# Patient Record
Sex: FEMALE | Race: WHITE | Employment: OTHER | URBAN - METROPOLITAN AREA
[De-identification: names, ages, dates, MRNs, and addresses within clinical notes are randomized per-mention and may not be internally consistent; named-entity substitution may affect disease eponyms.]

---

## 2018-05-29 ENCOUNTER — APPOINTMENT (OUTPATIENT)
Dept: CT IMAGING | Age: 48
End: 2018-05-29
Payer: MEDICARE

## 2018-05-29 ENCOUNTER — HOSPITAL ENCOUNTER (EMERGENCY)
Age: 48
Discharge: HOME | End: 2018-05-29
Attending: EMERGENCY MEDICINE
Payer: MEDICARE

## 2018-05-29 VITALS
WEIGHT: 105 LBS | DIASTOLIC BLOOD PRESSURE: 57 MMHG | RESPIRATION RATE: 18 BRPM | BODY MASS INDEX: 19.32 KG/M2 | HEART RATE: 67 BPM | HEIGHT: 62 IN | OXYGEN SATURATION: 99 % | SYSTOLIC BLOOD PRESSURE: 114 MMHG | TEMPERATURE: 98.2 F

## 2018-05-29 DIAGNOSIS — R10.9 CHRONIC ABDOMINAL PAIN: Primary | ICD-10-CM

## 2018-05-29 DIAGNOSIS — K86.89 PANCREATIC MASS: ICD-10-CM

## 2018-05-29 DIAGNOSIS — G89.29 CHRONIC ABDOMINAL PAIN: Primary | ICD-10-CM

## 2018-05-29 LAB
ALBUMIN SERPL-MCNC: 4.2 G/DL (ref 3.5–5.2)
ALP BLD-CCNC: 50 U/L (ref 35–104)
ALT SERPL-CCNC: 5 U/L (ref 0–32)
AMORPHOUS: NORMAL
ANION GAP SERPL CALCULATED.3IONS-SCNC: 14 MMOL/L (ref 7–16)
AST SERPL-CCNC: 15 U/L (ref 0–31)
BACTERIA: NORMAL /HPF
BASOPHILS ABSOLUTE: 0.04 E9/L (ref 0–0.2)
BASOPHILS RELATIVE PERCENT: 0.5 % (ref 0–2)
BILIRUB SERPL-MCNC: 0.2 MG/DL (ref 0–1.2)
BILIRUBIN URINE: NEGATIVE
BLOOD, URINE: ABNORMAL
BUN BLDV-MCNC: 11 MG/DL (ref 6–20)
CALCIUM SERPL-MCNC: 9.1 MG/DL (ref 8.6–10.2)
CHLORIDE BLD-SCNC: 106 MMOL/L (ref 98–107)
CLARITY: ABNORMAL
CO2: 24 MMOL/L (ref 22–29)
COLOR: YELLOW
CREAT SERPL-MCNC: 0.6 MG/DL (ref 0.5–1)
EOSINOPHILS ABSOLUTE: 0.03 E9/L (ref 0.05–0.5)
EOSINOPHILS RELATIVE PERCENT: 0.4 % (ref 0–6)
EPITHELIAL CELLS, UA: NORMAL /HPF
GFR AFRICAN AMERICAN: >60
GFR NON-AFRICAN AMERICAN: >60 ML/MIN/1.73
GLUCOSE BLD-MCNC: 102 MG/DL (ref 74–109)
GLUCOSE URINE: NEGATIVE MG/DL
HCG(URINE) PREGNANCY TEST: NEGATIVE
HCT VFR BLD CALC: 39.4 % (ref 34–48)
HEMOGLOBIN: 13.4 G/DL (ref 11.5–15.5)
IMMATURE GRANULOCYTES #: 0.01 E9/L
IMMATURE GRANULOCYTES %: 0.1 % (ref 0–5)
KETONES, URINE: NEGATIVE MG/DL
LACTIC ACID: 1 MMOL/L (ref 0.5–2.2)
LEUKOCYTE ESTERASE, URINE: NEGATIVE
LIPASE: 20 U/L (ref 13–60)
LYMPHOCYTES ABSOLUTE: 2.36 E9/L (ref 1.5–4)
LYMPHOCYTES RELATIVE PERCENT: 30.6 % (ref 20–42)
MCH RBC QN AUTO: 30.6 PG (ref 26–35)
MCHC RBC AUTO-ENTMCNC: 34 % (ref 32–34.5)
MCV RBC AUTO: 90 FL (ref 80–99.9)
MONOCYTES ABSOLUTE: 0.4 E9/L (ref 0.1–0.95)
MONOCYTES RELATIVE PERCENT: 5.2 % (ref 2–12)
NEUTROPHILS ABSOLUTE: 4.88 E9/L (ref 1.8–7.3)
NEUTROPHILS RELATIVE PERCENT: 63.2 % (ref 43–80)
NITRITE, URINE: NEGATIVE
PDW BLD-RTO: 13.1 FL (ref 11.5–15)
PH UA: 7.5 (ref 5–9)
PLATELET # BLD: 256 E9/L (ref 130–450)
PMV BLD AUTO: 10.1 FL (ref 7–12)
POTASSIUM SERPL-SCNC: 3.5 MMOL/L (ref 3.5–5)
PROTEIN UA: NEGATIVE MG/DL
RBC # BLD: 4.38 E12/L (ref 3.5–5.5)
RBC UA: >20 /HPF (ref 0–2)
SODIUM BLD-SCNC: 144 MMOL/L (ref 132–146)
SPECIFIC GRAVITY UA: 1.02 (ref 1–1.03)
TOTAL PROTEIN: 6.5 G/DL (ref 6.4–8.3)
UROBILINOGEN, URINE: 0.2 E.U./DL
WBC # BLD: 7.7 E9/L (ref 4.5–11.5)
WBC UA: NORMAL /HPF (ref 0–5)

## 2018-05-29 PROCEDURE — 6360000004 HC RX CONTRAST MEDICATION: Performed by: RADIOLOGY

## 2018-05-29 PROCEDURE — 83690 ASSAY OF LIPASE: CPT

## 2018-05-29 PROCEDURE — 6360000002 HC RX W HCPCS: Performed by: EMERGENCY MEDICINE

## 2018-05-29 PROCEDURE — 85025 COMPLETE CBC W/AUTO DIFF WBC: CPT

## 2018-05-29 PROCEDURE — 96374 THER/PROPH/DIAG INJ IV PUSH: CPT

## 2018-05-29 PROCEDURE — 81001 URINALYSIS AUTO W/SCOPE: CPT

## 2018-05-29 PROCEDURE — 96375 TX/PRO/DX INJ NEW DRUG ADDON: CPT

## 2018-05-29 PROCEDURE — 80053 COMPREHEN METABOLIC PANEL: CPT

## 2018-05-29 PROCEDURE — 99284 EMERGENCY DEPT VISIT MOD MDM: CPT

## 2018-05-29 PROCEDURE — 74177 CT ABD & PELVIS W/CONTRAST: CPT

## 2018-05-29 PROCEDURE — 2580000003 HC RX 258: Performed by: EMERGENCY MEDICINE

## 2018-05-29 PROCEDURE — 81025 URINE PREGNANCY TEST: CPT

## 2018-05-29 PROCEDURE — 6360000002 HC RX W HCPCS

## 2018-05-29 PROCEDURE — 83605 ASSAY OF LACTIC ACID: CPT

## 2018-05-29 RX ORDER — 0.9 % SODIUM CHLORIDE 0.9 %
1000 INTRAVENOUS SOLUTION INTRAVENOUS ONCE
Status: COMPLETED | OUTPATIENT
Start: 2018-05-29 | End: 2018-05-29

## 2018-05-29 RX ORDER — ONDANSETRON 2 MG/ML
4 INJECTION INTRAMUSCULAR; INTRAVENOUS ONCE
Status: COMPLETED | OUTPATIENT
Start: 2018-05-29 | End: 2018-05-29

## 2018-05-29 RX ORDER — HYDROCODONE BITARTRATE AND ACETAMINOPHEN 5; 325 MG/1; MG/1
1 TABLET ORAL EVERY 6 HOURS PRN
Qty: 12 TABLET | Refills: 0 | Status: SHIPPED | OUTPATIENT
Start: 2018-05-29 | End: 2018-06-01

## 2018-05-29 RX ORDER — MORPHINE SULFATE 2 MG/ML
INJECTION, SOLUTION INTRAMUSCULAR; INTRAVENOUS
Status: COMPLETED
Start: 2018-05-29 | End: 2018-05-29

## 2018-05-29 RX ORDER — MORPHINE SULFATE 4 MG/ML
4 INJECTION, SOLUTION INTRAMUSCULAR; INTRAVENOUS ONCE
Status: DISCONTINUED | OUTPATIENT
Start: 2018-05-29 | End: 2018-05-29 | Stop reason: HOSPADM

## 2018-05-29 RX ORDER — ONDANSETRON 4 MG/1
4 TABLET, ORALLY DISINTEGRATING ORAL EVERY 8 HOURS PRN
Qty: 12 TABLET | Refills: 0 | Status: SHIPPED | OUTPATIENT
Start: 2018-05-29 | End: 2019-05-28

## 2018-05-29 RX ADMIN — MORPHINE SULFATE 4 MG: 2 INJECTION, SOLUTION INTRAMUSCULAR; INTRAVENOUS at 15:54

## 2018-05-29 RX ADMIN — IOPAMIDOL 110 ML: 755 INJECTION, SOLUTION INTRAVENOUS at 17:10

## 2018-05-29 RX ADMIN — SODIUM CHLORIDE 1000 ML: 9 INJECTION, SOLUTION INTRAVENOUS at 15:46

## 2018-05-29 RX ADMIN — ONDANSETRON 4 MG: 2 INJECTION INTRAMUSCULAR; INTRAVENOUS at 15:53

## 2018-05-29 ASSESSMENT — PAIN DESCRIPTION - DESCRIPTORS: DESCRIPTORS: SHARP;ACHING

## 2018-05-29 ASSESSMENT — ENCOUNTER SYMPTOMS
VOMITING: 1
DIARRHEA: 1
COUGH: 0
BACK PAIN: 0
BLOOD IN STOOL: 0
NAUSEA: 1
SHORTNESS OF BREATH: 0
ABDOMINAL PAIN: 1

## 2018-05-29 ASSESSMENT — PAIN DESCRIPTION - ONSET: ONSET: PROGRESSIVE

## 2018-05-29 ASSESSMENT — PAIN SCALES - GENERAL
PAINLEVEL_OUTOF10: 10

## 2018-05-29 ASSESSMENT — PAIN DESCRIPTION - FREQUENCY: FREQUENCY: INTERMITTENT

## 2018-05-29 ASSESSMENT — PAIN DESCRIPTION - PROGRESSION: CLINICAL_PROGRESSION: RAPIDLY WORSENING

## 2018-05-29 ASSESSMENT — PAIN DESCRIPTION - ORIENTATION: ORIENTATION: RIGHT;LEFT;MID;LOWER

## 2018-05-29 ASSESSMENT — PAIN DESCRIPTION - LOCATION: LOCATION: ABDOMEN

## 2018-05-29 ASSESSMENT — PAIN DESCRIPTION - PAIN TYPE: TYPE: ACUTE PAIN

## 2018-06-04 ENCOUNTER — HOSPITAL ENCOUNTER (OUTPATIENT)
Dept: GENERAL RADIOLOGY | Age: 48
Discharge: HOME | End: 2018-06-06
Payer: MEDICARE

## 2018-06-04 DIAGNOSIS — R10.9 STOMACH ACHE: ICD-10-CM

## 2018-06-04 PROCEDURE — 74250 X-RAY XM SM INT 1CNTRST STD: CPT

## 2018-06-04 PROCEDURE — 2500000003 HC RX 250 WO HCPCS: Performed by: RADIOLOGY

## 2018-06-04 RX ADMIN — BARIUM SULFATE 176 G: 960 POWDER, FOR SUSPENSION ORAL at 09:32

## 2018-07-07 ENCOUNTER — APPOINTMENT (OUTPATIENT)
Dept: CT IMAGING | Age: 48
End: 2018-07-07
Payer: MEDICARE

## 2018-07-07 ENCOUNTER — HOSPITAL ENCOUNTER (EMERGENCY)
Age: 48
Discharge: HOME | End: 2018-07-07
Attending: EMERGENCY MEDICINE
Payer: MEDICARE

## 2018-07-07 VITALS
SYSTOLIC BLOOD PRESSURE: 108 MMHG | DIASTOLIC BLOOD PRESSURE: 62 MMHG | BODY MASS INDEX: 18.4 KG/M2 | RESPIRATION RATE: 16 BRPM | TEMPERATURE: 97.7 F | WEIGHT: 100 LBS | OXYGEN SATURATION: 95 % | HEART RATE: 69 BPM | HEIGHT: 62 IN

## 2018-07-07 DIAGNOSIS — R19.7 DIARRHEA, UNSPECIFIED TYPE: ICD-10-CM

## 2018-07-07 DIAGNOSIS — I95.9 HYPOTENSION, UNSPECIFIED HYPOTENSION TYPE: ICD-10-CM

## 2018-07-07 DIAGNOSIS — K86.89 PANCREATIC MASS: ICD-10-CM

## 2018-07-07 DIAGNOSIS — K59.00 CONSTIPATION, UNSPECIFIED CONSTIPATION TYPE: Primary | ICD-10-CM

## 2018-07-07 LAB
ALBUMIN SERPL-MCNC: 4.4 G/DL (ref 3.5–5.2)
ALP BLD-CCNC: 53 U/L (ref 35–104)
ALT SERPL-CCNC: <5 U/L (ref 0–32)
ANION GAP SERPL CALCULATED.3IONS-SCNC: 13 MMOL/L (ref 7–16)
AST SERPL-CCNC: 14 U/L (ref 0–31)
BACTERIA: ABNORMAL /HPF
BASOPHILS ABSOLUTE: 0.04 E9/L (ref 0–0.2)
BASOPHILS RELATIVE PERCENT: 0.4 % (ref 0–2)
BILIRUB SERPL-MCNC: 0.3 MG/DL (ref 0–1.2)
BILIRUBIN URINE: NEGATIVE
BLOOD, URINE: NEGATIVE
BUN BLDV-MCNC: 10 MG/DL (ref 6–20)
CALCIUM SERPL-MCNC: 9.5 MG/DL (ref 8.6–10.2)
CHLORIDE BLD-SCNC: 99 MMOL/L (ref 98–107)
CLARITY: CLEAR
CO2: 26 MMOL/L (ref 22–29)
COLOR: YELLOW
CREAT SERPL-MCNC: 0.7 MG/DL (ref 0.5–1)
EOSINOPHILS ABSOLUTE: 0.08 E9/L (ref 0.05–0.5)
EOSINOPHILS RELATIVE PERCENT: 0.9 % (ref 0–6)
EPITHELIAL CELLS, UA: ABNORMAL /HPF
GFR AFRICAN AMERICAN: >60
GFR NON-AFRICAN AMERICAN: >60 ML/MIN/1.73
GLUCOSE BLD-MCNC: 100 MG/DL (ref 74–109)
GLUCOSE URINE: NEGATIVE MG/DL
HCG(URINE) PREGNANCY TEST: NEGATIVE
HCT VFR BLD CALC: 40.3 % (ref 34–48)
HEMOGLOBIN: 13.8 G/DL (ref 11.5–15.5)
IMMATURE GRANULOCYTES #: 0.03 E9/L
IMMATURE GRANULOCYTES %: 0.3 % (ref 0–5)
KETONES, URINE: NEGATIVE MG/DL
LACTIC ACID: 0.8 MMOL/L (ref 0.5–2.2)
LEUKOCYTE ESTERASE, URINE: ABNORMAL
LIPASE: 19 U/L (ref 13–60)
LYMPHOCYTES ABSOLUTE: 2.25 E9/L (ref 1.5–4)
LYMPHOCYTES RELATIVE PERCENT: 25.3 % (ref 20–42)
MCH RBC QN AUTO: 30.8 PG (ref 26–35)
MCHC RBC AUTO-ENTMCNC: 34.2 % (ref 32–34.5)
MCV RBC AUTO: 90 FL (ref 80–99.9)
MONOCYTES ABSOLUTE: 0.48 E9/L (ref 0.1–0.95)
MONOCYTES RELATIVE PERCENT: 5.4 % (ref 2–12)
MUCUS: PRESENT
NEUTROPHILS ABSOLUTE: 6.03 E9/L (ref 1.8–7.3)
NEUTROPHILS RELATIVE PERCENT: 67.7 % (ref 43–80)
NITRITE, URINE: NEGATIVE
PDW BLD-RTO: 13.1 FL (ref 11.5–15)
PH UA: 6.5 (ref 5–9)
PLATELET # BLD: 256 E9/L (ref 130–450)
PMV BLD AUTO: 9.7 FL (ref 7–12)
POTASSIUM SERPL-SCNC: 4.1 MMOL/L (ref 3.5–5)
PROTEIN UA: NEGATIVE MG/DL
RBC # BLD: 4.48 E12/L (ref 3.5–5.5)
RBC UA: ABNORMAL /HPF (ref 0–2)
SODIUM BLD-SCNC: 138 MMOL/L (ref 132–146)
SPECIFIC GRAVITY UA: <=1.005 (ref 1–1.03)
TOTAL PROTEIN: 6.9 G/DL (ref 6.4–8.3)
UROBILINOGEN, URINE: 0.2 E.U./DL
WBC # BLD: 8.9 E9/L (ref 4.5–11.5)
WBC UA: ABNORMAL /HPF (ref 0–5)

## 2018-07-07 PROCEDURE — 81025 URINE PREGNANCY TEST: CPT

## 2018-07-07 PROCEDURE — 74177 CT ABD & PELVIS W/CONTRAST: CPT

## 2018-07-07 PROCEDURE — 83605 ASSAY OF LACTIC ACID: CPT

## 2018-07-07 PROCEDURE — 96375 TX/PRO/DX INJ NEW DRUG ADDON: CPT

## 2018-07-07 PROCEDURE — C9113 INJ PANTOPRAZOLE SODIUM, VIA: HCPCS | Performed by: EMERGENCY MEDICINE

## 2018-07-07 PROCEDURE — 83690 ASSAY OF LIPASE: CPT

## 2018-07-07 PROCEDURE — 99284 EMERGENCY DEPT VISIT MOD MDM: CPT

## 2018-07-07 PROCEDURE — 6360000004 HC RX CONTRAST MEDICATION: Performed by: RADIOLOGY

## 2018-07-07 PROCEDURE — 6360000002 HC RX W HCPCS: Performed by: EMERGENCY MEDICINE

## 2018-07-07 PROCEDURE — 85025 COMPLETE CBC W/AUTO DIFF WBC: CPT

## 2018-07-07 PROCEDURE — 81001 URINALYSIS AUTO W/SCOPE: CPT

## 2018-07-07 PROCEDURE — 96374 THER/PROPH/DIAG INJ IV PUSH: CPT

## 2018-07-07 PROCEDURE — 80053 COMPREHEN METABOLIC PANEL: CPT

## 2018-07-07 PROCEDURE — 2580000003 HC RX 258: Performed by: EMERGENCY MEDICINE

## 2018-07-07 RX ORDER — ONDANSETRON 2 MG/ML
4 INJECTION INTRAMUSCULAR; INTRAVENOUS ONCE
Status: COMPLETED | OUTPATIENT
Start: 2018-07-07 | End: 2018-07-07

## 2018-07-07 RX ORDER — PANTOPRAZOLE SODIUM 40 MG/10ML
40 INJECTION, POWDER, LYOPHILIZED, FOR SOLUTION INTRAVENOUS ONCE
Status: COMPLETED | OUTPATIENT
Start: 2018-07-07 | End: 2018-07-07

## 2018-07-07 RX ORDER — SODIUM CHLORIDE 9 MG/ML
INJECTION, SOLUTION INTRAVENOUS ONCE
Status: DISCONTINUED | OUTPATIENT
Start: 2018-07-07 | End: 2018-07-07 | Stop reason: HOSPADM

## 2018-07-07 RX ORDER — 0.9 % SODIUM CHLORIDE 0.9 %
500 INTRAVENOUS SOLUTION INTRAVENOUS ONCE
Status: COMPLETED | OUTPATIENT
Start: 2018-07-07 | End: 2018-07-07

## 2018-07-07 RX ORDER — DICYCLOMINE HYDROCHLORIDE 10 MG/1
20 CAPSULE ORAL 3 TIMES DAILY PRN
Qty: 20 CAPSULE | Refills: 0 | Status: SHIPPED | OUTPATIENT
Start: 2018-07-07 | End: 2018-10-09

## 2018-07-07 RX ORDER — DOCUSATE SODIUM 100 MG/1
100 CAPSULE, LIQUID FILLED ORAL 2 TIMES DAILY PRN
Qty: 20 CAPSULE | Refills: 0 | Status: SHIPPED | OUTPATIENT
Start: 2018-07-07 | End: 2018-07-12

## 2018-07-07 RX ORDER — SODIUM CHLORIDE 9 MG/ML
INJECTION, SOLUTION INTRAVENOUS ONCE
Status: COMPLETED | OUTPATIENT
Start: 2018-07-07 | End: 2018-07-07

## 2018-07-07 RX ADMIN — PANTOPRAZOLE SODIUM 40 MG: 40 INJECTION, POWDER, FOR SOLUTION INTRAVENOUS at 03:37

## 2018-07-07 RX ADMIN — ONDANSETRON 4 MG: 2 INJECTION INTRAMUSCULAR; INTRAVENOUS at 03:37

## 2018-07-07 RX ADMIN — SODIUM CHLORIDE: 9 INJECTION, SOLUTION INTRAVENOUS at 03:37

## 2018-07-07 RX ADMIN — SODIUM CHLORIDE 500 ML: 9 INJECTION, SOLUTION INTRAVENOUS at 03:37

## 2018-07-07 RX ADMIN — IOPAMIDOL 110 ML: 755 INJECTION, SOLUTION INTRAVENOUS at 04:24

## 2018-07-07 ASSESSMENT — PAIN SCALES - GENERAL: PAINLEVEL_OUTOF10: 10

## 2018-07-07 NOTE — ED PROVIDER NOTES
Department of Emergency Medicine   ED  Provider Note  Admit Date/RoomTime: 7/7/2018  1:22 AM  ED Room: 22/22      History of Present Illness:  7/7/18, Time: 2:26 AM         Jerlene Hamman is a 50 y.o. female presenting to the ED for abdominal pain. The complaint has been persistent, moderate in severity, and worsened by nothing. Patient reports that she has been having abdominal pain for the past several months. Reports that she has not had a BM for 5 days and her last BM was \"runny\". Notes that she had diarrhea prior to being constipated. She states that she did see a general surgeon at [facility] today who advised her to return to the clinic for follow-up and MRCP in 1 year and see PCP and/or gastroenterologist for ongoing abdominal pain/digestive issues. Dx with IBS. Pt has no other complaints. Pt denies any loc, chest pain, sob, palpitations, fever, chills, nausea, vomiting, neck pain, extremity pain, edema, HA, cough, congestion, visual disturbances, dysuria, hematuria, weakness, numbness, tingling, dizziness or any other further symptoms at this time. Review of Systems:   Pertinent positives and negatives are stated within HPI, all other systems reviewed and are negative.    --------------------------------------------- PAST HISTORY ------------------------------------------  Past Medical History:  has a past medical history of Anxiety; Bipolar 1 disorder ([facility] [address] and PTSD (post-traumatic stress disorder). Past Surgical History:  has a past surgical history that includes tumor removal.    Social History:  reports that she has been smoking Cigarettes. She has been smoking about 0.50 packs per day. She has never used smokeless tobacco. She reports that she does not drink alcohol or use drugs. Family History: family history is not on file. The patients home medications have been reviewed. Allergies: Ativan [lorazepam];  Seroquel [quetiapine fumarate]; and Sulfa antibiotics    ----------------------------------------------PHYSICAL EXAM--------------------------------------    Constitutional/General: Alert and oriented x3, well appearing, non toxic in NAD  Head: Normocephalic and atraumatic  Eyes: PERRL, EOMI, conjunctiva normal, sclera non icteric  Mouth: Oropharynx clear  Neck: Supple, no JVD, full ROM,  Respiratory: Lungs clear to auscultation bilaterally, no wheezes, rales, or rhonchi. Not in respiratory distress  Cardiovascular:  Regular rate. Regular rhythm. No murmurs, gallops, or rubs. 2+ distal pulses  Chest: No chest wall tenderness  GI:  Abdomen Soft, Non tender, Non distended. +BS. No rebound, guarding, or rigidity. No pulsatile masses. Musculoskeletal: Moves all extremities x 4. Warm and well perfused, no clubbing, cyanosis, or edema. Integument: Skin warm and dry. No rashes. Neurologic: GCS 15, no focal deficits, symmetric strength 5/5 in the upper and lower extremities bilaterally  Psychiatric: Normal Affect    -------------------------------------------------- RESULTS -------------------------------------------------  I have personally reviewed all laboratory and imaging results for this patient. Results are listed below.      LABS:  Results for orders placed or performed during the hospital encounter of 07/07/18   Comprehensive Metabolic Panel   Result Value Ref Range    Sodium 138 132 - 146 mmol/L    Potassium 4.1 3.5 - 5.0 mmol/L    Chloride 99 98 - 107 mmol/L    CO2 26 22 - 29 mmol/L    Anion Gap 13 7 - 16 mmol/L    Glucose 100 74 - 109 mg/dL    BUN 10 6 - 20 mg/dL    CREATININE 0.7 0.5 - 1.0 mg/dL    GFR Non-African American >60 >=60 mL/min/1.73    GFR African American >60     Calcium 9.5 8.6 - 10.2 mg/dL    Total Protein 6.9 6.4 - 8.3 g/dL    Alb 4.4 3.5 - 5.2 g/dL    Total Bilirubin 0.3 0.0 - 1.2 mg/dL    Alkaline Phosphatase 53 35 - 104 U/L    ALT <5 0 - 32 U/L    AST 14 0 - 31 U/L   CBC Auto Differential   Result Value Ref Range    WBC 8.9 4.5 -

## 2018-10-08 ENCOUNTER — TELEPHONE (OUTPATIENT)
Dept: SURGERY | Age: 48
End: 2018-10-08

## 2018-10-09 ENCOUNTER — HOSPITAL ENCOUNTER (OUTPATIENT)
Age: 48
Discharge: HOME | End: 2018-10-11
Payer: MEDICARE

## 2018-10-09 ENCOUNTER — PROCEDURE VISIT (OUTPATIENT)
Dept: SURGERY | Age: 48
End: 2018-10-09
Payer: MEDICARE

## 2018-10-09 VITALS
DIASTOLIC BLOOD PRESSURE: 56 MMHG | BODY MASS INDEX: 18.4 KG/M2 | OXYGEN SATURATION: 97 % | HEART RATE: 90 BPM | WEIGHT: 100 LBS | TEMPERATURE: 97.3 F | SYSTOLIC BLOOD PRESSURE: 101 MMHG | HEIGHT: 62 IN

## 2018-10-09 DIAGNOSIS — L72.3 SEBACEOUS CYST OF LEFT AXILLA: ICD-10-CM

## 2018-10-09 DIAGNOSIS — L08.9 INFECTED EPIDERMOID CYST: Primary | ICD-10-CM

## 2018-10-09 DIAGNOSIS — L72.0 INFECTED EPIDERMOID CYST: Primary | ICD-10-CM

## 2018-10-09 PROCEDURE — G8427 DOCREV CUR MEDS BY ELIG CLIN: HCPCS | Performed by: SURGERY

## 2018-10-09 PROCEDURE — 99203 OFFICE O/P NEW LOW 30 MIN: CPT | Performed by: SURGERY

## 2018-10-09 PROCEDURE — G8484 FLU IMMUNIZE NO ADMIN: HCPCS | Performed by: SURGERY

## 2018-10-09 PROCEDURE — 87205 SMEAR GRAM STAIN: CPT

## 2018-10-09 PROCEDURE — 4004F PT TOBACCO SCREEN RCVD TLK: CPT | Performed by: SURGERY

## 2018-10-09 PROCEDURE — 87070 CULTURE OTHR SPECIMN AEROBIC: CPT

## 2018-10-09 PROCEDURE — G8419 CALC BMI OUT NRM PARAM NOF/U: HCPCS | Performed by: SURGERY

## 2018-10-09 RX ORDER — GABAPENTIN 600 MG/1
600 TABLET ORAL 3 TIMES DAILY
COMMUNITY
End: 2019-06-11 | Stop reason: SDUPTHER

## 2018-10-09 RX ORDER — LIDOCAINE HYDROCHLORIDE 10 MG/ML
10 INJECTION, SOLUTION EPIDURAL; INFILTRATION; INTRACAUDAL; PERINEURAL ONCE
Status: DISCONTINUED | OUTPATIENT
Start: 2018-10-09 | End: 2019-11-15

## 2018-10-09 RX ORDER — PRIMIDONE 50 MG/1
50 TABLET ORAL EVERY EVENING
Status: ON HOLD | COMMUNITY
Start: 2017-07-31 | End: 2019-01-03 | Stop reason: ALTCHOICE

## 2018-10-12 LAB
GRAM STAIN RESULT: NORMAL
WOUND/ABSCESS: NORMAL

## 2018-10-19 ENCOUNTER — OFFICE VISIT (OUTPATIENT)
Dept: SURGERY | Age: 48
End: 2018-10-19
Payer: MEDICARE

## 2018-10-19 VITALS
HEIGHT: 62 IN | WEIGHT: 100 LBS | BODY MASS INDEX: 18.4 KG/M2 | TEMPERATURE: 98.6 F | DIASTOLIC BLOOD PRESSURE: 70 MMHG | HEART RATE: 71 BPM | SYSTOLIC BLOOD PRESSURE: 100 MMHG | OXYGEN SATURATION: 97 %

## 2018-10-19 DIAGNOSIS — L72.0 INFECTED EPIDERMOID CYST: Primary | ICD-10-CM

## 2018-10-19 DIAGNOSIS — L08.9 INFECTED EPIDERMOID CYST: Primary | ICD-10-CM

## 2018-10-19 PROCEDURE — G8427 DOCREV CUR MEDS BY ELIG CLIN: HCPCS | Performed by: SURGERY

## 2018-10-19 PROCEDURE — 4004F PT TOBACCO SCREEN RCVD TLK: CPT | Performed by: SURGERY

## 2018-10-19 PROCEDURE — G8419 CALC BMI OUT NRM PARAM NOF/U: HCPCS | Performed by: SURGERY

## 2018-10-19 PROCEDURE — G8484 FLU IMMUNIZE NO ADMIN: HCPCS | Performed by: SURGERY

## 2018-10-19 PROCEDURE — 99213 OFFICE O/P EST LOW 20 MIN: CPT | Performed by: SURGERY

## 2018-12-11 ENCOUNTER — OFFICE VISIT (OUTPATIENT)
Dept: ORTHOPEDIC SURGERY | Age: 48
End: 2018-12-11
Payer: MEDICARE

## 2018-12-11 VITALS
SYSTOLIC BLOOD PRESSURE: 118 MMHG | TEMPERATURE: 98.2 F | WEIGHT: 105 LBS | HEART RATE: 73 BPM | HEIGHT: 62 IN | DIASTOLIC BLOOD PRESSURE: 82 MMHG | BODY MASS INDEX: 19.32 KG/M2

## 2018-12-11 DIAGNOSIS — M25.551 RIGHT HIP PAIN: Primary | ICD-10-CM

## 2018-12-11 PROCEDURE — G8484 FLU IMMUNIZE NO ADMIN: HCPCS | Performed by: ORTHOPAEDIC SURGERY

## 2018-12-11 PROCEDURE — 99203 OFFICE O/P NEW LOW 30 MIN: CPT | Performed by: ORTHOPAEDIC SURGERY

## 2018-12-11 PROCEDURE — 4004F PT TOBACCO SCREEN RCVD TLK: CPT | Performed by: ORTHOPAEDIC SURGERY

## 2018-12-11 PROCEDURE — G8427 DOCREV CUR MEDS BY ELIG CLIN: HCPCS | Performed by: ORTHOPAEDIC SURGERY

## 2018-12-11 PROCEDURE — G8420 CALC BMI NORM PARAMETERS: HCPCS | Performed by: ORTHOPAEDIC SURGERY

## 2018-12-11 RX ORDER — CYCLOBENZAPRINE HCL 10 MG
10 TABLET ORAL
COMMUNITY
Start: 2017-07-31 | End: 2019-06-11 | Stop reason: SDUPTHER

## 2018-12-17 ENCOUNTER — TELEPHONE (OUTPATIENT)
Dept: ORTHOPEDIC SURGERY | Age: 48
End: 2018-12-17

## 2018-12-21 RX ORDER — PROPRANOLOL HCL 60 MG
60 CAPSULE, EXTENDED RELEASE 24HR ORAL 2 TIMES DAILY
COMMUNITY
End: 2019-06-11 | Stop reason: SDUPTHER

## 2018-12-27 ENCOUNTER — TELEPHONE (OUTPATIENT)
Dept: ORTHOPEDIC SURGERY | Age: 48
End: 2018-12-27

## 2018-12-27 NOTE — TELEPHONE ENCOUNTER
Pre [facility] is required for R hip injection under fluro (CPT: 77380) Follow Medicare guidelines.

## 2018-12-27 NOTE — TELEPHONE ENCOUNTER
Pt notified of all appt dates, times, and locations. Pt verbalized understanding and she is agreeable to this plan.

## 2019-01-02 ENCOUNTER — ANESTHESIA EVENT (OUTPATIENT)
Dept: OPERATING ROOM | Age: 49
End: 2019-01-02
Payer: MEDICARE

## 2019-01-03 ENCOUNTER — APPOINTMENT (OUTPATIENT)
Dept: GENERAL RADIOLOGY | Age: 49
End: 2019-01-03
Attending: ORTHOPAEDIC SURGERY
Payer: MEDICARE

## 2019-01-03 ENCOUNTER — HOSPITAL ENCOUNTER (OUTPATIENT)
Age: 49
Setting detail: OUTPATIENT SURGERY
Discharge: HOME | End: 2019-01-03
Attending: ORTHOPAEDIC SURGERY | Admitting: ORTHOPAEDIC SURGERY
Payer: MEDICARE

## 2019-01-03 ENCOUNTER — ANESTHESIA (OUTPATIENT)
Dept: OPERATING ROOM | Age: 49
End: 2019-01-03
Payer: MEDICARE

## 2019-01-03 VITALS
BODY MASS INDEX: 20.24 KG/M2 | TEMPERATURE: 97.2 F | DIASTOLIC BLOOD PRESSURE: 60 MMHG | OXYGEN SATURATION: 98 % | RESPIRATION RATE: 20 BRPM | SYSTOLIC BLOOD PRESSURE: 123 MMHG | WEIGHT: 110 LBS | HEIGHT: 62 IN | HEART RATE: 78 BPM

## 2019-01-03 VITALS — DIASTOLIC BLOOD PRESSURE: 55 MMHG | OXYGEN SATURATION: 98 % | SYSTOLIC BLOOD PRESSURE: 96 MMHG

## 2019-01-03 LAB — HCG(URINE) PREGNANCY TEST: NEGATIVE

## 2019-01-03 PROCEDURE — 2580000003 HC RX 258: Performed by: NURSE ANESTHETIST, CERTIFIED REGISTERED

## 2019-01-03 PROCEDURE — 3600000002 HC SURGERY LEVEL 2 BASE: Performed by: ORTHOPAEDIC SURGERY

## 2019-01-03 PROCEDURE — 77002 NEEDLE LOCALIZATION BY XRAY: CPT | Performed by: ORTHOPAEDIC SURGERY

## 2019-01-03 PROCEDURE — 3700000001 HC ADD 15 MINUTES (ANESTHESIA): Performed by: ORTHOPAEDIC SURGERY

## 2019-01-03 PROCEDURE — 81025 URINE PREGNANCY TEST: CPT

## 2019-01-03 PROCEDURE — 7100000010 HC PHASE II RECOVERY - FIRST 15 MIN: Performed by: ORTHOPAEDIC SURGERY

## 2019-01-03 PROCEDURE — 6360000002 HC RX W HCPCS: Performed by: ORTHOPAEDIC SURGERY

## 2019-01-03 PROCEDURE — 6360000002 HC RX W HCPCS: Performed by: NURSE ANESTHETIST, CERTIFIED REGISTERED

## 2019-01-03 PROCEDURE — 7100000011 HC PHASE II RECOVERY - ADDTL 15 MIN: Performed by: ORTHOPAEDIC SURGERY

## 2019-01-03 PROCEDURE — 2500000003 HC RX 250 WO HCPCS: Performed by: ORTHOPAEDIC SURGERY

## 2019-01-03 PROCEDURE — 20610 DRAIN/INJ JOINT/BURSA W/O US: CPT | Performed by: ORTHOPAEDIC SURGERY

## 2019-01-03 PROCEDURE — 2709999900 HC NON-CHARGEABLE SUPPLY: Performed by: ORTHOPAEDIC SURGERY

## 2019-01-03 PROCEDURE — 3209999900 FLUORO FOR SURGICAL PROCEDURES

## 2019-01-03 PROCEDURE — 6360000004 HC RX CONTRAST MEDICATION: Performed by: ORTHOPAEDIC SURGERY

## 2019-01-03 PROCEDURE — 3600000012 HC SURGERY LEVEL 2 ADDTL 15MIN: Performed by: ORTHOPAEDIC SURGERY

## 2019-01-03 PROCEDURE — 3700000000 HC ANESTHESIA ATTENDED CARE: Performed by: ORTHOPAEDIC SURGERY

## 2019-01-03 RX ORDER — SODIUM CHLORIDE 9 MG/ML
INJECTION, SOLUTION INTRAVENOUS CONTINUOUS
Status: DISCONTINUED | OUTPATIENT
Start: 2019-01-03 | End: 2019-01-03 | Stop reason: HOSPADM

## 2019-01-03 RX ORDER — SODIUM CHLORIDE 9 MG/ML
INJECTION, SOLUTION INTRAVENOUS CONTINUOUS PRN
Status: DISCONTINUED | OUTPATIENT
Start: 2019-01-03 | End: 2019-01-03 | Stop reason: SDUPTHER

## 2019-01-03 RX ORDER — PROPOFOL 10 MG/ML
INJECTION, EMULSION INTRAVENOUS PRN
Status: DISCONTINUED | OUTPATIENT
Start: 2019-01-03 | End: 2019-01-03 | Stop reason: SDUPTHER

## 2019-01-03 RX ORDER — LIDOCAINE HYDROCHLORIDE 20 MG/ML
INJECTION, SOLUTION INFILTRATION; PERINEURAL PRN
Status: DISCONTINUED | OUTPATIENT
Start: 2019-01-03 | End: 2019-01-03 | Stop reason: HOSPADM

## 2019-01-03 RX ORDER — HYDROCODONE BITARTRATE AND ACETAMINOPHEN 5; 325 MG/1; MG/1
2 TABLET ORAL PRN
Status: DISCONTINUED | OUTPATIENT
Start: 2019-01-03 | End: 2019-01-03 | Stop reason: HOSPADM

## 2019-01-03 RX ORDER — FENTANYL CITRATE 50 UG/ML
INJECTION, SOLUTION INTRAMUSCULAR; INTRAVENOUS PRN
Status: DISCONTINUED | OUTPATIENT
Start: 2019-01-03 | End: 2019-01-03 | Stop reason: SDUPTHER

## 2019-01-03 RX ORDER — SODIUM CHLORIDE 0.9 % (FLUSH) 0.9 %
10 SYRINGE (ML) INJECTION PRN
Status: DISCONTINUED | OUTPATIENT
Start: 2019-01-03 | End: 2019-01-03 | Stop reason: HOSPADM

## 2019-01-03 RX ORDER — HYDROCODONE BITARTRATE AND ACETAMINOPHEN 5; 325 MG/1; MG/1
1 TABLET ORAL PRN
Status: DISCONTINUED | OUTPATIENT
Start: 2019-01-03 | End: 2019-01-03 | Stop reason: HOSPADM

## 2019-01-03 RX ORDER — PRIMIDONE 50 MG/1
50 TABLET ORAL NIGHTLY
COMMUNITY
End: 2019-05-28

## 2019-01-03 RX ORDER — MIDAZOLAM HYDROCHLORIDE 1 MG/ML
INJECTION INTRAMUSCULAR; INTRAVENOUS PRN
Status: DISCONTINUED | OUTPATIENT
Start: 2019-01-03 | End: 2019-01-03 | Stop reason: SDUPTHER

## 2019-01-03 RX ORDER — SODIUM CHLORIDE 0.9 % (FLUSH) 0.9 %
10 SYRINGE (ML) INJECTION EVERY 12 HOURS SCHEDULED
Status: DISCONTINUED | OUTPATIENT
Start: 2019-01-03 | End: 2019-01-03 | Stop reason: HOSPADM

## 2019-01-03 RX ADMIN — SODIUM CHLORIDE: 9 INJECTION, SOLUTION INTRAVENOUS at 07:02

## 2019-01-03 RX ADMIN — FENTANYL CITRATE 25 MCG: 50 INJECTION, SOLUTION INTRAMUSCULAR; INTRAVENOUS at 07:08

## 2019-01-03 RX ADMIN — MIDAZOLAM HYDROCHLORIDE 1 MG: 1 INJECTION, SOLUTION INTRAMUSCULAR; INTRAVENOUS at 07:06

## 2019-01-03 RX ADMIN — MIDAZOLAM HYDROCHLORIDE 1 MG: 1 INJECTION, SOLUTION INTRAMUSCULAR; INTRAVENOUS at 07:02

## 2019-01-03 RX ADMIN — PROPOFOL 100 MG: 10 INJECTION, EMULSION INTRAVENOUS at 07:08

## 2019-01-03 ASSESSMENT — PULMONARY FUNCTION TESTS
PIF_VALUE: 1
PIF_VALUE: 0
PIF_VALUE: 1
PIF_VALUE: 1
PIF_VALUE: 0
PIF_VALUE: 0
PIF_VALUE: 1
PIF_VALUE: 1
PIF_VALUE: 0
PIF_VALUE: 0
PIF_VALUE: 1
PIF_VALUE: 1
PIF_VALUE: 0
PIF_VALUE: 1
PIF_VALUE: 0
PIF_VALUE: 1

## 2019-01-03 ASSESSMENT — PAIN DESCRIPTION - ORIENTATION
ORIENTATION: RIGHT

## 2019-01-03 ASSESSMENT — PAIN DESCRIPTION - LOCATION
LOCATION: HIP

## 2019-01-03 ASSESSMENT — PAIN SCALES - GENERAL
PAINLEVEL_OUTOF10: 0

## 2019-01-03 ASSESSMENT — PAIN DESCRIPTION - PAIN TYPE
TYPE: ACUTE PAIN

## 2019-01-03 ASSESSMENT — LIFESTYLE VARIABLES: SMOKING_STATUS: 1

## 2019-01-03 ASSESSMENT — PAIN DESCRIPTION - PROGRESSION
CLINICAL_PROGRESSION: NOT CHANGED
CLINICAL_PROGRESSION: NOT CHANGED

## 2019-01-03 ASSESSMENT — PAIN - FUNCTIONAL ASSESSMENT: PAIN_FUNCTIONAL_ASSESSMENT: 0-10

## 2019-01-15 ENCOUNTER — TELEPHONE (OUTPATIENT)
Dept: ORTHOPEDIC SURGERY | Age: 49
End: 2019-01-15

## 2019-04-18 ENCOUNTER — TELEPHONE (OUTPATIENT)
Dept: ORTHOPEDIC SURGERY | Age: 49
End: 2019-04-18

## 2019-05-28 ENCOUNTER — OFFICE VISIT (OUTPATIENT)
Dept: ORTHOPEDIC SURGERY | Age: 49
End: 2019-05-28
Payer: MEDICARE

## 2019-05-28 VITALS
SYSTOLIC BLOOD PRESSURE: 118 MMHG | HEIGHT: 62 IN | DIASTOLIC BLOOD PRESSURE: 77 MMHG | WEIGHT: 110 LBS | HEART RATE: 71 BPM | BODY MASS INDEX: 20.24 KG/M2

## 2019-05-28 DIAGNOSIS — M25.859 FEMORAL ACETABULAR IMPINGEMENT: Primary | ICD-10-CM

## 2019-05-28 DIAGNOSIS — M25.551 RIGHT HIP PAIN: ICD-10-CM

## 2019-05-28 PROCEDURE — 4004F PT TOBACCO SCREEN RCVD TLK: CPT | Performed by: ORTHOPAEDIC SURGERY

## 2019-05-28 PROCEDURE — G8420 CALC BMI NORM PARAMETERS: HCPCS | Performed by: ORTHOPAEDIC SURGERY

## 2019-05-28 PROCEDURE — 99213 OFFICE O/P EST LOW 20 MIN: CPT | Performed by: ORTHOPAEDIC SURGERY

## 2019-05-28 PROCEDURE — G8427 DOCREV CUR MEDS BY ELIG CLIN: HCPCS | Performed by: ORTHOPAEDIC SURGERY

## 2019-05-28 RX ORDER — VORTIOXETINE 20 MG/1
TABLET, FILM COATED ORAL
Refills: 0 | COMMUNITY
Start: 2019-05-23 | End: 2020-01-23 | Stop reason: ALTCHOICE

## 2019-05-28 NOTE — PROGRESS NOTES
Post Operative Visit     Surgery: Right hip arthrogram and injection under fluoroscopy, with anesthesia   Date: 1/3/19    Subjective:    Meghann Nielsen is here for follow up visit s/p above procedure. She states she got about 1 week of relief from the injection. Her pain has returned. It is mainly in her groin. It is essentially constant. Controlled Substances Monitoring:        Physical Exam:    Height: 5' 2\" (1.575 m), Weight: 110 lb (49.9 kg), BP: 118/77    On exam of the hip, there is positive impingement testing. Hip flexion to 90° is painful. There is no tenderness laterally or posterior      Imaging: No new imaging. Previous x-rays were reviewed showing cam bump on femur with maintained joint space    Assessment and Plan:  Right hip pain with femoral acetabular impingement  We discussed her pain today. I would like to obtain an MR arthrogram to assess her hip. She may be a candidate for arthroscopic management.   We will see her back once the MRI is complete    Adrianna Truong MD  Orthopaedic Surgery   5/28/19  9:40 AM

## 2019-06-04 ENCOUNTER — TELEPHONE (OUTPATIENT)
Dept: ORTHOPEDIC SURGERY | Age: 49
End: 2019-06-04

## 2019-06-04 ENCOUNTER — TELEPHONE (OUTPATIENT)
Dept: FAMILY MEDICINE CLINIC | Age: 49
End: 2019-06-04

## 2019-06-04 DIAGNOSIS — G43.709 CHRONIC MIGRAINE WITHOUT AURA WITHOUT STATUS MIGRAINOSUS, NOT INTRACTABLE: Primary | ICD-10-CM

## 2019-06-04 NOTE — TELEPHONE ENCOUNTER
Tried to return pt's vm in ref to her MRI/arthrogram, I was unable to leave vm. I verified MRI/arthrogram order was placed.  Pt should call scheduling at [phone]

## 2019-06-07 ENCOUNTER — TELEPHONE (OUTPATIENT)
Dept: FAMILY MEDICINE CLINIC | Age: 49
End: 2019-06-07

## 2019-06-10 PROBLEM — F33.41 RECURRENT MAJOR DEPRESSION IN PARTIAL REMISSION (HCC): Status: ACTIVE | Noted: 2017-08-02

## 2019-06-10 PROBLEM — F41.9 ANXIETY: Status: ACTIVE | Noted: 2017-08-02

## 2019-06-10 PROBLEM — G44.52 NEW DAILY PERSISTENT HEADACHE: Status: ACTIVE | Noted: 2017-08-02

## 2019-06-11 ENCOUNTER — TELEPHONE (OUTPATIENT)
Dept: FAMILY MEDICINE CLINIC | Age: 49
End: 2019-06-11

## 2019-06-11 ENCOUNTER — OFFICE VISIT (OUTPATIENT)
Dept: FAMILY MEDICINE CLINIC | Age: 49
End: 2019-06-11
Payer: MEDICARE

## 2019-06-11 ENCOUNTER — HOSPITAL ENCOUNTER (OUTPATIENT)
Age: 49
Discharge: HOME | End: 2019-06-13
Payer: MEDICARE

## 2019-06-11 VITALS — HEIGHT: 62 IN | RESPIRATION RATE: 15 BRPM | BODY MASS INDEX: 21.16 KG/M2 | WEIGHT: 115 LBS

## 2019-06-11 DIAGNOSIS — K58.0 IRRITABLE BOWEL SYNDROME WITH DIARRHEA: ICD-10-CM

## 2019-06-11 DIAGNOSIS — F33.41 RECURRENT MAJOR DEPRESSION IN PARTIAL REMISSION (HCC): Primary | ICD-10-CM

## 2019-06-11 DIAGNOSIS — F33.41 RECURRENT MAJOR DEPRESSION IN PARTIAL REMISSION (HCC): ICD-10-CM

## 2019-06-11 DIAGNOSIS — G44.52 NEW DAILY PERSISTENT HEADACHE: ICD-10-CM

## 2019-06-11 LAB
ALBUMIN SERPL-MCNC: 4.6 G/DL (ref 3.5–5.2)
ALP BLD-CCNC: 78 U/L (ref 35–104)
ALT SERPL-CCNC: 5 U/L (ref 0–32)
AMORPHOUS: ABNORMAL
ANION GAP SERPL CALCULATED.3IONS-SCNC: 12 MMOL/L (ref 7–16)
AST SERPL-CCNC: 15 U/L (ref 0–31)
BACTERIA: ABNORMAL /HPF
BASOPHILS ABSOLUTE: 0.04 E9/L (ref 0–0.2)
BASOPHILS RELATIVE PERCENT: 0.6 % (ref 0–2)
BILIRUB SERPL-MCNC: 0.4 MG/DL (ref 0–1.2)
BILIRUBIN URINE: NEGATIVE
BLOOD, URINE: NEGATIVE
BUN BLDV-MCNC: 11 MG/DL (ref 6–20)
CALCIUM SERPL-MCNC: 9.9 MG/DL (ref 8.6–10.2)
CHLORIDE BLD-SCNC: 105 MMOL/L (ref 98–107)
CHOLESTEROL, TOTAL: 225 MG/DL (ref 0–199)
CLARITY: ABNORMAL
CO2: 26 MMOL/L (ref 22–29)
COLOR: YELLOW
CREAT SERPL-MCNC: 0.7 MG/DL (ref 0.5–1)
EOSINOPHILS ABSOLUTE: 0.06 E9/L (ref 0.05–0.5)
EOSINOPHILS RELATIVE PERCENT: 0.9 % (ref 0–6)
EPITHELIAL CELLS, UA: ABNORMAL /HPF
GFR AFRICAN AMERICAN: >60
GFR NON-AFRICAN AMERICAN: >60 ML/MIN/1.73
GLUCOSE FASTING: 89 MG/DL (ref 74–99)
GLUCOSE URINE: NEGATIVE MG/DL
HCT VFR BLD CALC: 43.9 % (ref 34–48)
HDLC SERPL-MCNC: 44 MG/DL
HEMOGLOBIN: 14.4 G/DL (ref 11.5–15.5)
IMMATURE GRANULOCYTES #: 0.03 E9/L
IMMATURE GRANULOCYTES %: 0.4 % (ref 0–5)
KETONES, URINE: NEGATIVE MG/DL
LDL CHOLESTEROL CALCULATED: 158 MG/DL (ref 0–99)
LEUKOCYTE ESTERASE, URINE: NEGATIVE
LYMPHOCYTES ABSOLUTE: 2.27 E9/L (ref 1.5–4)
LYMPHOCYTES RELATIVE PERCENT: 32.2 % (ref 20–42)
MCH RBC QN AUTO: 31.2 PG (ref 26–35)
MCHC RBC AUTO-ENTMCNC: 32.8 % (ref 32–34.5)
MCV RBC AUTO: 95.2 FL (ref 80–99.9)
MONOCYTES ABSOLUTE: 0.37 E9/L (ref 0.1–0.95)
MONOCYTES RELATIVE PERCENT: 5.3 % (ref 2–12)
NEUTROPHILS ABSOLUTE: 4.27 E9/L (ref 1.8–7.3)
NEUTROPHILS RELATIVE PERCENT: 60.6 % (ref 43–80)
NITRITE, URINE: NEGATIVE
PDW BLD-RTO: 13.5 FL (ref 11.5–15)
PH UA: 7 (ref 5–9)
PLATELET # BLD: 244 E9/L (ref 130–450)
PMV BLD AUTO: 10.3 FL (ref 7–12)
POTASSIUM SERPL-SCNC: 4.9 MMOL/L (ref 3.5–5)
PROTEIN UA: NEGATIVE MG/DL
RBC # BLD: 4.61 E12/L (ref 3.5–5.5)
RBC UA: ABNORMAL /HPF (ref 0–2)
SODIUM BLD-SCNC: 143 MMOL/L (ref 132–146)
SPECIFIC GRAVITY UA: 1.01 (ref 1–1.03)
TOTAL PROTEIN: 7.5 G/DL (ref 6.4–8.3)
TRIGL SERPL-MCNC: 116 MG/DL (ref 0–149)
TSH SERPL DL<=0.05 MIU/L-ACNC: 0.72 UIU/ML (ref 0.27–4.2)
UROBILINOGEN, URINE: 0.2 E.U./DL
VLDLC SERPL CALC-MCNC: 23 MG/DL
WBC # BLD: 7 E9/L (ref 4.5–11.5)
WBC UA: ABNORMAL /HPF (ref 0–5)

## 2019-06-11 PROCEDURE — 36415 COLL VENOUS BLD VENIPUNCTURE: CPT

## 2019-06-11 PROCEDURE — 80061 LIPID PANEL: CPT

## 2019-06-11 PROCEDURE — 81001 URINALYSIS AUTO W/SCOPE: CPT

## 2019-06-11 PROCEDURE — 80053 COMPREHEN METABOLIC PANEL: CPT

## 2019-06-11 PROCEDURE — 84443 ASSAY THYROID STIM HORMONE: CPT

## 2019-06-11 PROCEDURE — 85025 COMPLETE CBC W/AUTO DIFF WBC: CPT

## 2019-06-11 PROCEDURE — 99214 OFFICE O/P EST MOD 30 MIN: CPT | Performed by: FAMILY MEDICINE

## 2019-06-11 RX ORDER — PROPRANOLOL HCL 60 MG
CAPSULE, EXTENDED RELEASE 24HR ORAL
Qty: 120 CAPSULE | Refills: 2 | Status: SHIPPED | OUTPATIENT
Start: 2019-06-11 | End: 2019-08-27 | Stop reason: SDUPTHER

## 2019-06-11 RX ORDER — GABAPENTIN 600 MG/1
600 TABLET ORAL 3 TIMES DAILY
Qty: 90 TABLET | Refills: 2 | Status: SHIPPED | OUTPATIENT
Start: 2019-06-11 | End: 2019-11-15

## 2019-06-11 RX ORDER — CYCLOBENZAPRINE HCL 10 MG
10 TABLET ORAL 3 TIMES DAILY PRN
Qty: 90 TABLET | Refills: 2 | Status: SHIPPED | OUTPATIENT
Start: 2019-06-11 | End: 2019-08-26 | Stop reason: SDUPTHER

## 2019-06-11 ASSESSMENT — ENCOUNTER SYMPTOMS
CHEST TIGHTNESS: 0
PHOTOPHOBIA: 0
ALLERGIC/IMMUNOLOGIC NEGATIVE: 1
BACK PAIN: 0
SORE THROAT: 0
NAUSEA: 1
DIARRHEA: 1
TROUBLE SWALLOWING: 0
ABDOMINAL PAIN: 1
COUGH: 0
EYE REDNESS: 0
BLOOD IN STOOL: 0
SHORTNESS OF BREATH: 0
EYE DISCHARGE: 0
VOMITING: 0
CONSTIPATION: 1
EYE PAIN: 0
SINUS PAIN: 0

## 2019-06-11 NOTE — TELEPHONE ENCOUNTER
She was in to see yo in [facility] today and did not get any scripts sent to [employer]. She was expecting an antibiotic.

## 2019-06-20 ENCOUNTER — HOSPITAL ENCOUNTER (OUTPATIENT)
Dept: MRI IMAGING | Age: 49
Discharge: HOME | End: 2019-06-22
Payer: MEDICARE

## 2019-06-20 ENCOUNTER — HOSPITAL ENCOUNTER (OUTPATIENT)
Dept: GENERAL RADIOLOGY | Age: 49
Discharge: HOME | End: 2019-06-22
Payer: MEDICARE

## 2019-06-20 VITALS
OXYGEN SATURATION: 96 % | HEART RATE: 72 BPM | WEIGHT: 113 LBS | DIASTOLIC BLOOD PRESSURE: 62 MMHG | HEIGHT: 62 IN | SYSTOLIC BLOOD PRESSURE: 124 MMHG | BODY MASS INDEX: 20.8 KG/M2 | RESPIRATION RATE: 18 BRPM

## 2019-06-20 DIAGNOSIS — M25.859 FEMORAL ACETABULAR IMPINGEMENT: ICD-10-CM

## 2019-06-28 ENCOUNTER — TELEPHONE (OUTPATIENT)
Dept: ADMINISTRATIVE | Age: 49
End: 2019-06-28

## 2019-06-28 ENCOUNTER — TELEPHONE (OUTPATIENT)
Dept: ORTHOPEDIC SURGERY | Age: 49
End: 2019-06-28

## 2019-06-28 DIAGNOSIS — G44.59 OTHER COMPLICATED HEADACHE SYNDROME: Primary | ICD-10-CM

## 2019-07-24 ENCOUNTER — OFFICE VISIT (OUTPATIENT)
Dept: NEUROLOGY | Age: 49
End: 2019-07-24
Payer: MEDICARE

## 2019-07-24 VITALS
WEIGHT: 113 LBS | BODY MASS INDEX: 20.8 KG/M2 | HEIGHT: 62 IN | DIASTOLIC BLOOD PRESSURE: 80 MMHG | SYSTOLIC BLOOD PRESSURE: 130 MMHG

## 2019-07-24 DIAGNOSIS — M54.2 CERVICALGIA: ICD-10-CM

## 2019-07-24 DIAGNOSIS — G44.86 CERVICOGENIC HEADACHE: ICD-10-CM

## 2019-07-24 DIAGNOSIS — Z87.820 HISTORY OF CONCUSSION: ICD-10-CM

## 2019-07-24 DIAGNOSIS — G44.321 INTRACTABLE CHRONIC POST-TRAUMATIC HEADACHE: ICD-10-CM

## 2019-07-24 DIAGNOSIS — G43.809 MIGRAINE VARIANT WITH HEADACHE: Primary | ICD-10-CM

## 2019-07-24 DIAGNOSIS — R51.9 CHRONIC DAILY HEADACHE: ICD-10-CM

## 2019-07-24 PROCEDURE — G8427 DOCREV CUR MEDS BY ELIG CLIN: HCPCS | Performed by: PSYCHIATRY & NEUROLOGY

## 2019-07-24 PROCEDURE — 99204 OFFICE O/P NEW MOD 45 MIN: CPT | Performed by: PSYCHIATRY & NEUROLOGY

## 2019-07-24 PROCEDURE — G8420 CALC BMI NORM PARAMETERS: HCPCS | Performed by: PSYCHIATRY & NEUROLOGY

## 2019-07-24 PROCEDURE — 4004F PT TOBACCO SCREEN RCVD TLK: CPT | Performed by: PSYCHIATRY & NEUROLOGY

## 2019-07-24 RX ORDER — GABAPENTIN 600 MG/1
600 TABLET ORAL DAILY
COMMUNITY
Start: 2019-07-22 | End: 2019-11-04

## 2019-07-24 ASSESSMENT — ENCOUNTER SYMPTOMS
ALLERGIC/IMMUNOLOGIC NEGATIVE: 1
EYES NEGATIVE: 1
BACK PAIN: 1
GASTROINTESTINAL NEGATIVE: 1
RESPIRATORY NEGATIVE: 1

## 2019-07-24 NOTE — PROGRESS NOTES
Neurology Consult Note:    Patient: Meena Bush  : 1970  Date: 19  Referring provider: Rinda Litten, DO    Referral to Neurology: headache    Dear Rinda Litten, DO     Thank you for your referral of Meena Bush a 80-year-old woman referred for evaluation of chronic migraine headaches. She reports a chronic daily headache pattern with headaches waxing and waning in intensity throughout the day for the past year and migraine headache onset approximately 2 years ago following a fall injury where she suffered a minor closed head injury resulting in a concussion. She also had another fall where she struck her head consistent with a whiplash injury and concussion. She does not recall a loss of consciousness. She has chronic cervicalgia. Headaches have no hemicranial preference. She describes a sharp, stabbing, intermittent headache pain type. She does experience photophobia, sonophobia, nausea and sometimes vomiting. There are no specific headache triggers. She denies a visual aura. She does consume several cups of coffee or tea per day. She smokes cigarettes. There is no family history of migraine. Her clinical history is consistent with posttraumatic migraine and tension type headaches or cervicogenic related headaches. She denies other focal neurologic symptoms such as visual loss, diplopia, slurred speech, confusion, tremor, gait ataxia, facial droop, hemiparesis, hemisensory loss, spinning vertigo. She has experienced several fainting episodes in the past.      She reports no relief of propranolol or gabapentin in response to her migraine and tried Botox for chronic migraine under Dr. Sarah Wang, [facility], several years ago but this was not effective in helping to reduce or control her migraine headaches. She has bipolar disorder and is managed with PRN diazepam and lamotrigine.  Dr. Martinez [place] office notes were received and indicates she has been treated with Decadron,

## 2019-07-27 ENCOUNTER — HOSPITAL ENCOUNTER (OUTPATIENT)
Dept: GENERAL RADIOLOGY | Age: 49
Discharge: HOME | End: 2019-07-29
Payer: MEDICARE

## 2019-07-27 ENCOUNTER — HOSPITAL ENCOUNTER (OUTPATIENT)
Dept: MRI IMAGING | Age: 49
Discharge: HOME | End: 2019-07-29
Payer: MEDICARE

## 2019-07-27 ENCOUNTER — HOSPITAL ENCOUNTER (OUTPATIENT)
Age: 49
Discharge: HOME | End: 2019-07-27
Payer: MEDICARE

## 2019-07-27 DIAGNOSIS — M54.2 CERVICALGIA: ICD-10-CM

## 2019-07-27 DIAGNOSIS — G43.809 MIGRAINE VARIANT WITH HEADACHE: ICD-10-CM

## 2019-07-27 DIAGNOSIS — G44.321 INTRACTABLE CHRONIC POST-TRAUMATIC HEADACHE: ICD-10-CM

## 2019-07-27 DIAGNOSIS — G44.86 CERVICOGENIC HEADACHE: ICD-10-CM

## 2019-07-27 DIAGNOSIS — Z87.820 HISTORY OF CONCUSSION: ICD-10-CM

## 2019-07-27 DIAGNOSIS — G44.59 OTHER COMPLICATED HEADACHE SYNDROME: ICD-10-CM

## 2019-07-27 DIAGNOSIS — R51.9 CHRONIC DAILY HEADACHE: ICD-10-CM

## 2019-07-27 PROCEDURE — 70551 MRI BRAIN STEM W/O DYE: CPT

## 2019-07-27 PROCEDURE — 72040 X-RAY EXAM NECK SPINE 2-3 VW: CPT

## 2019-07-29 ENCOUNTER — TELEPHONE (OUTPATIENT)
Dept: NEUROLOGY | Age: 49
End: 2019-07-29

## 2019-07-29 PROBLEM — Z86.69 HISTORY OF MIGRAINE: Chronic | Status: ACTIVE | Noted: 2019-07-29

## 2019-07-29 PROBLEM — E78.49 OTHER HYPERLIPIDEMIA: Chronic | Status: ACTIVE | Noted: 2019-07-29

## 2019-07-29 PROBLEM — F17.200 TOBACCO USE DISORDER: Chronic | Status: ACTIVE | Noted: 2019-07-29

## 2019-08-15 ENCOUNTER — TELEPHONE (OUTPATIENT)
Dept: NEUROLOGY | Age: 49
End: 2019-08-15

## 2019-08-22 ENCOUNTER — HOSPITAL ENCOUNTER (OUTPATIENT)
Dept: MAMMOGRAPHY | Age: 49
Discharge: HOME | End: 2019-08-24
Payer: MEDICARE

## 2019-08-22 DIAGNOSIS — Z12.39 SCREENING BREAST EXAMINATION: ICD-10-CM

## 2019-08-22 PROCEDURE — 77067 SCR MAMMO BI INCL CAD: CPT

## 2019-08-26 DIAGNOSIS — G44.52 NEW DAILY PERSISTENT HEADACHE: ICD-10-CM

## 2019-08-26 RX ORDER — CYCLOBENZAPRINE HCL 10 MG
10 TABLET ORAL 3 TIMES DAILY PRN
Qty: 90 TABLET | Refills: 2 | Status: SHIPPED | OUTPATIENT
Start: 2019-08-26 | End: 2019-11-04 | Stop reason: SDUPTHER

## 2019-08-27 DIAGNOSIS — G44.52 NEW DAILY PERSISTENT HEADACHE: ICD-10-CM

## 2019-08-28 RX ORDER — PROPRANOLOL HCL 60 MG
CAPSULE, EXTENDED RELEASE 24HR ORAL
Qty: 120 CAPSULE | Refills: 3 | Status: SHIPPED | OUTPATIENT
Start: 2019-08-28 | End: 2019-11-15

## 2019-08-29 ENCOUNTER — TELEPHONE (OUTPATIENT)
Dept: ONCOLOGY | Age: 49
End: 2019-08-29

## 2019-08-29 NOTE — TELEPHONE ENCOUNTER
Call to patient in reference to her mammogram performed at [address] [place] [address] on August 22, 2019. Instructed patient that the radiologist has recommended some additional breast imaging, in order to make a final determination/result. Informed her that a request for Rx has been faxed to the ordering physician. Once we receive the script a  from [address] [place] [address] Dr Dykes will contact her to schedule the additional imaging study/studies. Verbalizes understanding and is agreeable to proceed.        Anup Rojas, RN, BSN, [address]

## 2019-09-10 ENCOUNTER — HOSPITAL ENCOUNTER (OUTPATIENT)
Dept: GENERAL RADIOLOGY | Age: 49
Discharge: HOME | End: 2019-09-12
Payer: MEDICARE

## 2019-09-10 DIAGNOSIS — R92.8 ABNORMAL MAMMOGRAM: ICD-10-CM

## 2019-09-10 PROCEDURE — 76642 ULTRASOUND BREAST LIMITED: CPT

## 2019-09-10 PROCEDURE — G0279 TOMOSYNTHESIS, MAMMO: HCPCS

## 2019-10-01 ENCOUNTER — HOSPITAL ENCOUNTER (OUTPATIENT)
Dept: MRI IMAGING | Age: 49
Discharge: HOME | End: 2019-10-03
Payer: MEDICARE

## 2019-10-01 DIAGNOSIS — M54.12 CERVICAL RADICULOPATHY: ICD-10-CM

## 2019-10-01 DIAGNOSIS — M50.30 DDD (DEGENERATIVE DISC DISEASE), CERVICAL: ICD-10-CM

## 2019-10-01 PROCEDURE — 72141 MRI NECK SPINE W/O DYE: CPT

## 2019-10-04 ENCOUNTER — HOSPITAL ENCOUNTER (OUTPATIENT)
Dept: CT IMAGING | Age: 49
Discharge: HOME | End: 2019-10-06
Payer: MEDICARE

## 2019-10-04 DIAGNOSIS — M50.30 DDD (DEGENERATIVE DISC DISEASE), CERVICAL: ICD-10-CM

## 2019-10-04 PROCEDURE — 6360000004 HC RX CONTRAST MEDICATION: Performed by: RADIOLOGY

## 2019-10-04 PROCEDURE — 72126 CT NECK SPINE W/DYE: CPT

## 2019-10-04 RX ORDER — SODIUM CHLORIDE 0.9 % (FLUSH) 0.9 %
10 SYRINGE (ML) INJECTION ONCE
Status: DISCONTINUED | OUTPATIENT
Start: 2019-10-04 | End: 2019-10-07 | Stop reason: HOSPADM

## 2019-10-04 RX ADMIN — IOPAMIDOL 80 ML: 755 INJECTION, SOLUTION INTRAVENOUS at 16:08

## 2019-10-21 ENCOUNTER — HOSPITAL ENCOUNTER (OUTPATIENT)
Dept: NUCLEAR MEDICINE | Age: 49
Discharge: HOME | End: 2019-10-21
Payer: MEDICARE

## 2019-10-21 DIAGNOSIS — M89.9 BONE LESION: ICD-10-CM

## 2019-10-21 PROCEDURE — A9503 TC99M MEDRONATE: HCPCS | Performed by: RADIOLOGY

## 2019-10-21 PROCEDURE — 3430000000 HC RX DIAGNOSTIC RADIOPHARMACEUTICAL: Performed by: RADIOLOGY

## 2019-10-21 PROCEDURE — 78306 BONE IMAGING WHOLE BODY: CPT

## 2019-10-21 RX ORDER — TC 99M MEDRONATE 20 MG/10ML
25 INJECTION, POWDER, LYOPHILIZED, FOR SOLUTION INTRAVENOUS
Status: COMPLETED | OUTPATIENT
Start: 2019-10-21 | End: 2019-10-21

## 2019-10-21 RX ADMIN — TC 99M MEDRONATE 25 MILLICURIE: 20 INJECTION, POWDER, LYOPHILIZED, FOR SOLUTION INTRAVENOUS at 12:53

## 2019-11-03 ENCOUNTER — HOSPITAL ENCOUNTER (OUTPATIENT)
Dept: ULTRASOUND IMAGING | Age: 49
Discharge: HOME | End: 2019-11-05
Payer: MEDICARE

## 2019-11-03 DIAGNOSIS — N95.0 POSTMENOPAUSE BLEEDING: ICD-10-CM

## 2019-11-03 PROCEDURE — 76830 TRANSVAGINAL US NON-OB: CPT

## 2019-11-04 ENCOUNTER — OFFICE VISIT (OUTPATIENT)
Dept: FAMILY MEDICINE CLINIC | Age: 49
End: 2019-11-04
Payer: MEDICARE

## 2019-11-04 ENCOUNTER — HOSPITAL ENCOUNTER (OUTPATIENT)
Age: 49
Discharge: HOME | End: 2019-11-06
Payer: MEDICARE

## 2019-11-04 VITALS
OXYGEN SATURATION: 98 % | HEIGHT: 62 IN | WEIGHT: 108.02 LBS | HEART RATE: 82 BPM | BODY MASS INDEX: 19.88 KG/M2 | DIASTOLIC BLOOD PRESSURE: 80 MMHG | TEMPERATURE: 98.4 F | SYSTOLIC BLOOD PRESSURE: 120 MMHG

## 2019-11-04 DIAGNOSIS — M19.011 PRIMARY OSTEOARTHRITIS OF RIGHT SHOULDER: ICD-10-CM

## 2019-11-04 DIAGNOSIS — R63.4 WEIGHT LOSS: ICD-10-CM

## 2019-11-04 DIAGNOSIS — R63.4 WEIGHT LOSS: Primary | ICD-10-CM

## 2019-11-04 LAB
ALBUMIN SERPL-MCNC: 4.7 G/DL (ref 3.5–5.2)
ALP BLD-CCNC: 68 U/L (ref 35–104)
ALT SERPL-CCNC: 6 U/L (ref 0–32)
ANION GAP SERPL CALCULATED.3IONS-SCNC: 19 MMOL/L (ref 7–16)
AST SERPL-CCNC: 15 U/L (ref 0–31)
BASOPHILS ABSOLUTE: 0.08 E9/L (ref 0–0.2)
BASOPHILS RELATIVE PERCENT: 0.8 % (ref 0–2)
BILIRUB SERPL-MCNC: <0.2 MG/DL (ref 0–1.2)
BUN BLDV-MCNC: 16 MG/DL (ref 6–20)
CALCIUM SERPL-MCNC: 9.9 MG/DL (ref 8.6–10.2)
CHLORIDE BLD-SCNC: 100 MMOL/L (ref 98–107)
CHOLESTEROL, TOTAL: 262 MG/DL (ref 0–199)
CO2: 21 MMOL/L (ref 22–29)
CREAT SERPL-MCNC: 0.7 MG/DL (ref 0.5–1)
EOSINOPHILS ABSOLUTE: 0.07 E9/L (ref 0.05–0.5)
EOSINOPHILS RELATIVE PERCENT: 0.7 % (ref 0–6)
GFR AFRICAN AMERICAN: >60
GFR NON-AFRICAN AMERICAN: >60 ML/MIN/1.73
GLUCOSE BLD-MCNC: 93 MG/DL (ref 74–99)
HCT VFR BLD CALC: 45.8 % (ref 34–48)
HDLC SERPL-MCNC: 56 MG/DL
HEMOGLOBIN: 14.6 G/DL (ref 11.5–15.5)
IMMATURE GRANULOCYTES #: 0.04 E9/L
IMMATURE GRANULOCYTES %: 0.4 % (ref 0–5)
LDL CHOLESTEROL CALCULATED: 173 MG/DL (ref 0–99)
LYMPHOCYTES ABSOLUTE: 3.32 E9/L (ref 1.5–4)
LYMPHOCYTES RELATIVE PERCENT: 34.5 % (ref 20–42)
MCH RBC QN AUTO: 30.6 PG (ref 26–35)
MCHC RBC AUTO-ENTMCNC: 31.9 % (ref 32–34.5)
MCV RBC AUTO: 96 FL (ref 80–99.9)
MONOCYTES ABSOLUTE: 0.56 E9/L (ref 0.1–0.95)
MONOCYTES RELATIVE PERCENT: 5.8 % (ref 2–12)
NEUTROPHILS ABSOLUTE: 5.54 E9/L (ref 1.8–7.3)
NEUTROPHILS RELATIVE PERCENT: 57.8 % (ref 43–80)
PDW BLD-RTO: 13 FL (ref 11.5–15)
PLATELET # BLD: 324 E9/L (ref 130–450)
PMV BLD AUTO: 9.7 FL (ref 7–12)
POTASSIUM SERPL-SCNC: 4.4 MMOL/L (ref 3.5–5)
RBC # BLD: 4.77 E12/L (ref 3.5–5.5)
SODIUM BLD-SCNC: 140 MMOL/L (ref 132–146)
TOTAL PROTEIN: 7.3 G/DL (ref 6.4–8.3)
TRIGL SERPL-MCNC: 163 MG/DL (ref 0–149)
VLDLC SERPL CALC-MCNC: 33 MG/DL
WBC # BLD: 9.6 E9/L (ref 4.5–11.5)

## 2019-11-04 PROCEDURE — 4004F PT TOBACCO SCREEN RCVD TLK: CPT | Performed by: FAMILY MEDICINE

## 2019-11-04 PROCEDURE — G8427 DOCREV CUR MEDS BY ELIG CLIN: HCPCS | Performed by: FAMILY MEDICINE

## 2019-11-04 PROCEDURE — 85025 COMPLETE CBC W/AUTO DIFF WBC: CPT

## 2019-11-04 PROCEDURE — 80053 COMPREHEN METABOLIC PANEL: CPT

## 2019-11-04 PROCEDURE — 80061 LIPID PANEL: CPT

## 2019-11-04 PROCEDURE — G8420 CALC BMI NORM PARAMETERS: HCPCS | Performed by: FAMILY MEDICINE

## 2019-11-04 PROCEDURE — 99214 OFFICE O/P EST MOD 30 MIN: CPT | Performed by: FAMILY MEDICINE

## 2019-11-04 PROCEDURE — G8484 FLU IMMUNIZE NO ADMIN: HCPCS | Performed by: FAMILY MEDICINE

## 2019-11-04 PROCEDURE — 36415 COLL VENOUS BLD VENIPUNCTURE: CPT

## 2019-11-04 RX ORDER — GALCANEZUMAB 120 MG/ML
INJECTION, SOLUTION SUBCUTANEOUS
COMMUNITY
Start: 2019-10-07 | End: 2020-01-23 | Stop reason: ALTCHOICE

## 2019-11-04 RX ORDER — CYCLOBENZAPRINE HCL 10 MG
10 TABLET ORAL 3 TIMES DAILY PRN
Qty: 90 TABLET | Refills: 2 | Status: SHIPPED | OUTPATIENT
Start: 2019-11-04 | End: 2019-11-15 | Stop reason: SDUPTHER

## 2019-11-04 ASSESSMENT — ENCOUNTER SYMPTOMS
EYE REDNESS: 0
ABDOMINAL PAIN: 0
COUGH: 0
EYE DISCHARGE: 0
BLOOD IN STOOL: 0
TROUBLE SWALLOWING: 0
EYE PAIN: 0
NAUSEA: 0
SORE THROAT: 0
SINUS PAIN: 0
SHORTNESS OF BREATH: 0
VOMITING: 0
ALLERGIC/IMMUNOLOGIC NEGATIVE: 1
DIARRHEA: 0
BACK PAIN: 0
CHEST TIGHTNESS: 0
PHOTOPHOBIA: 0

## 2019-11-06 ENCOUNTER — TELEPHONE (OUTPATIENT)
Dept: FAMILY MEDICINE CLINIC | Age: 49
End: 2019-11-06

## 2019-11-13 LAB
BASOPHILS ABSOLUTE: 0 /ΜL
BASOPHILS RELATIVE PERCENT: 0 %
EOSINOPHILS ABSOLUTE: 0 /ΜL
EOSINOPHILS RELATIVE PERCENT: 0 %
HCT VFR BLD CALC: 40.8 % (ref 36–46)
HEMOGLOBIN: 13.6 G/DL (ref 12–16)
LYMPHOCYTES ABSOLUTE: 2.1 /ΜL
LYMPHOCYTES RELATIVE PERCENT: 20 %
MCH RBC QN AUTO: NORMAL PG
MCHC RBC AUTO-ENTMCNC: NORMAL G/DL
MCV RBC AUTO: NORMAL FL
MONOCYTES ABSOLUTE: 0.5 /ΜL
MONOCYTES RELATIVE PERCENT: 5 %
NEUTROPHILS ABSOLUTE: 8 /ΜL
NEUTROPHILS RELATIVE PERCENT: 75 %
PLATELET # BLD: 329 K/ΜL
PMV BLD AUTO: NORMAL FL
RBC # BLD: 4.44 10^6/ΜL
WBC # BLD: 10.7 10^3/ML

## 2019-11-15 ENCOUNTER — OFFICE VISIT (OUTPATIENT)
Dept: FAMILY MEDICINE CLINIC | Age: 49
End: 2019-11-15
Payer: MEDICARE

## 2019-11-15 VITALS
TEMPERATURE: 99.5 F | HEIGHT: 62 IN | HEART RATE: 93 BPM | BODY MASS INDEX: 19.88 KG/M2 | WEIGHT: 108 LBS | RESPIRATION RATE: 15 BRPM | SYSTOLIC BLOOD PRESSURE: 110 MMHG | OXYGEN SATURATION: 98 % | DIASTOLIC BLOOD PRESSURE: 76 MMHG

## 2019-11-15 DIAGNOSIS — E78.2 MIXED HYPERLIPIDEMIA: ICD-10-CM

## 2019-11-15 DIAGNOSIS — E01.0 THYROMEGALY: Primary | ICD-10-CM

## 2019-11-15 DIAGNOSIS — M19.011 PRIMARY OSTEOARTHRITIS OF RIGHT SHOULDER: ICD-10-CM

## 2019-11-15 DIAGNOSIS — B37.0 ORAL THRUSH: ICD-10-CM

## 2019-11-15 PROCEDURE — G8427 DOCREV CUR MEDS BY ELIG CLIN: HCPCS | Performed by: FAMILY MEDICINE

## 2019-11-15 PROCEDURE — G8484 FLU IMMUNIZE NO ADMIN: HCPCS | Performed by: FAMILY MEDICINE

## 2019-11-15 PROCEDURE — 99214 OFFICE O/P EST MOD 30 MIN: CPT | Performed by: FAMILY MEDICINE

## 2019-11-15 PROCEDURE — G8420 CALC BMI NORM PARAMETERS: HCPCS | Performed by: FAMILY MEDICINE

## 2019-11-15 PROCEDURE — 4004F PT TOBACCO SCREEN RCVD TLK: CPT | Performed by: FAMILY MEDICINE

## 2019-11-15 RX ORDER — ROSUVASTATIN CALCIUM 20 MG/1
20 TABLET, COATED ORAL NIGHTLY
Qty: 30 TABLET | Refills: 3 | Status: SHIPPED
Start: 2019-11-15 | End: 2020-02-18 | Stop reason: SDUPTHER

## 2019-11-15 RX ORDER — CLOTRIMAZOLE 10 MG/1
10 LOZENGE ORAL; TOPICAL
Qty: 50 TABLET | Refills: 0 | Status: SHIPPED | OUTPATIENT
Start: 2019-11-15 | End: 2019-11-25

## 2019-11-15 RX ORDER — CYCLOBENZAPRINE HCL 10 MG
10 TABLET ORAL 3 TIMES DAILY PRN
Qty: 90 TABLET | Refills: 2 | Status: SHIPPED | OUTPATIENT
Start: 2019-11-15 | End: 2020-01-23 | Stop reason: ALTCHOICE

## 2019-11-15 RX ORDER — PROPRANOLOL HYDROCHLORIDE 120 MG/1
120 CAPSULE, EXTENDED RELEASE ORAL DAILY
COMMUNITY
End: 2020-01-23 | Stop reason: SDUPTHER

## 2019-11-15 ASSESSMENT — ENCOUNTER SYMPTOMS
EYE PAIN: 0
ABDOMINAL PAIN: 0
SINUS PAIN: 0
BLOOD IN STOOL: 0
ALLERGIC/IMMUNOLOGIC NEGATIVE: 1
NAUSEA: 0
DIARRHEA: 0
SHORTNESS OF BREATH: 0
BACK PAIN: 0
EYE REDNESS: 0
EYE DISCHARGE: 0
VOMITING: 0
PHOTOPHOBIA: 0
TROUBLE SWALLOWING: 0
SORE THROAT: 0
CHEST TIGHTNESS: 0
COUGH: 0

## 2019-11-20 ENCOUNTER — TELEPHONE (OUTPATIENT)
Dept: FAMILY MEDICINE CLINIC | Age: 49
End: 2019-11-20

## 2019-11-23 ENCOUNTER — APPOINTMENT (OUTPATIENT)
Dept: CT IMAGING | Age: 49
End: 2019-11-23
Payer: MEDICARE

## 2019-11-23 ENCOUNTER — HOSPITAL ENCOUNTER (EMERGENCY)
Age: 49
Discharge: HOME | End: 2019-11-23
Payer: MEDICARE

## 2019-11-23 VITALS
BODY MASS INDEX: 19.14 KG/M2 | TEMPERATURE: 98 F | OXYGEN SATURATION: 100 % | HEART RATE: 72 BPM | HEIGHT: 62 IN | WEIGHT: 104 LBS | DIASTOLIC BLOOD PRESSURE: 73 MMHG | SYSTOLIC BLOOD PRESSURE: 138 MMHG | RESPIRATION RATE: 14 BRPM

## 2019-11-23 DIAGNOSIS — G43.911 INTRACTABLE MIGRAINE WITH STATUS MIGRAINOSUS, UNSPECIFIED MIGRAINE TYPE: Primary | ICD-10-CM

## 2019-11-23 LAB
ALBUMIN SERPL-MCNC: 4.6 G/DL (ref 3.5–5.2)
ALP BLD-CCNC: 63 U/L (ref 35–104)
ALT SERPL-CCNC: 8 U/L (ref 0–32)
ANION GAP SERPL CALCULATED.3IONS-SCNC: 13 MMOL/L (ref 7–16)
AST SERPL-CCNC: 16 U/L (ref 0–31)
BASOPHILS ABSOLUTE: 0.04 E9/L (ref 0–0.2)
BASOPHILS RELATIVE PERCENT: 0.4 % (ref 0–2)
BILIRUB SERPL-MCNC: 0.4 MG/DL (ref 0–1.2)
BILIRUBIN URINE: NEGATIVE
BLOOD, URINE: NEGATIVE
BUN BLDV-MCNC: 15 MG/DL (ref 6–20)
CALCIUM SERPL-MCNC: 9.7 MG/DL (ref 8.6–10.2)
CHLORIDE BLD-SCNC: 101 MMOL/L (ref 98–107)
CLARITY: CLEAR
CO2: 24 MMOL/L (ref 22–29)
COLOR: YELLOW
CREAT SERPL-MCNC: 0.6 MG/DL (ref 0.5–1)
EOSINOPHILS ABSOLUTE: 0.06 E9/L (ref 0.05–0.5)
EOSINOPHILS RELATIVE PERCENT: 0.7 % (ref 0–6)
GFR AFRICAN AMERICAN: >60
GFR NON-AFRICAN AMERICAN: >60 ML/MIN/1.73
GLUCOSE BLD-MCNC: 118 MG/DL (ref 74–99)
GLUCOSE URINE: NEGATIVE MG/DL
HCG, URINE, POC: NEGATIVE
HCT VFR BLD CALC: 45 % (ref 34–48)
HEMOGLOBIN: 15.2 G/DL (ref 11.5–15.5)
IMMATURE GRANULOCYTES #: 0.04 E9/L
IMMATURE GRANULOCYTES %: 0.4 % (ref 0–5)
KETONES, URINE: 15 MG/DL
LEUKOCYTE ESTERASE, URINE: NEGATIVE
LYMPHOCYTES ABSOLUTE: 2.77 E9/L (ref 1.5–4)
LYMPHOCYTES RELATIVE PERCENT: 30.5 % (ref 20–42)
Lab: NORMAL
MCH RBC QN AUTO: 30.8 PG (ref 26–35)
MCHC RBC AUTO-ENTMCNC: 33.8 % (ref 32–34.5)
MCV RBC AUTO: 91.3 FL (ref 80–99.9)
MONOCYTES ABSOLUTE: 0.48 E9/L (ref 0.1–0.95)
MONOCYTES RELATIVE PERCENT: 5.3 % (ref 2–12)
NEGATIVE QC PASS/FAIL: NORMAL
NEUTROPHILS ABSOLUTE: 5.69 E9/L (ref 1.8–7.3)
NEUTROPHILS RELATIVE PERCENT: 62.7 % (ref 43–80)
NITRITE, URINE: NEGATIVE
PDW BLD-RTO: 12.3 FL (ref 11.5–15)
PH UA: 6.5 (ref 5–9)
PLATELET # BLD: 255 E9/L (ref 130–450)
PMV BLD AUTO: 9.6 FL (ref 7–12)
POSITIVE QC PASS/FAIL: NORMAL
POTASSIUM REFLEX MAGNESIUM: 4.4 MMOL/L (ref 3.5–5)
PROTEIN UA: NEGATIVE MG/DL
RBC # BLD: 4.93 E12/L (ref 3.5–5.5)
SODIUM BLD-SCNC: 138 MMOL/L (ref 132–146)
SPECIFIC GRAVITY UA: 1.02 (ref 1–1.03)
TOTAL PROTEIN: 7.3 G/DL (ref 6.4–8.3)
UROBILINOGEN, URINE: 0.2 E.U./DL
WBC # BLD: 9.1 E9/L (ref 4.5–11.5)

## 2019-11-23 PROCEDURE — 96361 HYDRATE IV INFUSION ADD-ON: CPT

## 2019-11-23 PROCEDURE — 85025 COMPLETE CBC W/AUTO DIFF WBC: CPT

## 2019-11-23 PROCEDURE — 6360000002 HC RX W HCPCS: Performed by: NURSE PRACTITIONER

## 2019-11-23 PROCEDURE — 80053 COMPREHEN METABOLIC PANEL: CPT

## 2019-11-23 PROCEDURE — 96374 THER/PROPH/DIAG INJ IV PUSH: CPT

## 2019-11-23 PROCEDURE — 96375 TX/PRO/DX INJ NEW DRUG ADDON: CPT

## 2019-11-23 PROCEDURE — 99284 EMERGENCY DEPT VISIT MOD MDM: CPT

## 2019-11-23 PROCEDURE — 2580000003 HC RX 258: Performed by: NURSE PRACTITIONER

## 2019-11-23 PROCEDURE — 70450 CT HEAD/BRAIN W/O DYE: CPT

## 2019-11-23 PROCEDURE — 81003 URINALYSIS AUTO W/O SCOPE: CPT

## 2019-11-23 RX ORDER — DEXAMETHASONE SODIUM PHOSPHATE 10 MG/ML
10 INJECTION INTRAMUSCULAR; INTRAVENOUS ONCE
Status: COMPLETED | OUTPATIENT
Start: 2019-11-23 | End: 2019-11-23

## 2019-11-23 RX ORDER — KETOROLAC TROMETHAMINE 30 MG/ML
15 INJECTION, SOLUTION INTRAMUSCULAR; INTRAVENOUS ONCE
Status: COMPLETED | OUTPATIENT
Start: 2019-11-23 | End: 2019-11-23

## 2019-11-23 RX ORDER — DIPHENHYDRAMINE HYDROCHLORIDE 50 MG/ML
25 INJECTION INTRAMUSCULAR; INTRAVENOUS ONCE
Status: COMPLETED | OUTPATIENT
Start: 2019-11-23 | End: 2019-11-23

## 2019-11-23 RX ORDER — 0.9 % SODIUM CHLORIDE 0.9 %
1000 INTRAVENOUS SOLUTION INTRAVENOUS ONCE
Status: COMPLETED | OUTPATIENT
Start: 2019-11-23 | End: 2019-11-23

## 2019-11-23 RX ORDER — METOCLOPRAMIDE HYDROCHLORIDE 5 MG/ML
10 INJECTION INTRAMUSCULAR; INTRAVENOUS ONCE
Status: COMPLETED | OUTPATIENT
Start: 2019-11-23 | End: 2019-11-23

## 2019-11-23 RX ADMIN — METOCLOPRAMIDE 10 MG: 5 INJECTION, SOLUTION INTRAMUSCULAR; INTRAVENOUS at 15:46

## 2019-11-23 RX ADMIN — DIPHENHYDRAMINE HYDROCHLORIDE 25 MG: 50 INJECTION, SOLUTION INTRAMUSCULAR; INTRAVENOUS at 15:46

## 2019-11-23 RX ADMIN — SODIUM CHLORIDE 1000 ML: 9 INJECTION, SOLUTION INTRAVENOUS at 17:59

## 2019-11-23 RX ADMIN — SODIUM CHLORIDE 1000 ML: 9 INJECTION, SOLUTION INTRAVENOUS at 15:41

## 2019-11-23 RX ADMIN — DEXAMETHASONE SODIUM PHOSPHATE 10 MG: 10 INJECTION INTRAMUSCULAR; INTRAVENOUS at 18:29

## 2019-11-23 RX ADMIN — KETOROLAC TROMETHAMINE 15 MG: 30 INJECTION, SOLUTION INTRAMUSCULAR at 17:58

## 2019-11-23 ASSESSMENT — PAIN DESCRIPTION - ORIENTATION: ORIENTATION: RIGHT;LEFT;MID

## 2019-11-23 ASSESSMENT — PAIN DESCRIPTION - LOCATION: LOCATION: HEAD

## 2019-11-23 ASSESSMENT — PAIN SCALES - GENERAL: PAINLEVEL_OUTOF10: 8

## 2020-01-16 RX ORDER — GABAPENTIN 600 MG/1
TABLET ORAL
Qty: 90 TABLET | Refills: 2 | Status: SHIPPED | OUTPATIENT
Start: 2020-01-16 | End: 2020-01-23 | Stop reason: ALTCHOICE

## 2020-01-16 RX ORDER — OMEPRAZOLE 40 MG/1
CAPSULE, DELAYED RELEASE ORAL
Qty: 30 CAPSULE | Refills: 2 | Status: SHIPPED
Start: 2020-01-16 | End: 2020-02-17

## 2020-01-23 RX ORDER — DESVENLAFAXINE 50 MG/1
100 TABLET, EXTENDED RELEASE ORAL DAILY
COMMUNITY
End: 2021-04-12

## 2020-01-23 RX ORDER — PROPRANOLOL HYDROCHLORIDE 60 MG/1
TABLET ORAL
Qty: 120 TABLET | Refills: 3 | Status: SHIPPED
Start: 2020-01-23 | End: 2020-02-17 | Stop reason: SDUPTHER

## 2020-01-23 RX ORDER — TIZANIDINE HYDROCHLORIDE 4 MG/1
4 CAPSULE, GELATIN COATED ORAL 3 TIMES DAILY
COMMUNITY
End: 2020-09-08

## 2020-01-23 NOTE — TELEPHONE ENCOUNTER
Last Appointment:  11/15/2019  Future Appointments   Date Time Provider Woodrow Ji   2/3/2020  6:50 AM SCHEDULE, [facility]   2/17/2020  1:30 PM [place] DO WILL Casas [facility]      Propranolol 120 mg XR, there is not hX on when this was last ordered, how many tablets, or if there was refills under medications. Please  Advise.

## 2020-01-23 NOTE — PROGRESS NOTES
[facility] PRE-ADMISSION TESTING INSTRUCTIONS    The Preadmission Testing patient is instructed accordingly using the following criteria (check applicable):    ARRIVAL INSTRUCTIONS:  [x] Parking the day of Surgery is located in the Main Entrance lot. Upon entering the door, make an immediate right to the surgery reception desk    [] [phone] is available Monday through Friday 6 am to 6 pm    [x] Bring photo ID and insurance card    [] Bring in a copy of Living will or Durable Power of  papers. [x] Please be sure to arrange for responsible adult to provide transportation to and from the hospital    [x] Please arrange for responsible adult to be with you for the 24 hour period post procedure due to having anesthesia      GENERAL INSTRUCTIONS:    [x] Nothing by mouth after midnight, including gum, candy, mints or water    [x] You may brush your teeth, but do not swallow any water    [x] Take medications as instructed with 1-2 oz of water    [] Stop herbal supplements and vitamins 5 days prior to procedure    [] Follow preop dosing of blood thinners per physician instructions    [] Take 1/2 dose of evening insulin, but no insulin after midnight    [] No oral diabetic medications after midnight    [] If diabetic and have low blood sugar or feel symptomatic, take 1-2oz apple juice only    [] Bring inhalers day of surgery    [] Bring C-PAP/ Bi-Pap day of surgery    [x] Bring urine specimen day of surgery    [x] Shower or bath with soap, lather and rinse well, AM of Surgery, no lotion, powders or creams to surgical site    [] Follow bowel prep as instructed per surgeon    [x] No tobacco products within 24 hours of surgery     [x] No alcohol or illegal drug use within 24 hours of surgery.     [x] Jewelry, body piercing's, eyeglasses, contact lenses and dentures are not permitted into surgery (bring cases)      [x] Please do not wear any nail polish, make up or hair

## 2020-01-31 NOTE — H&P
Years: 30.00       Pack years: 30.00       Types: Cigarettes    Smokeless tobacco: Never Used   Substance and Sexual Activity    Alcohol use: No    Drug use: No    Sexual activity: Not Currently   Lifestyle    Physical activity:       Days per week: Not on file       Minutes per session: Not on file    Stress: Not on file   Relationships    Social connections:       Talks on phone: Not on file       Gets together: Not on file       Attends Jew service: Not on file       Active member of club or organization: Not on file       Attends meetings of clubs or organizations: Not on file       Relationship status: Not on file    Intimate partner violence:       Fear of current or ex partner: Not on file       Emotionally abused: Not on file       Physically abused: Not on file       Forced sexual activity: Not on file   Other Topics Concern    Not on file   Social History Narrative    Not on file         Family History   History reviewed. No pertinent family history. Allergies:  Latex; Ativan [lorazepam]; Seroquel [quetiapine fumarate]; Cariprazine; Dexamethasone; Hydroxyzine; Levofloxacin; Oxycodone-acetaminophen; Quetiapine; Tramadol; Venlafaxine; and Sulfa antibiotics     Review of Systems   Constitutional: Negative for chills and fever. Gastrointestinal: Negative for abdominal distention, abdominal pain, nausea and vomiting. Genitourinary: Positive for vaginal bleeding. Negative for difficulty urinating, dysuria, pelvic pain and vaginal discharge.         Objective:      BP 86/65   Pulse 76   Ht 5' 2\" (1.575 m)   Wt 109 lb (49.4 kg)   BMI 19.94 kg/m²      Physical Exam  Vitals signs reviewed. Constitutional:       Appearance: She is well-developed. Abdominal:      General: There is no distension. Palpations: Abdomen is soft. Tenderness: There is no tenderness. Genitourinary:     Vagina: Normal.      Cervix: No cervical motion tenderness.       Adnexa:         Right: No mass or tenderness. Left: No mass or tenderness. Skin:     General: Skin is warm and dry. Neurological:      Mental Status: She is alert and oriented to person, place, and time.             Assessment:        Diagnosis Orders   1.  Other reasons for seeking consultation            Body mass index is 19.94 kg/m².        Plan:      No orders of the defined types were placed in this encounter.       Encounter Medications    No orders of the defined types were placed in this encounter.      Procedure scheduling patient for hysteroscopy D&C risk of surgeries was discussed with her infection perforation risk of anesthesia unforeseen complications also risk of failure of the procedure were reviewed patient understand the above we'll proceed with scheduling her for that surgery   No follow-ups on file.        Patient seen today condition unchanged exam is unchanged  reViewed with her  again risk of procedure and alternatives patient is in agreement

## 2020-02-03 ENCOUNTER — ANESTHESIA EVENT (OUTPATIENT)
Dept: OPERATING ROOM | Age: 50
End: 2020-02-03
Payer: MEDICARE

## 2020-02-03 ENCOUNTER — HOSPITAL ENCOUNTER (OUTPATIENT)
Age: 50
Setting detail: OUTPATIENT SURGERY
Discharge: HOME | End: 2020-02-03
Attending: OBSTETRICS & GYNECOLOGY | Admitting: OBSTETRICS & GYNECOLOGY
Payer: MEDICARE

## 2020-02-03 ENCOUNTER — ANESTHESIA (OUTPATIENT)
Dept: OPERATING ROOM | Age: 50
End: 2020-02-03
Payer: MEDICARE

## 2020-02-03 VITALS
HEART RATE: 78 BPM | RESPIRATION RATE: 18 BRPM | HEIGHT: 62 IN | WEIGHT: 108 LBS | OXYGEN SATURATION: 98 % | BODY MASS INDEX: 19.88 KG/M2 | DIASTOLIC BLOOD PRESSURE: 57 MMHG | TEMPERATURE: 97.3 F | SYSTOLIC BLOOD PRESSURE: 113 MMHG

## 2020-02-03 VITALS — OXYGEN SATURATION: 100 % | SYSTOLIC BLOOD PRESSURE: 102 MMHG | DIASTOLIC BLOOD PRESSURE: 53 MMHG

## 2020-02-03 LAB — HCG(URINE) PREGNANCY TEST: NEGATIVE

## 2020-02-03 PROCEDURE — 81025 URINE PREGNANCY TEST: CPT

## 2020-02-03 PROCEDURE — 2580000003 HC RX 258: Performed by: NURSE ANESTHETIST, CERTIFIED REGISTERED

## 2020-02-03 PROCEDURE — 7100000010 HC PHASE II RECOVERY - FIRST 15 MIN: Performed by: OBSTETRICS & GYNECOLOGY

## 2020-02-03 PROCEDURE — 3700000001 HC ADD 15 MINUTES (ANESTHESIA): Performed by: OBSTETRICS & GYNECOLOGY

## 2020-02-03 PROCEDURE — 88305 TISSUE EXAM BY PATHOLOGIST: CPT

## 2020-02-03 PROCEDURE — 3700000000 HC ANESTHESIA ATTENDED CARE: Performed by: OBSTETRICS & GYNECOLOGY

## 2020-02-03 PROCEDURE — 2500000003 HC RX 250 WO HCPCS: Performed by: NURSE ANESTHETIST, CERTIFIED REGISTERED

## 2020-02-03 PROCEDURE — 6360000002 HC RX W HCPCS: Performed by: ANESTHESIOLOGY

## 2020-02-03 PROCEDURE — 3600000003 HC SURGERY LEVEL 3 BASE: Performed by: OBSTETRICS & GYNECOLOGY

## 2020-02-03 PROCEDURE — 7100000000 HC PACU RECOVERY - FIRST 15 MIN: Performed by: OBSTETRICS & GYNECOLOGY

## 2020-02-03 PROCEDURE — 7100000011 HC PHASE II RECOVERY - ADDTL 15 MIN: Performed by: OBSTETRICS & GYNECOLOGY

## 2020-02-03 PROCEDURE — 3600000013 HC SURGERY LEVEL 3 ADDTL 15MIN: Performed by: OBSTETRICS & GYNECOLOGY

## 2020-02-03 PROCEDURE — 2709999900 HC NON-CHARGEABLE SUPPLY: Performed by: OBSTETRICS & GYNECOLOGY

## 2020-02-03 PROCEDURE — 6360000002 HC RX W HCPCS: Performed by: NURSE ANESTHETIST, CERTIFIED REGISTERED

## 2020-02-03 RX ORDER — DEXAMETHASONE SODIUM PHOSPHATE 4 MG/ML
INJECTION, SOLUTION INTRA-ARTICULAR; INTRALESIONAL; INTRAMUSCULAR; INTRAVENOUS; SOFT TISSUE PRN
Status: DISCONTINUED | OUTPATIENT
Start: 2020-02-03 | End: 2020-02-03 | Stop reason: SDUPTHER

## 2020-02-03 RX ORDER — ONDANSETRON 2 MG/ML
INJECTION INTRAMUSCULAR; INTRAVENOUS PRN
Status: DISCONTINUED | OUTPATIENT
Start: 2020-02-03 | End: 2020-02-03 | Stop reason: SDUPTHER

## 2020-02-03 RX ORDER — SODIUM CHLORIDE 9 MG/ML
INJECTION, SOLUTION INTRAVENOUS CONTINUOUS PRN
Status: DISCONTINUED | OUTPATIENT
Start: 2020-02-03 | End: 2020-02-03 | Stop reason: SDUPTHER

## 2020-02-03 RX ORDER — GLYCOPYRROLATE 1 MG/5 ML
SYRINGE (ML) INTRAVENOUS PRN
Status: DISCONTINUED | OUTPATIENT
Start: 2020-02-03 | End: 2020-02-03 | Stop reason: SDUPTHER

## 2020-02-03 RX ORDER — IBUPROFEN 600 MG/1
600 TABLET ORAL ONCE
Status: DISCONTINUED | OUTPATIENT
Start: 2020-02-03 | End: 2020-02-03 | Stop reason: HOSPADM

## 2020-02-03 RX ORDER — PROPOFOL 10 MG/ML
INJECTION, EMULSION INTRAVENOUS PRN
Status: DISCONTINUED | OUTPATIENT
Start: 2020-02-03 | End: 2020-02-03 | Stop reason: SDUPTHER

## 2020-02-03 RX ORDER — FENTANYL CITRATE 50 UG/ML
50 INJECTION, SOLUTION INTRAMUSCULAR; INTRAVENOUS EVERY 5 MIN PRN
Status: DISCONTINUED | OUTPATIENT
Start: 2020-02-03 | End: 2020-02-03 | Stop reason: HOSPADM

## 2020-02-03 RX ORDER — SODIUM CHLORIDE 0.9 % (FLUSH) 0.9 %
10 SYRINGE (ML) INJECTION EVERY 12 HOURS SCHEDULED
Status: DISCONTINUED | OUTPATIENT
Start: 2020-02-03 | End: 2020-02-03 | Stop reason: HOSPADM

## 2020-02-03 RX ORDER — MIDAZOLAM HYDROCHLORIDE 1 MG/ML
INJECTION INTRAMUSCULAR; INTRAVENOUS PRN
Status: DISCONTINUED | OUTPATIENT
Start: 2020-02-03 | End: 2020-02-03 | Stop reason: SDUPTHER

## 2020-02-03 RX ORDER — FENTANYL CITRATE 50 UG/ML
INJECTION, SOLUTION INTRAMUSCULAR; INTRAVENOUS PRN
Status: DISCONTINUED | OUTPATIENT
Start: 2020-02-03 | End: 2020-02-03 | Stop reason: SDUPTHER

## 2020-02-03 RX ORDER — SODIUM CHLORIDE 0.9 % (FLUSH) 0.9 %
10 SYRINGE (ML) INJECTION PRN
Status: DISCONTINUED | OUTPATIENT
Start: 2020-02-03 | End: 2020-02-03 | Stop reason: HOSPADM

## 2020-02-03 RX ORDER — LIDOCAINE HYDROCHLORIDE 20 MG/ML
INJECTION, SOLUTION EPIDURAL; INFILTRATION; INTRACAUDAL; PERINEURAL PRN
Status: DISCONTINUED | OUTPATIENT
Start: 2020-02-03 | End: 2020-02-03 | Stop reason: SDUPTHER

## 2020-02-03 RX ADMIN — DEXAMETHASONE SODIUM PHOSPHATE 10 MG: 4 INJECTION, SOLUTION INTRAMUSCULAR; INTRAVENOUS at 07:26

## 2020-02-03 RX ADMIN — ONDANSETRON HYDROCHLORIDE 4 MG: 2 INJECTION, SOLUTION INTRAMUSCULAR; INTRAVENOUS at 07:26

## 2020-02-03 RX ADMIN — PROPOFOL 200 MG: 10 INJECTION, EMULSION INTRAVENOUS at 07:20

## 2020-02-03 RX ADMIN — FENTANYL CITRATE 50 MCG: 50 INJECTION, SOLUTION INTRAMUSCULAR; INTRAVENOUS at 08:15

## 2020-02-03 RX ADMIN — MIDAZOLAM 2 MG: 1 INJECTION INTRAMUSCULAR; INTRAVENOUS at 07:16

## 2020-02-03 RX ADMIN — SODIUM CHLORIDE: 9 INJECTION, SOLUTION INTRAVENOUS at 07:16

## 2020-02-03 RX ADMIN — FENTANYL CITRATE 100 MCG: 50 INJECTION, SOLUTION INTRAMUSCULAR; INTRAVENOUS at 07:20

## 2020-02-03 RX ADMIN — Medication 0.2 MG: at 07:20

## 2020-02-03 RX ADMIN — LIDOCAINE HYDROCHLORIDE 100 MG: 20 INJECTION, SOLUTION EPIDURAL; INFILTRATION; INTRACAUDAL; PERINEURAL at 07:20

## 2020-02-03 ASSESSMENT — LIFESTYLE VARIABLES: SMOKING_STATUS: 1

## 2020-02-03 ASSESSMENT — PAIN SCALES - GENERAL
PAINLEVEL_OUTOF10: 0
PAINLEVEL_OUTOF10: 7
PAINLEVEL_OUTOF10: 2

## 2020-02-03 ASSESSMENT — PAIN - FUNCTIONAL ASSESSMENT: PAIN_FUNCTIONAL_ASSESSMENT: 0-10

## 2020-02-03 NOTE — ANESTHESIA POSTPROCEDURE EVALUATION
Department of Anesthesiology  Postprocedure Note    Patient: Andreea Berger  MRN: [id number]  YOB: 1970  Date of evaluation: 2/3/2020  Time:  11:14 AM     Procedure Summary     Date:  02/03/20 Room / Location:  [facility] OR 03 / [facility]    Anesthesia Start:  3721 Anesthesia Stop:  6159    Procedure:  DILATATION AND CURETTAGE HYSTEROSCOPY   +++LATEX ALLERGY+++ (N/A ) Diagnosis:  (POSTMENOPAUSAL BLEEDING CERVICAL DIAGNOSIS)    Surgeon:  Maritza Mejia MD Responsible Provider:  Amaury Reyes DO    Anesthesia Type:  general ASA Status:  3          Anesthesia Type: general    Vito Phase I: Vito Score: 10    Vito Phase II: Vito Score: 10    Last vitals: Reviewed and per EMR flowsheets.        Anesthesia Post Evaluation    Patient location during evaluation: PACU  Patient participation: complete - patient participated  Level of consciousness: awake and alert  Airway patency: patent  Nausea & Vomiting: no nausea and no vomiting  Complications: no  Cardiovascular status: hemodynamically stable  Respiratory status: acceptable  Hydration status: euvolemic

## 2020-02-03 NOTE — ANESTHESIA PRE PROCEDURE
Cigarettes    Smokeless tobacco: Never Used   Substance Use Topics    Alcohol use: No                                Ready to quit: Not Answered  Counseling given: Not Answered      Vital Signs (Current):   Vitals:    01/23/20 1541 02/03/20 0621   BP:  (!) 120/56   Pulse:  70   Resp:  15   Temp:  98.5 °F (36.9 °C)   TempSrc:  Temporal   SpO2:  99%   Weight: 108 lb (49 kg) 108 lb (49 kg)   Height: 5' 2\" (1.575 m) 5' 2\" (1.575 m)                                              BP Readings from Last 3 Encounters:   02/03/20 (!) 120/56   12/12/19 86/65   11/23/19 138/73       NPO Status: Time of last liquid consumption: 1900                        Time of last solid consumption: 1900                        Date of last liquid consumption: 02/02/20                        Date of last solid food consumption: 02/02/20    BMI:   Wt Readings from Last 3 Encounters:   02/03/20 108 lb (49 kg)   12/12/19 109 lb (49.4 kg)   11/23/19 104 lb (47.2 kg)     Body mass index is 19.75 kg/m². CBC:   Lab Results   Component Value Date    WBC 9.1 11/23/2019    RBC 4.93 11/23/2019    HGB 15.2 11/23/2019    HCT 45.0 11/23/2019    MCV 91.3 11/23/2019    RDW 12.3 11/23/2019     11/23/2019       CMP:   Lab Results   Component Value Date     11/23/2019    K 4.4 11/23/2019     11/23/2019    CO2 24 11/23/2019    BUN 15 11/23/2019    CREATININE 0.6 11/23/2019    GFRAA >60 11/23/2019    LABGLOM >60 11/23/2019    GLUCOSE 118 11/23/2019    PROT 7.3 11/23/2019    CALCIUM 9.7 11/23/2019    BILITOT 0.4 11/23/2019    ALKPHOS 63 11/23/2019    AST 16 11/23/2019    ALT 8 11/23/2019       POC Tests: No results for input(s): POCGLU, POCNA, POCK, POCCL, POCBUN, POCHEMO, POCHCT in the last 72 hours.     Coags: No results found for: PROTIME, INR, APTT    HCG (If Applicable):   Lab Results   Component Value Date    PREGTESTUR NEGATIVE 02/03/2020        ABGs: No results found for: PHART, PO2ART, FZN0QJE, THE9AFJ, BEART, S1JDFLMR     Type & Screen (If Applicable):  No results found for: LABABO, [address]    Anesthesia Evaluation  Patient summary reviewed no history of anesthetic complications:   Airway: Mallampati: I  TM distance: >3 FB   Neck ROM: full  Mouth opening: > = 3 FB Dental:          Pulmonary: breath sounds clear to auscultation  (+) current smoker                           Cardiovascular:Negative CV ROS            Rhythm: regular  Rate: normal           Beta Blocker:  Dose within 24 Hrs         Neuro/Psych:   (+) headaches: migraine headaches, psychiatric history:depression/anxiety              ROS comment: Anxiety    Bipolar 1 disorder (HCC)   PTSD (post-traumatic stress disorder)        GI/Hepatic/Renal:   (+) GERD:,           Endo/Other:    (+) : arthritis: OA., .                 Abdominal:           Vascular: negative vascular ROS. Anesthesia Plan      general     ASA 3       Induction: intravenous. MIPS: Postoperative opioids intended and Prophylactic antiemetics administered. Anesthetic plan and risks discussed with patient. Plan discussed with CRNA.             [address] Palumbo, DO   2/3/2020

## 2020-02-03 NOTE — PROCEDURES
Giovani Amador is a 52 y.o. female patient. No diagnosis found. Past Medical History:   Diagnosis Date    Anxiety     Arthritis     right hip, for injection 1-3-18     Bipolar 1 disorder (HCC)     Chronic back pain     and neck pain     Depression     Irregular menses     Migraines     Other hyperlipidemia 7/29/2019    PTSD (post-traumatic stress disorder)     Tobacco use disorder 7/29/2019     Blood pressure (!) 120/56, pulse 70, temperature 98.5 °F (36.9 °C), temperature source Temporal, resp. rate 15, height 5' 2\" (1.575 m), weight 108 lb (49 kg), SpO2 99 %. Procedures  Department of Obstetrics and Gynecology   Operative Report        Pre-operative Diagnosis: Postmenopausal bleeding/rectal stenosis                                       Post-operative Diagnosis:  Same    Procedure hysteroscopy D&C  Surgeon:  Connie Mesa    Anesthesia:  {General)  Findings: 8-week size uterus atrophic tissue pending final pathology  Estimated blood loss:5 Cc  Specimens: Uterine curetting  Complications:  none    Condition:  Good  Procedure  Patient was brought to the operating room where she was placed in a dorsal supine position given anesthesia placed in the dorsolithotomy position she was prepped and draped in a normal sterile fashion weighted speculum was placed in the posterior portion vagina full visualization of the cervix was obtained single-tooth tenaculum was placed cervix was dilated uterus was sounded up to 8 cm hysteroscope was introduced atrophic tissue was obtained difficult to assess uterine cavity due to most likely uterine adhesions sharp curetting was performed small amount of uterine tissue was obtained.   Patient was stable instrument counts were correct x2 patient will follow-up in the office to discuss further plan after pathology is obtained     Connie Mesa MD  2/3/2020

## 2020-02-17 ENCOUNTER — OFFICE VISIT (OUTPATIENT)
Dept: FAMILY MEDICINE CLINIC | Age: 50
End: 2020-02-17
Payer: MEDICARE

## 2020-02-17 VITALS
SYSTOLIC BLOOD PRESSURE: 108 MMHG | BODY MASS INDEX: 21.35 KG/M2 | DIASTOLIC BLOOD PRESSURE: 80 MMHG | WEIGHT: 116 LBS | HEIGHT: 62 IN

## 2020-02-17 PROCEDURE — G8420 CALC BMI NORM PARAMETERS: HCPCS | Performed by: FAMILY MEDICINE

## 2020-02-17 PROCEDURE — G8484 FLU IMMUNIZE NO ADMIN: HCPCS | Performed by: FAMILY MEDICINE

## 2020-02-17 PROCEDURE — G8427 DOCREV CUR MEDS BY ELIG CLIN: HCPCS | Performed by: FAMILY MEDICINE

## 2020-02-17 PROCEDURE — 4004F PT TOBACCO SCREEN RCVD TLK: CPT | Performed by: FAMILY MEDICINE

## 2020-02-17 PROCEDURE — 99214 OFFICE O/P EST MOD 30 MIN: CPT | Performed by: FAMILY MEDICINE

## 2020-02-17 RX ORDER — PROPRANOLOL HYDROCHLORIDE 60 MG/1
TABLET ORAL
Qty: 120 TABLET | Refills: 3 | Status: SHIPPED
Start: 2020-02-17 | End: 2020-09-21 | Stop reason: DRUGHIGH

## 2020-02-17 ASSESSMENT — ENCOUNTER SYMPTOMS
NAUSEA: 0
EYE DISCHARGE: 0
BLOOD IN STOOL: 0
SHORTNESS OF BREATH: 0
SINUS PAIN: 0
EYE REDNESS: 0
SORE THROAT: 0
TROUBLE SWALLOWING: 0
CHEST TIGHTNESS: 0
PHOTOPHOBIA: 0
COUGH: 0
DIARRHEA: 0
ALLERGIC/IMMUNOLOGIC NEGATIVE: 1
VOMITING: 0
EYE PAIN: 0
BACK PAIN: 0
ABDOMINAL PAIN: 0

## 2020-02-17 NOTE — PROGRESS NOTES
20  Loly Escudero : 1970 Sex: female  Age: 52 y.o. Chief Complaint   Patient presents with    Hyperlipidemia    Hip Pain     pt said they have arthritus in their hips and since the drastic weather change they have been hurting pretty bad and pt would like to discus this with Dr. Al Cord Blood Pressure Check     pt would like to also discuss their BP, they said normally it's 98/80, and recently its been 120/80, she undersstands that 120/80 is good but says its not for her. Hip pain, lateral aspect with pain to palpation. Denies deep joint pain. Has good ROM bilaterally. Denies recent illness, trauma, rash, fever, chills      Review of Systems   Constitutional: Negative for appetite change, fatigue and unexpected weight change. HENT: Negative for congestion, ear pain, hearing loss, sinus pain, sore throat and trouble swallowing. Eyes: Negative for photophobia, pain, discharge and redness. Respiratory: Negative for cough, chest tightness and shortness of breath. Cardiovascular: Negative for chest pain, palpitations and leg swelling. Gastrointestinal: Negative for abdominal pain, blood in stool, diarrhea, nausea and vomiting. Endocrine: Negative. Genitourinary: Negative for dysuria, flank pain, frequency and hematuria. Musculoskeletal: Positive for arthralgias. Negative for back pain, joint swelling and myalgias. Hips   Skin: Negative. Allergic/Immunologic: Negative. Neurological: Negative for dizziness, seizures, syncope, weakness, light-headedness, numbness and headaches. Hematological: Negative for adenopathy. Does not bruise/bleed easily. Psychiatric/Behavioral: Negative.           Current Outpatient Medications:     propranolol (INDERAL) 60 MG tablet, take 1 tablet by mouth every morning and 1 tablet IN THE AFTERNOON and 2 tablets at bedtime, Disp: 120 tablet, Rfl: 3    diclofenac sodium 1 % GEL, Apply 2 g topically 3 times daily, Disp: 1 Tube, Rfl: 0    Erenumab-aooe (AIMOVIG) 140 MG/ML SOAJ, Inject into the skin every 30 days, Disp: , Rfl:     desvenlafaxine succinate (PRISTIQ) 50 MG TB24 extended release tablet, Take 100 mg by mouth daily Instructed to take am of procedure , Disp: , Rfl:     tiZANidine (ZANAFLEX) 4 MG capsule, Take 4 mg by mouth 3 times daily Instructed to take am of procedure, Disp: , Rfl:     rosuvastatin (CRESTOR) 20 MG tablet, Take 1 tablet by mouth nightly, Disp: 30 tablet, Rfl: 3    Docusate Calcium (STOOL SOFTENER PO), Take by mouth 2 times daily, Disp: , Rfl:     diazepam (VALIUM) 10 MG tablet, Take 10 mg by mouth every 8 hours as needed for Anxiety. Instructed to take am of procedure if needed, Disp: , Rfl:     folic acid (FOLVITE) 1 MG tablet, Take 1 mg by mouth daily. , Disp: , Rfl:     lamoTRIgine (LAMICTAL) 200 MG tablet, Take 200 mg by mouth every evening , Disp: , Rfl:     EPINEPHrine (EPIPEN 2-BENITO) 0.3 MG/0.3ML SOAJ injection, Inject 0.3 mLs into the muscle once as needed (as needed in case of anaphylaxis) Use as directed for allergic reaction, Disp: 1 each, Rfl: 1  Allergies   Allergen Reactions    Latex     Ativan [Lorazepam] Other (See Comments)     Panic attack    Seroquel [Quetiapine Fumarate] Other (See Comments)     \"very bad mood\"    Cariprazine     Dexamethasone      Other reaction(s): Free Text    Hydroxyzine     Levofloxacin     Oxycodone-Acetaminophen     Quetiapine     Tramadol     Venlafaxine     Sulfa Antibiotics Rash       Past Medical History:   Diagnosis Date    Anxiety     Arthritis     right hip, for injection 1-3-18     Bipolar 1 disorder (HCC)     Chronic back pain     and neck pain     Depression     Irregular menses     Migraines     Other hyperlipidemia 7/29/2019    PTSD (post-traumatic stress disorder)     Tobacco use disorder 7/29/2019     Past Surgical History:   Procedure Laterality Date    ARTHROGRAPHY Right 1/3/2019    RIGHT HIP ARTHROGRAM AND STEROID 2 g topically 3 times daily        Return in about 1 week (around 2/24/2020) for recheck if not improved.       Seen By:  Paramjit Neville, DO

## 2020-02-18 ENCOUNTER — TELEPHONE (OUTPATIENT)
Dept: FAMILY MEDICINE CLINIC | Age: 50
End: 2020-02-18

## 2020-02-18 RX ORDER — ROSUVASTATIN CALCIUM 20 MG/1
20 TABLET, COATED ORAL NIGHTLY
Qty: 30 TABLET | Refills: 3 | Status: SHIPPED
Start: 2020-02-18 | End: 2020-04-21

## 2020-02-24 ENCOUNTER — TELEPHONE (OUTPATIENT)
Dept: FAMILY MEDICINE CLINIC | Age: 50
End: 2020-02-24

## 2020-04-21 RX ORDER — ROSUVASTATIN CALCIUM 20 MG/1
TABLET, COATED ORAL
Qty: 90 TABLET | Refills: 2 | Status: SHIPPED
Start: 2020-04-21 | End: 2020-06-29 | Stop reason: SDUPTHER

## 2020-05-22 RX ORDER — OMEPRAZOLE 40 MG/1
CAPSULE, DELAYED RELEASE ORAL
Qty: 30 CAPSULE | Refills: 2 | Status: SHIPPED
Start: 2020-05-22 | End: 2020-08-04 | Stop reason: DRUGHIGH

## 2020-06-29 ENCOUNTER — OFFICE VISIT (OUTPATIENT)
Dept: FAMILY MEDICINE CLINIC | Age: 50
End: 2020-06-29
Payer: MEDICARE

## 2020-06-29 ENCOUNTER — HOSPITAL ENCOUNTER (OUTPATIENT)
Age: 50
Discharge: HOME | End: 2020-07-01
Payer: MEDICARE

## 2020-06-29 VITALS
DIASTOLIC BLOOD PRESSURE: 70 MMHG | OXYGEN SATURATION: 99 % | RESPIRATION RATE: 16 BRPM | HEART RATE: 77 BPM | SYSTOLIC BLOOD PRESSURE: 100 MMHG | BODY MASS INDEX: 22.46 KG/M2 | TEMPERATURE: 98.6 F | WEIGHT: 111.2 LBS

## 2020-06-29 PROBLEM — E78.2 MIXED HYPERLIPIDEMIA: Chronic | Status: ACTIVE | Noted: 2019-07-29

## 2020-06-29 LAB
ALBUMIN SERPL-MCNC: 4.5 G/DL (ref 3.5–5.2)
ALP BLD-CCNC: 61 U/L (ref 35–104)
ALT SERPL-CCNC: 7 U/L (ref 0–32)
ANION GAP SERPL CALCULATED.3IONS-SCNC: 13 MMOL/L (ref 7–16)
AST SERPL-CCNC: 20 U/L (ref 0–31)
BASOPHILS ABSOLUTE: 0.04 E9/L (ref 0–0.2)
BASOPHILS RELATIVE PERCENT: 0.5 % (ref 0–2)
BILIRUB SERPL-MCNC: <0.2 MG/DL (ref 0–1.2)
BILIRUBIN URINE: NEGATIVE
BLOOD, URINE: NEGATIVE
BUN BLDV-MCNC: 9 MG/DL (ref 6–20)
CALCIUM SERPL-MCNC: 9.4 MG/DL (ref 8.6–10.2)
CHLORIDE BLD-SCNC: 103 MMOL/L (ref 98–107)
CHOLESTEROL, TOTAL: 136 MG/DL (ref 0–199)
CLARITY: CLEAR
CO2: 25 MMOL/L (ref 22–29)
COLOR: YELLOW
CREAT SERPL-MCNC: 0.6 MG/DL (ref 0.5–1)
EOSINOPHILS ABSOLUTE: 0.12 E9/L (ref 0.05–0.5)
EOSINOPHILS RELATIVE PERCENT: 1.6 % (ref 0–6)
GFR AFRICAN AMERICAN: >60
GFR NON-AFRICAN AMERICAN: >60 ML/MIN/1.73
GLUCOSE FASTING: 94 MG/DL (ref 74–99)
GLUCOSE URINE: NEGATIVE MG/DL
HCT VFR BLD CALC: 43.5 % (ref 34–48)
HDLC SERPL-MCNC: 48 MG/DL
HEMOGLOBIN: 13.8 G/DL (ref 11.5–15.5)
IMMATURE GRANULOCYTES #: 0.01 E9/L
IMMATURE GRANULOCYTES %: 0.1 % (ref 0–5)
KETONES, URINE: NEGATIVE MG/DL
LDL CHOLESTEROL CALCULATED: 68 MG/DL (ref 0–99)
LEUKOCYTE ESTERASE, URINE: NEGATIVE
LYMPHOCYTES ABSOLUTE: 2.27 E9/L (ref 1.5–4)
LYMPHOCYTES RELATIVE PERCENT: 30.5 % (ref 20–42)
MCH RBC QN AUTO: 30.4 PG (ref 26–35)
MCHC RBC AUTO-ENTMCNC: 31.7 % (ref 32–34.5)
MCV RBC AUTO: 95.8 FL (ref 80–99.9)
MONOCYTES ABSOLUTE: 0.36 E9/L (ref 0.1–0.95)
MONOCYTES RELATIVE PERCENT: 4.8 % (ref 2–12)
NEUTROPHILS ABSOLUTE: 4.65 E9/L (ref 1.8–7.3)
NEUTROPHILS RELATIVE PERCENT: 62.5 % (ref 43–80)
NITRITE, URINE: NEGATIVE
PDW BLD-RTO: 13.3 FL (ref 11.5–15)
PH UA: 7 (ref 5–9)
PLATELET # BLD: 221 E9/L (ref 130–450)
PMV BLD AUTO: 10 FL (ref 7–12)
POTASSIUM SERPL-SCNC: 4.5 MMOL/L (ref 3.5–5)
PROTEIN UA: NEGATIVE MG/DL
RBC # BLD: 4.54 E12/L (ref 3.5–5.5)
SODIUM BLD-SCNC: 141 MMOL/L (ref 132–146)
SPECIFIC GRAVITY UA: 1.02 (ref 1–1.03)
TOTAL PROTEIN: 7.1 G/DL (ref 6.4–8.3)
TRIGL SERPL-MCNC: 99 MG/DL (ref 0–149)
TSH SERPL DL<=0.05 MIU/L-ACNC: 0.86 UIU/ML (ref 0.27–4.2)
UROBILINOGEN, URINE: 0.2 E.U./DL
VLDLC SERPL CALC-MCNC: 20 MG/DL
WBC # BLD: 7.5 E9/L (ref 4.5–11.5)

## 2020-06-29 PROCEDURE — 99214 OFFICE O/P EST MOD 30 MIN: CPT | Performed by: FAMILY MEDICINE

## 2020-06-29 PROCEDURE — 4004F PT TOBACCO SCREEN RCVD TLK: CPT | Performed by: FAMILY MEDICINE

## 2020-06-29 PROCEDURE — 84443 ASSAY THYROID STIM HORMONE: CPT

## 2020-06-29 PROCEDURE — 36415 COLL VENOUS BLD VENIPUNCTURE: CPT

## 2020-06-29 PROCEDURE — 3017F COLORECTAL CA SCREEN DOC REV: CPT | Performed by: FAMILY MEDICINE

## 2020-06-29 PROCEDURE — 80053 COMPREHEN METABOLIC PANEL: CPT

## 2020-06-29 PROCEDURE — 80061 LIPID PANEL: CPT

## 2020-06-29 PROCEDURE — 85025 COMPLETE CBC W/AUTO DIFF WBC: CPT

## 2020-06-29 PROCEDURE — G8420 CALC BMI NORM PARAMETERS: HCPCS | Performed by: FAMILY MEDICINE

## 2020-06-29 PROCEDURE — 81003 URINALYSIS AUTO W/O SCOPE: CPT

## 2020-06-29 PROCEDURE — G8427 DOCREV CUR MEDS BY ELIG CLIN: HCPCS | Performed by: FAMILY MEDICINE

## 2020-06-29 RX ORDER — ROSUVASTATIN CALCIUM 20 MG/1
TABLET, COATED ORAL
Qty: 90 TABLET | Refills: 2 | Status: SHIPPED
Start: 2020-06-29 | End: 2020-09-21 | Stop reason: SDUPTHER

## 2020-06-29 ASSESSMENT — ENCOUNTER SYMPTOMS
EYE REDNESS: 0
SINUS PAIN: 0
CHEST TIGHTNESS: 0
PHOTOPHOBIA: 0
ALLERGIC/IMMUNOLOGIC NEGATIVE: 1
EYE PAIN: 0
DIARRHEA: 0
BACK PAIN: 0
ABDOMINAL PAIN: 0
BLOOD IN STOOL: 0
SORE THROAT: 0
COUGH: 0
NAUSEA: 0
EYE DISCHARGE: 0
VOMITING: 0
SHORTNESS OF BREATH: 0
TROUBLE SWALLOWING: 0

## 2020-08-03 ENCOUNTER — TELEPHONE (OUTPATIENT)
Dept: ADMINISTRATIVE | Age: 50
End: 2020-08-03

## 2020-08-04 ENCOUNTER — VIRTUAL VISIT (OUTPATIENT)
Dept: FAMILY MEDICINE CLINIC | Age: 50
End: 2020-08-04
Payer: MEDICARE

## 2020-08-04 PROCEDURE — G2012 BRIEF CHECK IN BY MD/QHP: HCPCS | Performed by: FAMILY MEDICINE

## 2020-08-04 RX ORDER — OMEPRAZOLE 20 MG/1
20 CAPSULE, DELAYED RELEASE ORAL
Qty: 30 CAPSULE | Refills: 2 | Status: SHIPPED
Start: 2020-08-04 | End: 2020-08-14 | Stop reason: SDUPTHER

## 2020-08-04 RX ORDER — PERPHENAZINE 4 MG/1
4 TABLET, FILM COATED ORAL 2 TIMES DAILY
COMMUNITY
Start: 2020-06-03 | End: 2020-09-21

## 2020-08-04 RX ORDER — BENZTROPINE MESYLATE 0.5 MG/1
0.5 TABLET ORAL 2 TIMES DAILY
COMMUNITY
Start: 2020-07-21 | End: 2020-09-21

## 2020-08-04 RX ORDER — ONDANSETRON 4 MG/1
4 TABLET, ORALLY DISINTEGRATING ORAL 3 TIMES DAILY PRN
Qty: 30 TABLET | Refills: 2 | Status: SHIPPED
Start: 2020-08-04 | End: 2020-11-02

## 2020-08-04 NOTE — PROGRESS NOTES
Dasha Rich is a 48 y.o. female evaluated via telephone on 8/4/2020. Consent:  She and/or health care decision maker is aware that that she may receive a bill for this telephone service, depending on her insurance coverage, and has provided verbal consent to proceed: Yes      Documentation:  I communicated with the patient and/or health care decision maker about Abdominal pain. Details of this discussion including any medical advice provided:   The patient states that they have had abdominal pain for the last 2 weeks. The pain is described as aching and is located in the upper abdomen. The patient denies nausea and vomiting. Bowel movements have been normal color and consistency. Only intermittent diarrhea. No black or bloody stools. The patient denies dysuria, urinary frequency, urgency and or gross hematuria. No flank pain. No fevers or chills. No chest pain or dyspnea. She is out of Prilosec and Zofran  Which she has used in the past  Pancho Mike was seen today for diarrhea, abdominal pain and nausea. Diagnoses and all orders for this visit:    Gastroesophageal reflux disease without esophagitis  -     omeprazole (PRILOSEC) 20 MG delayed release capsule; Take 1 capsule by mouth every morning (before breakfast)    Nausea  -     ondansetron (ZOFRAN-ODT) 4 MG disintegrating tablet; Take 1 tablet by mouth 3 times daily as needed for Nausea or Vomiting        I affirm this is a Patient Initiated Episode with a Patient who has not had a related appointment within my department in the past 7 days or scheduled within the next 24 hours.     Patient identification was verified at the start of the visit: Yes    Total Time: minutes: 5-10 minutes    Note: not billable if this call serves to triage the patient into an appointment for the relevant concern      Renaldo Sanabria

## 2020-08-11 ENCOUNTER — OFFICE VISIT (OUTPATIENT)
Dept: ORTHOPEDIC SURGERY | Age: 50
End: 2020-08-11
Payer: MEDICARE

## 2020-08-11 VITALS — TEMPERATURE: 97.9 F | BODY MASS INDEX: 19.32 KG/M2 | WEIGHT: 105 LBS | HEIGHT: 62 IN

## 2020-08-11 PROCEDURE — 3017F COLORECTAL CA SCREEN DOC REV: CPT | Performed by: ORTHOPAEDIC SURGERY

## 2020-08-11 PROCEDURE — G8420 CALC BMI NORM PARAMETERS: HCPCS | Performed by: ORTHOPAEDIC SURGERY

## 2020-08-11 PROCEDURE — 99213 OFFICE O/P EST LOW 20 MIN: CPT | Performed by: ORTHOPAEDIC SURGERY

## 2020-08-11 PROCEDURE — 4004F PT TOBACCO SCREEN RCVD TLK: CPT | Performed by: ORTHOPAEDIC SURGERY

## 2020-08-11 PROCEDURE — G8427 DOCREV CUR MEDS BY ELIG CLIN: HCPCS | Performed by: ORTHOPAEDIC SURGERY

## 2020-08-11 NOTE — PROGRESS NOTES
 Smoking status: Current Every Day Smoker     Packs/day: 1.50     Years: 30.00     Pack years: 45.00     Types: Cigarettes    Smokeless tobacco: Never Used   Substance and Sexual Activity    Alcohol use: No    Drug use: No    Sexual activity: Not Currently       CURRENT MEDICATIONS     Current Outpatient Medications:     Fremanezumab-vfrm 225 MG/1.5ML SOAJ, Inject into the skin Inject every 30 days, Disp: , Rfl:     benztropine (COGENTIN) 0.5 MG tablet, Take 0.5 mg by mouth 2 times daily, Disp: , Rfl:     perphenazine 4 MG tablet, , Disp: , Rfl:     omeprazole (PRILOSEC) 20 MG delayed release capsule, Take 1 capsule by mouth every morning (before breakfast), Disp: 30 capsule, Rfl: 2    ondansetron (ZOFRAN-ODT) 4 MG disintegrating tablet, Take 1 tablet by mouth 3 times daily as needed for Nausea or Vomiting, Disp: 30 tablet, Rfl: 2    rosuvastatin (CRESTOR) 20 MG tablet, take 1 tablet by mouth nightly ---90DAYS REQUEST, Disp: 90 tablet, Rfl: 2    propranolol (INDERAL) 60 MG tablet, take 1 tablet by mouth every morning and 1 tablet IN THE AFTERNOON and 2 tablets at bedtime, Disp: 120 tablet, Rfl: 3    desvenlafaxine succinate (PRISTIQ) 50 MG TB24 extended release tablet, Take 100 mg by mouth daily Instructed to take am of procedure , Disp: , Rfl:     tiZANidine (ZANAFLEX) 4 MG capsule, Take 4 mg by mouth 3 times daily Instructed to take am of procedure, Disp: , Rfl:     Docusate Calcium (STOOL SOFTENER PO), Take by mouth 2 times daily, Disp: , Rfl:     diazepam (VALIUM) 10 MG tablet, Take 10 mg by mouth every 8 hours as needed for Anxiety. Instructed to take am of procedure if needed, Disp: , Rfl:     folic acid (FOLVITE) 1 MG tablet, Take 1 mg by mouth daily. , Disp: , Rfl:     lamoTRIgine (LAMICTAL) 200 MG tablet, Take 200 mg by mouth every evening , Disp: , Rfl:     diclofenac sodium 1 % GEL, Apply 2 g topically 3 times daily (Patient not taking: Reported on 8/4/2020), Disp: 1 Tube, Rfl: 0   EPINEPHrine (EPIPEN 2-BENITO) 0.3 MG/0.3ML SOAJ injection, Inject 0.3 mLs into the muscle once as needed (as needed in case of anaphylaxis) Use as directed for allergic reaction (Patient not taking: Reported on 8/4/2020), Disp: 1 each, Rfl: 1    ALLERGIES  Allergies   Allergen Reactions    Latex     Ativan [Lorazepam] Other (See Comments)     Panic attack    Seroquel [Quetiapine Fumarate] Other (See Comments)     \"very bad mood\"    Cariprazine     Dexamethasone      Other reaction(s): Free Text    Hydroxyzine     Levofloxacin     Oxycodone-Acetaminophen     Quetiapine     Venlafaxine     Sulfa Antibiotics Rash       Controlled Substances Monitoring:      REVIEW OF SYSTEMS:     General: Negative Fever, chills, fatigue   Cardiovascular: Negative chest pain, palpitations  Respiratory: Equal chest expansion, negative shortness of breath   Skin: Negative rash, open wounds  Psych: Normal affect, mood stable  Neurologic: sensation grossly intact in extremities   Musculoskeletal: See HPI      PHYSICAL EXAM     Vitals:    08/11/20 1028   Temp: 97.9 °F (36.6 °C)   TempSrc: Infrared   Weight: 105 lb (47.6 kg)   Height: 5' 2\" (1.575 m)       Height: 5' 2\" (1.575 m)  Weight: [unfilled]  BMI:  Body mass index is 19.2 kg/m². General: The patient is alert and oriented x 3, appears to be stated age and in no distress. HEENT: head is normocephalic, atraumatic. EOMI. Neck: supple, trachea midline, no thyromegaly   Cardiovascular: peripheral pulses palpable. Normal Capillary refill   Respiratory: breathing unlabored, chest expansion symmetric   Skin: no rash, no open wounds, no erythema  Psych: normal affect; mood stable  Neurologic: gait normal, sensation grossly intact in extremities  MSK:     Hip:  Bilateral hip exam decreased internal and external rotation on exam.  Mild greater trochanter tenderness present. Straight leg raise exam negative for lower back or hip pain, resisted abduction negative for pain.   Georgia Olivier

## 2020-08-13 ENCOUNTER — TELEPHONE (OUTPATIENT)
Dept: FAMILY MEDICINE CLINIC | Age: 50
End: 2020-08-13

## 2020-08-13 NOTE — TELEPHONE ENCOUNTER
Tito Lover calling to tell you that the Omeprazole has helped some, but she is still a little nauseated at times.

## 2020-08-14 ENCOUNTER — TELEPHONE (OUTPATIENT)
Dept: FAMILY MEDICINE CLINIC | Age: 50
End: 2020-08-14

## 2020-08-14 RX ORDER — OMEPRAZOLE 40 MG/1
40 CAPSULE, DELAYED RELEASE ORAL
Qty: 30 CAPSULE | Refills: 2 | Status: SHIPPED
Start: 2020-08-14 | End: 2020-09-21 | Stop reason: SDUPTHER

## 2020-08-14 NOTE — TELEPHONE ENCOUNTER
Patient given instructions and another Rx was sent  For 40mg to get after this 30, 20mg Rx . Patient demonstrates verbal understanding.

## 2020-08-26 NOTE — PROGRESS NOTES
mouth 3 times daily Instructed to take am of procedure, Disp: , Rfl:     Docusate Calcium (STOOL SOFTENER PO), Take by mouth 2 times daily, Disp: , Rfl:     diazepam (VALIUM) 10 MG tablet, Take 10 mg by mouth every 8 hours as needed for Anxiety. Instructed to take am of procedure if needed, Disp: , Rfl:     folic acid (FOLVITE) 1 MG tablet, Take 1 mg by mouth daily. , Disp: , Rfl:     lamoTRIgine (LAMICTAL) 200 MG tablet, Take 200 mg by mouth every evening , Disp: , Rfl:     omeprazole (PRILOSEC) 40 MG delayed release capsule, take 1 capsule by mouth once daily (Patient not taking: Reported on 6/29/2020), Disp: 30 capsule, Rfl: 2    diclofenac sodium 1 % GEL, Apply 2 g topically 3 times daily, Disp: 1 Tube, Rfl: 0    EPINEPHrine (EPIPEN 2-BENITO) 0.3 MG/0.3ML SOAJ injection, Inject 0.3 mLs into the muscle once as needed (as needed in case of anaphylaxis) Use as directed for allergic reaction, Disp: 1 each, Rfl: 1  Allergies   Allergen Reactions    Latex     Ativan [Lorazepam] Other (See Comments)     Panic attack    Seroquel [Quetiapine Fumarate] Other (See Comments)     \"very bad mood\"    Cariprazine     Dexamethasone      Other reaction(s): Free Text    Hydroxyzine     Levofloxacin     Oxycodone-Acetaminophen     Quetiapine     Tramadol     Venlafaxine     Sulfa Antibiotics Rash       Past Medical History:   Diagnosis Date    Anxiety     Arthritis     right hip, for injection 1-3-18     Bipolar 1 disorder (HCC)     Chronic back pain     and neck pain     Depression     Irregular menses     Migraines     Other hyperlipidemia 7/29/2019    PTSD (post-traumatic stress disorder)     Tobacco use disorder 7/29/2019     Past Surgical History:   Procedure Laterality Date    ARTHROGRAPHY Right 1/3/2019    RIGHT HIP ARTHROGRAM AND STEROID INJECTION performed by Deepak Winston MD at [address] [place] [address] N/A 2/3/2020    DILATATION AND CURETTAGE HYSTEROSCOPY performed by Sonu Liz MD at [facility]  02/20/2019    implant     TONSILLECTOMY      TOOTH EXTRACTION      TUBAL LIGATION      TUMOR REMOVAL      in pancrease      No family history on file. Social History     Socioeconomic History    Marital status:      Spouse name: Not on file    Number of children: Not on file    Years of education: Not on file    Highest education level: Not on file   Occupational History    Not on file   Social Needs    Financial resource strain: Not on file    Food insecurity     Worry: Not on file     Inability: Not on file    Transportation needs     Medical: Not on file     Non-medical: Not on file   Tobacco Use    Smoking status: Current Every Day Smoker     Packs/day: 1.50     Years: 30.00     Pack years: 45.00     Types: Cigarettes    Smokeless tobacco: Never Used   Substance and Sexual Activity    Alcohol use: No    Drug use: No    Sexual activity: Not Currently   Lifestyle    Physical activity     Days per week: Not on file     Minutes per session: Not on file    Stress: Not on file   Relationships    Social connections     Talks on phone: Not on file     Gets together: Not on file     Attends Synagogue service: Not on file     Active member of club or organization: Not on file     Attends meetings of clubs or organizations: Not on file     Relationship status: Not on file    Intimate partner violence     Fear of current or ex partner: Not on file     Emotionally abused: Not on file     Physically abused: Not on file     Forced sexual activity: Not on file   Other Topics Concern    Not on file   Social History Narrative    Not on file       Vitals:    06/29/20 1036   BP: 100/70   Pulse: 77   Resp: 16   Temp: 98.6 °F (37 °C)   SpO2: 99%   Weight: 111 lb 3.2 oz (50.4 kg)       Physical Exam  Vitals signs and nursing note reviewed. Constitutional:       Appearance: She is well-developed.    HENT:      Head: Please resume regular diet upon discharge to acute rehab facility.

## 2020-09-08 NOTE — PROGRESS NOTES

## 2020-09-10 ENCOUNTER — HOSPITAL ENCOUNTER (OUTPATIENT)
Age: 50
Setting detail: OUTPATIENT SURGERY
Discharge: HOME | End: 2020-09-10
Attending: ORTHOPAEDIC SURGERY | Admitting: ORTHOPAEDIC SURGERY
Payer: MEDICARE

## 2020-09-10 ENCOUNTER — APPOINTMENT (OUTPATIENT)
Dept: GENERAL RADIOLOGY | Age: 50
End: 2020-09-10
Attending: ORTHOPAEDIC SURGERY
Payer: MEDICARE

## 2020-09-10 ENCOUNTER — ANESTHESIA (OUTPATIENT)
Dept: OPERATING ROOM | Age: 50
End: 2020-09-10
Payer: MEDICARE

## 2020-09-10 ENCOUNTER — ANESTHESIA EVENT (OUTPATIENT)
Dept: OPERATING ROOM | Age: 50
End: 2020-09-10
Payer: MEDICARE

## 2020-09-10 VITALS — DIASTOLIC BLOOD PRESSURE: 52 MMHG | SYSTOLIC BLOOD PRESSURE: 92 MMHG | OXYGEN SATURATION: 98 %

## 2020-09-10 VITALS
DIASTOLIC BLOOD PRESSURE: 59 MMHG | TEMPERATURE: 97.2 F | BODY MASS INDEX: 20.24 KG/M2 | WEIGHT: 110 LBS | HEIGHT: 62 IN | RESPIRATION RATE: 16 BRPM | OXYGEN SATURATION: 99 % | SYSTOLIC BLOOD PRESSURE: 109 MMHG | HEART RATE: 63 BPM

## 2020-09-10 PROCEDURE — 2709999900 HC NON-CHARGEABLE SUPPLY: Performed by: ORTHOPAEDIC SURGERY

## 2020-09-10 PROCEDURE — 6360000002 HC RX W HCPCS: Performed by: ORTHOPAEDIC SURGERY

## 2020-09-10 PROCEDURE — 3600000012 HC SURGERY LEVEL 2 ADDTL 15MIN: Performed by: ORTHOPAEDIC SURGERY

## 2020-09-10 PROCEDURE — 6360000002 HC RX W HCPCS: Performed by: NURSE ANESTHETIST, CERTIFIED REGISTERED

## 2020-09-10 PROCEDURE — 2500000003 HC RX 250 WO HCPCS: Performed by: ORTHOPAEDIC SURGERY

## 2020-09-10 PROCEDURE — 3600000002 HC SURGERY LEVEL 2 BASE: Performed by: ORTHOPAEDIC SURGERY

## 2020-09-10 PROCEDURE — 7100000011 HC PHASE II RECOVERY - ADDTL 15 MIN: Performed by: ORTHOPAEDIC SURGERY

## 2020-09-10 PROCEDURE — 3209999900 FLUORO FOR SURGICAL PROCEDURES

## 2020-09-10 PROCEDURE — 6360000004 HC RX CONTRAST MEDICATION: Performed by: ORTHOPAEDIC SURGERY

## 2020-09-10 PROCEDURE — 7100000010 HC PHASE II RECOVERY - FIRST 15 MIN: Performed by: ORTHOPAEDIC SURGERY

## 2020-09-10 PROCEDURE — 2580000003 HC RX 258: Performed by: NURSE ANESTHETIST, CERTIFIED REGISTERED

## 2020-09-10 PROCEDURE — 77002 NEEDLE LOCALIZATION BY XRAY: CPT | Performed by: ORTHOPAEDIC SURGERY

## 2020-09-10 PROCEDURE — 20610 DRAIN/INJ JOINT/BURSA W/O US: CPT | Performed by: ORTHOPAEDIC SURGERY

## 2020-09-10 PROCEDURE — 3700000001 HC ADD 15 MINUTES (ANESTHESIA): Performed by: ORTHOPAEDIC SURGERY

## 2020-09-10 PROCEDURE — 3700000000 HC ANESTHESIA ATTENDED CARE: Performed by: ORTHOPAEDIC SURGERY

## 2020-09-10 PROCEDURE — 6360000002 HC RX W HCPCS: Performed by: NURSE PRACTITIONER

## 2020-09-10 RX ORDER — LIDOCAINE HYDROCHLORIDE 20 MG/ML
INJECTION, SOLUTION INFILTRATION; PERINEURAL PRN
Status: DISCONTINUED | OUTPATIENT
Start: 2020-09-10 | End: 2020-09-10 | Stop reason: ALTCHOICE

## 2020-09-10 RX ORDER — SODIUM CHLORIDE 0.9 % (FLUSH) 0.9 %
10 SYRINGE (ML) INJECTION EVERY 12 HOURS SCHEDULED
Status: DISCONTINUED | OUTPATIENT
Start: 2020-09-10 | End: 2020-09-10 | Stop reason: HOSPADM

## 2020-09-10 RX ORDER — SODIUM CHLORIDE 0.9 % (FLUSH) 0.9 %
10 SYRINGE (ML) INJECTION PRN
Status: DISCONTINUED | OUTPATIENT
Start: 2020-09-10 | End: 2020-09-10 | Stop reason: HOSPADM

## 2020-09-10 RX ORDER — MIDAZOLAM HYDROCHLORIDE 1 MG/ML
INJECTION INTRAMUSCULAR; INTRAVENOUS PRN
Status: DISCONTINUED | OUTPATIENT
Start: 2020-09-10 | End: 2020-09-10 | Stop reason: SDUPTHER

## 2020-09-10 RX ORDER — SODIUM CHLORIDE, SODIUM LACTATE, POTASSIUM CHLORIDE, CALCIUM CHLORIDE 600; 310; 30; 20 MG/100ML; MG/100ML; MG/100ML; MG/100ML
INJECTION, SOLUTION INTRAVENOUS CONTINUOUS PRN
Status: DISCONTINUED | OUTPATIENT
Start: 2020-09-10 | End: 2020-09-10 | Stop reason: SDUPTHER

## 2020-09-10 RX ORDER — PROPOFOL 10 MG/ML
INJECTION, EMULSION INTRAVENOUS PRN
Status: DISCONTINUED | OUTPATIENT
Start: 2020-09-10 | End: 2020-09-10 | Stop reason: SDUPTHER

## 2020-09-10 RX ORDER — FENTANYL CITRATE 50 UG/ML
INJECTION, SOLUTION INTRAMUSCULAR; INTRAVENOUS PRN
Status: DISCONTINUED | OUTPATIENT
Start: 2020-09-10 | End: 2020-09-10 | Stop reason: SDUPTHER

## 2020-09-10 RX ADMIN — FENTANYL CITRATE 100 MCG: 50 INJECTION, SOLUTION INTRAMUSCULAR; INTRAVENOUS at 07:05

## 2020-09-10 RX ADMIN — SODIUM CHLORIDE, POTASSIUM CHLORIDE, SODIUM LACTATE AND CALCIUM CHLORIDE: 600; 310; 30; 20 INJECTION, SOLUTION INTRAVENOUS at 06:50

## 2020-09-10 RX ADMIN — MIDAZOLAM 2 MG: 1 INJECTION INTRAMUSCULAR; INTRAVENOUS at 06:59

## 2020-09-10 RX ADMIN — PROPOFOL 80 MG: 10 INJECTION, EMULSION INTRAVENOUS at 07:05

## 2020-09-10 RX ADMIN — Medication 2 G: at 06:59

## 2020-09-10 ASSESSMENT — PAIN SCALES - GENERAL
PAINLEVEL_OUTOF10: 0

## 2020-09-10 ASSESSMENT — PULMONARY FUNCTION TESTS
PIF_VALUE: 1
PIF_VALUE: 0
PIF_VALUE: 1
PIF_VALUE: 0
PIF_VALUE: 0
PIF_VALUE: 1
PIF_VALUE: 0
PIF_VALUE: 1

## 2020-09-10 ASSESSMENT — PAIN - FUNCTIONAL ASSESSMENT: PAIN_FUNCTIONAL_ASSESSMENT: 0-10

## 2020-09-10 ASSESSMENT — PAIN DESCRIPTION - DESCRIPTORS: DESCRIPTORS: DISCOMFORT;ACHING

## 2020-09-10 ASSESSMENT — LIFESTYLE VARIABLES: SMOKING_STATUS: 1

## 2020-09-10 NOTE — ANESTHESIA POSTPROCEDURE EVALUATION
Department of Anesthesiology  Postprocedure Note    Patient: Bhupendra Payne  MRN: [id number]  YOB: 1970  Date of evaluation: 9/10/2020  Time:  9:22 AM     Procedure Summary     Date:  09/10/20 Room / Location:  [facility] OR 05 / [facility]    Anesthesia Start:  7912 Anesthesia Stop:  5015    Procedure:  RIGHT HIP INJECTION UNDER FLUOROSCOPY (Right Hip) Diagnosis:  (RIGHT HIP OSTEOARTHRITIS)    Surgeon:  Sarah Ward MD Responsible Provider:  Philomena Davila DO    Anesthesia Type:  MAC ASA Status:  3          Anesthesia Type: MAC    Ivto Phase I: Vito Score: 10    Vito Phase II: Vito Score: 10    Last vitals: Reviewed and per EMR flowsheets.        Anesthesia Post Evaluation    Patient location during evaluation: PACU  Patient participation: complete - patient participated  Level of consciousness: awake and alert  Airway patency: patent  Nausea & Vomiting: no nausea and no vomiting  Complications: no  Cardiovascular status: hemodynamically stable  Respiratory status: acceptable  Hydration status: euvolemic

## 2020-09-10 NOTE — PROGRESS NOTES
Discharge instructions provided with good understanding. Remains a little groggy. Taking nourishment with good toleration.

## 2020-09-10 NOTE — ANESTHESIA PRE PROCEDURE
Department of Anesthesiology  Preprocedure Note       Name:  Maile Garza   Age:  48 y.o.  :  1970                                          MRN:  [id number]         Date:  9/10/2020      Surgeon: Xin Hsieh):  Terrence Olivier MD    Procedure: RIGHT HIP INJECTION UNDER FLUOROSCOPY    +++LATEX ALLERGY+++ (Right )    Medications prior to admission:   Prior to Admission medications    Medication Sig Start Date End Date Taking? Authorizing Provider   omeprazole (PRILOSEC) 40 MG delayed release capsule Take 1 capsule by mouth every morning (before breakfast) 20  Yes [place] SHANE Persaud DO   Fremanezumab-vfrm 225 MG/1.5ML SOAJ Inject into the skin Inject every 30 days   Yes Historical Provider, MD   benztropine (COGENTIN) 0.5 MG tablet Take 0.5 mg by mouth 2 times daily 20  Yes Historical Provider, MD   perphenazine 4 MG tablet Take 4 mg by mouth 2 times daily  6/3/20  Yes Historical Provider, MD   rosuvastatin (CRESTOR) 20 MG tablet take 1 tablet by mouth nightly ---90DAYS REQUEST 20  Yes [place] SHANE Persaud DO   propranolol (INDERAL) 60 MG tablet take 1 tablet by mouth every morning and 1 tablet IN THE AFTERNOON and 2 tablets at bedtime 20  Yes Gladstone SHANE Persaud DO   desvenlafaxine succinate (PRISTIQ) 50 MG TB24 extended release tablet Take 100 mg by mouth daily Instructed to take am of procedure    Yes Historical Provider, MD   Docusate Calcium (STOOL SOFTENER PO) Take by mouth 2 times daily   Yes Historical Provider, MD   diazepam (VALIUM) 10 MG tablet Take 10 mg by mouth every 8 hours as needed for Anxiety. Instructed to take am of procedure if needed   Yes Historical Provider, MD   folic acid (FOLVITE) 1 MG tablet Take 1 mg by mouth daily.    Yes Historical Provider, MD   lamoTRIgine (LAMICTAL) 200 MG tablet Take 200 mg by mouth every evening    Yes Historical Provider, MD   ondansetron (ZOFRAN-ODT) 4 MG disintegrating tablet Take 1 tablet by mouth 3 times daily as needed for Nausea or Vomiting 8/4/20   Lala Mendiola DO       Current medications:    Current Facility-Administered Medications   Medication Dose Route Frequency Provider Last Rate Last Dose    sodium chloride flush 0.9 % injection 10 mL  10 mL Intravenous 2 times per day FRENCH Romero CNP        sodium chloride flush 0.9 % injection 10 mL  10 mL Intravenous PRN FRENCH Romero CNP        ceFAZolin (ANCEF) 2 g in sterile water 20 mL IV syringe  2 g Intravenous On Call to FRENCH Villalta CNP        cefazolin 2 g in 20 mL SWFI IV Syringe IV syringe                Allergies:     Allergies   Allergen Reactions    Latex     Ativan [Lorazepam] Other (See Comments)     Panic attack    Seroquel [Quetiapine Fumarate] Other (See Comments)     \"very bad mood\"    Cariprazine     Dexamethasone      Other reaction(s): Free Text    Hydroxyzine     Levofloxacin     Oxycodone-Acetaminophen     Quetiapine     Venlafaxine     Sulfa Antibiotics Rash       Problem List:    Patient Active Problem List   Diagnosis Code    Right hip pain M25.551    Anxiety F41.9    Recurrent major depression in partial remission ([facility] [address] F33.41    New daily persistent headache G44.52    Tobacco use disorder F17.200    Mixed hyperlipidemia E78.2    History of migraine Z86.69       Past Medical History:        Diagnosis Date    Anxiety     Arthritis     right hip, for injection 1-3-18     Bipolar 1 disorder ([facility] [address]     Chronic back pain     and neck pain     Depression     Migraines     Other hyperlipidemia 7/29/2019    PTSD (post-traumatic stress disorder)     Tobacco use disorder 7/29/2019       Past Surgical History:        Procedure Laterality Date    ARTHROGRAPHY Right 1/3/2019    RIGHT HIP ARTHROGRAM AND STEROID INJECTION performed by Paige Lizama MD at [address] [place] [address] N/A 2/3/2020    DILATATION AND CURETTAGE HYSTEROSCOPY performed by Antonio Mcclelland MD at [facility] OR    SMALL INTESTINE SURGERY  02/20/2019    implant     TONSILLECTOMY      TOOTH EXTRACTION      TUBAL LIGATION      TUMOR REMOVAL      in pancrease        Social History:    Social History     Tobacco Use    Smoking status: Current Every Day Smoker     Packs/day: 1.50     Years: 30.00     Pack years: 45.00     Types: Cigarettes    Smokeless tobacco: Never Used   Substance Use Topics    Alcohol use: No                                Ready to quit: Not Answered  Counseling given: Not Answered      Vital Signs (Current):   Vitals:    09/08/20 1603 09/10/20 0608   BP:  (!) 101/50   Pulse:  72   Resp:  20   Temp:  96.8 °F (36 °C)   TempSrc:  Temporal   SpO2:  98%   Weight: 110 lb (49.9 kg) 110 lb (49.9 kg)   Height: 5' 2\" (1.575 m) 5' 2\" (1.575 m)                                              BP Readings from Last 3 Encounters:   09/10/20 (!) 101/50   06/29/20 100/70   02/20/20 (!) 161/79       NPO Status: Time of last liquid consumption: 2130                        Time of last solid consumption: 1800                        Date of last liquid consumption: 09/09/20                        Date of last solid food consumption: 09/09/20    BMI:   Wt Readings from Last 3 Encounters:   09/10/20 110 lb (49.9 kg)   08/11/20 105 lb (47.6 kg)   06/29/20 111 lb 3.2 oz (50.4 kg)     Body mass index is 20.12 kg/m².     CBC:   Lab Results   Component Value Date    WBC 7.5 06/29/2020    RBC 4.54 06/29/2020    HGB 13.8 06/29/2020    HCT 43.5 06/29/2020    MCV 95.8 06/29/2020    RDW 13.3 06/29/2020     06/29/2020       CMP:   Lab Results   Component Value Date     06/29/2020    K 4.5 06/29/2020    K 4.4 11/23/2019     06/29/2020    CO2 25 06/29/2020    BUN 9 06/29/2020    CREATININE 0.6 06/29/2020    GFRAA >60 06/29/2020    LABGLOM >60 06/29/2020    GLUCOSE 118 11/23/2019    PROT 7.1 06/29/2020    CALCIUM 9.4 06/29/2020    BILITOT <0.2 06/29/2020    ALKPHOS 61 06/29/2020    AST 20 06/29/2020    ALT 7 06/29/2020       POC Tests: No results for input(s): POCGLU, POCNA, POCK, POCCL, POCBUN, POCHEMO, POCHCT in the last 72 hours. Coags: No results found for: PROTIME, INR, APTT    HCG (If Applicable):   Lab Results   Component Value Date    PREGTESTUR NEGATIVE 02/03/2020        ABGs: No results found for: PHART, PO2ART, VLI7YTY, MDM6YWH, BEART, Z1AOYHOI     Type & Screen (If Applicable):  No results found for: LABABO, [address]    Anesthesia Evaluation  Patient summary reviewed no history of anesthetic complications:   Airway: Mallampati: I  TM distance: >3 FB   Neck ROM: full  Mouth opening: > = 3 FB Dental:          Pulmonary: breath sounds clear to auscultation  (+) current smoker                           Cardiovascular:Negative CV ROS            Rhythm: regular  Rate: normal           Beta Blocker:  Dose within 24 Hrs         Neuro/Psych:   (+) headaches: migraine headaches, psychiatric history:depression/anxiety              ROS comment: Anxiety    Bipolar 1 disorder (HCC)   PTSD (post-traumatic stress disorder)        GI/Hepatic/Renal:   (+) GERD:,           Endo/Other:    (+) : arthritis: OA., .                 Abdominal:           Vascular: negative vascular ROS. Anesthesia Plan      MAC     ASA 3     (General back up)  Induction: intravenous. MIPS: Postoperative opioids intended and Prophylactic antiemetics administered. Anesthetic plan and risks discussed with patient. Plan discussed with CRNA.             [address] Jew[address] Palumbo DO   9/10/2020

## 2020-09-10 NOTE — H&P
New Hip Patient     Referring Provider:   DO Sha Alvarado 84, [address]:   No chief complaint on file. HPI:    Naseem Gutierrez is a 48y.o. year old female who is seen today  for evaluation of bilateral hip pain. She reports the pain has been ongoing for the past 6 months. She does not recall a specific injury which started the pain. She reports the pain is worse when weightbearing, better with rest.  The patient does not have mechanical symptoms. She denies a feeling of instability. The prior treatments have been Therapy  with no improvement. The patient   has not responded to the treatment. The patient is not working. The patient is on disability. PAST MEDICAL HISTORY  Past Medical History:   Diagnosis Date    Anxiety     Arthritis     right hip, for injection 1-3-18     Bipolar 1 disorder ([facility] [address]     Chronic back pain     and neck pain     Depression     Migraines     Other hyperlipidemia 7/29/2019    PTSD (post-traumatic stress disorder)     Tobacco use disorder 7/29/2019       PAST SURGICAL HISTORY  Past Surgical History:   Procedure Laterality Date    ARTHROGRAPHY Right 1/3/2019    RIGHT HIP ARTHROGRAM AND STEROID INJECTION performed by Erlinda Haider MD at [facility] AND CURETTAGE OF UTERUS N/A 2/3/2020    DILATATION AND CURETTAGE HYSTEROSCOPY performed by Nito Blakely MD at [address] [place] [address]  02/20/2019    implant     TONSILLECTOMY      TOOTH EXTRACTION      TUBAL LIGATION      TUMOR REMOVAL      in pancrease        FAMILY HISTORY   History reviewed. No pertinent family history.     SOCIAL HISTORY  Social History     Occupational History    Not on file   Tobacco Use    Smoking status: Current Every Day Smoker     Packs/day: 1.50     Years: 30.00     Pack years: 45.00     Types: Cigarettes    Smokeless tobacco: Never Used   Substance and Sexual Activity    Alcohol use: No    Drug use: No    Sexual activity: Not on file       CURRENT MEDICATIONS   No current outpatient medications on file. ALLERGIES  Allergies   Allergen Reactions    Latex     Ativan [Lorazepam] Other (See Comments)     Panic attack    Seroquel [Quetiapine Fumarate] Other (See Comments)     \"very bad mood\"    Cariprazine     Dexamethasone      Other reaction(s): Free Text    Hydroxyzine     Levofloxacin     Oxycodone-Acetaminophen     Quetiapine     Venlafaxine     Sulfa Antibiotics Rash       Controlled Substances Monitoring:      REVIEW OF SYSTEMS:     General: Negative Fever, chills, fatigue   Cardiovascular: Negative chest pain, palpitations  Respiratory: Equal chest expansion, negative shortness of breath   Skin: Negative rash, open wounds  Psych: Normal affect, mood stable  Neurologic: sensation grossly intact in extremities   Musculoskeletal: See HPI      PHYSICAL EXAM     Vitals:    09/08/20 1603 09/10/20 0608   BP:  (!) 101/50   Pulse:  72   Resp:  20   Temp:  96.8 °F (36 °C)   TempSrc:  Temporal   SpO2:  98%   Weight: 110 lb (49.9 kg) 110 lb (49.9 kg)   Height: 5' 2\" (1.575 m) 5' 2\" (1.575 m)       Height: 5' 2\" (1.575 m)  Weight: [unfilled]  BMI:  Body mass index is 20.12 kg/m². General: The patient is alert and oriented x 3, appears to be stated age and in no distress. HEENT: head is normocephalic, atraumatic. EOMI. Neck: supple, trachea midline, no thyromegaly   Cardiovascular: peripheral pulses palpable. Normal Capillary refill   Respiratory: breathing unlabored, chest expansion symmetric   Skin: no rash, no open wounds, no erythema  Psych: normal affect; mood stable  Neurologic: gait normal, sensation grossly intact in extremities  MSK:     Hip:  Bilateral hip exam decreased internal and external rotation on exam.  Mild greater trochanter tenderness present. Straight leg raise exam negative for lower back or hip pain, resisted abduction negative for pain.   Josy Bautista FADIR exams elicit lateral buttocks pain     IMAGING:    Previous imaging was reviewed which shows osteoarthritis to her hips      ASSESSMENT  Bilateral hip osteoarthritis     PLAN  Today we discussed both her hips. Patient reports lateralization of pain in towards buttocks and lower back versus her groin. Patient reports that she has completed physical therapy with no improvements in the past.  She has tried cortisone injections to her right hip joint in the past with no improvement. Patient is not interested in trying physical therapy again. Patient states she is unable to take over-the-counter anti-inflammatory medications due to her current migraine prescription. We discussed following up with her spine physician for her back symptoms. Patient will call office to schedule hip injection in the near future. Rocio Navarrete CNP  Orthopaedic Surgery   8/11/20  6:45 AM      Staff Addendum    I have seen and evaluated the patient and agree with the assessment and plan as documented by Rocio Navarrete CNP. I have performed the key components of the history and physical examination and concur with the findings and plan, and have made changes where appropriate/necessary. Agree with above. Patient well-known to us. Seen previously for her hips and then lost to follow-up for. .  Since that time her arthritis has progressed. She also has significant back problems with spinal cord stimulator. We discussed intra-articular steroid injection for her hip. She is interested in proceeding. She will call us to schedule      Bekah Cole MD  [address]      Update History & Physical     The patient's History and Physical was reviewed with the patient and there were no significant changes.      I examined the patient and there were no significant changes from the previous History and Physical.     Plan: The risk, benefits, expected outcome, and alternative to the recommended procedure have been discussed with the patient. Patient understands and wants to proceed with the procedure.

## 2020-09-10 NOTE — OP NOTE
OPERATIVE REPORT    PATIENT:  Mikayla Diop  [id number]    DATE OF PROCEDURE:  9/10/20    SURGEON: Winston Flores MD    ASSISTANT:  None    PRE-PROCEDURE DIAGNOSIS:  Right hip pain    POST-PROCEDURE DIAGNOSIS: Right hip pain    PROCEDURE:  Right hip arthrogram and steroid injection under fluoroscopy, with anesthesia     ANESTHESIA: MAC and local      PROCEDURE INDICATIONS:  The patient is a 48year old female who suffers from right hip pain. The patient would like to try intra-articular steroid injection for improved pain relief. She understands this is likely a temporary solution and future procedures may be required. Risks, benefits, and alternative treatments were dicussed. The patient understands the risks, signed an informed consent, and elected to proceed       PROCEDURE: The patient was transferred from hospital bed to OR table and underwent sedation. The planned entry site for needle insertion was mapped out using fluoroscopy. Next, a square area around this site was squared off with drapes and the skin was cleaned with chloraprep. Next, the skin and deep tissues were anesthetized with 2% lidocaine. A long 21 gauge spinal needle was then introduced through the skin down to the femoral neck using fluoroscopic guidance. When it was felt the appropriate position had been reached, 2 cc of omnivist radio-opague dye was injected and found to pool within the capsule, indicating we were intra-articular. The medication mixture of 40 mg Kenalog (1cc), 2 cc 0.25% Marcaine, and 2 cc 2% lidocaine was injected into the joint. The needle was removed, and site was covered with a bandaid. The patient was transferred to the hospital bed and taken to the PACU in stable condition. She tolerated the procedure well      ESTIMATED BLOOD LOSS: 0 mL    COMPLICATIONS: none    POST OPERATIVE PLAN: The patient will be discharged home. She will follow up in clinic in 3 to 4 weeks.          Anushka Knowles

## 2020-09-21 ENCOUNTER — OFFICE VISIT (OUTPATIENT)
Dept: FAMILY MEDICINE CLINIC | Age: 50
End: 2020-09-21
Payer: MEDICARE

## 2020-09-21 VITALS
BODY MASS INDEX: 20.06 KG/M2 | SYSTOLIC BLOOD PRESSURE: 102 MMHG | DIASTOLIC BLOOD PRESSURE: 64 MMHG | WEIGHT: 109 LBS | TEMPERATURE: 98 F | HEIGHT: 62 IN | HEART RATE: 71 BPM | OXYGEN SATURATION: 98 %

## 2020-09-21 PROCEDURE — 99213 OFFICE O/P EST LOW 20 MIN: CPT | Performed by: FAMILY MEDICINE

## 2020-09-21 PROCEDURE — 3017F COLORECTAL CA SCREEN DOC REV: CPT | Performed by: FAMILY MEDICINE

## 2020-09-21 PROCEDURE — 4004F PT TOBACCO SCREEN RCVD TLK: CPT | Performed by: FAMILY MEDICINE

## 2020-09-21 PROCEDURE — G8420 CALC BMI NORM PARAMETERS: HCPCS | Performed by: FAMILY MEDICINE

## 2020-09-21 PROCEDURE — G8427 DOCREV CUR MEDS BY ELIG CLIN: HCPCS | Performed by: FAMILY MEDICINE

## 2020-09-21 RX ORDER — SUCRALFATE 1 G/1
1 TABLET ORAL 2 TIMES DAILY
Qty: 60 TABLET | Refills: 3 | Status: SHIPPED
Start: 2020-09-21 | End: 2020-11-02

## 2020-09-21 RX ORDER — ROSUVASTATIN CALCIUM 20 MG/1
TABLET, COATED ORAL
Qty: 90 TABLET | Refills: 1 | Status: SHIPPED
Start: 2020-09-21 | End: 2020-12-02 | Stop reason: SDUPTHER

## 2020-09-21 RX ORDER — OMEPRAZOLE 40 MG/1
40 CAPSULE, DELAYED RELEASE ORAL
Qty: 90 CAPSULE | Refills: 1 | Status: SHIPPED
Start: 2020-09-21 | End: 2020-12-02

## 2020-09-21 RX ORDER — PROPRANOLOL HYDROCHLORIDE 80 MG/1
80 CAPSULE, EXTENDED RELEASE ORAL 2 TIMES DAILY
Qty: 180 CAPSULE | Refills: 1 | Status: SHIPPED
Start: 2020-09-21 | End: 2020-11-02 | Stop reason: DRUGHIGH

## 2020-09-21 ASSESSMENT — ENCOUNTER SYMPTOMS
BACK PAIN: 0
SINUS PAIN: 0
BLOOD IN STOOL: 0
CHEST TIGHTNESS: 0
COUGH: 0
TROUBLE SWALLOWING: 0
VOMITING: 0
ABDOMINAL PAIN: 1
EYE PAIN: 0
SORE THROAT: 0
ALLERGIC/IMMUNOLOGIC NEGATIVE: 1
EYE REDNESS: 0
PHOTOPHOBIA: 0
SHORTNESS OF BREATH: 0
NAUSEA: 0
DIARRHEA: 0
EYE DISCHARGE: 0

## 2020-09-29 ENCOUNTER — TELEPHONE (OUTPATIENT)
Dept: ADMINISTRATIVE | Age: 50
End: 2020-09-29

## 2020-09-29 NOTE — TELEPHONE ENCOUNTER
Patient states the medicine that Dr Kevin Flores recently put her on is helping. Stated he wanted her to inform him. Tried to transfer call to nurse line.

## 2020-10-29 ENCOUNTER — TELEPHONE (OUTPATIENT)
Dept: FAMILY MEDICINE CLINIC | Age: 50
End: 2020-10-29

## 2020-10-29 NOTE — TELEPHONE ENCOUNTER
Iliana Benitez calling [facility] wants you to d/c propranolol ER due to rebound HA.  They suggest placing her on regular relase propanolol

## 2020-10-30 NOTE — TELEPHONE ENCOUNTER
She has an Appointment on 11/2 and asking if you want her to wait until then to change the medication?

## 2020-11-02 ENCOUNTER — OFFICE VISIT (OUTPATIENT)
Dept: FAMILY MEDICINE CLINIC | Age: 50
End: 2020-11-02
Payer: MEDICARE

## 2020-11-02 VITALS
BODY MASS INDEX: 20.24 KG/M2 | RESPIRATION RATE: 16 BRPM | WEIGHT: 110 LBS | SYSTOLIC BLOOD PRESSURE: 104 MMHG | OXYGEN SATURATION: 98 % | TEMPERATURE: 97.1 F | HEART RATE: 76 BPM | DIASTOLIC BLOOD PRESSURE: 80 MMHG | HEIGHT: 62 IN

## 2020-11-02 PROCEDURE — 4004F PT TOBACCO SCREEN RCVD TLK: CPT | Performed by: FAMILY MEDICINE

## 2020-11-02 PROCEDURE — G8427 DOCREV CUR MEDS BY ELIG CLIN: HCPCS | Performed by: FAMILY MEDICINE

## 2020-11-02 PROCEDURE — 99213 OFFICE O/P EST LOW 20 MIN: CPT | Performed by: FAMILY MEDICINE

## 2020-11-02 PROCEDURE — G8420 CALC BMI NORM PARAMETERS: HCPCS | Performed by: FAMILY MEDICINE

## 2020-11-02 PROCEDURE — 3017F COLORECTAL CA SCREEN DOC REV: CPT | Performed by: FAMILY MEDICINE

## 2020-11-02 PROCEDURE — G8484 FLU IMMUNIZE NO ADMIN: HCPCS | Performed by: FAMILY MEDICINE

## 2020-11-02 RX ORDER — PROPRANOLOL HCL 60 MG
60 CAPSULE, EXTENDED RELEASE 24HR ORAL DAILY
Qty: 30 CAPSULE | Refills: 2 | Status: SHIPPED
Start: 2020-11-02 | End: 2020-11-03 | Stop reason: CLARIF

## 2020-11-02 RX ORDER — MAGNESIUM OXIDE 400 MG/1
400 TABLET ORAL DAILY
Qty: 30 TABLET | Refills: 1 | Status: SHIPPED
Start: 2020-11-02 | End: 2020-12-18

## 2020-11-02 ASSESSMENT — ENCOUNTER SYMPTOMS
TROUBLE SWALLOWING: 0
CHEST TIGHTNESS: 0
EYE PAIN: 0
ALLERGIC/IMMUNOLOGIC NEGATIVE: 1
NAUSEA: 0
VOMITING: 0
SORE THROAT: 0
COUGH: 0
SHORTNESS OF BREATH: 0
EYE DISCHARGE: 0
PHOTOPHOBIA: 0
DIARRHEA: 0
BLOOD IN STOOL: 0
SINUS PAIN: 0
EYE REDNESS: 0
ABDOMINAL PAIN: 0
BACK PAIN: 0

## 2020-11-02 NOTE — PROGRESS NOTES
20  Anthony Lei : 1970 Sex: female  Age: 48 y.o. Chief Complaint   Patient presents with    Medication Check     propanolol       The patient complains of a headache for weeks. The headache is all over her head. The headache did come on gradually. It is constant and currently a 710 on the pain scale. The patient has taken Tylenol/Motrin at home with minimal improvement in their discomfort. Patient denies any dizziness or visual disturbances. Admits photophobia. No nausea and vomiting. No neck pain/ stiffness, fever or chills. No sore throat, nasal congestion or facial pressure. No numbness, tingling, or weakness to the extremities. No back and abdominal or flank pain. No bowel or bladder dysfunction. No recent head injury. No new exposures or recent illness. [facility] managing headaches, feels these are rebound and needs BB changed to regular release and Mg and B6 started      Review of Systems   Constitutional: Negative for appetite change, fatigue and unexpected weight change. HENT: Negative for congestion, ear pain, hearing loss, sinus pain, sore throat and trouble swallowing. Eyes: Negative for photophobia, pain, discharge and redness. Respiratory: Negative for cough, chest tightness and shortness of breath. Cardiovascular: Negative for chest pain, palpitations and leg swelling. Gastrointestinal: Negative for abdominal pain, blood in stool, diarrhea, nausea and vomiting. Endocrine: Negative. Genitourinary: Negative for dysuria, flank pain, frequency and hematuria. Musculoskeletal: Negative for arthralgias, back pain, joint swelling and myalgias. Skin: Negative. Allergic/Immunologic: Negative. Neurological: Positive for headaches. Negative for dizziness, seizures, syncope, weakness, light-headedness and numbness. Hematological: Negative for adenopathy. Does not bruise/bleed easily. Psychiatric/Behavioral: Negative.           Current Outpatient Medications:     NONFORMULARY, 225 mg Ajovi  1 q month, injectable, Disp: , Rfl:     propranolol (INDERAL LA) 60 MG extended release capsule, Take 1 capsule by mouth daily, Disp: 30 capsule, Rfl: 2    magnesium oxide (MAG-OX) 400 MG tablet, Take 1 tablet by mouth daily, Disp: 30 tablet, Rfl: 1    omeprazole (PRILOSEC) 40 MG delayed release capsule, Take 1 capsule by mouth every morning (before breakfast), Disp: 90 capsule, Rfl: 1    rosuvastatin (CRESTOR) 20 MG tablet, take 1 tablet by mouth nightly ---90DAYS REQUEST, Disp: 90 tablet, Rfl: 1    desvenlafaxine succinate (PRISTIQ) 50 MG TB24 extended release tablet, Take 100 mg by mouth daily Instructed to take am of procedure , Disp: , Rfl:     Docusate Calcium (STOOL SOFTENER PO), Take by mouth 2 times daily, Disp: , Rfl:     diazepam (VALIUM) 10 MG tablet, Take 10 mg by mouth every 8 hours as needed for Anxiety. Instructed to take am of procedure if needed, Disp: , Rfl:     folic acid (FOLVITE) 1 MG tablet, Take 1 mg by mouth daily. , Disp: , Rfl:     lamoTRIgine (LAMICTAL) 200 MG tablet, Take 200 mg by mouth every evening , Disp: , Rfl:   Allergies   Allergen Reactions    Latex     Ativan [Lorazepam] Other (See Comments)     Panic attack    Seroquel [Quetiapine Fumarate] Other (See Comments)     \"very bad mood\"    Cariprazine     Dexamethasone      Other reaction(s): Free Text    Hydroxyzine     Levofloxacin     Oxycodone-Acetaminophen     Quetiapine     Venlafaxine     Sulfa Antibiotics Rash       Past Medical History:   Diagnosis Date    Anxiety     Arthritis     right hip, for injection 1-3-18     Bipolar 1 disorder (HCC)     Chronic back pain     and neck pain     Depression     Migraines     Other hyperlipidemia 7/29/2019    PTSD (post-traumatic stress disorder)     Tobacco use disorder 7/29/2019     Past Surgical History:   Procedure Laterality Date    ARTHROGRAPHY Right 1/3/2019    RIGHT HIP ARTHROGRAM AND STEROID INJECTION performed by Aimee Beckwith MD at [facility] Right 9/10/2020    RIGHT HIP INJECTION UNDER FLUOROSCOPY performed by Aimee Beckwith MD at [facility] AND CURETTAGE OF UTERUS N/A 2/3/2020    DILATATION AND CURETTAGE HYSTEROSCOPY performed by Nikita Orozco MD at [facility]  02/20/2019    implant     TONSILLECTOMY      TOOTH EXTRACTION      TUBAL LIGATION      TUMOR REMOVAL      in pancrease      No family history on file.   Social History     Socioeconomic History    Marital status:      Spouse name: Not on file    Number of children: Not on file    Years of education: Not on file    Highest education level: Not on file   Occupational History    Not on file   Social Needs    Financial resource strain: Not on file    Food insecurity     Worry: Not on file     Inability: Not on file    Transportation needs     Medical: Not on file     Non-medical: Not on file   Tobacco Use    Smoking status: Current Every Day Smoker     Packs/day: 1.50     Years: 30.00     Pack years: 45.00     Types: Cigarettes    Smokeless tobacco: Never Used   Substance and Sexual Activity    Alcohol use: No    Drug use: No    Sexual activity: Not on file   Lifestyle    Physical activity     Days per week: Not on file     Minutes per session: Not on file    Stress: Not on file   Relationships    Social connections     Talks on phone: Not on file     Gets together: Not on file     Attends [employer] service: Not on file     Active member of club or organization: Not on file     Attends meetings of clubs or organizations: Not on file     Relationship status: Not on file    Intimate partner violence     Fear of current or ex partner: Not on file     Emotionally abused: Not on file     Physically abused: Not on file     Forced sexual activity: Not on file   Other Topics Concern    Not on file   Social History Narrative    Not on file       Vitals: 11/02/20 1320   BP: 104/80   Pulse: 76   Resp: 16   Temp: 97.1 °F (36.2 °C)   TempSrc: Temporal   SpO2: 98%   Weight: 110 lb (49.9 kg)   Height: 5' 2\" (1.575 m)       Physical Exam  Vitals signs and nursing note reviewed. Constitutional:       Appearance: She is well-developed. HENT:      Head: Atraumatic. Right Ear: External ear normal.      Left Ear: External ear normal.      Nose: Nose normal.      Mouth/Throat:      Pharynx: No oropharyngeal exudate. Eyes:      Conjunctiva/sclera: Conjunctivae normal.      Pupils: Pupils are equal, round, and reactive to light. Neck:      Musculoskeletal: Normal range of motion and neck supple. Thyroid: No thyromegaly. Trachea: No tracheal deviation. Cardiovascular:      Rate and Rhythm: Normal rate and regular rhythm. Heart sounds: No murmur. No friction rub. No gallop. Pulmonary:      Effort: Pulmonary effort is normal. No respiratory distress. Breath sounds: Normal breath sounds. Abdominal:      General: Bowel sounds are normal.      Palpations: Abdomen is soft. Musculoskeletal: Normal range of motion. General: No tenderness or deformity. Lymphadenopathy:      Cervical: No cervical adenopathy. Skin:     General: Skin is warm and dry. Capillary Refill: Capillary refill takes less than 2 seconds. Findings: No rash. Neurological:      Mental Status: She is alert and oriented to person, place, and time. Sensory: No sensory deficit. Motor: No abnormal muscle tone. Coordination: Coordination normal.      Deep Tendon Reflexes: Reflexes normal.         Assessment and Plan:  Adryan Escoto was seen today for medication check. Diagnoses and all orders for this visit:    Rebound headache  -     propranolol (INDERAL LA) 60 MG extended release capsule; Take 1 capsule by mouth daily    Intractable chronic migraine without aura and without status migrainosus  -     magnesium oxide (MAG-OX) 400 MG tablet;  Take 1 tablet by mouth daily        Return in about 4 weeks (around 11/30/2020).       Seen By:  Bhupinder Singh, DO

## 2020-11-03 ENCOUNTER — TELEPHONE (OUTPATIENT)
Dept: FAMILY MEDICINE CLINIC | Age: 50
End: 2020-11-03

## 2020-11-03 RX ORDER — PROPRANOLOL HYDROCHLORIDE 80 MG/1
80 TABLET ORAL 2 TIMES DAILY
Qty: 60 TABLET | Refills: 2 | Status: SHIPPED
Start: 2020-11-03 | End: 2021-03-02

## 2020-11-03 NOTE — TELEPHONE ENCOUNTER
Patient came in for appt yesterday. You were supposed to send in propanolol regular release due to rebound HA.  You sent in ER please advise why she is confused

## 2020-11-06 ENCOUNTER — TELEPHONE (OUTPATIENT)
Dept: FAMILY MEDICINE CLINIC | Age: 50
End: 2020-11-06

## 2020-11-06 NOTE — TELEPHONE ENCOUNTER
Spoke with [facility] 80 mg propanolol not available right now. They filled 5 days then they will 40 mg Rx  2 po bid. Called and left message , she is to call if there are any additional questions.

## 2020-11-13 ENCOUNTER — TELEPHONE (OUTPATIENT)
Dept: FAMILY MEDICINE CLINIC | Age: 50
End: 2020-11-13

## 2020-11-13 NOTE — TELEPHONE ENCOUNTER
Bibiana Andrew calling to tell you that the Propanolol is working. She is still getting the migraines, but not as severe. She had a follow up with her doctor at [facility] yesterday. They have scheduled an MRI on the 17th at [facility] in [facility].

## 2020-11-17 ENCOUNTER — HOSPITAL ENCOUNTER (OUTPATIENT)
Dept: MRI IMAGING | Age: 50
Discharge: HOME | End: 2020-11-19
Payer: MEDICARE

## 2020-11-17 PROCEDURE — A9579 GAD-BASE MR CONTRAST NOS,1ML: HCPCS | Performed by: RADIOLOGY

## 2020-11-17 PROCEDURE — 70553 MRI BRAIN STEM W/O & W/DYE: CPT

## 2020-11-17 PROCEDURE — 6360000004 HC RX CONTRAST MEDICATION: Performed by: RADIOLOGY

## 2020-11-17 RX ADMIN — GADOTERIDOL 10 ML: 279.3 INJECTION, SOLUTION INTRAVENOUS at 19:02

## 2020-12-02 ENCOUNTER — OFFICE VISIT (OUTPATIENT)
Dept: FAMILY MEDICINE CLINIC | Age: 50
End: 2020-12-02
Payer: MEDICARE

## 2020-12-02 ENCOUNTER — TELEPHONE (OUTPATIENT)
Dept: FAMILY MEDICINE CLINIC | Age: 50
End: 2020-12-02

## 2020-12-02 VITALS
BODY MASS INDEX: 21.9 KG/M2 | DIASTOLIC BLOOD PRESSURE: 62 MMHG | RESPIRATION RATE: 16 BRPM | HEIGHT: 62 IN | WEIGHT: 119 LBS | HEART RATE: 69 BPM | SYSTOLIC BLOOD PRESSURE: 100 MMHG | TEMPERATURE: 97 F | OXYGEN SATURATION: 98 %

## 2020-12-02 DIAGNOSIS — R35.0 FREQUENCY OF MICTURITION: ICD-10-CM

## 2020-12-02 LAB
APPEARANCE FLUID: NORMAL
BILIRUBIN, POC: NORMAL
BLOOD URINE, POC: NORMAL
CLARITY, POC: CLEAR
COLOR, POC: YELLOW
GLUCOSE URINE, POC: NORMAL
KETONES, POC: NORMAL
LEUKOCYTE EST, POC: NORMAL
NITRITE, POC: NORMAL
PH, POC: 7
PROTEIN, POC: NORMAL
SPECIFIC GRAVITY, POC: 1.01
UROBILINOGEN, POC: 0.2

## 2020-12-02 PROCEDURE — G8420 CALC BMI NORM PARAMETERS: HCPCS | Performed by: FAMILY MEDICINE

## 2020-12-02 PROCEDURE — G8428 CUR MEDS NOT DOCUMENT: HCPCS | Performed by: FAMILY MEDICINE

## 2020-12-02 PROCEDURE — 99213 OFFICE O/P EST LOW 20 MIN: CPT | Performed by: FAMILY MEDICINE

## 2020-12-02 PROCEDURE — 4004F PT TOBACCO SCREEN RCVD TLK: CPT | Performed by: FAMILY MEDICINE

## 2020-12-02 PROCEDURE — 81002 URINALYSIS NONAUTO W/O SCOPE: CPT | Performed by: FAMILY MEDICINE

## 2020-12-02 PROCEDURE — G8484 FLU IMMUNIZE NO ADMIN: HCPCS | Performed by: FAMILY MEDICINE

## 2020-12-02 PROCEDURE — 3017F COLORECTAL CA SCREEN DOC REV: CPT | Performed by: FAMILY MEDICINE

## 2020-12-02 RX ORDER — ROSUVASTATIN CALCIUM 20 MG/1
TABLET, COATED ORAL
Qty: 90 TABLET | Refills: 1 | Status: SHIPPED
Start: 2020-12-02 | End: 2021-03-02 | Stop reason: SDUPTHER

## 2020-12-02 RX ORDER — PROPRANOLOL HYDROCHLORIDE 40 MG/1
TABLET ORAL
Qty: 120 TABLET | Refills: 3 | Status: SHIPPED
Start: 2020-12-02 | End: 2020-12-23

## 2020-12-02 RX ORDER — PROPRANOLOL HYDROCHLORIDE 80 MG/1
80 TABLET ORAL 2 TIMES DAILY
Qty: 60 TABLET | Refills: 2 | Status: CANCELLED | OUTPATIENT
Start: 2020-12-02

## 2020-12-02 ASSESSMENT — ENCOUNTER SYMPTOMS
DIARRHEA: 0
BACK PAIN: 0
SINUS PAIN: 0
COUGH: 0
NAUSEA: 0
EYE REDNESS: 0
TROUBLE SWALLOWING: 0
ABDOMINAL PAIN: 0
SHORTNESS OF BREATH: 0
SORE THROAT: 0
EYE DISCHARGE: 0
ALLERGIC/IMMUNOLOGIC NEGATIVE: 1
BLOOD IN STOOL: 0
PHOTOPHOBIA: 0
VOMITING: 0
EYE PAIN: 0
CHEST TIGHTNESS: 0

## 2020-12-02 NOTE — PROGRESS NOTES
Calcium (STOOL SOFTENER PO), Take by mouth 2 times daily, Disp: , Rfl:     diazepam (VALIUM) 10 MG tablet, Take 10 mg by mouth every 8 hours as needed for Anxiety. Instructed to take am of procedure if needed, Disp: , Rfl:     folic acid (FOLVITE) 1 MG tablet, Take 1 mg by mouth daily. , Disp: , Rfl:     lamoTRIgine (LAMICTAL) 200 MG tablet, Take 200 mg by mouth every evening , Disp: , Rfl:   Allergies   Allergen Reactions    Latex     Ativan [Lorazepam] Other (See Comments)     Panic attack    Seroquel [Quetiapine Fumarate] Other (See Comments)     \"very bad mood\"    Cariprazine     Dexamethasone      Other reaction(s): Free Text    Hydroxyzine     Levofloxacin     Oxycodone-Acetaminophen     Quetiapine     Venlafaxine     Sulfa Antibiotics Rash       Past Medical History:   Diagnosis Date    Anxiety     Arthritis     right hip, for injection 1-3-18     [facility] 1 [facility] ([facility] [address]     Chronic back pain     and neck pain     Depression     Migraines     Other hyperlipidemia 7/29/2019    PTSD (post-traumatic stress disorder)     Tobacco use disorder 7/29/2019     Past Surgical History:   Procedure Laterality Date    ARTHROGRAPHY Right 1/3/2019    RIGHT HIP ARTHROGRAM AND STEROID INJECTION performed by Candace Leary MD at [address] [place] [address] Right 9/10/2020    RIGHT HIP INJECTION UNDER FLUOROSCOPY performed by Candace Leary MD at [address] [place] [address] N/A 2/3/2020    DILATATION AND CURETTAGE HYSTEROSCOPY performed by Laney Edouard MD at [facility]  02/20/2019    implant     TONSILLECTOMY      TOOTH EXTRACTION      TUBAL LIGATION      TUMOR REMOVAL      in pancrease      No family history on file.   Social History     Socioeconomic History    Marital status:      Spouse name: Not on file    Number of children: Not on file    Years of education: Not on file    Highest education level: Not on file Occupational History    Not on file   Social Needs    Financial resource strain: Not on file    Food insecurity     Worry: Not on file     Inability: Not on file    Transportation needs     Medical: Not on file     Non-medical: Not on file   Tobacco Use    Smoking status: Current Every Day Smoker     Packs/day: 1.50     Years: 30.00     Pack years: 45.00     Types: Cigarettes    Smokeless tobacco: Never Used   Substance and Sexual Activity    Alcohol use: No    Drug use: No    Sexual activity: Not on file   Lifestyle    Physical activity     Days per week: Not on file     Minutes per session: Not on file    Stress: Not on file   Relationships    Social connections     Talks on phone: Not on file     Gets together: Not on file     Attends Confucianist service: Not on file     Active member of club or organization: Not on file     Attends meetings of clubs or organizations: Not on file     Relationship status: Not on file    Intimate partner violence     Fear of current or ex partner: Not on file     Emotionally abused: Not on file     Physically abused: Not on file     Forced sexual activity: Not on file   Other Topics Concern    Not on file   Social History Narrative    Not on file       Vitals:    12/02/20 1329   BP: 100/62   Pulse: 69   Resp: 16   Temp: 97 °F (36.1 °C)   TempSrc: Temporal   SpO2: 98%   Weight: 119 lb (54 kg)  Comment: with coat   Height: 5' 2\" (1.575 m)       Physical Exam  Vitals signs and nursing note reviewed. Constitutional:       Appearance: She is well-developed. HENT:      Head: Atraumatic. Right Ear: External ear normal.      Left Ear: External ear normal.      Nose: Nose normal.      Mouth/Throat:      Pharynx: No oropharyngeal exudate. Eyes:      Conjunctiva/sclera: Conjunctivae normal.      Pupils: Pupils are equal, round, and reactive to light. Neck:      Musculoskeletal: Normal range of motion and neck supple. Thyroid: No thyromegaly.       Trachea: No tracheal deviation. Cardiovascular:      Rate and Rhythm: Normal rate and regular rhythm. Heart sounds: No murmur. No friction rub. No gallop. Pulmonary:      Effort: Pulmonary effort is normal. No respiratory distress. Breath sounds: Normal breath sounds. Abdominal:      General: Bowel sounds are normal.      Palpations: Abdomen is soft. Musculoskeletal: Normal range of motion. General: No tenderness or deformity. Lymphadenopathy:      Cervical: No cervical adenopathy. Skin:     General: Skin is warm and dry. Capillary Refill: Capillary refill takes less than 2 seconds. Findings: No rash. Neurological:      Mental Status: She is alert and oriented to person, place, and time. Sensory: No sensory deficit. Motor: No abnormal muscle tone. Coordination: Coordination normal.      Deep Tendon Reflexes: Reflexes normal.      Comments: Paraesthesias bilateral upper         Assessment and Plan:  [facility] was seen today for medication check and urinary frequency. Diagnoses and all orders for this visit:    Mixed hyperlipidemia  -     rosuvastatin (CRESTOR) 20 MG tablet; take 1 tablet by mouth nightly ---90DAYS REQUEST    Frequency of micturition  -     POCT Urinalysis no Micro  -     Culture, Urine; Future    Paresthesia of upper extremity  -     EMG; Future    History of migraine  -     propranolol (INDERAL) 40 MG tablet; 2 tablets BID        Return in about 3 months (around 3/2/2021).       Seen By:  Pascale Manzano DO

## 2020-12-02 NOTE — TELEPHONE ENCOUNTER
Let patient know that Dr Deysi Wise office has been sent EMG order through [facility] and they will contact patient to schedule.

## 2020-12-05 LAB — URINE CULTURE, ROUTINE: NORMAL

## 2020-12-10 ENCOUNTER — TELEPHONE (OUTPATIENT)
Dept: FAMILY MEDICINE CLINIC | Age: 50
End: 2020-12-10

## 2020-12-10 NOTE — TELEPHONE ENCOUNTER
Romilda Romberg calling for the results of urine test and EMG dome at [address] [place] [address] over a week ago.

## 2020-12-11 ENCOUNTER — TELEPHONE (OUTPATIENT)
Dept: FAMILY MEDICINE CLINIC | Age: 50
End: 2020-12-11

## 2020-12-16 ENCOUNTER — OFFICE VISIT (OUTPATIENT)
Dept: FAMILY MEDICINE CLINIC | Age: 50
End: 2020-12-16
Payer: MEDICARE

## 2020-12-16 VITALS
OXYGEN SATURATION: 98 % | WEIGHT: 115 LBS | TEMPERATURE: 96.7 F | RESPIRATION RATE: 15 BRPM | SYSTOLIC BLOOD PRESSURE: 102 MMHG | HEART RATE: 62 BPM | DIASTOLIC BLOOD PRESSURE: 74 MMHG | BODY MASS INDEX: 21.16 KG/M2 | HEIGHT: 62 IN

## 2020-12-16 PROBLEM — R35.0 FREQUENCY OF MICTURITION: Status: ACTIVE | Noted: 2020-12-16

## 2020-12-16 PROCEDURE — G8427 DOCREV CUR MEDS BY ELIG CLIN: HCPCS | Performed by: FAMILY MEDICINE

## 2020-12-16 PROCEDURE — 4004F PT TOBACCO SCREEN RCVD TLK: CPT | Performed by: FAMILY MEDICINE

## 2020-12-16 PROCEDURE — 99213 OFFICE O/P EST LOW 20 MIN: CPT | Performed by: FAMILY MEDICINE

## 2020-12-16 PROCEDURE — G8420 CALC BMI NORM PARAMETERS: HCPCS | Performed by: FAMILY MEDICINE

## 2020-12-16 PROCEDURE — G8484 FLU IMMUNIZE NO ADMIN: HCPCS | Performed by: FAMILY MEDICINE

## 2020-12-16 PROCEDURE — 3017F COLORECTAL CA SCREEN DOC REV: CPT | Performed by: FAMILY MEDICINE

## 2020-12-16 RX ORDER — OMEPRAZOLE 40 MG/1
40 CAPSULE, DELAYED RELEASE ORAL
Qty: 90 CAPSULE | Refills: 1 | Status: SHIPPED
Start: 2020-12-16 | End: 2021-03-02

## 2020-12-16 ASSESSMENT — ENCOUNTER SYMPTOMS
VOMITING: 0
EYE PAIN: 0
COUGH: 0
ABDOMINAL PAIN: 0
TROUBLE SWALLOWING: 0
SINUS PAIN: 0
SORE THROAT: 0
ALLERGIC/IMMUNOLOGIC NEGATIVE: 1
BACK PAIN: 0
DIARRHEA: 0
EYE DISCHARGE: 0
SHORTNESS OF BREATH: 0
CHEST TIGHTNESS: 0
PHOTOPHOBIA: 0
EYE REDNESS: 0
BLOOD IN STOOL: 0
NAUSEA: 0

## 2020-12-16 NOTE — PROGRESS NOTES
20  Chantale Gomez : 1970 Sex: female  Age: 48 y.o. Chief Complaint   Patient presents with    Results       EMG, urine C&S results still  Pending! Reviewed MR, and nuclear stomach emptying test      Review of Systems   Constitutional: Negative for appetite change, fatigue and unexpected weight change. HENT: Negative for congestion, ear pain, hearing loss, sinus pain, sore throat and trouble swallowing. Eyes: Negative for photophobia, pain, discharge and redness. Respiratory: Negative for cough, chest tightness and shortness of breath. Cardiovascular: Negative for chest pain, palpitations and leg swelling. Gastrointestinal: Negative for abdominal pain, blood in stool, diarrhea, nausea and vomiting. Endocrine: Negative. Genitourinary: Negative for dysuria, flank pain, frequency and hematuria. Musculoskeletal: Negative for arthralgias, back pain, joint swelling and myalgias. Skin: Negative. Allergic/Immunologic: Negative. Neurological: Negative for dizziness, seizures, syncope, weakness, light-headedness, numbness and headaches. Hematological: Negative for adenopathy. Does not bruise/bleed easily. Psychiatric/Behavioral: Negative.           Current Outpatient Medications:     omeprazole (PRILOSEC) 40 MG delayed release capsule, Take 1 capsule by mouth every morning (before breakfast), Disp: 90 capsule, Rfl: 1    rosuvastatin (CRESTOR) 20 MG tablet, take 1 tablet by mouth nightly ---90DAYS REQUEST, Disp: 90 tablet, Rfl: 1    propranolol (INDERAL) 40 MG tablet, 2 tablets BID, Disp: 120 tablet, Rfl: 3    propranolol (INDERAL) 80 MG tablet, Take 1 tablet by mouth 2 times daily, Disp: 60 tablet, Rfl: 2    NONFORMULARY, 225 mg Ajovi  1 q month, injectable, Disp: , Rfl:     magnesium oxide (MAG-OX) 400 MG tablet, Take 1 tablet by mouth daily, Disp: 30 tablet, Rfl: 1    desvenlafaxine succinate (PRISTIQ) 50 MG TB24 extended release tablet, Take 100 mg by mouth daily Instructed to take am of procedure , Disp: , Rfl:     Docusate Calcium (STOOL SOFTENER PO), Take by mouth 2 times daily, Disp: , Rfl:     diazepam (VALIUM) 10 MG tablet, Take 10 mg by mouth every 8 hours as needed for Anxiety. Instructed to take am of procedure if needed, Disp: , Rfl:     folic acid (FOLVITE) 1 MG tablet, Take 1 mg by mouth daily. , Disp: , Rfl:     lamoTRIgine (LAMICTAL) 200 MG tablet, Take 200 mg by mouth every evening , Disp: , Rfl:   Allergies   Allergen Reactions    Latex     Ativan [Lorazepam] Other (See Comments)     Panic attack    Seroquel [Quetiapine Fumarate] Other (See Comments)     \"very bad mood\"    Cariprazine     Dexamethasone      Other reaction(s): Free Text    Hydroxyzine     Levofloxacin     Oxycodone-Acetaminophen     Quetiapine     Venlafaxine     Sulfa Antibiotics Rash       Past Medical History:   Diagnosis Date    Anxiety     Arthritis     right hip, for injection 1-3-18     Bipolar 1 disorder ([facility] [address]     Chronic back pain     and neck pain     Depression     Migraines     Other hyperlipidemia 7/29/2019    PTSD (post-traumatic stress disorder)     Tobacco use disorder 7/29/2019     Past Surgical History:   Procedure Laterality Date    ARTHROGRAPHY Right 1/3/2019    RIGHT HIP ARTHROGRAM AND STEROID INJECTION performed by Jeff Santana MD at [address] [place] [address] Right 9/10/2020    RIGHT HIP INJECTION UNDER FLUOROSCOPY performed by Jeff Santana MD at [facility] N/A 2/3/2020    DILATATION AND CURETTAGE HYSTEROSCOPY performed by Mabel Bailey MD at [address] [place] [address]  02/20/2019    implant     TONSILLECTOMY      TOOTH EXTRACTION      TUBAL LIGATION      TUMOR REMOVAL      in pancrease      No family history on file.   Social History     Socioeconomic History    Marital status:      Spouse name: Not on file    Number of children: Not on file    Years of education: Not on file    Highest education level: Not on file   Occupational History    Not on file   Social Needs    Financial resource strain: Not on file    Food insecurity     Worry: Not on file     Inability: Not on file    Transportation needs     Medical: Not on file     Non-medical: Not on file   Tobacco Use    Smoking status: Current Every Day Smoker     Packs/day: 1.50     Years: 30.00     Pack years: 45.00     Types: Cigarettes    Smokeless tobacco: Never Used   Substance and Sexual Activity    Alcohol use: No    Drug use: No    Sexual activity: Not on file   Lifestyle    Physical activity     Days per week: Not on file     Minutes per session: Not on file    Stress: Not on file   Relationships    Social connections     Talks on phone: Not on file     Gets together: Not on file     Attends Yarsanism service: Not on file     Active member of club or organization: Not on file     Attends meetings of clubs or organizations: Not on file     Relationship status: Not on file    Intimate partner violence     Fear of current or ex partner: Not on file     Emotionally abused: Not on file     Physically abused: Not on file     Forced sexual activity: Not on file   Other Topics Concern    Not on file   Social History Narrative    Not on file       Vitals:    12/16/20 1455   BP: 102/74   Pulse: 62   Resp: 15   Temp: 96.7 °F (35.9 °C)   TempSrc: Temporal   SpO2: 98%   Weight: 115 lb (52.2 kg)   Height: 5' 2\" (1.575 m)       Physical Exam  Vitals signs and nursing note reviewed. Constitutional:       Appearance: She is well-developed. HENT:      Head: Atraumatic. Right Ear: External ear normal.      Left Ear: External ear normal.      Nose: Nose normal.      Mouth/Throat:      Pharynx: No oropharyngeal exudate. Eyes:      Conjunctiva/sclera: Conjunctivae normal.      Pupils: Pupils are equal, round, and reactive to light.    Neck:      Musculoskeletal: Normal range of motion and neck supple. Thyroid: No thyromegaly. Trachea: No tracheal deviation. Cardiovascular:      Rate and Rhythm: Normal rate and regular rhythm. Heart sounds: No murmur. No friction rub. No gallop. Pulmonary:      Effort: Pulmonary effort is normal. No respiratory distress. Breath sounds: Normal breath sounds. Abdominal:      General: Bowel sounds are normal.      Palpations: Abdomen is soft. Musculoskeletal: Normal range of motion. General: No tenderness or deformity. Lymphadenopathy:      Cervical: No cervical adenopathy. Skin:     General: Skin is warm and dry. Capillary Refill: Capillary refill takes less than 2 seconds. Findings: No rash. Neurological:      Mental Status: She is alert and oriented to person, place, and time. Sensory: No sensory deficit. Motor: No abnormal muscle tone. Coordination: Coordination normal.      Deep Tendon Reflexes: Reflexes normal.         Assessment and Plan:  [facility] was seen today for results. Diagnoses and all orders for this visit:    Mixed hyperlipidemia    Gastroesophageal reflux disease without esophagitis  -     omeprazole (PRILOSEC) 40 MG delayed release capsule; Take 1 capsule by mouth every morning (before breakfast)    Recurrent major depression in partial remission ([address]    Frequency of micturition        Return in about 3 months (around 3/16/2021).       Seen By:  Cassandra Meade DO

## 2020-12-17 DIAGNOSIS — R20.2 PARESTHESIA OF UPPER EXTREMITY: ICD-10-CM

## 2020-12-17 NOTE — TELEPHONE ENCOUNTER
Voicemail left for patient to return our call at their earliest convenience. [facility] will be faxing EMG. I will scan and route to doctor. [facility] did not have any urine results from a week ago. There is results from 12/2/20 in patient's chart that was normal.  Need to clarify about the urine.

## 2020-12-18 ENCOUNTER — TELEPHONE (OUTPATIENT)
Dept: FAMILY MEDICINE CLINIC | Age: 50
End: 2020-12-18

## 2020-12-18 ENCOUNTER — APPOINTMENT (OUTPATIENT)
Dept: CT IMAGING | Age: 50
End: 2020-12-18
Payer: MEDICARE

## 2020-12-18 ENCOUNTER — HOSPITAL ENCOUNTER (EMERGENCY)
Age: 50
Discharge: HOME | End: 2020-12-18
Attending: EMERGENCY MEDICINE
Payer: MEDICARE

## 2020-12-18 ENCOUNTER — APPOINTMENT (OUTPATIENT)
Dept: GENERAL RADIOLOGY | Age: 50
End: 2020-12-18
Payer: MEDICARE

## 2020-12-18 ENCOUNTER — OFFICE VISIT (OUTPATIENT)
Dept: PRIMARY CARE CLINIC | Age: 50
End: 2020-12-18
Payer: MEDICARE

## 2020-12-18 VITALS
HEIGHT: 62 IN | DIASTOLIC BLOOD PRESSURE: 66 MMHG | RESPIRATION RATE: 18 BRPM | BODY MASS INDEX: 19.88 KG/M2 | HEART RATE: 68 BPM | TEMPERATURE: 98.3 F | SYSTOLIC BLOOD PRESSURE: 102 MMHG | WEIGHT: 108 LBS | OXYGEN SATURATION: 98 %

## 2020-12-18 VITALS
HEART RATE: 68 BPM | RESPIRATION RATE: 16 BRPM | SYSTOLIC BLOOD PRESSURE: 116 MMHG | OXYGEN SATURATION: 97 % | HEIGHT: 62 IN | BODY MASS INDEX: 19.88 KG/M2 | TEMPERATURE: 97.9 F | DIASTOLIC BLOOD PRESSURE: 71 MMHG | WEIGHT: 108 LBS

## 2020-12-18 DIAGNOSIS — Z20.822 ENCOUNTER FOR LABORATORY TESTING FOR COVID-19 VIRUS: ICD-10-CM

## 2020-12-18 LAB
ALBUMIN SERPL-MCNC: 4.5 G/DL (ref 3.5–5.2)
ALP BLD-CCNC: 72 U/L (ref 35–104)
ALT SERPL-CCNC: 10 U/L (ref 0–32)
ANION GAP SERPL CALCULATED.3IONS-SCNC: 8 MMOL/L (ref 7–16)
AST SERPL-CCNC: 21 U/L (ref 0–31)
BACTERIA: ABNORMAL /HPF
BASOPHILS ABSOLUTE: 0.04 E9/L (ref 0–0.2)
BASOPHILS RELATIVE PERCENT: 0.6 % (ref 0–2)
BILIRUB SERPL-MCNC: 0.4 MG/DL (ref 0–1.2)
BILIRUBIN URINE: NEGATIVE
BLOOD, URINE: ABNORMAL
BUN BLDV-MCNC: 13 MG/DL (ref 6–20)
CALCIUM SERPL-MCNC: 9.9 MG/DL (ref 8.6–10.2)
CHLORIDE BLD-SCNC: 105 MMOL/L (ref 98–107)
CHP ED QC CHECK: NORMAL
CLARITY: CLEAR
CO2: 26 MMOL/L (ref 22–29)
COLOR: YELLOW
CREAT SERPL-MCNC: 0.6 MG/DL (ref 0.5–1)
EOSINOPHILS ABSOLUTE: 0.1 E9/L (ref 0.05–0.5)
EOSINOPHILS RELATIVE PERCENT: 1.4 % (ref 0–6)
EPITHELIAL CELLS, UA: ABNORMAL /HPF
GFR AFRICAN AMERICAN: >60
GFR NON-AFRICAN AMERICAN: >60 ML/MIN/1.73
GLUCOSE BLD-MCNC: 104 MG/DL (ref 74–99)
GLUCOSE BLD-MCNC: 107 MG/DL
GLUCOSE URINE: NEGATIVE MG/DL
HCT VFR BLD CALC: 43.1 % (ref 34–48)
HEMOGLOBIN: 13.9 G/DL (ref 11.5–15.5)
IMMATURE GRANULOCYTES #: 0.02 E9/L
IMMATURE GRANULOCYTES %: 0.3 % (ref 0–5)
KETONES, URINE: >=80 MG/DL
LACTIC ACID: 0.8 MMOL/L (ref 0.5–2.2)
LEUKOCYTE ESTERASE, URINE: NEGATIVE
LIPASE: 20 U/L (ref 13–60)
LYMPHOCYTES ABSOLUTE: 2.44 E9/L (ref 1.5–4)
LYMPHOCYTES RELATIVE PERCENT: 34.5 % (ref 20–42)
Lab: NORMAL
MCH RBC QN AUTO: 30.5 PG (ref 26–35)
MCHC RBC AUTO-ENTMCNC: 32.3 % (ref 32–34.5)
MCV RBC AUTO: 94.5 FL (ref 80–99.9)
METER GLUCOSE: 107 MG/DL (ref 74–99)
MONOCYTES ABSOLUTE: 0.42 E9/L (ref 0.1–0.95)
MONOCYTES RELATIVE PERCENT: 5.9 % (ref 2–12)
MUCUS: PRESENT /LPF
NEUTROPHILS ABSOLUTE: 4.05 E9/L (ref 1.8–7.3)
NEUTROPHILS RELATIVE PERCENT: 57.3 % (ref 43–80)
NITRITE, URINE: NEGATIVE
PDW BLD-RTO: 13.8 FL (ref 11.5–15)
PH UA: 6 (ref 5–9)
PLATELET # BLD: 256 E9/L (ref 130–450)
PMV BLD AUTO: 9.6 FL (ref 7–12)
POTASSIUM REFLEX MAGNESIUM: 3.8 MMOL/L (ref 3.5–5)
PROTEIN UA: NEGATIVE MG/DL
QC PASS/FAIL: NORMAL
RBC # BLD: 4.56 E12/L (ref 3.5–5.5)
RBC UA: ABNORMAL /HPF (ref 0–2)
SARS-COV-2, POC: NORMAL
SODIUM BLD-SCNC: 139 MMOL/L (ref 132–146)
SPECIFIC GRAVITY UA: >=1.03 (ref 1–1.03)
TOTAL PROTEIN: 7.2 G/DL (ref 6.4–8.3)
TROPONIN: <0.01 NG/ML (ref 0–0.03)
UROBILINOGEN, URINE: 0.2 E.U./DL
WBC # BLD: 7.1 E9/L (ref 4.5–11.5)
WBC UA: ABNORMAL /HPF (ref 0–5)

## 2020-12-18 PROCEDURE — 6360000002 HC RX W HCPCS: Performed by: STUDENT IN AN ORGANIZED HEALTH CARE EDUCATION/TRAINING PROGRAM

## 2020-12-18 PROCEDURE — 84484 ASSAY OF TROPONIN QUANT: CPT

## 2020-12-18 PROCEDURE — 99212 OFFICE O/P EST SF 10 MIN: CPT | Performed by: FAMILY MEDICINE

## 2020-12-18 PROCEDURE — 4004F PT TOBACCO SCREEN RCVD TLK: CPT | Performed by: FAMILY MEDICINE

## 2020-12-18 PROCEDURE — 82962 GLUCOSE BLOOD TEST: CPT

## 2020-12-18 PROCEDURE — 96361 HYDRATE IV INFUSION ADD-ON: CPT

## 2020-12-18 PROCEDURE — 70450 CT HEAD/BRAIN W/O DYE: CPT

## 2020-12-18 PROCEDURE — 87426 SARSCOV CORONAVIRUS AG IA: CPT | Performed by: FAMILY MEDICINE

## 2020-12-18 PROCEDURE — 3017F COLORECTAL CA SCREEN DOC REV: CPT | Performed by: FAMILY MEDICINE

## 2020-12-18 PROCEDURE — 85025 COMPLETE CBC W/AUTO DIFF WBC: CPT

## 2020-12-18 PROCEDURE — 99284 EMERGENCY DEPT VISIT MOD MDM: CPT

## 2020-12-18 PROCEDURE — 93005 ELECTROCARDIOGRAM TRACING: CPT | Performed by: NURSE PRACTITIONER

## 2020-12-18 PROCEDURE — 80053 COMPREHEN METABOLIC PANEL: CPT

## 2020-12-18 PROCEDURE — 83605 ASSAY OF LACTIC ACID: CPT

## 2020-12-18 PROCEDURE — G8427 DOCREV CUR MEDS BY ELIG CLIN: HCPCS | Performed by: FAMILY MEDICINE

## 2020-12-18 PROCEDURE — 96374 THER/PROPH/DIAG INJ IV PUSH: CPT

## 2020-12-18 PROCEDURE — 71046 X-RAY EXAM CHEST 2 VIEWS: CPT

## 2020-12-18 PROCEDURE — G8484 FLU IMMUNIZE NO ADMIN: HCPCS | Performed by: FAMILY MEDICINE

## 2020-12-18 PROCEDURE — 81001 URINALYSIS AUTO W/SCOPE: CPT

## 2020-12-18 PROCEDURE — G8420 CALC BMI NORM PARAMETERS: HCPCS | Performed by: FAMILY MEDICINE

## 2020-12-18 PROCEDURE — 2580000003 HC RX 258: Performed by: EMERGENCY MEDICINE

## 2020-12-18 PROCEDURE — 83690 ASSAY OF LIPASE: CPT

## 2020-12-18 PROCEDURE — 96375 TX/PRO/DX INJ NEW DRUG ADDON: CPT

## 2020-12-18 RX ORDER — KETOROLAC TROMETHAMINE 30 MG/ML
15 INJECTION, SOLUTION INTRAMUSCULAR; INTRAVENOUS ONCE
Status: DISCONTINUED | OUTPATIENT
Start: 2020-12-18 | End: 2020-12-18

## 2020-12-18 RX ORDER — DIPHENHYDRAMINE HYDROCHLORIDE 50 MG/ML
25 INJECTION INTRAMUSCULAR; INTRAVENOUS ONCE
Status: COMPLETED | OUTPATIENT
Start: 2020-12-18 | End: 2020-12-18

## 2020-12-18 RX ORDER — PROCHLORPERAZINE EDISYLATE 5 MG/ML
10 INJECTION INTRAMUSCULAR; INTRAVENOUS ONCE
Status: COMPLETED | OUTPATIENT
Start: 2020-12-18 | End: 2020-12-18

## 2020-12-18 RX ORDER — 0.9 % SODIUM CHLORIDE 0.9 %
2000 INTRAVENOUS SOLUTION INTRAVENOUS ONCE
Status: COMPLETED | OUTPATIENT
Start: 2020-12-18 | End: 2020-12-18

## 2020-12-18 RX ADMIN — PROCHLORPERAZINE EDISYLATE 10 MG: 5 INJECTION INTRAMUSCULAR; INTRAVENOUS at 20:46

## 2020-12-18 RX ADMIN — DIPHENHYDRAMINE HYDROCHLORIDE 25 MG: 50 INJECTION, SOLUTION INTRAMUSCULAR; INTRAVENOUS at 20:46

## 2020-12-18 RX ADMIN — SODIUM CHLORIDE 2000 ML: 9 INJECTION, SOLUTION INTRAVENOUS at 20:42

## 2020-12-18 ASSESSMENT — ENCOUNTER SYMPTOMS
DIARRHEA: 1
NAUSEA: 1
SHORTNESS OF BREATH: 1

## 2020-12-18 NOTE — PROGRESS NOTES
2020     Ramon Crawford (:  1970) is a 48 y.o. female, here for evaluation of the following medical concerns:    HPI  Patient brought in today due to worsening dizziness x2 weeks. Patient has longstanding history of abnormal MRI and headaches. Currently trying to be weaned off of benzodiazepines. Denies any recent exposure to Covid. Covid testing in office negative. She contacted the office. She initially presented to urgent care instead of hospital.  For some reason they shunted her over to the flu clinic. ECP recommended that she be evaluated by emergency room urgently given patient's longstanding medical history. Vitals currently stable. Review of Systems   Constitutional: Positive for fatigue. Respiratory: Positive for shortness of breath. Gastrointestinal: Positive for diarrhea and nausea. Neurological: Positive for dizziness, tremors, weakness, light-headedness and headaches. Psychiatric/Behavioral: Positive for decreased concentration. All other systems reviewed and are negative. Prior to Visit Medications    Medication Sig Taking? Authorizing Provider   omeprazole (PRILOSEC) 40 MG delayed release capsule Take 1 capsule by mouth every morning (before breakfast) Yes Alexis Persaud DO   rosuvastatin (CRESTOR) 20 MG tablet take 1 tablet by mouth nightly ---90DAYS REQUEST Yes Alexis Persaud DO   propranolol (INDERAL) 40 MG tablet 2 tablets BID Yes Alexis Persaud DO   propranolol (INDERAL) 80 MG tablet Take 1 tablet by mouth 2 times daily Yes Alexis Persaud DO   desvenlafaxine succinate (PRISTIQ) 50 MG TB24 extended release tablet Take 100 mg by mouth daily Instructed to take am of procedure  Yes Historical Provider, MD   Docusate Calcium (STOOL SOFTENER PO) Take by mouth 2 times daily Yes Historical Provider, MD   diazepam (VALIUM) 10 MG tablet Take 10 mg by mouth every 8 hours as needed for Anxiety.  Instructed to take am of procedure if needed Yes Historical Provider, MD   folic acid (FOLVITE) 1 MG tablet Take 1 mg by mouth daily. Yes Historical Provider, MD   lamoTRIgine (LAMICTAL) 200 MG tablet Take 200 mg by mouth every evening  Yes Historical Provider, MD        Social History     Tobacco Use    Smoking status: Current Every Day Smoker     Packs/day: 1.50     Years: 30.00     Pack years: 45.00     Types: Cigarettes    Smokeless tobacco: Never Used   Substance Use Topics    Alcohol use: No        Vitals:    12/18/20 1550   BP: 102/66   Pulse: 68   Resp: 18   Temp: 98.3 °F (36.8 °C)   TempSrc: Oral   SpO2: 98%   Weight: 108 lb (49 kg)   Height: 5' 2\" (1.575 m)     Estimated body mass index is 19.75 kg/m² as calculated from the following:    Height as of this encounter: 5' 2\" (1.575 m). Weight as of this encounter: 108 lb (49 kg). Physical Exam  Vitals signs reviewed. Constitutional:       Appearance: She is ill-appearing. HENT:      Head: Normocephalic and atraumatic. Eyes:      Extraocular Movements: Extraocular movements intact. Conjunctiva/sclera: Conjunctivae normal.      Pupils: Pupils are equal, round, and reactive to light. Cardiovascular:      Rate and Rhythm: Normal rate and regular rhythm. Pulmonary:      Effort: Pulmonary effort is normal.   Neurological:      Motor: Weakness present. Assessment/Plan:   Diagnosis Orders   1. Encounter for laboratory testing for COVID-19 virus  POCT COVID-19, Antigen    COVID-19 Ambulatory   2. Diarrhea, unspecified type     3. Nausea     4. Weakness     5. Dizziness       In office testing for Covid negative. Given patient's presentation longstanding history of neurologic abnormalities she is sent directly over to hospital for further evaluation and treatment including labs and imaging. Refused EMS transport. Angelito Rodrigez D.O.   4:09 PM  12/18/2020       This document may have been prepared at least partially through the use of voice recognition software.  Although effort is taken to assure the accuracy of this document, it is possible that grammatical, syntax,  or spelling errors may occur.

## 2020-12-18 NOTE — ED NOTES
FIRST PROVIDER CONTACT ASSESSMENT NOTE      Department of Emergency Medicine   12/18/20  4:27 PM EST    Chief Complaint: No chief complaint on file. History of Present Illness:   Joanne Hayes is a 48 y.o. female who presents to the ED for dizziness, nausea, diarrhea for the last several weeks. Patient states that she was seen by Dr. Pascale Alvarez this evening and sent to the ER for further evaluation. Patient states that she was tested for Covid and her test is not back yet. Medical History:  has a past medical history of Anxiety, Arthritis, Bipolar 1 disorder ([address] Chronic back pain, Depression, Migraines, Other hyperlipidemia, PTSD (post-traumatic stress disorder), and Tobacco use disorder. Surgical History:  has a past surgical history that includes tumor removal; Tonsillectomy; Tooth Extraction; Tubal ligation; Colonoscopy; arthrography (Right, 1/3/2019); Small intestine surgery (02/20/2019); Dilation and curettage of uterus (N/A, 2/3/2020); and arthrography (Right, 9/10/2020). Social History:  reports that she has been smoking cigarettes. She has a 45.00 pack-year smoking history. She has never used smokeless tobacco. She reports that she does not drink alcohol or use drugs. Family History: family history is not on file.     *ALLERGIES*     Latex, Ativan [lorazepam], Seroquel [quetiapine fumarate], Cariprazine, Dexamethasone, Hydroxyzine, Levofloxacin, Oxycodone-acetaminophen, Quetiapine, Venlafaxine, and Sulfa antibiotics     Physical Exam:      VS:  /75   Pulse 75   Temp 97.9 °F (36.6 °C) (Temporal)   Resp 16   Ht 5' 2\" (1.575 m)   Wt 108 lb (49 kg)   LMP 12/28/2018 (LMP Unknown)   SpO2 99%   BMI 19.75 kg/m²      Initial Plan of Care:  Initiate Treatment-Testing, Proceed toTreatment Area When Bed Available for ED Attending/MLP to Continue Care    -----------------END OF FIRST PROVIDER CONTACT ASSESSMENT NOTE--------------  Electronically signed by Sophia Oneal, APRN - CNP   DD: 12/18/20           Sophia Current, APRN - CNP  12/18/20 9280

## 2020-12-19 ENCOUNTER — CARE COORDINATION (OUTPATIENT)
Dept: CARE COORDINATION | Age: 50
End: 2020-12-19

## 2020-12-19 LAB
EKG ATRIAL RATE: 68 BPM
EKG P AXIS: 62 DEGREES
EKG P-R INTERVAL: 134 MS
EKG Q-T INTERVAL: 392 MS
EKG QRS DURATION: 80 MS
EKG QTC CALCULATION (BAZETT): 416 MS
EKG R AXIS: 84 DEGREES
EKG T AXIS: 48 DEGREES
EKG VENTRICULAR RATE: 68 BPM
URINE CULTURE, ROUTINE: NORMAL

## 2020-12-19 PROCEDURE — 93010 ELECTROCARDIOGRAM REPORT: CPT | Performed by: INTERNAL MEDICINE

## 2020-12-19 NOTE — CARE COORDINATION
Patient contacted regarding Akil Lopze. Discussed COVID-19 related testing which was pending at this time. Test results were pending. Patient informed of results, if available? Patient is aware her COVID antigen test was negative. She states she needed 2 negative tests, therefore she was tested again in the ED and she is aware that this test result is pending. Care Transition Nurse/ Ambulatory Care Manager contacted the patient by telephone to perform post discharge assessment. Call within 2 business days of discharge: Yes. Verified name and  with patient as identifiers. Provided introduction to self, and explanation of the CTN/ACM role, and reason for call due to risk factors for infection and/or exposure to COVID-19. Symptoms reviewed with patient who verbalized the following symptoms: nausea and dizziness/lightheadedness. Patient states symptoms have slightly improved. Due to no new or worsening symptoms encounter was not routed to provider for escalation. Discussed follow-up appointments. If no appointment was previously scheduled, appointment scheduling offered: Yes  [facility] follow up appointment(s):   Future Appointments   Date Time Provider Woodrow Ji   3/2/2021 11:45 AM M[facility] [address]     Patient states she will call PCP to schedule an ED F/U when she receives results from her COVID test.   Patient does not need assistance in making this call. Non-[facility] follow up appointment(s):     Non-face-to-face services provided:  Reviewed AVS     Advance Care Planning:   Does patient have an Advance Directive:  decision maker updated. Patient has following risk factors of: Patient does smoke. CTN/ACM reviewed discharge instructions, medical action plan and red flags such as increased shortness of breath, increasing fever and signs of decompensation with patient who verbalized understanding.    Discussed exposure protocols and quarantine with CDC Guidelines What to do if you are sick with coronavirus disease 2019.  Patient was given an opportunity for questions and concerns. The patient agrees to contact the [facility] exposure line [phone], local health department [place] Department of Health: ([phone]) and PCP office for questions related to their healthcare. CTN/ACM provided contact information for future needs. Reviewed and educated patient on any new and changed medications related to discharge diagnosis     No medications were ordered by the ED provider. Patient is active on My Chart and is aware how to utilize this tool. Patient/family/caregiver given information for [employer] and agrees to enroll yes  Patient's preferred e-mail: [email or web address]   Patient's preferred phone number: [phone]  Based on Loop alert triggers, patient will be contacted by nurse care manager for worsening symptoms. Pt will be further monitored by COVID Loop Team based on severity of symptoms and risk factors. ACM reviewed self quarantine recommendations d/t COVID test results are pending. Steps to help prevent the spread of COVID-19 if you are sick  SOURCE - https://fidelia-perez.info/. html     Stay home except to get medical care   ; Stay home: People who are mildly ill with COVID-19 are able to isolate at home during their illness. You should restrict activities outside your home, except for getting medical care.   ; Avoid public areas: Do not go to work, school, or public areas.   ; Avoid public transportation: Avoid using public transportation, ride-sharing, or taxis.  ; Separate yourself from other people and animals in your home   ; Stay away from others: As much as possible, you should stay in a specific room and away from other people in your home. Also, you should use a separate bathroom, if available.   ; Limit contact with pets & animals:  You should restrict contact with pets and other animals while you are sick with COVID-19, just like you would around other people. Although there have not been reports of pets or other animals becoming sick with COVID-19, it is still recommended that people sick with COVID-19 limit contact with animals until more information is known about the virus. ; When possible, have another member of your household care for your animals while you are sick. If you are sick with COVID-19, avoid contact with your pet, including petting, snuggling, being kissed or licked, and sharing food. If you must care for your pet or be around animals while you are sick, wash your hands before and after you interact with pets and wear a facemask. See COVID-19 and Animals for more information. Other considerations   The ill person should eat/be fed in their room if possible. Non-disposable  items used should be handled with gloves and washed with hot water or in a . Clean hands after handling used  items.  If possible, dedicate a lined trash can for the ill person. Use gloves when removing garbage bags, handling, and disposing of trash. Wash hands after handling or disposing of trash.  Consider consulting with your local health department about trash disposal guidance if available. Information for Household Members and Caregivers of Someone who is Sick   Call ahead before visiting your doctor   Call ahead: If you have a medical appointment, call the healthcare provider and tell them that you have or may have COVID-19. This will help the healthcare provider's office take steps to keep other people from getting infected or exposed. Wear a facemask if you are sick   ; If you are sick: You should wear a facemask when you are around other people (e.g., sharing a room or vehicle) or pets and before you enter a healthcare provider's office.    ; If you are caring for others: If the person who is sick is not able to wear a facemask (for example, because it causes trouble breathing), then people who live with the person who is sick should not stay in the same room with them, or they should wear a facemask if they enter a room with the person who is sick. Cover your coughs and sneezes   ; Cover: Cover your mouth and nose with a tissue when you cough or sneeze.   ; Dispose: Throw used tissues in a lined trash can.   ; Wash hands: Immediately wash your hands with soap and water for at least 20 seconds or, if soap and water are not available, clean your hands with an alcohol-based hand  that contains at least 60% alcohol. Clean your hands often   ; Wash hands: Wash your hands often with soap and water for at least 20 seconds, especially after blowing your nose, coughing, or sneezing; going to the bathroom; and before eating or preparing food.   ; Hand : If soap and water are not readily available, use an alcohol-based hand  with at least 60% alcohol, covering all surfaces of your hands and rubbing them together until they feel dry.   ; Soap and water: Soap and water are the best option if hands are visibly dirty.   ; Avoid touching: Avoid touching your eyes, nose, and mouth with unwashed hands. Handwashing Tips   ; Wet your hands with clean, running water (warm or cold), turn off the tap, and apply soap.  ; Lather your hands by rubbing them together with the soap. Lather the backs of your hands, between your fingers, and under your nails. ; Scrub your hands for at least 20 seconds. Need a timer? Hum the [place] from beginning to end twice.  ; Rinse your hands well under clean, running water.  ; Dry your hands using a clean towel or air dry them. Avoid sharing personal household items   ; Do not share: You should not share dishes, drinking glasses, cups, eating utensils, towels, or bedding with other people or pets in your home.   ; Wash thoroughly after use:  After using these items, they should be washed thoroughly with soap and

## 2020-12-19 NOTE — ED PROVIDER NOTES
HPI:     Rainelle Boast is a 48 y.o. female presenting to the ED for lightheadedness, beginning 1 week ago. The complaint has been persistent, moderate in severity, and worsened by nothing. Patient states that she has been intermittent lightheaded for the last week, states that she has not passed out or lost consciousness but she has come close to passing out a few times. States that she has been mildly nauseated intermittently as well. Patient also is complaining of headache. States that she gets migraine headaches and she feels one coming on at this time. .  She denies any, dizziness, fever, chest pain, cough, vomiting, abdominal pain, diarrhea, constipation, urinary symptoms. Review of Systems:   Please see HPI above. All bolded are positive. All un-bolded are negative.     Constitutional Symptoms: fever, chills, fatigue, generalized weakness, diaphoresis, increase in thirst, loss of appetite  Eyes: vision change   Ears, Nose, Mouth, Throat: hearing loss, nasal congestion, sores in the mouth  Cardiovascular: chest pain, chest heaviness, palpitations  Respiratory: shortness of breath, wheezing, coughing  Gastrointestinal: abdominal pain, nausea, vomiting, diarrhea, constipation, melena, hematochezia, hematemesis  Genitourinary: dysuria, hematuria, increased frequency  Musculoskeletal: lower extremity edema, myalgias, arthralgias, back pain  Integumentary: rashes, itching   Neurological: headache, lightheadedness, dizziness, confusion, syncope, numbness, tingling, focal weakness  Psychiatric: depression, suicidal ideation, anxiety  Endocrine: unintentional weight change  Hematologic/Lymphatic: lymphadenopathy, easy bruising, easy bleeding   Allergic/Immunologic: recurrent infections            --------------------------------------------- PAST HISTORY ---------------------------------------------  Past Medical History:  has a past medical history of Anxiety, Arthritis, [facility] Chronic back pain, Depression, Migraines, Other hyperlipidemia, PTSD (post-traumatic stress disorder), and Tobacco use disorder. Past Surgical History:  has a past surgical history that includes tumor removal; Tonsillectomy; Tooth Extraction; Tubal ligation; Colonoscopy; arthrography (Right, 1/3/2019); Small intestine surgery (02/20/2019); Dilation and curettage of uterus (N/A, 2/3/2020); and arthrography (Right, 9/10/2020). Social History:  reports that she has been smoking cigarettes. She has a 45.00 pack-year smoking history. She has never used smokeless tobacco. She reports that she does not drink alcohol or use drugs. Family History: family history is not on file. The patients home medications have been reviewed.     Allergies: Latex, Ativan [lorazepam], Seroquel [quetiapine fumarate], Cariprazine, Dexamethasone, Hydroxyzine, Levofloxacin, Oxycodone-acetaminophen, Quetiapine, Venlafaxine, and Sulfa antibiotics    -------------------------------------------------- RESULTS -------------------------------------------------  All laboratory and radiology results have been personally reviewed by myself   LABS:  Results for orders placed or performed during the hospital encounter of 12/18/20   CBC Auto Differential   Result Value Ref Range    WBC 7.1 4.5 - 11.5 E9/L    RBC 4.56 3.50 - 5.50 E12/L    Hemoglobin 13.9 11.5 - 15.5 g/dL    Hematocrit 43.1 34.0 - 48.0 %    MCV 94.5 80.0 - 99.9 fL    MCH 30.5 26.0 - 35.0 pg    MCHC 32.3 32.0 - 34.5 %    RDW 13.8 11.5 - 15.0 fL    Platelets 680 678 - 317 E9/L    MPV 9.6 7.0 - 12.0 fL    Neutrophils % 57.3 43.0 - 80.0 %    Immature Granulocytes % 0.3 0.0 - 5.0 %    Lymphocytes % 34.5 20.0 - 42.0 %    Monocytes % 5.9 2.0 - 12.0 %    Eosinophils % 1.4 0.0 - 6.0 %    Basophils % 0.6 0.0 - 2.0 %    Neutrophils Absolute 4.05 1.80 - 7.30 E9/L    Immature Granulocytes # 0.02 E9/L    Lymphocytes Absolute 2.44 1.50 - 4.00 E9/L    Monocytes Absolute 0.42 0.10 - 0.95 E9/L Interval 392 ms    QTc Calculation (Bazett) 416 ms    P Axis 62 degrees    R Axis 84 degrees    T Axis 48 degrees       RADIOLOGY:  Interpreted by Radiologist.  CT HEAD WO CONTRAST   Final Result   No acute intracranial abnormality. XR CHEST (2 VW)   Final Result   New subtle patchy ground-glass opacity in right lower lung may be a small   focus of edema and/or pneumonitis          ------------------------- NURSING NOTES AND VITALS REVIEWED ---------------------------   The nursing notes within the ED encounter and vital signs as below have been reviewed. /71   Pulse 68   Temp 97.9 °F (36.6 °C) (Temporal)   Resp 16   Ht 5' 2\" (1.575 m)   Wt 108 lb (49 kg)   LMP 12/28/2018 (LMP Unknown)   SpO2 97%   BMI 19.75 kg/m²   Oxygen Saturation Interpretation: Normal      ---------------------------------------------------PHYSICAL EXAM--------------------------------------      Constitutional/General: Alert and oriented x3, well appearing, non toxic in NAD  Head: Normocephalic and atraumatic  Eyes: PERRL, EOMI, conjunctival pallor noted  Mouth: Oropharynx clear, handling secretions, no trismus, dry mucous membranes  Neck: Supple, full ROM,   Pulmonary: Lungs clear to auscultation bilaterally, no wheezes, rales, or rhonchi. Not in respiratory distress  Cardiovascular:  Regular rate and rhythm, no murmurs, gallops, or rubs. 2+ distal pulses  Abdomen: Soft, non tender, non distended,   Extremities: Moves all extremities x 4.  Warm and well perfused  Skin: warm and dry without rash  Neurologic: GCS 15,  Psych: Normal Affect      ------------------------------ ED COURSE/MEDICAL DECISION MAKING----------------------  Medications   0.9 % sodium chloride bolus (0 mLs Intravenous Stopped 12/18/20 2154)   diphenhydrAMINE (BENADRYL) injection 25 mg (25 mg Intravenous Given 12/18/20 2046)   prochlorperazine (COMPAZINE) injection 10 mg (10 mg Intravenous Given 12/18/20 2046)         ED COURSE:  ED Course as of Dec 19 0022   Fri Dec 18, 2020   1917 EKG: This EKG is signed and interpreted by me. Rate: 68  Rhythm: Sinus  Interpretation: Normal sinus rhythm, normal [place] interval, normal QRS, normal QT interval, no acute ST or T wave changes  Comparison: no previous EKG available        [JA]   2047 PHYSICAL EXAM:  Vitals Reviewed  Constitutional/General: Alert and oriented x3, well appearing, non toxic in NAD  Head: Normocephalic and atraumatic  Eyes: PERRL, EOMI  Mouth: Oropharynx clear, handling secretions, no trismus  Neck: Supple, full ROM, no meningeal signs, no nuchal rigidity  Pulmonary: Lungs clear to auscultation bilaterally, no wheezes, rales, or rhonchi. Not in respiratory distress  Cardiovascular:  Regular rate and rhythm, no murmurs, gallops, or rubs. 2+ distal pulses  Abdomen: Soft, non distended, mild epigastric tenderness, no rebound, no guarding  Extremities: Moves all extremities x 4. Warm and well perfused  Skin: warm and dry without rash  Neurologic: GCS 15, Facial symmetry. Speech clear. Cranial nerves intact. No focal motor or sensory deficits. No ataxia with finger-to-nose or heel-to-shin. No drift in upper or lower extremities. No nystagmus. Psych: Normal Affect        [JA]      ED Course User Index  [JA] Guerline Murry MD       Medical Decision Making:    Patient presented the ER today with chief complaint of lightheadedness. Patient appears clinically dehydrated. Patient was given Compazine and Benadryl for her headache in addition to a liter of fluids. States he is feeling significantly better at this time, states that her symptoms are fully resolved. Patient be discharged home with instructions to orally rehydrate and return to ER symptoms return or worsen. Patient informed to follow with her PCP. She verbalized understanding of plan and is agreeable discharge this time. Questions answered. Counseling:    The emergency provider has spoken with the patient and discussed todays results, in addition to providing specific details for the plan of care and counseling regarding the diagnosis and prognosis. Questions are answered at this time and they are agreeable with the plan.      --------------------------------- IMPRESSION AND DISPOSITION ---------------------------------    IMPRESSION  1. Dehydration    2. Lightheadedness        Discharge Medication List as of 12/18/2020 10:52 PM          DISPOSITION  Disposition: Discharge to home  Patient condition is stable      NOTE: This report was transcribed using voice recognition software.  Every effort was made to ensure accuracy; however, inadvertent computerized transcription errors may be present       Cherelle Mercado DO  Resident  12/19/20 0030

## 2020-12-20 LAB
SARS-COV-2: NOT DETECTED
SOURCE: NORMAL

## 2020-12-23 ENCOUNTER — OFFICE VISIT (OUTPATIENT)
Dept: FAMILY MEDICINE CLINIC | Age: 50
End: 2020-12-23
Payer: MEDICARE

## 2020-12-23 VITALS
DIASTOLIC BLOOD PRESSURE: 78 MMHG | HEART RATE: 56 BPM | WEIGHT: 112.8 LBS | SYSTOLIC BLOOD PRESSURE: 124 MMHG | OXYGEN SATURATION: 98 % | HEIGHT: 62 IN | BODY MASS INDEX: 20.76 KG/M2 | TEMPERATURE: 97.1 F

## 2020-12-23 PROCEDURE — 3017F COLORECTAL CA SCREEN DOC REV: CPT | Performed by: FAMILY MEDICINE

## 2020-12-23 PROCEDURE — 4004F PT TOBACCO SCREEN RCVD TLK: CPT | Performed by: FAMILY MEDICINE

## 2020-12-23 PROCEDURE — G8484 FLU IMMUNIZE NO ADMIN: HCPCS | Performed by: FAMILY MEDICINE

## 2020-12-23 PROCEDURE — G8427 DOCREV CUR MEDS BY ELIG CLIN: HCPCS | Performed by: FAMILY MEDICINE

## 2020-12-23 PROCEDURE — 99213 OFFICE O/P EST LOW 20 MIN: CPT | Performed by: FAMILY MEDICINE

## 2020-12-23 PROCEDURE — G8420 CALC BMI NORM PARAMETERS: HCPCS | Performed by: FAMILY MEDICINE

## 2020-12-23 RX ORDER — BACLOFEN 10 MG/1
10 TABLET ORAL 2 TIMES DAILY
Qty: 60 TABLET | Refills: 1 | Status: SHIPPED
Start: 2020-12-23 | End: 2021-03-02 | Stop reason: SDUPTHER

## 2020-12-23 RX ORDER — ELETRIPTAN HYDROBROMIDE 40 MG/1
40 TABLET, FILM COATED ORAL
Qty: 6 TABLET | Refills: 1 | Status: SHIPPED
Start: 2020-12-23 | End: 2021-03-02 | Stop reason: SDUPTHER

## 2020-12-23 RX ORDER — ELETRIPTAN HYDROBROMIDE 40 MG/1
40 TABLET, FILM COATED ORAL
COMMUNITY
End: 2020-12-23 | Stop reason: SDUPTHER

## 2020-12-23 ASSESSMENT — ENCOUNTER SYMPTOMS
ALLERGIC/IMMUNOLOGIC NEGATIVE: 1
SORE THROAT: 0
DIARRHEA: 0
CHEST TIGHTNESS: 0
SHORTNESS OF BREATH: 0
NAUSEA: 0
EYE DISCHARGE: 0
TROUBLE SWALLOWING: 0
SINUS PAIN: 0
COUGH: 0
BACK PAIN: 0
ABDOMINAL PAIN: 0
PHOTOPHOBIA: 0
VOMITING: 0
EYE REDNESS: 0
BLOOD IN STOOL: 0
EYE PAIN: 0

## 2020-12-23 NOTE — PROGRESS NOTES
20  Trnia Valle : 1970 Sex: female  Age: 48 y.o. Chief Complaint   Patient presents with    Follow-up     ED follow up from 2020    Neck Pain     onset of a few weeks    Migraine       Post ER recheck. THE [facility] ER where she was scanned and treated with IV fluids for mild dehydration. She is much improved. ER notes were reviewed by me      Review of Systems   Constitutional: Negative for appetite change, fatigue and unexpected weight change. HENT: Negative for congestion, ear pain, hearing loss, sinus pain, sore throat and trouble swallowing. Eyes: Negative for photophobia, pain, discharge and redness. Respiratory: Negative for cough, chest tightness and shortness of breath. Cardiovascular: Negative for chest pain, palpitations and leg swelling. Gastrointestinal: Negative for abdominal pain, blood in stool, diarrhea, nausea and vomiting. Endocrine: Negative. Genitourinary: Negative for dysuria, flank pain, frequency and hematuria. Musculoskeletal: Positive for arthralgias, myalgias and neck pain. Negative for back pain and joint swelling. Cervical spine   Skin: Negative. Allergic/Immunologic: Negative. Neurological: Positive for dizziness and headaches. Negative for seizures, syncope, weakness, light-headedness and numbness. Hematological: Negative for adenopathy. Does not bruise/bleed easily. Psychiatric/Behavioral: Negative.           Current Outpatient Medications:     eletriptan (RELPAX) 40 MG tablet, Take 1 tablet by mouth once as needed (Headache) may repeat in 2 hours if necessary, Disp: 6 tablet, Rfl: 1    diclofenac sodium (VOLTAREN) 1 % GEL, Apply 2 g topically 2 times daily, Disp: 100 g, Rfl: 1    baclofen (LIORESAL) 10 MG tablet, Take 1 tablet by mouth 2 times daily, Disp: 60 tablet, Rfl: 1    omeprazole (PRILOSEC) 40 MG delayed release capsule, Take 1 capsule by mouth every morning (before breakfast), Disp: 90 capsule, Rfl: 1   rosuvastatin (CRESTOR) 20 MG tablet, take 1 tablet by mouth nightly ---90DAYS REQUEST, Disp: 90 tablet, Rfl: 1    propranolol (INDERAL) 80 MG tablet, Take 1 tablet by mouth 2 times daily, Disp: 60 tablet, Rfl: 2    desvenlafaxine succinate (PRISTIQ) 50 MG TB24 extended release tablet, Take 100 mg by mouth daily Instructed to take am of procedure , Disp: , Rfl:     Docusate Calcium (STOOL SOFTENER PO), Take by mouth 2 times daily, Disp: , Rfl:     diazepam (VALIUM) 10 MG tablet, Take 10 mg by mouth every 8 hours as needed for Anxiety. Instructed to take am of procedure if needed, Disp: , Rfl:     folic acid (FOLVITE) 1 MG tablet, Take 1 mg by mouth daily. , Disp: , Rfl:     lamoTRIgine (LAMICTAL) 200 MG tablet, Take 200 mg by mouth every evening , Disp: , Rfl:   Allergies   Allergen Reactions    Latex     Ativan [Lorazepam] Other (See Comments)     Panic attack    Seroquel [Quetiapine Fumarate] Other (See Comments)     \"very bad mood\"    Cariprazine     Dexamethasone      Other reaction(s): Free Text    Hydroxyzine     Levofloxacin     Oxycodone-Acetaminophen     Quetiapine     Venlafaxine     Sulfa Antibiotics Rash       Past Medical History:   Diagnosis Date    Anxiety     Arthritis     right hip, for injection 1-3-18     B[facility] 1 disorder ([facility]     Chronic back pain     and neck pain     Depression     Migraines     Other hyperlipidemia 7/29/2019    PTSD (post-traumatic stress disorder)     Tobacco use disorder 7/29/2019     Past Surgical History:   Procedure Laterality Date    ARTHROGRAPHY Right 1/3/2019    RIGHT HIP ARTHROGRAM AND STEROID INJECTION performed by Elizabeth Chacon MD at [facility] Right 9/10/2020    RIGHT HIP INJECTION UNDER FLUOROSCOPY performed by Elizabeth Chacon MD at [facility] N/A 2/3/2020    DILATATION AND CURETTAGE HYSTEROSCOPY performed by Nasreen Clark MD at [address] [place] [address] SURGERY  02/20/2019    implant     TONSILLECTOMY      TOOTH EXTRACTION      TUBAL LIGATION      TUMOR REMOVAL      in pancrease      No family history on file. Social History     Socioeconomic History    Marital status:      Spouse name: Not on file    Number of children: Not on file    Years of education: Not on file    Highest education level: Not on file   Occupational History    Not on file   Social Needs    Financial resource strain: Not on file    Food insecurity     Worry: Not on file     Inability: Not on file    Transportation needs     Medical: Not on file     Non-medical: Not on file   Tobacco Use    Smoking status: Current Every Day Smoker     Packs/day: 1.50     Years: 30.00     Pack years: 45.00     Types: Cigarettes    Smokeless tobacco: Never Used   Substance and Sexual Activity    Alcohol use: No    Drug use: No    Sexual activity: Not on file   Lifestyle    Physical activity     Days per week: Not on file     Minutes per session: Not on file    Stress: Not on file   Relationships    Social connections     Talks on phone: Not on file     Gets together: Not on file     Attends Mosque service: Not on file     Active member of club or organization: Not on file     Attends meetings of clubs or organizations: Not on file     Relationship status: Not on file    Intimate partner violence     Fear of current or ex partner: Not on file     Emotionally abused: Not on file     Physically abused: Not on file     Forced sexual activity: Not on file   Other Topics Concern    Not on file   Social History Narrative    Not on file       Vitals:    12/23/20 1121   BP: 124/78   Pulse: 56   Temp: 97.1 °F (36.2 °C)   SpO2: 98%   Weight: 112 lb 12.8 oz (51.2 kg)   Height: 5' 2\" (1.575 m)       Physical Exam  Vitals signs and nursing note reviewed. Constitutional:       Appearance: She is well-developed. HENT:      Head: Atraumatic.       Right Ear: External ear normal.      Left Ear: External ear normal.      Nose: Nose normal.      Mouth/Throat:      Pharynx: No oropharyngeal exudate. Eyes:      Conjunctiva/sclera: Conjunctivae normal.      Pupils: Pupils are equal, round, and reactive to light. Neck:      Musculoskeletal: Normal range of motion and neck supple. Thyroid: No thyromegaly. Trachea: No tracheal deviation. Cardiovascular:      Rate and Rhythm: Normal rate and regular rhythm. Heart sounds: No murmur. No friction rub. No gallop. Pulmonary:      Effort: Pulmonary effort is normal. No respiratory distress. Breath sounds: Normal breath sounds. Abdominal:      General: Bowel sounds are normal.      Palpations: Abdomen is soft. Musculoskeletal: Normal range of motion. General: Tenderness present. No deformity. Comments: Pain, stiffness, decreased ROM Cspine   Lymphadenopathy:      Cervical: No cervical adenopathy. Skin:     General: Skin is warm and dry. Capillary Refill: Capillary refill takes less than 2 seconds. Findings: No rash. Neurological:      Mental Status: She is alert and oriented to person, place, and time. Sensory: No sensory deficit. Motor: No abnormal muscle tone. Coordination: Coordination normal.      Deep Tendon Reflexes: Reflexes normal.         Assessment and Plan:  Patience Screen was seen today for follow-up, neck pain and migraine. Diagnoses and all orders for this visit:    History of migraine  -     eletriptan (RELPAX) 40 MG tablet; Take 1 tablet by mouth once as needed (Headache) may repeat in 2 hours if necessary    Spondylosis of cervical region without myelopathy or radiculopathy  -     diclofenac sodium (VOLTAREN) 1 % GEL; Apply 2 g topically 2 times daily  -     baclofen (LIORESAL) 10 MG tablet; Take 1 tablet by mouth 2 times daily        Return in about 3 months (around 3/23/2021).       Seen By:  Jad Zamora DO

## 2021-01-07 ENCOUNTER — TELEPHONE (OUTPATIENT)
Dept: FAMILY MEDICINE CLINIC | Age: 51
End: 2021-01-07

## 2021-01-07 DIAGNOSIS — R20.2 PARESTHESIA OF UPPER EXTREMITY: ICD-10-CM

## 2021-01-07 DIAGNOSIS — M47.812 SPONDYLOSIS OF CERVICAL REGION WITHOUT MYELOPATHY OR RADICULOPATHY: Primary | ICD-10-CM

## 2021-01-07 NOTE — TELEPHONE ENCOUNTER
Fleta Rubinstein calling about the pain she is still having in the arms and into the shoulder blades. This is still very painful to the point that she can hardly carry her purse. Also, the omeprazole is helping, but she still has some nausea at times.

## 2021-01-12 ENCOUNTER — TELEPHONE (OUTPATIENT)
Dept: ADMINISTRATIVE | Age: 51
End: 2021-01-12

## 2021-01-12 DIAGNOSIS — Z12.31 OTHER SCREENING MAMMOGRAM: Primary | ICD-10-CM

## 2021-01-15 NOTE — TELEPHONE ENCOUNTER
Patient calling in stated she was suppose to talk about her cholesterol but forgot an also needed a refill on pended medication. I look forward to serving you and your patients again in the future. Please don't hesitate to contact me at my mobile number .         Aisha Chaney MD  Orthopedic Surgery

## 2021-03-02 ENCOUNTER — OFFICE VISIT (OUTPATIENT)
Dept: FAMILY MEDICINE CLINIC | Age: 51
End: 2021-03-02
Payer: MEDICARE

## 2021-03-02 VITALS
RESPIRATION RATE: 18 BRPM | TEMPERATURE: 98 F | OXYGEN SATURATION: 97 % | DIASTOLIC BLOOD PRESSURE: 64 MMHG | HEART RATE: 81 BPM | SYSTOLIC BLOOD PRESSURE: 102 MMHG | WEIGHT: 110 LBS | HEIGHT: 62 IN | BODY MASS INDEX: 20.24 KG/M2

## 2021-03-02 VITALS
HEIGHT: 62 IN | BODY MASS INDEX: 20.24 KG/M2 | RESPIRATION RATE: 18 BRPM | TEMPERATURE: 98 F | DIASTOLIC BLOOD PRESSURE: 64 MMHG | HEART RATE: 81 BPM | WEIGHT: 110 LBS | OXYGEN SATURATION: 97 % | SYSTOLIC BLOOD PRESSURE: 102 MMHG

## 2021-03-02 DIAGNOSIS — Z00.00 ROUTINE GENERAL MEDICAL EXAMINATION AT A HEALTH CARE FACILITY: Primary | ICD-10-CM

## 2021-03-02 DIAGNOSIS — E78.2 MIXED HYPERLIPIDEMIA: ICD-10-CM

## 2021-03-02 DIAGNOSIS — F33.41 RECURRENT MAJOR DEPRESSION IN PARTIAL REMISSION (HCC): ICD-10-CM

## 2021-03-02 DIAGNOSIS — Z86.69 HISTORY OF MIGRAINE: ICD-10-CM

## 2021-03-02 DIAGNOSIS — M47.812 SPONDYLOSIS OF CERVICAL REGION WITHOUT MYELOPATHY OR RADICULOPATHY: ICD-10-CM

## 2021-03-02 LAB
ALBUMIN SERPL-MCNC: 4.4 G/DL (ref 3.5–5.2)
ALP BLD-CCNC: 56 U/L (ref 35–104)
ALT SERPL-CCNC: 9 U/L (ref 0–32)
ANION GAP SERPL CALCULATED.3IONS-SCNC: 9 MMOL/L (ref 7–16)
AST SERPL-CCNC: 23 U/L (ref 0–31)
BASOPHILS ABSOLUTE: 0.05 E9/L (ref 0–0.2)
BASOPHILS RELATIVE PERCENT: 0.5 % (ref 0–2)
BILIRUB SERPL-MCNC: 0.2 MG/DL (ref 0–1.2)
BUN BLDV-MCNC: 12 MG/DL (ref 6–20)
CALCIUM SERPL-MCNC: 9.7 MG/DL (ref 8.6–10.2)
CHLORIDE BLD-SCNC: 104 MMOL/L (ref 98–107)
CHOLESTEROL, TOTAL: 148 MG/DL (ref 0–199)
CO2: 27 MMOL/L (ref 22–29)
CREAT SERPL-MCNC: 0.6 MG/DL (ref 0.5–1)
EOSINOPHILS ABSOLUTE: 0.12 E9/L (ref 0.05–0.5)
EOSINOPHILS RELATIVE PERCENT: 1.2 % (ref 0–6)
GFR AFRICAN AMERICAN: >60
GFR NON-AFRICAN AMERICAN: >60 ML/MIN/1.73
GLUCOSE FASTING: 78 MG/DL (ref 74–99)
HCT VFR BLD CALC: 43 % (ref 34–48)
HDLC SERPL-MCNC: 48 MG/DL
HEMOGLOBIN: 13.9 G/DL (ref 11.5–15.5)
IMMATURE GRANULOCYTES #: 0.04 E9/L
IMMATURE GRANULOCYTES %: 0.4 % (ref 0–5)
LDL CHOLESTEROL CALCULATED: 76 MG/DL (ref 0–99)
LYMPHOCYTES ABSOLUTE: 2.88 E9/L (ref 1.5–4)
LYMPHOCYTES RELATIVE PERCENT: 29.2 % (ref 20–42)
MCH RBC QN AUTO: 30.5 PG (ref 26–35)
MCHC RBC AUTO-ENTMCNC: 32.3 % (ref 32–34.5)
MCV RBC AUTO: 94.5 FL (ref 80–99.9)
MONOCYTES ABSOLUTE: 0.54 E9/L (ref 0.1–0.95)
MONOCYTES RELATIVE PERCENT: 5.5 % (ref 2–12)
NEUTROPHILS ABSOLUTE: 6.24 E9/L (ref 1.8–7.3)
NEUTROPHILS RELATIVE PERCENT: 63.2 % (ref 43–80)
PDW BLD-RTO: 12.9 FL (ref 11.5–15)
PLATELET # BLD: 273 E9/L (ref 130–450)
PMV BLD AUTO: 10 FL (ref 7–12)
POTASSIUM SERPL-SCNC: 4.3 MMOL/L (ref 3.5–5)
RBC # BLD: 4.55 E12/L (ref 3.5–5.5)
SODIUM BLD-SCNC: 140 MMOL/L (ref 132–146)
TOTAL PROTEIN: 6.9 G/DL (ref 6.4–8.3)
TRIGL SERPL-MCNC: 118 MG/DL (ref 0–149)
TSH SERPL DL<=0.05 MIU/L-ACNC: 0.58 UIU/ML (ref 0.27–4.2)
VLDLC SERPL CALC-MCNC: 24 MG/DL
WBC # BLD: 9.9 E9/L (ref 4.5–11.5)

## 2021-03-02 PROCEDURE — 3017F COLORECTAL CA SCREEN DOC REV: CPT | Performed by: FAMILY MEDICINE

## 2021-03-02 PROCEDURE — G0439 PPPS, SUBSEQ VISIT: HCPCS | Performed by: FAMILY MEDICINE

## 2021-03-02 PROCEDURE — G8420 CALC BMI NORM PARAMETERS: HCPCS | Performed by: FAMILY MEDICINE

## 2021-03-02 PROCEDURE — 99214 OFFICE O/P EST MOD 30 MIN: CPT | Performed by: FAMILY MEDICINE

## 2021-03-02 PROCEDURE — G8427 DOCREV CUR MEDS BY ELIG CLIN: HCPCS | Performed by: FAMILY MEDICINE

## 2021-03-02 PROCEDURE — G8484 FLU IMMUNIZE NO ADMIN: HCPCS | Performed by: FAMILY MEDICINE

## 2021-03-02 PROCEDURE — 4004F PT TOBACCO SCREEN RCVD TLK: CPT | Performed by: FAMILY MEDICINE

## 2021-03-02 RX ORDER — ROSUVASTATIN CALCIUM 20 MG/1
TABLET, COATED ORAL
Qty: 90 TABLET | Refills: 1 | Status: SHIPPED
Start: 2021-03-02 | End: 2021-06-14 | Stop reason: SDUPTHER

## 2021-03-02 RX ORDER — PREGABALIN 50 MG/1
150 CAPSULE ORAL 2 TIMES DAILY
COMMUNITY
Start: 2021-02-15 | End: 2021-04-12

## 2021-03-02 RX ORDER — ELETRIPTAN HYDROBROMIDE 40 MG/1
40 TABLET, FILM COATED ORAL
Qty: 6 TABLET | Refills: 1 | Status: SHIPPED
Start: 2021-03-02 | End: 2021-04-15 | Stop reason: SDUPTHER

## 2021-03-02 RX ORDER — NADOLOL 20 MG/1
TABLET ORAL
COMMUNITY
Start: 2021-01-13 | End: 2021-06-18 | Stop reason: SDUPTHER

## 2021-03-02 RX ORDER — BACLOFEN 10 MG/1
10 TABLET ORAL 2 TIMES DAILY
Qty: 60 TABLET | Refills: 1 | Status: SHIPPED
Start: 2021-03-02 | End: 2021-04-06 | Stop reason: SDUPTHER

## 2021-03-02 ASSESSMENT — ENCOUNTER SYMPTOMS
ABDOMINAL PAIN: 0
EYE PAIN: 0
SORE THROAT: 0
EYE REDNESS: 0
BLOOD IN STOOL: 0
COUGH: 0
SINUS PAIN: 0
BACK PAIN: 0
DIARRHEA: 0
ALLERGIC/IMMUNOLOGIC NEGATIVE: 1
PHOTOPHOBIA: 0
NAUSEA: 0
EYE DISCHARGE: 0
TROUBLE SWALLOWING: 0
CHEST TIGHTNESS: 0
SHORTNESS OF BREATH: 0
VOMITING: 0

## 2021-03-02 ASSESSMENT — PATIENT HEALTH QUESTIONNAIRE - PHQ9
SUM OF ALL RESPONSES TO PHQ QUESTIONS 1-9: 2
SUM OF ALL RESPONSES TO PHQ QUESTIONS 1-9: 2
SUM OF ALL RESPONSES TO PHQ9 QUESTIONS 1 & 2: 2
SUM OF ALL RESPONSES TO PHQ QUESTIONS 1-9: 2
2. FEELING DOWN, DEPRESSED OR HOPELESS: 0

## 2021-03-02 NOTE — PATIENT INSTRUCTIONS
Personalized Preventive Plan for Tiera Gallego - 3/2/2021  Medicare offers a range of preventive health benefits. Some of the tests and screenings are paid in full while other may be subject to a deductible, co-insurance, and/or copay. Some of these benefits include a comprehensive review of your medical history including lifestyle, illnesses that may run in your family, and various assessments and screenings as appropriate. After reviewing your medical record and screening and assessments performed today your provider may have ordered immunizations, labs, imaging, and/or referrals for you. A list of these orders (if applicable) as well as your Preventive Care list are included within your After Visit Summary for your review. Other Preventive Recommendations:    · A preventive eye exam performed by an eye specialist is recommended every 1-2 years to screen for glaucoma; cataracts, macular degeneration, and other eye disorders. · A preventive dental visit is recommended every 6 months. · Try to get at least 150 minutes of exercise per week or 10,000 steps per day on a pedometer . · Order or download the FREE \"Exercise & Physical Activity: Your Everyday Guide\" from [employer]. Call 0-156.812.9006 or search [employer] online. · You need 9592-7519 mg of calcium and 4949-2882 IU of vitamin D per day. It is possible to meet your calcium requirement with diet alone, but a vitamin D supplement is usually necessary to meet this goal.  · When exposed to the sun, use a sunscreen that protects against both UVA and UVB radiation with an SPF of 30 or greater. Reapply every 2 to 3 hours or after sweating, drying off with a towel, or swimming. · Always wear a seat belt when traveling in a car. Always wear a helmet when riding a bicycle or motorcycle.

## 2021-03-02 NOTE — PROGRESS NOTES
Medicare Annual Wellness Visit  Name: Pippa Murrell Date: 3/2/2021   MRN: [id number] Sex: Female   Age: 48 y.o. Ethnicity: Non-/Non    : 1970 Race: Mateo Richards is here for Medicare AWV    Screenings for behavioral, psychosocial and functional/safety risks, and cognitive dysfunction are all negative except as indicated below. These results, as well as other patient data from the [address] form, are documented in Flowsheets linked to this Encounter. Allergies   Allergen Reactions    Latex     Ativan [Lorazepam] Other (See Comments)     Panic attack    Seroquel [Quetiapine Fumarate] Other (See Comments)     \"very bad mood\"    Cariprazine     Dexamethasone      Other reaction(s): Free Text    Hydroxyzine     Levofloxacin     Oxycodone-Acetaminophen     Quetiapine     Venlafaxine     Sulfa Antibiotics Rash         Prior to Visit Medications    Medication Sig Taking? Authorizing Provider   nadolol (CORGARD) 20 MG tablet take 1 tablet by mouth once daily  Historical Provider, MD   pregabalin (LYRICA) 50 MG capsule take 1 capsule by mouth at bedtime for 1 week then 1 capsule ever. ..  (REFER TO PRESCRIPTION NOTES). Historical Provider, MD   baclofen (LIORESAL) 10 MG tablet Take 1 tablet by mouth 2 times daily  Chadwick SHANE Persaud DO   eletriptan (RELPAX) 40 MG tablet Take 1 tablet by mouth once as needed (Headache) may repeat in 2 hours if necessary  Chadwick SHANE Persaud DO   rosuvastatin (CRESTOR) 20 MG tablet take 1 tablet by mouth nightly ---90DAYS REQUEST  Chadwick SHANE Persaud DO   desvenlafaxine succinate (PRISTIQ) 50 MG TB24 extended release tablet Take 100 mg by mouth daily Instructed to take am of procedure   Historical Provider, MD   Docusate Calcium (STOOL SOFTENER PO) Take by mouth 2 times daily  Historical Provider, MD   diazepam (VALIUM) 10 MG tablet Take 10 mg by mouth every 8 hours as needed for Anxiety.  Instructed to take am of procedure if in [facility]. The following problems were reviewed today and where indicated follow up appointments were made and/or referrals ordered. Positive Risk Factor Screenings with Interventions:         Substance History:  Social History     Tobacco History     Smoking Status  Current Every Day Smoker Smoking Frequency  1.5 packs/day for 30 years (39 pk yrs) Smoking Tobacco Type  Cigarettes    Smokeless Tobacco Use  Never Used          Alcohol History     Alcohol Use Status  No          Drug Use     Drug Use Status  No          Sexual Activity     Sexually Active  Not Asked               Alcohol Screening:       A score of 8 or more is associated with harmful or hazardous drinking. A score of 13 or more in women, and 15 or more in men, is likely to indicate alcohol dependence. Substance Abuse Interventions:  · Alcohol misuse/dependence:  patient is not ready to change his/her alcohol consumption behavior at this time    General Health and ACP:  General  In general, how would you say your health is?: Fair  In the past 7 days, have you experienced any of the following?  New or Increased Pain, New or Increased Fatigue, Loneliness, Social Isolation, Stress or Anger?: (!) New or Increased Pain  Do you get the social and emotional support that you need?: Yes  Do you have a Living Will?: (!) No  Advance Directives     Power of  Living Will ACP-Advance Directive ACP-Power of     Not on File Filed on 09/23/11 Filed Not on File      General Health Risk Interventions:  · No Living Will: Patient declines ACP discussion/assistance    Health Habits/Nutrition:  Health Habits/Nutrition  Do you exercise for at least 20 minutes 2-3 times per week?: (!) No  Have you lost any weight without trying in the past 3 months?: (!) Yes  Do you eat only one meal per day?: No  Have you seen the dentist within the past year?: Yes  Body mass index: 20.12  Health Habits/Nutrition Interventions:  · Dental exam overdue:  patient encouraged to make appointment with his/her dentist    Hearing/Vision:  No exam data present  Hearing/Vision  Do you or your family notice any trouble with your hearing that hasn't been managed with hearing aids?: No  Do you have difficulty driving, watching TV, or doing any of your daily activities because of your eyesight?: No  Have you had an eye exam within the past year?: (!) No  Hearing/Vision Interventions:  · Hearing concerns:  patient declines any further evaluation/treatment for hearing issues    Safety:  Safety  Do you have working smoke detectors?: Yes  Have all throw rugs been removed or fastened?: (!) No  Do you have non-slip mats or surfaces in all bathtubs/showers?: (!) No  Do all of your stairways have a railing or banister?: (!) No  Are your doorways, halls and stairs free of clutter?: Yes  Do you always fasten your seatbelt when you are in a car?: Yes  Safety Interventions:  · Home safety tips provided    ADL:  ADLs  In the past 7 days, did you need help from others to perform any of the following everyday activities? Eating, dressing, grooming, bathing, toileting, or walking/balance?: None  In the past 7 days, did you need help from others to take care of any of the following? Laundry, housekeeping, banking/finances, shopping, telephone use, food preparation, transportation, or taking medications?: (!) Laundry  ADL Interventions:  · Patient declines any further evaluation/treatment for this issue    Personalized Preventive Plan   Current Health Maintenance Status    There is no immunization history on file for this patient.      Health Maintenance   Topic Date Due    Hepatitis C screen  Never done    Pneumococcal 0-64 years Vaccine (1 of 1 - PPSV23) Never done    HIV screen  Never done    DTaP/Tdap/Td vaccine (1 - Tdap) Never done    Cervical cancer screen  Never done    Annual Wellness Visit (AWV)  Never done    Shingles Vaccine (1 of 2) Never done    Flu vaccine (1) Never done    Lipid screen  06/29/2021    Breast cancer screen  01/18/2023    Colon cancer screen colonoscopy  10/23/2025    Hepatitis A vaccine  Aged Out    Hepatitis B vaccine  Aged Out    Hib vaccine  Aged Out    Meningococcal (ACWY) vaccine  Aged Out     Recommendations for [employer] Due: see orders and patient instructions/AVS.  . Recommended screening schedule for the next 5-10 years is provided to the patient in written form: see Patient Karen Alfaro was seen today for medicare aw. Diagnoses and all orders for this visit:    Routine general medical examination at a health care facility  1 year                 Cardiovascular Disease Risk Counseling: Assessed the patient's risk to develop cardiovascular disease and reviewed main risk factors. Reviewed steps to reduce disease risk including:   · Quitting tobacco use, reducing amount smoked, or not starting the habit  · Making healthy food choices  · Being physically active and gradualy increasing activity levels   · Reduce weight and determine a healthy BMI goal  · Monitor blood pressure and treat if higher than 140/90 mmHg  · Maintain blood total cholesterol levels under 5 mmol/l or 190 mg/dl  · Maintain LDL cholesterol levels under 3.0 mmol/l or 115 mg/dl   · Control blood glucose levels  · Consider taking aspirin (75 mg daily), once blood pressure is controlled   Provided a follow up plan.   Time spent (minutes): 5 min

## 2021-03-02 NOTE — PROGRESS NOTES
3/2/21  Babar Garcia : 1970 Sex: female  Age: 48 y.o. Assessment and Plan:  Brynn Mcmillan was seen today for hyperlipidemia. Diagnoses and all orders for this visit:    Spondylosis of cervical region without myelopathy or radiculopathy  -     baclofen (LIORESAL) 10 MG tablet; Take 1 tablet by mouth 2 times daily    History of migraine  -     eletriptan (RELPAX) 40 MG tablet; Take 1 tablet by mouth once as needed (Headache) may repeat in 2 hours if necessary    Mixed hyperlipidemia  -     rosuvastatin (CRESTOR) 20 MG tablet; take 1 tablet by mouth nightly ---90DAYS REQUEST  -     CBC Auto Differential; Future  -     Comprehensive Metabolic Panel, Fasting; Future  -     Lipid Panel; Future  -     TSH without Reflex; Future  -     Urinalysis; Future    Recurrent major depression in partial remission [facility])  She is managed by psychiatry. She has improved in her mood and outlook on current medications      Return in about 3 months (around 2021). Chief Complaint   Patient presents with    Hyperlipidemia       Presents for recheck visit. She is going to get a cervical epidural done at [facility] pain clinic next week. Diagnosed spinal stenosis and will try a epidural series first before rescue therapy. Headaches have been well controlled recently, she is tolerating the medications without difficulty. Phychiatrist is adjusting her medications and would like some lab work which we will do today. Review of Systems   Constitutional: Negative for appetite change, fatigue and unexpected weight change. HENT: Negative for congestion, ear pain, hearing loss, sinus pain, sore throat and trouble swallowing. Eyes: Negative for photophobia, pain, discharge and redness. Respiratory: Negative for cough, chest tightness and shortness of breath. Cardiovascular: Negative for chest pain, palpitations and leg swelling.    Gastrointestinal: Negative for abdominal pain, blood in stool, diarrhea, nausea and vomiting. Endocrine: Negative. Genitourinary: Negative for dysuria, flank pain, frequency and hematuria. Musculoskeletal: Positive for arthralgias. Negative for back pain, joint swelling and myalgias. Skin: Negative. Allergic/Immunologic: Negative. Neurological: Negative for dizziness, seizures, syncope, weakness, light-headedness, numbness and headaches. Hematological: Negative for adenopathy. Does not bruise/bleed easily. Psychiatric/Behavioral: Negative. Current Outpatient Medications:     nadolol (CORGARD) 20 MG tablet, take 1 tablet by mouth once daily, Disp: , Rfl:     pregabalin (LYRICA) 50 MG capsule, take 1 capsule by mouth at bedtime for 1 week then 1 capsule ever. ..  (REFER TO PRESCRIPTION NOTES). , Disp: , Rfl:     baclofen (LIORESAL) 10 MG tablet, Take 1 tablet by mouth 2 times daily, Disp: 60 tablet, Rfl: 1    eletriptan (RELPAX) 40 MG tablet, Take 1 tablet by mouth once as needed (Headache) may repeat in 2 hours if necessary, Disp: 6 tablet, Rfl: 1    rosuvastatin (CRESTOR) 20 MG tablet, take 1 tablet by mouth nightly ---90DAYS REQUEST, Disp: 90 tablet, Rfl: 1    desvenlafaxine succinate (PRISTIQ) 50 MG TB24 extended release tablet, Take 100 mg by mouth daily Instructed to take am of procedure , Disp: , Rfl:     Docusate Calcium (STOOL SOFTENER PO), Take by mouth 2 times daily, Disp: , Rfl:     diazepam (VALIUM) 10 MG tablet, Take 10 mg by mouth every 8 hours as needed for Anxiety. Instructed to take am of procedure if needed, Disp: , Rfl:     folic acid (FOLVITE) 1 MG tablet, Take 1 mg by mouth daily. , Disp: , Rfl:     lamoTRIgine (LAMICTAL) 200 MG tablet, Take 200 mg by mouth every evening , Disp: , Rfl:   Allergies   Allergen Reactions    Latex     Ativan [Lorazepam] Other (See Comments)     Panic attack    Seroquel [Quetiapine Fumarate] Other (See Comments)     \"very bad mood\"    Cariprazine     Dexamethasone      Other reaction(s): Free Text  Hydroxyzine     Levofloxacin     Oxycodone-Acetaminophen     Quetiapine     Venlafaxine     Sulfa Antibiotics Rash       Past Medical History:   Diagnosis Date    Anxiety     Arthritis     right hip, for injection 1-3-18     Bipolar 1 disorder (HCC)     Chronic back pain     and neck pain     Depression     Migraines     Other hyperlipidemia 7/29/2019    PTSD (post-traumatic stress disorder)     Tobacco use disorder 7/29/2019     Past Surgical History:   Procedure Laterality Date    ARTHROGRAPHY Right 1/3/2019    RIGHT HIP ARTHROGRAM AND STEROID INJECTION performed by Harmony Roa MD at [facility] Right 9/10/2020    RIGHT HIP INJECTION UNDER FLUOROSCOPY performed by Harmony Roa MD at [facility] N/A 2/3/2020    DILATATION AND CURETTAGE HYSTEROSCOPY performed by Brandin Adan MD at [address] [place] [address]  02/20/2019    implant     TONSILLECTOMY      TOOTH EXTRACTION      TUBAL LIGATION      TUMOR REMOVAL      in pancrease      No family history on file.   Social History     Socioeconomic History    Marital status:      Spouse name: Not on file    Number of children: Not on file    Years of education: Not on file    Highest education level: Not on file   Occupational History    Not on file   Social Needs    Financial resource strain: Not on file    Food insecurity     Worry: Not on file     Inability: Not on file    Transportation needs     Medical: Not on file     Non-medical: Not on file   Tobacco Use    Smoking status: Current Every Day Smoker     Packs/day: 1.50     Years: 30.00     Pack years: 45.00     Types: Cigarettes    Smokeless tobacco: Never Used   Substance and Sexual Activity    Alcohol use: No    Drug use: No    Sexual activity: Not on file   Lifestyle    Physical activity     Days per week: Not on file     Minutes per session: Not on file    Stress: Not on file Relationships    Social connections     Talks on phone: Not on file     Gets together: Not on file     Attends Gnosticist service: Not on file     Active member of club or organization: Not on file     Attends meetings of clubs or organizations: Not on file     Relationship status: Not on file    Intimate partner violence     Fear of current or ex partner: Not on file     Emotionally abused: Not on file     Physically abused: Not on file     Forced sexual activity: Not on file   Other Topics Concern    Not on file   Social History Narrative    Not on file       Vitals:    03/02/21 1148   BP: 102/64   Pulse: 81   Resp: 18   Temp: 98 °F (36.7 °C)   SpO2: 97%   Weight: 110 lb (49.9 kg)   Height: 5' 2\" (1.575 m)       Physical Exam  Vitals signs and nursing note reviewed. Constitutional:       Appearance: She is well-developed. HENT:      Head: Atraumatic. Right Ear: External ear normal.      Left Ear: External ear normal.      Nose: Nose normal.      Mouth/Throat:      Pharynx: No oropharyngeal exudate. Eyes:      Conjunctiva/sclera: Conjunctivae normal.      Pupils: Pupils are equal, round, and reactive to light. Neck:      Musculoskeletal: Normal range of motion and neck supple. Thyroid: No thyromegaly. Trachea: No tracheal deviation. Cardiovascular:      Rate and Rhythm: Normal rate and regular rhythm. Heart sounds: No murmur. No friction rub. No gallop. Pulmonary:      Effort: Pulmonary effort is normal. No respiratory distress. Breath sounds: Normal breath sounds. Abdominal:      General: Bowel sounds are normal.      Palpations: Abdomen is soft. Musculoskeletal: Normal range of motion. General: Tenderness present. No deformity. Comments: C spine   Lymphadenopathy:      Cervical: No cervical adenopathy. Skin:     General: Skin is warm and dry. Capillary Refill: Capillary refill takes less than 2 seconds. Findings: No rash.    Neurological: Mental Status: She is alert and oriented to person, place, and time. Sensory: No sensory deficit. Motor: No abnormal muscle tone.       Coordination: Coordination normal.      Deep Tendon Reflexes: Reflexes normal.             Seen By:  Zack Garza DO

## 2021-03-21 ENCOUNTER — APPOINTMENT (OUTPATIENT)
Dept: CT IMAGING | Age: 51
End: 2021-03-21
Payer: MEDICARE

## 2021-03-21 ENCOUNTER — HOSPITAL ENCOUNTER (EMERGENCY)
Age: 51
Discharge: HOME | End: 2021-03-21
Attending: EMERGENCY MEDICINE
Payer: MEDICARE

## 2021-03-21 VITALS
OXYGEN SATURATION: 96 % | RESPIRATION RATE: 16 BRPM | SYSTOLIC BLOOD PRESSURE: 101 MMHG | WEIGHT: 108 LBS | HEART RATE: 70 BPM | DIASTOLIC BLOOD PRESSURE: 56 MMHG | HEIGHT: 62 IN | BODY MASS INDEX: 19.88 KG/M2 | TEMPERATURE: 98.3 F

## 2021-03-21 DIAGNOSIS — M25.551 RIGHT HIP PAIN: Primary | ICD-10-CM

## 2021-03-21 LAB
ALBUMIN SERPL-MCNC: 4.3 G/DL (ref 3.5–5.2)
ALP BLD-CCNC: 65 U/L (ref 35–104)
ALT SERPL-CCNC: 9 U/L (ref 0–32)
AMPHETAMINE SCREEN, URINE: NOT DETECTED
ANION GAP SERPL CALCULATED.3IONS-SCNC: 8 MMOL/L (ref 7–16)
AST SERPL-CCNC: 23 U/L (ref 0–31)
BARBITURATE SCREEN URINE: NOT DETECTED
BASOPHILS ABSOLUTE: 0.04 E9/L (ref 0–0.2)
BASOPHILS RELATIVE PERCENT: 0.3 % (ref 0–2)
BENZODIAZEPINE SCREEN, URINE: NOT DETECTED
BILIRUB SERPL-MCNC: 0.2 MG/DL (ref 0–1.2)
BILIRUBIN URINE: NEGATIVE
BLOOD, URINE: NEGATIVE
BUN BLDV-MCNC: 11 MG/DL (ref 6–20)
C-REACTIVE PROTEIN: 1.8 MG/DL (ref 0–0.4)
CALCIUM SERPL-MCNC: 9.8 MG/DL (ref 8.6–10.2)
CANNABINOID SCREEN URINE: POSITIVE
CHLORIDE BLD-SCNC: 106 MMOL/L (ref 98–107)
CLARITY: CLEAR
CO2: 27 MMOL/L (ref 22–29)
COCAINE METABOLITE SCREEN URINE: NOT DETECTED
COLOR: YELLOW
CREAT SERPL-MCNC: 0.5 MG/DL (ref 0.5–1)
EOSINOPHILS ABSOLUTE: 0.1 E9/L (ref 0.05–0.5)
EOSINOPHILS RELATIVE PERCENT: 0.8 % (ref 0–6)
FENTANYL SCREEN, URINE: NOT DETECTED
GFR AFRICAN AMERICAN: >60
GFR NON-AFRICAN AMERICAN: >60 ML/MIN/1.73
GLUCOSE BLD-MCNC: 107 MG/DL (ref 74–99)
GLUCOSE URINE: NEGATIVE MG/DL
HCT VFR BLD CALC: 41.6 % (ref 34–48)
HEMOGLOBIN: 13.9 G/DL (ref 11.5–15.5)
IMMATURE GRANULOCYTES #: 0.04 E9/L
IMMATURE GRANULOCYTES %: 0.3 % (ref 0–5)
KETONES, URINE: NEGATIVE MG/DL
LACTIC ACID, SEPSIS: 0.9 MMOL/L (ref 0.5–1.9)
LEUKOCYTE ESTERASE, URINE: NEGATIVE
LYMPHOCYTES ABSOLUTE: 2.31 E9/L (ref 1.5–4)
LYMPHOCYTES RELATIVE PERCENT: 19.2 % (ref 20–42)
Lab: ABNORMAL
MCH RBC QN AUTO: 30.7 PG (ref 26–35)
MCHC RBC AUTO-ENTMCNC: 33.4 % (ref 32–34.5)
MCV RBC AUTO: 91.8 FL (ref 80–99.9)
METHADONE SCREEN, URINE: NOT DETECTED
MONOCYTES ABSOLUTE: 0.91 E9/L (ref 0.1–0.95)
MONOCYTES RELATIVE PERCENT: 7.6 % (ref 2–12)
NEUTROPHILS ABSOLUTE: 8.61 E9/L (ref 1.8–7.3)
NEUTROPHILS RELATIVE PERCENT: 71.8 % (ref 43–80)
NITRITE, URINE: NEGATIVE
OPIATE SCREEN URINE: NOT DETECTED
OXYCODONE URINE: NOT DETECTED
PDW BLD-RTO: 13 FL (ref 11.5–15)
PH UA: 6.5 (ref 5–9)
PHENCYCLIDINE SCREEN URINE: NOT DETECTED
PLATELET # BLD: 234 E9/L (ref 130–450)
PMV BLD AUTO: 9.9 FL (ref 7–12)
POTASSIUM SERPL-SCNC: 3.8 MMOL/L (ref 3.5–5)
PROTEIN UA: NEGATIVE MG/DL
RBC # BLD: 4.53 E12/L (ref 3.5–5.5)
SEDIMENTATION RATE, ERYTHROCYTE: 19 MM/HR (ref 0–20)
SODIUM BLD-SCNC: 141 MMOL/L (ref 132–146)
SPECIFIC GRAVITY UA: 1.01 (ref 1–1.03)
TOTAL PROTEIN: 7.1 G/DL (ref 6.4–8.3)
UROBILINOGEN, URINE: 0.2 E.U./DL
WBC # BLD: 12 E9/L (ref 4.5–11.5)

## 2021-03-21 PROCEDURE — 85651 RBC SED RATE NONAUTOMATED: CPT

## 2021-03-21 PROCEDURE — 80307 DRUG TEST PRSMV CHEM ANLYZR: CPT

## 2021-03-21 PROCEDURE — 96375 TX/PRO/DX INJ NEW DRUG ADDON: CPT

## 2021-03-21 PROCEDURE — 87040 BLOOD CULTURE FOR BACTERIA: CPT

## 2021-03-21 PROCEDURE — 6360000002 HC RX W HCPCS: Performed by: FAMILY MEDICINE

## 2021-03-21 PROCEDURE — 6360000004 HC RX CONTRAST MEDICATION: Performed by: RADIOLOGY

## 2021-03-21 PROCEDURE — 2580000003 HC RX 258: Performed by: EMERGENCY MEDICINE

## 2021-03-21 PROCEDURE — 96374 THER/PROPH/DIAG INJ IV PUSH: CPT

## 2021-03-21 PROCEDURE — 81003 URINALYSIS AUTO W/O SCOPE: CPT

## 2021-03-21 PROCEDURE — 80053 COMPREHEN METABOLIC PANEL: CPT

## 2021-03-21 PROCEDURE — 85025 COMPLETE CBC W/AUTO DIFF WBC: CPT

## 2021-03-21 PROCEDURE — 73702 CT LWR EXTREMITY W/O&W/DYE: CPT

## 2021-03-21 PROCEDURE — 83605 ASSAY OF LACTIC ACID: CPT

## 2021-03-21 PROCEDURE — 6360000002 HC RX W HCPCS: Performed by: EMERGENCY MEDICINE

## 2021-03-21 PROCEDURE — 96376 TX/PRO/DX INJ SAME DRUG ADON: CPT

## 2021-03-21 PROCEDURE — 99284 EMERGENCY DEPT VISIT MOD MDM: CPT

## 2021-03-21 PROCEDURE — 6370000000 HC RX 637 (ALT 250 FOR IP): Performed by: FAMILY MEDICINE

## 2021-03-21 PROCEDURE — 86140 C-REACTIVE PROTEIN: CPT

## 2021-03-21 RX ORDER — HYDROCODONE BITARTRATE AND ACETAMINOPHEN 5; 325 MG/1; MG/1
1 TABLET ORAL EVERY 6 HOURS PRN
Qty: 12 TABLET | Refills: 0 | Status: SHIPPED | OUTPATIENT
Start: 2021-03-21 | End: 2021-03-23 | Stop reason: SDUPTHER

## 2021-03-21 RX ORDER — FENTANYL CITRATE 50 UG/ML
50 INJECTION, SOLUTION INTRAMUSCULAR; INTRAVENOUS ONCE
Status: COMPLETED | OUTPATIENT
Start: 2021-03-21 | End: 2021-03-21

## 2021-03-21 RX ORDER — PREDNISONE 20 MG/1
40 TABLET ORAL DAILY
Qty: 10 TABLET | Refills: 0 | Status: SHIPPED | OUTPATIENT
Start: 2021-03-21 | End: 2021-03-26

## 2021-03-21 RX ORDER — KETOROLAC TROMETHAMINE 30 MG/ML
15 INJECTION, SOLUTION INTRAMUSCULAR; INTRAVENOUS ONCE
Status: COMPLETED | OUTPATIENT
Start: 2021-03-21 | End: 2021-03-21

## 2021-03-21 RX ORDER — 0.9 % SODIUM CHLORIDE 0.9 %
500 INTRAVENOUS SOLUTION INTRAVENOUS ONCE
Status: COMPLETED | OUTPATIENT
Start: 2021-03-21 | End: 2021-03-21

## 2021-03-21 RX ORDER — SODIUM CHLORIDE 9 MG/ML
INJECTION, SOLUTION INTRAVENOUS CONTINUOUS
Status: DISCONTINUED | OUTPATIENT
Start: 2021-03-21 | End: 2021-03-21 | Stop reason: HOSPADM

## 2021-03-21 RX ORDER — HYDROCODONE BITARTRATE AND ACETAMINOPHEN 5; 325 MG/1; MG/1
1 TABLET ORAL ONCE
Status: COMPLETED | OUTPATIENT
Start: 2021-03-21 | End: 2021-03-21

## 2021-03-21 RX ADMIN — KETOROLAC TROMETHAMINE 15 MG: 30 INJECTION, SOLUTION INTRAMUSCULAR at 16:18

## 2021-03-21 RX ADMIN — HYDROCODONE BITARTRATE AND ACETAMINOPHEN 1 TABLET: 5; 325 TABLET ORAL at 18:46

## 2021-03-21 RX ADMIN — IOPAMIDOL 75 ML: 755 INJECTION, SOLUTION INTRAVENOUS at 16:31

## 2021-03-21 RX ADMIN — SODIUM CHLORIDE 500 ML: 9 INJECTION, SOLUTION INTRAVENOUS at 15:21

## 2021-03-21 RX ADMIN — SODIUM CHLORIDE: 9 INJECTION, SOLUTION INTRAVENOUS at 15:28

## 2021-03-21 RX ADMIN — FENTANYL CITRATE 50 MCG: 50 INJECTION, SOLUTION INTRAMUSCULAR; INTRAVENOUS at 16:19

## 2021-03-21 RX ADMIN — FENTANYL CITRATE 50 MCG: 50 INJECTION, SOLUTION INTRAMUSCULAR; INTRAVENOUS at 15:26

## 2021-03-21 ASSESSMENT — ENCOUNTER SYMPTOMS
WHEEZING: 0
BACK PAIN: 0
CONSTIPATION: 0
DIARRHEA: 0
NAUSEA: 0
VOMITING: 0
SORE THROAT: 0
RHINORRHEA: 0
SHORTNESS OF BREATH: 0
COUGH: 0
ABDOMINAL PAIN: 1

## 2021-03-21 ASSESSMENT — PAIN SCALES - GENERAL: PAINLEVEL_OUTOF10: 10

## 2021-03-21 ASSESSMENT — PAIN DESCRIPTION - PAIN TYPE
TYPE: ACUTE PAIN
TYPE: ACUTE PAIN

## 2021-03-21 ASSESSMENT — PAIN DESCRIPTION - DESCRIPTORS: DESCRIPTORS: PATIENT UNABLE TO DESCRIBE

## 2021-03-21 ASSESSMENT — PAIN DESCRIPTION - LOCATION: LOCATION: GROIN;LEG

## 2021-03-21 ASSESSMENT — PAIN DESCRIPTION - PROGRESSION: CLINICAL_PROGRESSION: NOT CHANGED

## 2021-03-21 NOTE — ED NOTES
Ambulated patient with no complications.  Notified Dr. Meir Brown of this     Berkley Vitale, BUFFY  03/21/21 [id number]

## 2021-03-21 NOTE — ED PROVIDER NOTES
Vinod Lopez is a 46 y.o. female with a significant PMHx of tobacco use disorder, HLD, and anxiety who presents with right sided groin pain that has been going on for 1 weeks. These symptoms are moderate in severity. Symptoms are made better by nothing. Symptoms are made worse by movements. Associated symptoms include radiation down to right leg. She states that for the past week she has been experience groin pain that radiates down to her right foot. She states that it is sharp in nature and rated 10 out of 10. She states that ibuprofen did not help the pain. She denies any fevers or chills. She denies any troubles with bowel or bladder incontinence. The patient has tried Ibuprofen without getting meaningful relief of symptoms. The patient denies recent trauma, fever, chills, fatigue, HA, dizziness, vision changes, chest pain, palpitations, hx of MI, SOB, cough, wheezing, abdominal pain, N/V/D/C, hematochezia, melena, dysuria and hematuria. The patient is currently taking no blood thinners. Tobacco Hx:   reports that she has been smoking cigarettes. She has a 45.00 pack-year smoking history. She has never used smokeless tobacco.    Alcohol Hx:   reports no history of alcohol use. Illicit Drug Hx:    The history is provided by the patient. Review of Systems   Constitutional: Negative for activity change, appetite change, chills, fatigue and fever. HENT: Negative for congestion, rhinorrhea, sneezing and sore throat. Eyes: Negative for visual disturbance. Respiratory: Negative for cough, shortness of breath and wheezing. Cardiovascular: Negative for chest pain. Gastrointestinal: Positive for abdominal pain (r groin). Negative for constipation, diarrhea, nausea and vomiting. Endocrine: Negative for cold intolerance. Genitourinary: Negative for dysuria. Musculoskeletal: Positive for arthralgias (right leg). Negative for back pain. Skin: Negative for rash.    Neurological: Negative for dizziness, numbness and headaches. Psychiatric/Behavioral: The patient is not nervous/anxious. Physical Exam  Vitals signs reviewed. Constitutional:       Appearance: Normal appearance. She is not ill-appearing. Comments: Appears in pain     HENT:      Head: Normocephalic. Nose: Nose normal.      Mouth/Throat:      Mouth: Mucous membranes are moist.   Eyes:      Pupils: Pupils are equal, round, and reactive to light. Cardiovascular:      Rate and Rhythm: Normal rate and regular rhythm. Heart sounds: Normal heart sounds. No murmur. Pulmonary:      Effort: Pulmonary effort is normal.      Breath sounds: Normal breath sounds. No wheezing. Abdominal:      General: Bowel sounds are normal.      Palpations: Abdomen is soft. Tenderness: There is no abdominal tenderness. Musculoskeletal:      Right lower leg: No edema. Left lower leg: No edema. Comments: Right leg- pain with log roll; unable to perform ROM   Skin:     General: Skin is warm. Capillary Refill: Capillary refill takes less than 2 seconds. Coloration: Skin is not pale. Neurological:      Mental Status: She is alert and oriented to person, place, and time. Psychiatric:         Mood and Affect: Mood normal. Affect is tearful. Behavior: Behavior normal.         Judgment: Judgment normal.        MDM  Patient presents to the ED for right leg pain. Differential diagnoses included but not limited to abscess of hip, inflammation, hip fracture. Workup in the ED revealed WBC 12.0, UDS positive for cannabinoids, and CRP of 1.8. CT of the hip showed small right hip joint effusion. Patient was given Fentanyl, Toradol, and Norco for their symptoms with moderate improvement. Patient continues to be non-toxic on re-evaluation. Patient states that she has tolerated prednisone in the past; this was written on discharge. She was given her first dose of Norco while in the ER; she tolerated that well. Findings were discussed with the patient and reasons to immediately return to the ED were articulated to them. They will follow-up with their PMD and orthopedics. --------------------------------------------- PAST HISTORY ---------------------------------------------  Past Medical History:  has a past medical history of Anxiety, Arthritis, Bipolar 1 disorder ([address] Chronic back pain, Depression, Migraines, Other hyperlipidemia, PTSD (post-traumatic stress disorder), and Tobacco use disorder. Past Surgical History:  has a past surgical history that includes tumor removal; Tonsillectomy; Tooth Extraction; Tubal ligation; Colonoscopy; arthrography (Right, 1/3/2019); Small intestine surgery (02/20/2019); Dilation and curettage of uterus (N/A, 2/3/2020); and arthrography (Right, 9/10/2020). Social History:  reports that she has been smoking cigarettes. She has a 45.00 pack-year smoking history. She has never used smokeless tobacco. She reports that she does not drink alcohol or use drugs. Family History: family history is not on file. The patients home medications have been reviewed.     Allergies: Latex, Ativan [lorazepam], Seroquel [quetiapine fumarate], Cariprazine, Dexamethasone, Hydroxyzine, Levofloxacin, Oxycodone-acetaminophen, Quetiapine, Venlafaxine, and Sulfa antibiotics    -------------------------------------------------- RESULTS -------------------------------------------------  Labs:  Results for orders placed or performed during the hospital encounter of 03/21/21   CBC Auto Differential   Result Value Ref Range    WBC 12.0 (H) 4.5 - 11.5 E9/L    RBC 4.53 3.50 - 5.50 E12/L    Hemoglobin 13.9 11.5 - 15.5 g/dL    Hematocrit 41.6 34.0 - 48.0 %    MCV 91.8 80.0 - 99.9 fL    MCH 30.7 26.0 - 35.0 pg    MCHC 33.4 32.0 - 34.5 %    RDW 13.0 11.5 - 15.0 fL    Platelets 070 637 - 097 E9/L    MPV 9.9 7.0 - 12.0 fL    Neutrophils % 71.8 43.0 - 80.0 %    Immature Granulocytes % 0.3 0.0 - 5.0 % Lymphocytes % 19.2 (L) 20.0 - 42.0 %    Monocytes % 7.6 2.0 - 12.0 %    Eosinophils % 0.8 0.0 - 6.0 %    Basophils % 0.3 0.0 - 2.0 %    Neutrophils Absolute 8.61 (H) 1.80 - 7.30 E9/L    Immature Granulocytes # 0.04 E9/L    Lymphocytes Absolute 2.31 1.50 - 4.00 E9/L    Monocytes Absolute 0.91 0.10 - 0.95 E9/L    Eosinophils Absolute 0.10 0.05 - 0.50 E9/L    Basophils Absolute 0.04 0.00 - 0.20 E9/L   Comprehensive Metabolic Panel   Result Value Ref Range    Sodium 141 132 - 146 mmol/L    Potassium 3.8 3.5 - 5.0 mmol/L    Chloride 106 98 - 107 mmol/L    CO2 27 22 - 29 mmol/L    Anion Gap 8 7 - 16 mmol/L    Glucose 107 (H) 74 - 99 mg/dL    BUN 11 6 - 20 mg/dL    CREATININE 0.5 0.5 - 1.0 mg/dL    GFR Non-African American >60 >=60 mL/min/1.73    GFR African American >60     Calcium 9.8 8.6 - 10.2 mg/dL    Total Protein 7.1 6.4 - 8.3 g/dL    Albumin 4.3 3.5 - 5.2 g/dL    Total Bilirubin 0.2 0.0 - 1.2 mg/dL    Alkaline Phosphatase 65 35 - 104 U/L    ALT 9 0 - 32 U/L    AST 23 0 - 31 U/L   Urinalysis   Result Value Ref Range    Color, UA Yellow Straw/Yellow    Clarity, UA Clear Clear    Glucose, Ur Negative Negative mg/dL    Bilirubin Urine Negative Negative    Ketones, Urine Negative Negative mg/dL    Specific Gravity, UA 1.010 1.005 - 1.030    Blood, Urine Negative Negative    pH, UA 6.5 5.0 - 9.0    Protein, UA Negative Negative mg/dL    Urobilinogen, Urine 0.2 <2.0 E.U./dL    Nitrite, Urine Negative Negative    Leukocyte Esterase, Urine Negative Negative   Sedimentation Rate   Result Value Ref Range    Sed Rate 19 0 - 20 mm/Hr   C-Reactive Protein   Result Value Ref Range    CRP 1.8 (H) 0.0 - 0.4 mg/dL   Lactate, Sepsis   Result Value Ref Range    Lactic Acid, Sepsis 0.9 0.5 - 1.9 mmol/L   Urine Drug Screen   Result Value Ref Range    Amphetamine Screen, Urine NOT DETECTED Negative <1000 ng/mL    Barbiturate Screen, Ur NOT DETECTED Negative < 200 ng/mL    Benzodiazepine Screen, Urine NOT DETECTED Negative < 200 ng/mL Cannabinoid Scrn, Ur POSITIVE (A) Negative < 50ng/mL    Cocaine Metabolite Screen, Urine NOT DETECTED Negative < 300 ng/mL    Opiate Scrn, Ur NOT DETECTED Negative < 300ng/mL    PCP Screen, Urine NOT DETECTED Negative < 25 ng/mL    Methadone Screen, Urine NOT DETECTED Negative <300 ng/mL    Oxycodone Urine NOT DETECTED Negative <100 ng/mL    FENTANYL SCREEN, URINE NOT DETECTED Negative <1 ng/mL    Drug Screen Comment: see below        Radiology:  CT HIP RIGHT W WO CONTRAST   Preliminary Result   No evidence of a drainable abscess in the soft tissues of the right hip. Moderate right hip joint osteoarthrosis. No acute bony abnormality is seen. Suspected small right hip joint effusion. Probable uterine fibroid, similar in appearance to the prior CT. This can be   further assessed with ultrasound as clinically indicated. ------------------------- NURSING NOTES AND VITALS REVIEWED ---------------------------  Date / Time Roomed:  3/21/2021  2:06 PM  ED Bed Assignment:  07/07    The nursing notes within the ED encounter and vital signs as below have been reviewed. BP (!) 101/56   Pulse 70   Temp 98.3 °F (36.8 °C) (Tympanic)   Resp 16   Ht 5' 2\" (1.575 m)   Wt 108 lb (49 kg)   LMP 12/28/2018 (LMP Unknown)   SpO2 96%   BMI 19.75 kg/m²   Oxygen Saturation Interpretation: Normal      ------------------------------------------ PROGRESS NOTES ------------------------------------------  7:11 PM EDT  I have spoken with the patient and  and discussed todays results, in addition to providing specific details for the plan of care and counseling regarding the diagnosis and prognosis. Their questions are answered at this time and they are agreeable with the plan. I discussed at length with them reasons for immediate return here for re evaluation.  They will followup with their primary care physician by calling their office on Monday.      --------------------------------- ADDITIONAL PROVIDER NOTES ---------------------------------  At this time the patient is without objective evidence of an acute process requiring hospitalization or inpatient management. They have remained hemodynamically stable throughout their entire ED visit and are stable for discharge with outpatient follow-up. The plan has been discussed in detail and they are aware of the specific conditions for emergent return, as well as the importance of follow-up. New Prescriptions    HYDROCODONE-ACETAMINOPHEN (NORCO) 5-325 MG PER TABLET    Take 1 tablet by mouth every 6 hours as needed for Pain for up to 3 days. Intended supply: 3 days. Take lowest dose possible to manage pain    PREDNISONE (DELTASONE) 20 MG TABLET    Take 2 tablets by mouth daily for 5 days       Diagnosis:  1. Right hip pain        Disposition:  Patient's disposition: Discharge to home  Patient's condition is stable.        Edward Bradley MD  Resident  03/21/21 [id number]

## 2021-03-23 ENCOUNTER — OFFICE VISIT (OUTPATIENT)
Dept: FAMILY MEDICINE CLINIC | Age: 51
End: 2021-03-23
Payer: MEDICARE

## 2021-03-23 VITALS
RESPIRATION RATE: 18 BRPM | SYSTOLIC BLOOD PRESSURE: 106 MMHG | HEART RATE: 100 BPM | OXYGEN SATURATION: 99 % | DIASTOLIC BLOOD PRESSURE: 68 MMHG | WEIGHT: 108 LBS | TEMPERATURE: 97.4 F | BODY MASS INDEX: 19.88 KG/M2 | HEIGHT: 62 IN

## 2021-03-23 DIAGNOSIS — E78.2 MIXED HYPERLIPIDEMIA: Chronic | ICD-10-CM

## 2021-03-23 DIAGNOSIS — Z86.69 HISTORY OF MIGRAINE: Chronic | ICD-10-CM

## 2021-03-23 DIAGNOSIS — M25.451 RIGHT HIP JOINT EFFUSION: Primary | ICD-10-CM

## 2021-03-23 PROCEDURE — G8484 FLU IMMUNIZE NO ADMIN: HCPCS | Performed by: FAMILY MEDICINE

## 2021-03-23 PROCEDURE — 4004F PT TOBACCO SCREEN RCVD TLK: CPT | Performed by: FAMILY MEDICINE

## 2021-03-23 PROCEDURE — G8427 DOCREV CUR MEDS BY ELIG CLIN: HCPCS | Performed by: FAMILY MEDICINE

## 2021-03-23 PROCEDURE — G8420 CALC BMI NORM PARAMETERS: HCPCS | Performed by: FAMILY MEDICINE

## 2021-03-23 PROCEDURE — 3017F COLORECTAL CA SCREEN DOC REV: CPT | Performed by: FAMILY MEDICINE

## 2021-03-23 PROCEDURE — 99214 OFFICE O/P EST MOD 30 MIN: CPT | Performed by: FAMILY MEDICINE

## 2021-03-23 RX ORDER — ONDANSETRON 4 MG/1
4 TABLET, ORALLY DISINTEGRATING ORAL 3 TIMES DAILY PRN
Qty: 21 TABLET | Refills: 0 | Status: SHIPPED
Start: 2021-03-23 | End: 2021-06-18

## 2021-03-23 RX ORDER — HYDROCODONE BITARTRATE AND ACETAMINOPHEN 5; 325 MG/1; MG/1
1 TABLET ORAL EVERY 6 HOURS PRN
Qty: 12 TABLET | Refills: 0 | Status: SHIPPED | OUTPATIENT
Start: 2021-03-23 | End: 2021-03-26

## 2021-03-23 ASSESSMENT — ENCOUNTER SYMPTOMS
TROUBLE SWALLOWING: 0
BACK PAIN: 0
COUGH: 0
PHOTOPHOBIA: 0
SORE THROAT: 0
SINUS PAIN: 0
CHEST TIGHTNESS: 0
EYE DISCHARGE: 0
ALLERGIC/IMMUNOLOGIC NEGATIVE: 1
DIARRHEA: 0
ABDOMINAL PAIN: 0
SHORTNESS OF BREATH: 0
NAUSEA: 0
BLOOD IN STOOL: 0
EYE PAIN: 0
EYE REDNESS: 0
VOMITING: 0

## 2021-03-23 NOTE — PROGRESS NOTES
3/23/21  Lula Mei : 1970 Sex: female  Age: 46 y.o. Assessment and Plan:  Shannan Kay was seen today for follow-up from hospital and hip pain. Diagnoses and all orders for this visit:    Right hip joint effusion  -     HYDROcodone-acetaminophen (NORCO) 5-325 MG per tablet; Take 1 tablet by mouth every 6 hours as needed for Pain for up to 3 days. Intended supply: 3 days. Take lowest dose possible to manage pain    History of migraine  -     ondansetron (ZOFRAN-ODT) 4 MG disintegrating tablet; Take 1 tablet by mouth 3 times daily as needed for Nausea or Vomiting    Mixed hyperlipidemia    Discussed with orthopedics who is here today. Appointments been moved up to tomorrow  in [facility] be seen. Short temporary prescription for another 3 days of Norco was given for her to get her through the orthopedic appointment tomorrow. Further recommendations and management will be after orthopedic evaluation. Return in about 2 weeks (around 2021). Chief Complaint   Patient presents with    Follow-Up from Hospital     patient in ER 3/21/21    Hip Pain     right hip has fluid around it        Returns for follow-up visit. She was seen in the emergency room on  for right hip pain. Is been going on for a week prior gotten to the point where she was unable to bear weight anymore on that joint. Work-up was extensive and included lab and scans showed only fusion of the right hip with no inflammation. Lab results showed a mild elevation of the C-reactive protein white count of 12,000. She was placed on steroids and Norco with improvement in her pain, and an orthopedic referral was made for 2 weeks. She has significant degree of discomfort in the groin area. She was able to ambulate some difficulty. He denies any redness or streaking of the leg, fever, chills, malaise. ER charts, x-rays, lab was reviewed by me.       Review of Systems   Constitutional: Negative for appetite change, fatigue and unexpected weight change. HENT: Negative for congestion, ear pain, hearing loss, sinus pain, sore throat and trouble swallowing. Eyes: Negative for photophobia, pain, discharge and redness. Respiratory: Negative for cough, chest tightness and shortness of breath. Cardiovascular: Negative for chest pain, palpitations and leg swelling. Gastrointestinal: Negative for abdominal pain, blood in stool, diarrhea, nausea and vomiting. Endocrine: Negative. Genitourinary: Negative for dysuria, flank pain, frequency and hematuria. Musculoskeletal: Positive for arthralgias, gait problem and myalgias. Negative for back pain and joint swelling. Right hip   Skin: Negative. Allergic/Immunologic: Negative. Neurological: Negative for dizziness, seizures, syncope, weakness, light-headedness, numbness and headaches. Hematological: Negative for adenopathy. Does not bruise/bleed easily. Psychiatric/Behavioral: Negative. Current Outpatient Medications:     ondansetron (ZOFRAN-ODT) 4 MG disintegrating tablet, Take 1 tablet by mouth 3 times daily as needed for Nausea or Vomiting, Disp: 21 tablet, Rfl: 0    HYDROcodone-acetaminophen (NORCO) 5-325 MG per tablet, Take 1 tablet by mouth every 6 hours as needed for Pain for up to 3 days. Intended supply: 3 days. Take lowest dose possible to manage pain, Disp: 12 tablet, Rfl: 0    predniSONE (DELTASONE) 20 MG tablet, Take 2 tablets by mouth daily for 5 days, Disp: 10 tablet, Rfl: 0    nadolol (CORGARD) 20 MG tablet, take 1 tablet by mouth once daily, Disp: , Rfl:     pregabalin (LYRICA) 50 MG capsule, Take 150 mg by mouth 2 times daily.  , Disp: , Rfl:     baclofen (LIORESAL) 10 MG tablet, Take 1 tablet by mouth 2 times daily, Disp: 60 tablet, Rfl: 1    eletriptan (RELPAX) 40 MG tablet, Take 1 tablet by mouth once as needed (Headache) may repeat in 2 hours if necessary, Disp: 6 tablet, Rfl: 1    rosuvastatin (CRESTOR) 20 MG tablet, take 1 tablet by mouth nightly ---90DAYS REQUEST, Disp: 90 tablet, Rfl: 1    desvenlafaxine succinate (PRISTIQ) 50 MG TB24 extended release tablet, Take 100 mg by mouth daily Instructed to take am of procedure , Disp: , Rfl:     Docusate Calcium (STOOL SOFTENER PO), Take by mouth 2 times daily, Disp: , Rfl:     diazepam (VALIUM) 10 MG tablet, Take 10 mg by mouth every 8 hours as needed for Anxiety. Instructed to take am of procedure if needed, Disp: , Rfl:     folic acid (FOLVITE) 1 MG tablet, Take 1 mg by mouth daily. , Disp: , Rfl:     lamoTRIgine (LAMICTAL) 200 MG tablet, Take 200 mg by mouth every evening , Disp: , Rfl:   Allergies   Allergen Reactions    Latex     Ativan [Lorazepam] Other (See Comments)     Panic attack    Seroquel [Quetiapine Fumarate] Other (See Comments)     \"very bad mood\"    Cariprazine     Dexamethasone      Other reaction(s): Free Text    Hydroxyzine     Levofloxacin     Oxycodone-Acetaminophen     Quetiapine     Venlafaxine     Sulfa Antibiotics Rash       Past Medical History:   Diagnosis Date    Anxiety     Arthritis     right hip, for injection 1-3-18     Bipolar 1 disorder ([facility] [address]     Chronic back pain     and neck pain     Depression     Migraines     Other hyperlipidemia 7/29/2019    PTSD (post-traumatic stress disorder)     Tobacco use disorder 7/29/2019     Past Surgical History:   Procedure Laterality Date    ARTHROGRAPHY Right 1/3/2019    RIGHT HIP ARTHROGRAM AND STEROID INJECTION performed by Janna Wu MD at [address] [facility] Right 9/10/2020    RIGHT HIP INJECTION UNDER FLUOROSCOPY performed by Janna Wu MD at [address] [facility] N/A 2/3/2020    DILATATION AND CURETTAGE HYSTEROSCOPY performed by Kenya Yoder MD at [address] [facility]  02/20/2019    implant     TONSILLECTOMY      TOOTH EXTRACTION      TUBAL LIGATION      TUMOR REMOVAL      in pancrease No family history on file. Social History     Socioeconomic History    Marital status:      Spouse name: Not on file    Number of children: Not on file    Years of education: Not on file    Highest education level: Not on file   Occupational History    Not on file   Social Needs    Financial resource strain: Not on file    Food insecurity     Worry: Not on file     Inability: Not on file    Transportation needs     Medical: Not on file     Non-medical: Not on file   Tobacco Use    Smoking status: Current Every Day Smoker     Packs/day: 1.50     Years: 30.00     Pack years: 45.00     Types: Cigarettes    Smokeless tobacco: Never Used   Substance and Sexual Activity    Alcohol use: No    Drug use: No    Sexual activity: Not on file   Lifestyle    Physical activity     Days per week: Not on file     Minutes per session: Not on file    Stress: Not on file   Relationships    Social connections     Talks on phone: Not on file     Gets together: Not on file     Attends Holiness service: Not on file     Active member of club or organization: Not on file     Attends meetings of clubs or organizations: Not on file     Relationship status: Not on file    Intimate partner violence     Fear of current or ex partner: Not on file     Emotionally abused: Not on file     Physically abused: Not on file     Forced sexual activity: Not on file   Other Topics Concern    Not on file   Social History Narrative    Not on file       Vitals:    03/23/21 0958   BP: 106/68   Pulse: 100   Resp: 18   Temp: 97.4 °F (36.3 °C)   SpO2: 99%   Weight: 108 lb (49 kg)   Height: 5' 2\" (1.575 m)       Physical Exam  Vitals signs and nursing note reviewed. Constitutional:       Appearance: She is well-developed. HENT:      Head: Atraumatic. Right Ear: External ear normal.      Left Ear: External ear normal.      Nose: Nose normal.      Mouth/Throat:      Pharynx: No oropharyngeal exudate.    Eyes: Conjunctiva/sclera: Conjunctivae normal.      Pupils: Pupils are equal, round, and reactive to light. Neck:      Musculoskeletal: Normal range of motion and neck supple. Thyroid: No thyromegaly. Trachea: No tracheal deviation. Cardiovascular:      Rate and Rhythm: Normal rate and regular rhythm. Heart sounds: No murmur. No friction rub. No gallop. Pulmonary:      Effort: Pulmonary effort is normal. No respiratory distress. Breath sounds: Normal breath sounds. Abdominal:      General: Bowel sounds are normal.      Palpations: Abdomen is soft. Musculoskeletal: Normal range of motion. General: Tenderness present. No deformity. Comments: Pain, stiffness, decreased range of motion, right hip. Redness, streaking induration noted. Good color, pulses, sensation, strength of the right leg. Lymphadenopathy:      Cervical: No cervical adenopathy. Skin:     General: Skin is warm and dry. Capillary Refill: Capillary refill takes less than 2 seconds. Findings: No rash. Neurological:      Mental Status: She is alert and oriented to person, place, and time. Sensory: No sensory deficit. Motor: No abnormal muscle tone.       Coordination: Coordination normal.      Deep Tendon Reflexes: Reflexes normal.             Seen By:  Sharron Ramesh DO

## 2021-03-24 ENCOUNTER — OFFICE VISIT (OUTPATIENT)
Dept: ORTHOPEDIC SURGERY | Age: 51
End: 2021-03-24
Payer: MEDICARE

## 2021-03-24 VITALS — BODY MASS INDEX: 19.88 KG/M2 | WEIGHT: 108 LBS | HEIGHT: 62 IN

## 2021-03-24 DIAGNOSIS — M25.551 RIGHT HIP PAIN: Primary | ICD-10-CM

## 2021-03-24 PROCEDURE — 4004F PT TOBACCO SCREEN RCVD TLK: CPT | Performed by: NURSE PRACTITIONER

## 2021-03-24 PROCEDURE — 99203 OFFICE O/P NEW LOW 30 MIN: CPT | Performed by: NURSE PRACTITIONER

## 2021-03-24 PROCEDURE — G8420 CALC BMI NORM PARAMETERS: HCPCS | Performed by: NURSE PRACTITIONER

## 2021-03-24 PROCEDURE — G8427 DOCREV CUR MEDS BY ELIG CLIN: HCPCS | Performed by: NURSE PRACTITIONER

## 2021-03-24 PROCEDURE — 3017F COLORECTAL CA SCREEN DOC REV: CPT | Performed by: NURSE PRACTITIONER

## 2021-03-24 PROCEDURE — G8484 FLU IMMUNIZE NO ADMIN: HCPCS | Performed by: NURSE PRACTITIONER

## 2021-03-24 RX ORDER — UBROGEPANT 50 MG/1
TABLET ORAL
COMMUNITY
Start: 2021-03-23 | End: 2021-07-15

## 2021-03-24 NOTE — PROGRESS NOTES
Follow Up Visit     Myah Rizo returns today for follow up visit regarding Bilateral hip osteoarthritis. Treatment thus far has included right hip fluoro injection on 9/10/2021. She reports symptoms are improved but states that worse recently, she has gone to the ED (3/21/2021) and PCP, Dr Mikal Archer (3/23/2021) which they have given her Noco to help with the pain . Physical Exam:     Height: 5' 2\" (1.575 m), Weight: 108 lb (49 kg), BP: 106/68    General: Alert and oriented x3, no acute distress  Cardiovascular/pulmonary: No labored breathing, peripheral perfusion intact  Musculoskeletal:    Right hip exam range of motion intact reproducible pain to the groin with internal/external rotation, Lorene Aleks and FADIR exams are positive for pain. Mild tenderness over greater trochanter. No swelling or deformity visualized on inspection. No tenderness over the SI joint today. No weakness on exam.    Controlled Substances Monitoring:      Imaging:  No new imaging obtained today. Previous imaging of the right hip shows degenerative changes consistent with right hip osteoarthritis. Assessment: Right hip osteoarthritis      Plan: Today we discussed her right hip. Patient has not followed up following her right hip injection under fluoroscopy last September. Patient reports that the injectionn was beneficial and she was having minimal pain up until about 1 week ago. Patient states gradual return of symptoms. She denies any new recent injury. On physical exam today she has reproducible pain to the right hip joint with most range of motion. Mild stiffness present with range of motion, however a full range of motion was intact. Patient does have some overlap of sciatica symptoms stemming from her lower back. Patient does have a history of degenerative disc disease and a herniated disc. She will follow-up with physician who is following her low back pain.   She will call to schedule a right hip injection under fluoroscopy in the near future.       Jim Phipps, [address] [facility]  Orthopedic Surgery   03/24/21  12:06 PM

## 2021-03-26 LAB
BLOOD CULTURE, ROUTINE: NORMAL
CULTURE, BLOOD 2: NORMAL

## 2021-03-30 SDOH — HEALTH STABILITY: MENTAL HEALTH: HOW OFTEN DO YOU HAVE A DRINK CONTAINING ALCOHOL?: NEVER

## 2021-03-30 NOTE — PROGRESS NOTES
would greatly appreciate your comments    [] Parent/guardian of a minor must accompany their child and remain on the premises  the entire time they are under our care     [] Pediatric patients may bring favorite toy, blanket or comfort item with them    [] A caregiver or family member must remain with the patient during their stay if they are mentally handicapped, have dementia, disoriented or unable to use a call light or would be a safety concern if left unattended    [x] Please notify surgeon if you develop any illness between now and time of surgery (cold, cough, sore throat, fever, nausea, vomiting) or any signs of infections  including skin, wounds, and dental.    [] Other instructions    EDUCATIONAL MATERIALS PROVIDED:    [] PAT Preoperative Education Packet/Booklet     [] Medication List    [] Fluoroscopy Information Pamphlet    [] Transfusion bracelet applied with instructions    [] Joint replacement video reviewed    [] Shower with antibacterial soap and use CHG wipes provided the evening before surgery as instructed        Have you been tested for COVID  Yes           Have you been told you were positive for COVID No  Have you had any known exposure to someone that is positive for COVID No  Do you have a cough                   No              Do you have shortness of breath No                 Do you have a sore throat            No                Are you having chills                    No                Are you having muscle aches. No                    Please come to the hospital wearing a mask and have your significant other wear a mask as well. Both of you should check your temperature before leaving to come here,  if it is 100 or higher please call [phone] for instruction.

## 2021-04-01 ENCOUNTER — HOSPITAL ENCOUNTER (OUTPATIENT)
Age: 51
Discharge: HOME | End: 2021-04-03
Payer: MEDICARE

## 2021-04-01 DIAGNOSIS — Z01.818 PREOP TESTING: ICD-10-CM

## 2021-04-01 PROCEDURE — U0005 INFEC AGEN DETEC AMPLI PROBE: HCPCS

## 2021-04-01 PROCEDURE — U0003 INFECTIOUS AGENT DETECTION BY NUCLEIC ACID (DNA OR RNA); SEVERE ACUTE RESPIRATORY SYNDROME CORONAVIRUS 2 (SARS-COV-2) (CORONAVIRUS DISEASE [COVID-19]), AMPLIFIED PROBE TECHNIQUE, MAKING USE OF HIGH THROUGHPUT TECHNOLOGIES AS DESCRIBED BY CMS-2020-01-R: HCPCS

## 2021-04-02 LAB
SARS-COV-2: NOT DETECTED
SOURCE: NORMAL

## 2021-04-02 RX ORDER — SODIUM CHLORIDE 0.9 % (FLUSH) 0.9 %
10 SYRINGE (ML) INJECTION EVERY 12 HOURS SCHEDULED
Status: CANCELLED | OUTPATIENT
Start: 2021-04-02

## 2021-04-02 RX ORDER — SODIUM CHLORIDE 0.9 % (FLUSH) 0.9 %
10 SYRINGE (ML) INJECTION PRN
Status: CANCELLED | OUTPATIENT
Start: 2021-04-02

## 2021-04-06 DIAGNOSIS — M47.812 SPONDYLOSIS OF CERVICAL REGION WITHOUT MYELOPATHY OR RADICULOPATHY: ICD-10-CM

## 2021-04-06 RX ORDER — BACLOFEN 10 MG/1
10 TABLET ORAL 3 TIMES DAILY
Qty: 90 TABLET | Refills: 2 | Status: SHIPPED
Start: 2021-04-06 | End: 2021-06-17 | Stop reason: SDUPTHER

## 2021-04-06 NOTE — TELEPHONE ENCOUNTER
Last Appointment:  3/23/2021  Future Appointments   Date Time Provider [place]row Ji   4/9/2021 10:45 AM [place] DO WILL Montilla [facility]   5/11/2021 10:50 AM Alfonzo Roach MD [facility]      Patient calling baclofen is working well. Ok to increase to tid ?  If so will need new rx

## 2021-04-08 ENCOUNTER — HOSPITAL ENCOUNTER (OUTPATIENT)
Age: 51
Discharge: HOME | End: 2021-04-10
Payer: MEDICARE

## 2021-04-08 DIAGNOSIS — Z01.818 PREOP TESTING: ICD-10-CM

## 2021-04-08 PROCEDURE — U0005 INFEC AGEN DETEC AMPLI PROBE: HCPCS

## 2021-04-08 PROCEDURE — U0003 INFECTIOUS AGENT DETECTION BY NUCLEIC ACID (DNA OR RNA); SEVERE ACUTE RESPIRATORY SYNDROME CORONAVIRUS 2 (SARS-COV-2) (CORONAVIRUS DISEASE [COVID-19]), AMPLIFIED PROBE TECHNIQUE, MAKING USE OF HIGH THROUGHPUT TECHNOLOGIES AS DESCRIBED BY CMS-2020-01-R: HCPCS

## 2021-04-09 LAB
SARS-COV-2: NOT DETECTED
SOURCE: NORMAL

## 2021-04-12 RX ORDER — GABAPENTIN 300 MG/1
300 CAPSULE ORAL 3 TIMES DAILY
COMMUNITY
End: 2021-05-11

## 2021-04-12 RX ORDER — DULOXETIN HYDROCHLORIDE 20 MG/1
20 CAPSULE, DELAYED RELEASE ORAL DAILY
COMMUNITY
End: 2021-07-15

## 2021-04-12 NOTE — PROGRESS NOTES
Have you been tested for COVID  Yes           Have you been told you were positive for COVID No  Have you had any known exposure to someone that is positive for COVID No  Do you have a cough                   No              Do you have shortness of breath No                 Do you have a sore throat            No                Are you having chills                    No                Are you having muscle aches. No                    Please come to the hospital wearing a mask and have your significant other wear a mask as well. Both of you should check your temperature before leaving to come here,  if it is 100 or higher please call [phone] for instruction. [facility] PRE-ADMISSION TESTING INSTRUCTIONS      ARRIVAL INSTRUCTIONS:  [x] Parking the day of Surgery is located in the Main Entrance lot.   Upon entering the door, someone will be there to greet you    [x] Bring photo ID and insurance card    [x] Please be sure to arrange for responsible adult to provide transportation to and from the hospital    [x] Please arrange for responsible adult to be with you for the 24 hour period post procedure due to having anesthesia      GENERAL INSTRUCTIONS:    [x] Nothing by mouth after midnight, including gum, candy, mints or water    [x] You may brush your teeth, but do not swallow any water    [x] Take medications as instructed with 1-2 oz of water    [x] Stop herbal supplements and vitamins 5 days prior to procedure    [x] Shower or bath with soap, lather and rinse well, AM of Surgery, no lotion, powders or creams to surgical site    [x] No tobacco products within 24 hours of surgery     [x] Jewelry, body piercing's, eyeglasses, contact lenses and dentures are not permitted into surgery (bring cases)      [x] Please do not wear any nail polish, make up or hair products on the day of surgery    [x] You can expect a call the business day prior to procedure to notify you if your arrival time changes    [x] If you receive a survey after surgery we would greatly appreciate your comments

## 2021-04-13 ENCOUNTER — ANESTHESIA (OUTPATIENT)
Dept: OPERATING ROOM | Age: 51
End: 2021-04-13
Payer: MEDICARE

## 2021-04-13 ENCOUNTER — ANESTHESIA EVENT (OUTPATIENT)
Dept: OPERATING ROOM | Age: 51
End: 2021-04-13
Payer: MEDICARE

## 2021-04-13 ENCOUNTER — HOSPITAL ENCOUNTER (OUTPATIENT)
Dept: GENERAL RADIOLOGY | Age: 51
Setting detail: OUTPATIENT SURGERY
Discharge: HOME | End: 2021-04-15
Attending: ORTHOPAEDIC SURGERY
Payer: MEDICARE

## 2021-04-13 ENCOUNTER — HOSPITAL ENCOUNTER (OUTPATIENT)
Age: 51
Setting detail: OUTPATIENT SURGERY
Discharge: HOME | End: 2021-04-13
Attending: ORTHOPAEDIC SURGERY | Admitting: ORTHOPAEDIC SURGERY
Payer: MEDICARE

## 2021-04-13 VITALS
HEART RATE: 64 BPM | SYSTOLIC BLOOD PRESSURE: 104 MMHG | RESPIRATION RATE: 16 BRPM | WEIGHT: 108 LBS | OXYGEN SATURATION: 98 % | DIASTOLIC BLOOD PRESSURE: 62 MMHG | TEMPERATURE: 98.5 F | BODY MASS INDEX: 19.88 KG/M2 | HEIGHT: 62 IN

## 2021-04-13 VITALS — OXYGEN SATURATION: 96 % | DIASTOLIC BLOOD PRESSURE: 47 MMHG | SYSTOLIC BLOOD PRESSURE: 81 MMHG

## 2021-04-13 DIAGNOSIS — R52 PAIN: ICD-10-CM

## 2021-04-13 DIAGNOSIS — Z01.818 PREOP TESTING: Primary | ICD-10-CM

## 2021-04-13 PROCEDURE — 3600000002 HC SURGERY LEVEL 2 BASE: Performed by: ORTHOPAEDIC SURGERY

## 2021-04-13 PROCEDURE — 2580000003 HC RX 258: Performed by: NURSE ANESTHETIST, CERTIFIED REGISTERED

## 2021-04-13 PROCEDURE — 3600000012 HC SURGERY LEVEL 2 ADDTL 15MIN: Performed by: ORTHOPAEDIC SURGERY

## 2021-04-13 PROCEDURE — 20610 DRAIN/INJ JOINT/BURSA W/O US: CPT | Performed by: ORTHOPAEDIC SURGERY

## 2021-04-13 PROCEDURE — 7100000011 HC PHASE II RECOVERY - ADDTL 15 MIN: Performed by: ORTHOPAEDIC SURGERY

## 2021-04-13 PROCEDURE — 3700000000 HC ANESTHESIA ATTENDED CARE: Performed by: ORTHOPAEDIC SURGERY

## 2021-04-13 PROCEDURE — 2709999900 HC NON-CHARGEABLE SUPPLY: Performed by: ORTHOPAEDIC SURGERY

## 2021-04-13 PROCEDURE — 6360000004 HC RX CONTRAST MEDICATION: Performed by: ORTHOPAEDIC SURGERY

## 2021-04-13 PROCEDURE — 77002 NEEDLE LOCALIZATION BY XRAY: CPT | Performed by: ORTHOPAEDIC SURGERY

## 2021-04-13 PROCEDURE — 7100000010 HC PHASE II RECOVERY - FIRST 15 MIN: Performed by: ORTHOPAEDIC SURGERY

## 2021-04-13 PROCEDURE — 6360000002 HC RX W HCPCS: Performed by: ORTHOPAEDIC SURGERY

## 2021-04-13 PROCEDURE — 3700000001 HC ADD 15 MINUTES (ANESTHESIA): Performed by: ORTHOPAEDIC SURGERY

## 2021-04-13 PROCEDURE — 6360000002 HC RX W HCPCS: Performed by: NURSE ANESTHETIST, CERTIFIED REGISTERED

## 2021-04-13 PROCEDURE — 3209999900 FLUORO FOR SURGICAL PROCEDURES

## 2021-04-13 PROCEDURE — 2500000003 HC RX 250 WO HCPCS: Performed by: ORTHOPAEDIC SURGERY

## 2021-04-13 RX ORDER — SODIUM CHLORIDE 9 MG/ML
25 INJECTION, SOLUTION INTRAVENOUS PRN
Status: DISCONTINUED | OUTPATIENT
Start: 2021-04-13 | End: 2021-04-13 | Stop reason: HOSPADM

## 2021-04-13 RX ORDER — FENTANYL CITRATE 50 UG/ML
INJECTION, SOLUTION INTRAMUSCULAR; INTRAVENOUS PRN
Status: DISCONTINUED | OUTPATIENT
Start: 2021-04-13 | End: 2021-04-13 | Stop reason: SDUPTHER

## 2021-04-13 RX ORDER — SODIUM CHLORIDE 0.9 % (FLUSH) 0.9 %
10 SYRINGE (ML) INJECTION PRN
Status: DISCONTINUED | OUTPATIENT
Start: 2021-04-13 | End: 2021-04-13 | Stop reason: HOSPADM

## 2021-04-13 RX ORDER — HYDROCODONE BITARTRATE AND ACETAMINOPHEN 5; 325 MG/1; MG/1
2 TABLET ORAL PRN
Status: DISCONTINUED | OUTPATIENT
Start: 2021-04-13 | End: 2021-04-13 | Stop reason: HOSPADM

## 2021-04-13 RX ORDER — SODIUM CHLORIDE 9 MG/ML
INJECTION, SOLUTION INTRAVENOUS CONTINUOUS PRN
Status: DISCONTINUED | OUTPATIENT
Start: 2021-04-13 | End: 2021-04-13 | Stop reason: SDUPTHER

## 2021-04-13 RX ORDER — MIDAZOLAM HYDROCHLORIDE 1 MG/ML
INJECTION INTRAMUSCULAR; INTRAVENOUS PRN
Status: DISCONTINUED | OUTPATIENT
Start: 2021-04-13 | End: 2021-04-13 | Stop reason: SDUPTHER

## 2021-04-13 RX ORDER — LIDOCAINE HYDROCHLORIDE 20 MG/ML
INJECTION, SOLUTION INFILTRATION; PERINEURAL PRN
Status: DISCONTINUED | OUTPATIENT
Start: 2021-04-13 | End: 2021-04-13 | Stop reason: ALTCHOICE

## 2021-04-13 RX ORDER — PROPOFOL 10 MG/ML
INJECTION, EMULSION INTRAVENOUS PRN
Status: DISCONTINUED | OUTPATIENT
Start: 2021-04-13 | End: 2021-04-13 | Stop reason: SDUPTHER

## 2021-04-13 RX ORDER — HYDROCODONE BITARTRATE AND ACETAMINOPHEN 5; 325 MG/1; MG/1
1 TABLET ORAL PRN
Status: DISCONTINUED | OUTPATIENT
Start: 2021-04-13 | End: 2021-04-13 | Stop reason: HOSPADM

## 2021-04-13 RX ORDER — SODIUM CHLORIDE 0.9 % (FLUSH) 0.9 %
10 SYRINGE (ML) INJECTION EVERY 12 HOURS SCHEDULED
Status: DISCONTINUED | OUTPATIENT
Start: 2021-04-13 | End: 2021-04-13 | Stop reason: HOSPADM

## 2021-04-13 RX ADMIN — PROPOFOL 50 MG: 10 INJECTION, EMULSION INTRAVENOUS at 12:39

## 2021-04-13 RX ADMIN — FENTANYL CITRATE 50 MCG: 50 INJECTION, SOLUTION INTRAMUSCULAR; INTRAVENOUS at 12:34

## 2021-04-13 RX ADMIN — FENTANYL CITRATE 50 MCG: 50 INJECTION, SOLUTION INTRAMUSCULAR; INTRAVENOUS at 12:37

## 2021-04-13 RX ADMIN — SODIUM CHLORIDE: 9 INJECTION, SOLUTION INTRAVENOUS at 12:30

## 2021-04-13 RX ADMIN — MIDAZOLAM 2 MG: 1 INJECTION INTRAMUSCULAR; INTRAVENOUS at 12:30

## 2021-04-13 RX ADMIN — PROPOFOL 50 MG: 10 INJECTION, EMULSION INTRAVENOUS at 12:37

## 2021-04-13 ASSESSMENT — LIFESTYLE VARIABLES: SMOKING_STATUS: 1

## 2021-04-13 ASSESSMENT — PAIN DESCRIPTION - ORIENTATION: ORIENTATION: RIGHT

## 2021-04-13 ASSESSMENT — PAIN SCALES - GENERAL
PAINLEVEL_OUTOF10: 5
PAINLEVEL_OUTOF10: 0

## 2021-04-13 NOTE — ANESTHESIA PRE PROCEDURE
Department of Anesthesiology  Preprocedure Note       Name:  Anthony Garcia   Age:  46 y.o.  :  1970                                          MRN:  [id number]         Date:  2021      Surgeon: Mau Vance):  Alfonzo Roach MD    Procedure: RIGHT HIP INJECTION UNDER FLUOROSCOPY   +++LATEX ALLERGY+++ (Right )    Medications prior to admission:   Prior to Admission medications    Medication Sig Start Date End Date Taking? Authorizing Provider   gabapentin (NEURONTIN) 300 MG capsule Take 300 mg by mouth 3 times daily. Yes Historical Provider, MD   DULoxetine (CYMBALTA) 20 MG extended release capsule Take 20 mg by mouth daily   Yes Historical Provider, MD   baclofen (LIORESAL) 10 MG tablet Take 1 tablet by mouth 3 times daily 21  Yes [place] SHANE Persaud DO   UBRELVY 50 MG TABS take 1 tablet by mouth AT ONSET OF HEADACHE MAY REPEAT ONCE IN 24...  (REFER TO PRESCRIPTION NOTES). 3/23/21  Yes Historical Provider, MD   ondansetron (ZOFRAN-ODT) 4 MG disintegrating tablet Take 1 tablet by mouth 3 times daily as needed for Nausea or Vomiting 3/23/21  Yes Alexis Persaud DO   nadolol (CORGARD) 20 MG tablet take 1 tablet by mouth once daily 21  Yes Historical Provider, MD   eletriptan (RELPAX) 40 MG tablet Take 1 tablet by mouth once as needed (Headache) may repeat in 2 hours if necessary 3/2/21 4/12/21 Yes Alexis Persaud DO   rosuvastatin (CRESTOR) 20 MG tablet take 1 tablet by mouth nightly ---90DAYS REQUEST 3/2/21  Yes Alexis Persaud DO   Docusate Calcium (STOOL SOFTENER PO) Take by mouth 2 times daily   Yes Historical Provider, MD   diazepam (VALIUM) 10 MG tablet Take 10 mg by mouth every 8 hours as needed for Anxiety. Instructed to take am of procedure if needed   Yes Historical Provider, MD   folic acid (FOLVITE) 1 MG tablet Take 1 mg by mouth daily.    Yes Historical Provider, MD   lamoTRIgine (LAMICTAL) 200 MG tablet Take 200 mg by mouth every evening    Yes Historical Provider, MD       Current medications:    Current Facility-Administered Medications   Medication Dose Route Frequency Provider Last Rate Last Admin    HYDROcodone-acetaminophen (NORCO) 5-325 MG per tablet 1 tablet  1 tablet Oral PRN Marilyn Carlin MD        Or    HYDROcodone-acetaminophen (NORCO) 5-325 MG per tablet 2 tablet  2 tablet Oral PRN Marilyn Carlin MD           Allergies:     Allergies   Allergen Reactions    Latex Rash    Oxycodone-Acetaminophen Nausea Only    Quetiapine     Seroquel [Quetiapine Fumarate] Nausea Only    Sulfa Antibiotics Rash       Problem List:    Patient Active Problem List   Diagnosis Code    Right hip pain M25.551    Anxiety F41.9    Recurrent major depression in partial remission ([facility] [address] F33.41    New daily persistent headache G44.52    Tobacco use disorder F17.200    Mixed hyperlipidemia E78.2    History of migraine Z86.69    Frequency of micturition R35.0    Right hip joint effusion M25.451       Past Medical History:        Diagnosis Date    Anxiety     Arthritis     right hip    Bipolar 1 disorder ([facility] [address]     Chronic back pain     Depression     Hypertension     Migraines     Other hyperlipidemia 7/29/2019    PTSD (post-traumatic stress disorder)        Past Surgical History:        Procedure Laterality Date    ARTHROGRAPHY Right 1/3/2019    RIGHT HIP ARTHROGRAM AND STEROID INJECTION performed by Elizabeth Quiñones MD at [facility] Right 9/10/2020    RIGHT HIP INJECTION UNDER FLUOROSCOPY performed by Elizabeth Quiñones MD at [facility] N/A 2/3/2020    DILATATION AND CURETTAGE HYSTEROSCOPY performed by Lorenzo Willoughby MD at [address] [place] [address]  02/20/2019    implant for IBS     TONSILLECTOMY      TOOTH EXTRACTION      TUBAL LIGATION      TUMOR REMOVAL      in pancrease        Social History:    Social History     Tobacco Use    Smoking status: Current Every Day Smoker     Packs/day: 1.50 Years: 30.00     Pack years: 45.00     Types: Cigarettes    Smokeless tobacco: Never Used   Substance Use Topics    Alcohol use: Not Currently     Frequency: Never                                Ready to quit: Not Answered  Counseling given: Not Answered      Vital Signs (Current):   Vitals:    03/30/21 1225 04/12/21 1136   Weight: 108 lb (49 kg) 108 lb (49 kg)   Height: 5' 2\" (1.575 m) 5' 2\" (1.575 m)                                              BP Readings from Last 3 Encounters:   03/23/21 106/68   03/21/21 (!) 101/56   03/02/21 102/64       NPO Status: Time of last liquid consumption: 2300                                                                               BMI:   Wt Readings from Last 3 Encounters:   04/12/21 108 lb (49 kg)   03/24/21 108 lb (49 kg)   03/23/21 108 lb (49 kg)     Body mass index is 19.75 kg/m². CBC:   Lab Results   Component Value Date    WBC 12.0 03/21/2021    RBC 4.53 03/21/2021    HGB 13.9 03/21/2021    HCT 41.6 03/21/2021    MCV 91.8 03/21/2021    RDW 13.0 03/21/2021     03/21/2021       CMP:   Lab Results   Component Value Date     03/21/2021    K 3.8 03/21/2021    K 3.8 12/18/2020     03/21/2021    CO2 27 03/21/2021    BUN 11 03/21/2021    CREATININE 0.5 03/21/2021    GFRAA >60 03/21/2021    LABGLOM >60 03/21/2021    GLUCOSE 107 03/21/2021    PROT 7.1 03/21/2021    CALCIUM 9.8 03/21/2021    BILITOT 0.2 03/21/2021    ALKPHOS 65 03/21/2021    AST 23 03/21/2021    ALT 9 03/21/2021       POC Tests: No results for input(s): POCGLU, POCNA, POCK, POCCL, POCBUN, POCHEMO, POCHCT in the last 72 hours.     Coags: No results found for: PROTIME, INR, APTT    HCG (If Applicable):   Lab Results   Component Value Date    PREGTESTUR NEGATIVE 02/03/2020        ABGs: No results found for: PHART, PO2ART, LCE2LOP, RVX1LZI, BEART, U4ZSSWEZ     Type & Screen (If Applicable):  No results found for: LABABO, [address]    Anesthesia Evaluation  Patient summary reviewed no history of anesthetic complications:   Airway: Mallampati: I  TM distance: >3 FB   Neck ROM: full  Mouth opening: > = 3 FB Dental:          Pulmonary: breath sounds clear to auscultation  (+) current smoker                           Cardiovascular:    (+) hypertension:,         Rhythm: regular  Rate: normal           Beta Blocker:  Dose within 24 Hrs         Neuro/Psych:   (+) headaches: migraine headaches, psychiatric history:depression/anxiety              ROS comment: Anxiety    Bipolar 1 disorder (HCC)   PTSD (post-traumatic stress disorder)        GI/Hepatic/Renal:   (+) GERD:,          ROS comment: Abnormal delay in gastric emptying. Endo/Other:    (+) : arthritis: OA., .                 Abdominal:           Vascular: negative vascular ROS. Anesthesia Plan      MAC     ASA 3       Induction: intravenous. Anesthetic plan and risks discussed with patient. Plan discussed with CRNA.             Brad Walker MD   4/13/2021

## 2021-04-13 NOTE — OP NOTE
OPERATIVE REPORT    PATIENT:  Ainl Kahn  [id number]    DATE OF PROCEDURE:  4/13/21    SURGEON: Frederic Diaz MD    ASSISTANT:  none    PRE-PROCEDURE DIAGNOSIS:  Right hip pain    POST-PROCEDURE DIAGNOSIS: Right hip pain    PROCEDURE:  Right hip arthrogram and steroid injection under fluoroscopy, with anesthesia     ANESTHESIA: MAC and local      PROCEDURE INDICATIONS:  The patient is a 46year old female who suffers from right hip pain. The patient would like to try intra-articular steroid injection for improved pain relief. She understands this is likely a temporary solution and future procedures may be required. Risks, benefits, and alternative treatments were dicussed. The patient understands the risks, signed an informed consent, and elected to proceed       PROCEDURE: The patient was transferred from hospital bed to OR table and underwent sedation. The planned entry site for needle insertion was mapped out using fluoroscopy. Next, a square area around this site was squared off with drapes and the skin was cleaned with chloraprep. Next, the skin and deep tissues were anesthetized with 2% lidocaine. A long 21 gauge spinal needle was then introduced through the skin down to the femoral neck using fluoroscopic guidance. When it was felt the appropriate position had been reached, 2 cc of omnivist radio-opague dye was injected and found to pool within the capsule, indicating we were intra-articular. The medication mixture of 40 mg Kenalog (1cc), 2 cc 0.25% Marcaine, and 2 cc 2% lidocaine was injected into the joint. The needle was removed, and site was covered with a bandaid. The patient was transferred to the hospital bed and taken to the PACU in stable condition. She tolerated the procedure well      ESTIMATED BLOOD LOSS: 0 mL    COMPLICATIONS: none    POST OPERATIVE PLAN: The patient will be discharged home.   She will follow up in clinic in 4-6 weeks          Liv Joe MD  Orthopaedic Surgery   4/13/21  12:59 PM

## 2021-04-13 NOTE — H&P
Department of Orthopedic Surgery  Attending Pre-operative History and Physical        DIAGNOSIS: Right hip pain    INDICATION: Failed conservative treatment    PROCEDURE: Right hip arthrogram and steroid injection    CHIEF COMPLAINT: Right hip pain    History Obtained From:  patient    HISTORY OF PRESENT ILLNESS:      The patient is a 46 y.o. female with significant past medical history of right hip pain who would like to try intra-articular steroid injection. Risks were discussed and she would like to proceed. Past Medical History:        Diagnosis Date    Anxiety     Arthritis     right hip    Bipolar 1 disorder ([address]     Chronic back pain     Depression     Hypertension     Migraines     Other hyperlipidemia 7/29/2019    PTSD (post-traumatic stress disorder)        Past Surgical History:        Procedure Laterality Date    ARTHROGRAPHY Right 1/3/2019    RIGHT HIP ARTHROGRAM AND STEROID INJECTION performed by Lily Marks MD at [facility] Right 9/10/2020    RIGHT HIP INJECTION UNDER FLUOROSCOPY performed by Lily Marks MD at [address] [facility] AND CURETTAGE OF UTERUS N/A 2/3/2020    DILATATION AND CURETTAGE HYSTEROSCOPY performed by Sridevi Quinonez MD at [address] [place] [address]  02/20/2019    implant for IBS     TONSILLECTOMY      TOOTH EXTRACTION      TUBAL LIGATION      TUMOR REMOVAL      in pancrease        Medications Prior to Admission:   Medications Prior to Admission: gabapentin (NEURONTIN) 300 MG capsule, Take 300 mg by mouth 3 times daily. DULoxetine (CYMBALTA) 20 MG extended release capsule, Take 20 mg by mouth daily  baclofen (LIORESAL) 10 MG tablet, Take 1 tablet by mouth 3 times daily  UBRELVY 50 MG TABS, take 1 tablet by mouth AT ONSET OF HEADACHE MAY REPEAT ONCE IN 24...  (REFER TO PRESCRIPTION NOTES).   ondansetron (ZOFRAN-ODT) 4 MG disintegrating tablet, Take 1 tablet by mouth 3 times daily as needed for Nausea or Vomiting  nadolol (CORGARD) 20 MG tablet, take 1 tablet by mouth once daily  eletriptan (RELPAX) 40 MG tablet, Take 1 tablet by mouth once as needed (Headache) may repeat in 2 hours if necessary  rosuvastatin (CRESTOR) 20 MG tablet, take 1 tablet by mouth nightly ---90DAYS REQUEST  Docusate Calcium (STOOL SOFTENER PO), Take by mouth 2 times daily  diazepam (VALIUM) 10 MG tablet, Take 10 mg by mouth every 8 hours as needed for Anxiety. Instructed to take am of procedure if needed  folic acid (FOLVITE) 1 MG tablet, Take 1 mg by mouth daily. lamoTRIgine (LAMICTAL) 200 MG tablet, Take 200 mg by mouth every evening     Allergies:  Latex, Oxycodone-acetaminophen, Quetiapine, Seroquel [quetiapine fumarate], and Sulfa antibiotics    History of allergic reaction to anesthesia:  Never received anesthesia    Social History:   TOBACCO:   reports that she has been smoking cigarettes. She has a 45.00 pack-year smoking history. She has never used smokeless tobacco.  ETOH:   reports previous alcohol use. DRUGS:   reports no history of drug use. Family History:   History reviewed. No pertinent family history. REVIEW OF SYSTEMS:  CONSTITUTIONAL:  negative  RESPIRATORY:  negative  CARDIOVASCULAR:  negative  MUSCULOSKELETAL:  negative except for  HPI  NEUROLOGICAL:  negative    PHYSICAL EXAM:     VITALS:  /67   Pulse 65   Temp 97.4 °F (36.3 °C) (Temporal)   Resp 20   Ht 5' 2\" (1.575 m)   Wt 108 lb (49 kg)   LMP 12/28/2018 (LMP Unknown)   SpO2 98%   BMI 19.75 kg/m²     Gen: AOx3, NAD    Heart:  normal apical pulses, normal S1 and S2 and no edema    Lungs:  No increased work of breathing, good air exchange     Abdomen:  no scars, non-distended and non-tender    Extremities:  No clubbing, cyanosis, or edema    Musculoskeletal: Exam of the right hip shows pain with range of motion. Leg lengths near equivalent. Skin is intact.   Intact motor and sensory function of the foot      DATA:  CBC:   Lab Results   Component Value Date    WBC 12.0 03/21/2021    RBC 4.53 03/21/2021    HGB 13.9 03/21/2021    HCT 41.6 03/21/2021    MCV 91.8 03/21/2021    MCH 30.7 03/21/2021    MCHC 33.4 03/21/2021    RDW 13.0 03/21/2021     03/21/2021    MPV 9.9 03/21/2021     BMP:    Lab Results   Component Value Date     03/21/2021    K 3.8 03/21/2021    K 3.8 12/18/2020     03/21/2021    CO2 27 03/21/2021    BUN 11 03/21/2021    LABALBU 4.3 03/21/2021    CREATININE 0.5 03/21/2021    CALCIUM 9.8 03/21/2021    GFRAA >60 03/21/2021    LABGLOM >60 03/21/2021    GLUCOSE 107 03/21/2021       Radiology Review: X-rays reviewed    ASSESSMENT AND PLAN:    1. Patient is a 46 y.o. female with above specified procedure planned right hip arthrogram and steroid injection with anesthesia    2. Procedure options, risks and benefits reviewed with patient. Patient expresses understanding and has signed consent form to proceed.     3.  Patient and family were provided with pre-op and post-op instructions, prescription medications, and any other supplied needed post op (slings, braces, etc.)    Controlled Substances Monitoring:            Kellen Pittman MD  Orthopaedic Surgery   4/13/21  12:25 PM

## 2021-04-13 NOTE — ANESTHESIA POSTPROCEDURE EVALUATION
Department of Anesthesiology  Postprocedure Note    Patient: Joel Guerrero  MRN: [id number]  YOB: 1970  Date of evaluation: 4/13/2021  Time:  1:40 PM     Procedure Summary     Date: 04/13/21 Room / Location: [facility] 03 / [facility]    Anesthesia Start: 3963 Anesthesia Stop: 9437    Procedure: RIGHT HIP INJECTION UNDER FLUOROSCOPY   +++LATEX ALLERGY+++ (Right Hip) Diagnosis: (RIGHT HIP OSTEOARTHRITIS)    Surgeons: Cuauhtemoc Mane MD Responsible Provider: John Newberry MD    Anesthesia Type: MAC ASA Status: 3          Anesthesia Type: MAC    Vito Phase I: Vito Score: 10    Vito Phase II: Vito Score: 10    Last vitals: Reviewed and per EMR flowsheets.        Anesthesia Post Evaluation    Patient location during evaluation: PACU  Patient participation: complete - patient participated  Level of consciousness: awake and alert  Airway patency: patent  Nausea & Vomiting: no nausea and no vomiting  Complications: no  Cardiovascular status: hemodynamically stable  Respiratory status: acceptable  Hydration status: euvolemic

## 2021-04-15 ENCOUNTER — TELEPHONE (OUTPATIENT)
Dept: FAMILY MEDICINE CLINIC | Age: 51
End: 2021-04-15

## 2021-04-15 ENCOUNTER — OFFICE VISIT (OUTPATIENT)
Dept: FAMILY MEDICINE CLINIC | Age: 51
End: 2021-04-15
Payer: MEDICARE

## 2021-04-15 VITALS
HEART RATE: 98 BPM | SYSTOLIC BLOOD PRESSURE: 104 MMHG | WEIGHT: 103 LBS | TEMPERATURE: 97.1 F | DIASTOLIC BLOOD PRESSURE: 64 MMHG | HEIGHT: 62 IN | OXYGEN SATURATION: 98 % | BODY MASS INDEX: 18.95 KG/M2 | RESPIRATION RATE: 18 BRPM

## 2021-04-15 DIAGNOSIS — Z86.69 HISTORY OF MIGRAINE: Primary | ICD-10-CM

## 2021-04-15 DIAGNOSIS — R35.0 FREQUENCY OF URINATION: ICD-10-CM

## 2021-04-15 DIAGNOSIS — N39.498 OTHER URINARY INCONTINENCE: ICD-10-CM

## 2021-04-15 LAB
BILIRUBIN, POC: NEGATIVE
BLOOD URINE, POC: NEGATIVE
CLARITY, POC: CLEAR
COLOR, POC: YELLOW
GLUCOSE URINE, POC: NEGATIVE
KETONES, POC: NEGATIVE
LEUKOCYTE EST, POC: NEGATIVE
NITRITE, POC: NEGATIVE
PH, POC: 6.5
PROTEIN, POC: NEGATIVE
SPECIFIC GRAVITY, POC: 1.03
UROBILINOGEN, POC: 0.2

## 2021-04-15 PROCEDURE — 4004F PT TOBACCO SCREEN RCVD TLK: CPT | Performed by: FAMILY MEDICINE

## 2021-04-15 PROCEDURE — 3017F COLORECTAL CA SCREEN DOC REV: CPT | Performed by: FAMILY MEDICINE

## 2021-04-15 PROCEDURE — 99213 OFFICE O/P EST LOW 20 MIN: CPT | Performed by: FAMILY MEDICINE

## 2021-04-15 PROCEDURE — 81002 URINALYSIS NONAUTO W/O SCOPE: CPT | Performed by: FAMILY MEDICINE

## 2021-04-15 PROCEDURE — G8427 DOCREV CUR MEDS BY ELIG CLIN: HCPCS | Performed by: FAMILY MEDICINE

## 2021-04-15 PROCEDURE — G8420 CALC BMI NORM PARAMETERS: HCPCS | Performed by: FAMILY MEDICINE

## 2021-04-15 RX ORDER — PROMETHAZINE HYDROCHLORIDE 25 MG/1
25 TABLET ORAL 2 TIMES DAILY
Qty: 30 TABLET | Refills: 2 | Status: SHIPPED | OUTPATIENT
Start: 2021-04-15 | End: 2021-04-22

## 2021-04-15 RX ORDER — ELETRIPTAN HYDROBROMIDE 40 MG/1
40 TABLET, FILM COATED ORAL
Qty: 6 TABLET | Refills: 1 | Status: SHIPPED
Start: 2021-04-15 | End: 2021-08-02

## 2021-04-15 ASSESSMENT — ENCOUNTER SYMPTOMS
SINUS PAIN: 0
BLOOD IN STOOL: 0
TROUBLE SWALLOWING: 0
EYE REDNESS: 0
SORE THROAT: 0
COUGH: 0
CHEST TIGHTNESS: 0
ABDOMINAL PAIN: 0
VOMITING: 0
SHORTNESS OF BREATH: 0
DIARRHEA: 0
BACK PAIN: 0
EYE DISCHARGE: 0
NAUSEA: 0
PHOTOPHOBIA: 0
ALLERGIC/IMMUNOLOGIC NEGATIVE: 1
EYE PAIN: 0
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
## 2021-04-15 NOTE — TELEPHONE ENCOUNTER
Patient calling about medication that was to be called in. She said it was an ATB and something for nausea. [facility] had not received it.

## 2021-05-11 ENCOUNTER — OFFICE VISIT (OUTPATIENT)
Dept: ORTHOPEDIC SURGERY | Age: 51
End: 2021-05-11
Payer: MEDICARE

## 2021-05-11 VITALS — HEIGHT: 62 IN | BODY MASS INDEX: 19.88 KG/M2 | WEIGHT: 108 LBS

## 2021-05-11 DIAGNOSIS — M25.551 RIGHT HIP PAIN: Primary | ICD-10-CM

## 2021-05-11 PROCEDURE — G8420 CALC BMI NORM PARAMETERS: HCPCS | Performed by: NURSE PRACTITIONER

## 2021-05-11 PROCEDURE — 99212 OFFICE O/P EST SF 10 MIN: CPT | Performed by: NURSE PRACTITIONER

## 2021-05-11 PROCEDURE — 4004F PT TOBACCO SCREEN RCVD TLK: CPT | Performed by: NURSE PRACTITIONER

## 2021-05-11 PROCEDURE — 3017F COLORECTAL CA SCREEN DOC REV: CPT | Performed by: NURSE PRACTITIONER

## 2021-05-11 PROCEDURE — G8427 DOCREV CUR MEDS BY ELIG CLIN: HCPCS | Performed by: NURSE PRACTITIONER

## 2021-05-11 RX ORDER — HYDROCODONE BITARTRATE AND ACETAMINOPHEN 7.5; 325 MG/1; MG/1
TABLET ORAL
COMMUNITY
Start: 2021-04-20 | End: 2021-07-15

## 2021-05-11 RX ORDER — GABAPENTIN 600 MG/1
800 TABLET ORAL 3 TIMES DAILY
COMMUNITY
Start: 2021-04-26 | End: 2021-07-15

## 2021-05-11 NOTE — PROGRESS NOTES
Follow Up Post Operative Visit     Surgery: Right hip arthrogram and steroid injection under fluoroscopy, with anesthesia   Date: 4/13/2021    Subjective:    Mario Savage is here for follow up visit s/p above procedure. She is doing well. She denies having any pain during today's visit. She does report intermittent onset of mild left hip pain. REVIEW OF SYSTEMS:     General: Negative Fever, chills, fatigue   Cardiovascular: Negative chest pain, palpitations  Respiratory: Equal chest expansion, negative shortness of breath   Skin: Negative rash, open wounds  Psych: Normal affect, mood stable  Neurologic: sensation grossly intact in extremities   Musculoskeletal: See HPI      Controlled Substances Monitoring:        Physical Exam:    Height: 5' 2\" (1.575 m), Weight: 108 lb (49 kg)    General: Alert and oriented x3, no acute distress  Cardiovascular/pulmonary: No labored breathing, peripheral perfusion intact  Musculoskeletal:    Right hip exam no pain present with range of motion. Full range of motion intact. Desmond Mis and FADIR exams are intact. Nontender to palpation. No swelling or deformity visualized. Imaging: No new imaging obtained today. Assessment and Plan: Status post right hip arthrogram and steroid injection under fluoroscopy    Today we discussed her right hip. Patient reports no pain during physical exam today. States she has had complete relief of symptoms following injection last month. She will resume all activities without restrictions. Patient reports new onset of left hip pain intermittently. If symptoms to her left hip worsen will begin new work-up. Patient verbalized understanding. She will follow-up in 3 months.       Elvis Canales CNP  Orthopedic Surgery   05/11/21  11:15 AM

## 2021-05-13 ENCOUNTER — TELEPHONE (OUTPATIENT)
Dept: FAMILY MEDICINE CLINIC | Age: 51
End: 2021-05-13

## 2021-06-01 DIAGNOSIS — Z01.419 ENCOUNTER FOR ANNUAL ROUTINE GYNECOLOGICAL EXAMINATION: ICD-10-CM

## 2021-06-03 ENCOUNTER — CARE COORDINATION (OUTPATIENT)
Dept: CARE COORDINATION | Age: 51
End: 2021-06-03

## 2021-06-03 NOTE — CARE COORDINATION
Care Coordination Services explained to patient. Patient verbalizes understanding, but declines at this time, denies any needs at this time, reports that she is waiitng to receive the financial aid paperwork to fill out and turn in. Patient encouraged to call PCP office with any questions/concerns/updates. Trilobed Flap Text: The defect edges were debeveled with a #15 scalpel blade.  Given the location of the defect and the proximity to free margins a trilobed flap was deemed most appropriate.  Using a sterile surgical marker, an appropriate trilobed flap drawn around the defect.    The area thus outlined was incised deep to adipose tissue with a #15 scalpel blade.  The skin margins were undermined to an appropriate distance in all directions utilizing iris scissors.

## 2021-06-14 DIAGNOSIS — E78.2 MIXED HYPERLIPIDEMIA: ICD-10-CM

## 2021-06-14 DIAGNOSIS — M47.812 SPONDYLOSIS OF CERVICAL REGION WITHOUT MYELOPATHY OR RADICULOPATHY: ICD-10-CM

## 2021-06-14 NOTE — TELEPHONE ENCOUNTER
Last Appointment:  4/15/2021  Future Appointments   Date Time Provider Woodrow Leii   6/18/2021 11:30 AM [place] DO WILL Haynes [facility]   8/10/2021  9:50 AM Rajesh Meraz MD COL [facility]THO [facility]   6/3/2022 11:30 AM Frankie Jorge, APRN - CNP [facility] Marrero calling for refill on pended meds. States she does not have enough til 06/18. And is aware you are on vacation.

## 2021-06-17 RX ORDER — ROSUVASTATIN CALCIUM 20 MG/1
TABLET, COATED ORAL
Qty: 90 TABLET | Refills: 1 | Status: SHIPPED
Start: 2021-06-17 | End: 2021-06-18 | Stop reason: SDUPTHER

## 2021-06-17 RX ORDER — BACLOFEN 10 MG/1
10 TABLET ORAL 3 TIMES DAILY
Qty: 90 TABLET | Refills: 2 | Status: SHIPPED
Start: 2021-06-17 | End: 2021-06-18 | Stop reason: SDUPTHER

## 2021-06-18 ENCOUNTER — OFFICE VISIT (OUTPATIENT)
Dept: FAMILY MEDICINE CLINIC | Age: 51
End: 2021-06-18
Payer: MEDICARE

## 2021-06-18 VITALS
TEMPERATURE: 97.5 F | WEIGHT: 111.5 LBS | HEART RATE: 79 BPM | HEIGHT: 62 IN | DIASTOLIC BLOOD PRESSURE: 82 MMHG | RESPIRATION RATE: 17 BRPM | SYSTOLIC BLOOD PRESSURE: 118 MMHG | BODY MASS INDEX: 20.52 KG/M2 | OXYGEN SATURATION: 96 %

## 2021-06-18 DIAGNOSIS — M47.812 SPONDYLOSIS OF CERVICAL REGION WITHOUT MYELOPATHY OR RADICULOPATHY: ICD-10-CM

## 2021-06-18 DIAGNOSIS — H61.23 BILATERAL IMPACTED CERUMEN: Primary | ICD-10-CM

## 2021-06-18 DIAGNOSIS — Z86.69 HISTORY OF MIGRAINE: Chronic | ICD-10-CM

## 2021-06-18 DIAGNOSIS — K13.0 MASS OF LIP: ICD-10-CM

## 2021-06-18 DIAGNOSIS — E78.2 MIXED HYPERLIPIDEMIA: ICD-10-CM

## 2021-06-18 PROCEDURE — G8420 CALC BMI NORM PARAMETERS: HCPCS | Performed by: FAMILY MEDICINE

## 2021-06-18 PROCEDURE — G8427 DOCREV CUR MEDS BY ELIG CLIN: HCPCS | Performed by: FAMILY MEDICINE

## 2021-06-18 PROCEDURE — 99214 OFFICE O/P EST MOD 30 MIN: CPT | Performed by: FAMILY MEDICINE

## 2021-06-18 PROCEDURE — 4004F PT TOBACCO SCREEN RCVD TLK: CPT | Performed by: FAMILY MEDICINE

## 2021-06-18 PROCEDURE — 3017F COLORECTAL CA SCREEN DOC REV: CPT | Performed by: FAMILY MEDICINE

## 2021-06-18 RX ORDER — NADOLOL 20 MG/1
TABLET ORAL
Qty: 30 TABLET | Refills: 1 | Status: SHIPPED
Start: 2021-06-18 | End: 2021-09-03 | Stop reason: SDUPTHER

## 2021-06-18 RX ORDER — BACLOFEN 10 MG/1
10 TABLET ORAL 3 TIMES DAILY
Qty: 90 TABLET | Refills: 2 | Status: SHIPPED
Start: 2021-06-18 | End: 2021-09-03 | Stop reason: SDUPTHER

## 2021-06-18 RX ORDER — ROSUVASTATIN CALCIUM 20 MG/1
TABLET, COATED ORAL
Qty: 90 TABLET | Refills: 1 | Status: SHIPPED
Start: 2021-06-18 | End: 2021-09-03 | Stop reason: SDUPTHER

## 2021-06-18 SDOH — ECONOMIC STABILITY: FOOD INSECURITY: WITHIN THE PAST 12 MONTHS, THE FOOD YOU BOUGHT JUST DIDN'T LAST AND YOU DIDN'T HAVE MONEY TO GET MORE.: SOMETIMES TRUE

## 2021-06-18 SDOH — ECONOMIC STABILITY: FOOD INSECURITY: WITHIN THE PAST 12 MONTHS, YOU WORRIED THAT YOUR FOOD WOULD RUN OUT BEFORE YOU GOT MONEY TO BUY MORE.: NEVER TRUE

## 2021-06-18 ASSESSMENT — ENCOUNTER SYMPTOMS
EYE DISCHARGE: 0
EYE PAIN: 0
TROUBLE SWALLOWING: 0
SINUS PAIN: 0
DIARRHEA: 0
VOMITING: 0
SORE THROAT: 0
NAUSEA: 0
COUGH: 0
SHORTNESS OF BREATH: 0
BLOOD IN STOOL: 0
BACK PAIN: 0
EYE REDNESS: 0
PHOTOPHOBIA: 0
CHEST TIGHTNESS: 0
ALLERGIC/IMMUNOLOGIC NEGATIVE: 1
ABDOMINAL PAIN: 0

## 2021-06-18 ASSESSMENT — SOCIAL DETERMINANTS OF HEALTH (SDOH): HOW HARD IS IT FOR YOU TO PAY FOR THE VERY BASICS LIKE FOOD, HOUSING, MEDICAL CARE, AND HEATING?: HARD

## 2021-06-21 DIAGNOSIS — Z86.69 HISTORY OF MIGRAINE: Chronic | ICD-10-CM

## 2021-06-21 DIAGNOSIS — M47.812 SPONDYLOSIS OF CERVICAL REGION WITHOUT MYELOPATHY OR RADICULOPATHY: ICD-10-CM

## 2021-06-21 RX ORDER — NADOLOL 20 MG/1
TABLET ORAL
Qty: 90 TABLET | Refills: 1 | OUTPATIENT
Start: 2021-06-21

## 2021-06-21 RX ORDER — BACLOFEN 10 MG/1
10 TABLET ORAL 3 TIMES DAILY
Qty: 270 TABLET | Refills: 1 | OUTPATIENT
Start: 2021-06-21

## 2021-06-21 NOTE — TELEPHONE ENCOUNTER
Last Appointment:  6/18/2021  Future Appointments   Date Time Provider Woodrow Margy   8/10/2021  9:50 AM Stacy Herring MD [facility]   6/3/2022 11:30 AM FRENCH Matta - CNP [facility]      Patient needing 90 day supplies

## 2021-06-22 DIAGNOSIS — M47.812 SPONDYLOSIS OF CERVICAL REGION WITHOUT MYELOPATHY OR RADICULOPATHY: ICD-10-CM

## 2021-06-22 DIAGNOSIS — Z86.69 HISTORY OF MIGRAINE: Chronic | ICD-10-CM

## 2021-06-22 RX ORDER — BACLOFEN 10 MG/1
10 TABLET ORAL 3 TIMES DAILY
Qty: 270 TABLET | Refills: 1 | OUTPATIENT
Start: 2021-06-22

## 2021-06-22 RX ORDER — NADOLOL 20 MG/1
TABLET ORAL
Qty: 90 TABLET | Refills: 1 | OUTPATIENT
Start: 2021-06-22

## 2021-06-22 RX ORDER — GABAPENTIN 600 MG/1
600 TABLET ORAL 2 TIMES DAILY
Qty: 180 TABLET | Refills: 1 | OUTPATIENT
Start: 2021-06-22 | End: 2021-09-20

## 2021-06-25 ENCOUNTER — TELEPHONE (OUTPATIENT)
Dept: ADMINISTRATIVE | Age: 51
End: 2021-06-25

## 2021-06-25 NOTE — TELEPHONE ENCOUNTER
Patient believes she left her ID and Insurance card at the office in [place]. Could not reach   upon transfer.  Please reach back to patient,

## 2021-07-09 ENCOUNTER — TELEPHONE (OUTPATIENT)
Dept: PHARMACY | Facility: CLINIC | Age: 51
End: 2021-07-09

## 2021-07-09 NOTE — TELEPHONE ENCOUNTER
For Pharmacy [address] in place:  No   Recommendation Provided To: Pharmacy: 1   Intervention Detail: Adherence Monitorin   Gap Closed?: No Time Spent (min): 10

## 2021-07-09 NOTE — TELEPHONE ENCOUNTER
[facility] HEALTH CLINICAL PHARMACY REVIEW: ADHERENCE REVIEW  Identified care gap per Sulemanon; fills at [facility] Aid: Statin adherence    Last Visit: 6/18/21    Patient identified as LIS = 1, therefore patient may be able to receive a 90-day supply for the same cost as a 30-day supply    STATIN ADHERENCE    Per Insurance Records through June 21   ROSUVASTATIN TAB 20MG last filled on 6/18/21 for 90 day supply. Next refill due: 9/16/21  Fail date 7/14 but filled    Lab Results   Component Value Date    CHOL 148 03/02/2021    TRIG 118 03/02/2021    HDL 48 03/02/2021    LDLCALC 76 03/02/2021     ALT   Date Value Ref Range Status   03/21/2021 9 0 - 32 U/L Final     AST   Date Value Ref Range Status   03/21/2021 23 0 - 31 U/L Final     The 10-year ASCVD risk score (Shabnam Minor, et al., 2013) is: 2.6%    Values used to calculate the score:      Age: 46 years      Sex: Female      Is Non- : No      Diabetic: No      Tobacco smoker: Yes      Systolic Blood Pressure: 917 mmHg      Is BP treated: No      HDL Cholesterol: 48 mg/dL      Total Cholesterol: 148 mg/dL     PLAN  No patient out reach planned at this time. Fail date was 7/14 but filled on 6/18 for 90DS    Future Appointments   Date Time Provider Woodrow Ji   8/10/2021  9:50 AM Ronaldo Lee MD [facility]   6/3/2022 11:30 AM FRENCH Breaux - CNP [facility]       Samia Subram[facility].    [address], toll free: [phone], option 7

## 2021-07-15 RX ORDER — GABAPENTIN 800 MG/1
TABLET ORAL
COMMUNITY
Start: 2021-06-24 | End: 2021-07-23 | Stop reason: SDUPTHER

## 2021-07-15 RX ORDER — DESVENLAFAXINE 25 MG/1
TABLET, EXTENDED RELEASE ORAL
COMMUNITY
Start: 2021-06-24 | End: 2021-09-03

## 2021-07-15 NOTE — PROGRESS NOTES
[facility] PRE-ADMISSION TESTING INSTRUCTIONS    The Preadmission Testing patient is instructed accordingly using the following criteria (check applicable):    ARRIVAL INSTRUCTIONS:  [x] Parking the day of Surgery is located in the Main Entrance lot. Upon entering the door, make an immediate right to the surgery reception desk    [x] Bring photo ID and insurance card    [] Bring in a copy of Living will or Durable Power of  papers. [x] Please be sure to arrange for responsible adult to provide transportation to and from the hospital    [x] Please arrange for responsible adult to be with you for the 24 hour period post procedure due to having anesthesia      GENERAL INSTRUCTIONS:    [x] Nothing by mouth after midnight, including gum, candy, mints or water    [x] You may brush your teeth, but do not swallow any water    [x] Take medications as instructed with 1-2 oz of water    [x] Stop herbal supplements and vitamins 5 days prior to procedure    [] Follow preop dosing of blood thinners per physician instructions    [] Take 1/2 dose of evening insulin, but no insulin after midnight    [] No oral diabetic medications after midnight    [] If diabetic and have low blood sugar or feel symptomatic, take 1-2oz apple juice only    [] Bring inhalers day of surgery    [] Bring C-PAP/ Bi-Pap day of surgery    [] Bring urine specimen day of surgery    [x] Shower or bath with soap, lather and rinse well, AM of Surgery, no lotion, powders or creams to surgical site    [] Follow bowel prep as instructed per surgeon    [x] No tobacco products within 24 hours of surgery     [x] No alcohol or illegal drug use within 24 hours of surgery.     [x] Jewelry, body piercing's, eyeglasses, contact lenses and dentures are not permitted into surgery (bring cases)      [x] Please do not wear any nail polish, make up or hair products on the day of surgery    [x] You can expect a call the business day prior to procedure to notify you if your arrival time changes    [x] If you receive a survey after surgery we would greatly appreciate your comments    [] Parent/guardian of a minor must accompany their child and remain on the premises  the entire time they are under our care     [] Pediatric patients may bring favorite toy, blanket or comfort item with them    [] A caregiver or family member must remain with the patient during their stay if they are mentally handicapped, have dementia, disoriented or unable to use a call light or would be a safety concern if left unattended    [x] Please notify surgeon if you develop any illness between now and time of surgery (cold, cough, sore throat, fever, nausea, vomiting) or any signs of infections  including skin, wounds, and dental.    [x]  The Outpatient Pharmacy is available to fill your prescription here on your day of surgery, ask your preop nurse for details    [] Other instructions    EDUCATIONAL MATERIALS PROVIDED:    [] PAT Preoperative Education Packet/Booklet     [] Medication List    [] Transfusion bracelet applied with instructions    [] Shower with soap, lather and rinse well, and use CHG wipes provided the evening before surgery as instructed    [] Incentive spirometer with instructions

## 2021-07-15 NOTE — PROGRESS NOTES
Have you been tested for COVID  No    vaccinated       Have you been told you were positive for COVID No  Have you had any known exposure to someone that is positive for COVID No  Do you have a cough                   No              Do you have shortness of breath No                 Do you have a sore throat            No                Are you having chills                    No                Are you having muscle aches. No                    Please come to the hospital wearing a mask and have your significant other wear a mask as well. Both of you should check your temperature before leaving to come here,  if it is 100 or higher please call [phone] for instruction.

## 2021-07-19 ENCOUNTER — TELEPHONE (OUTPATIENT)
Dept: FAMILY MEDICINE CLINIC | Age: 51
End: 2021-07-19

## 2021-07-19 ENCOUNTER — OFFICE VISIT (OUTPATIENT)
Dept: FAMILY MEDICINE CLINIC | Age: 51
End: 2021-07-19
Payer: MEDICARE

## 2021-07-19 VITALS
BODY MASS INDEX: 19.88 KG/M2 | OXYGEN SATURATION: 98 % | WEIGHT: 108 LBS | TEMPERATURE: 97.7 F | HEART RATE: 82 BPM | SYSTOLIC BLOOD PRESSURE: 130 MMHG | HEIGHT: 62 IN | DIASTOLIC BLOOD PRESSURE: 78 MMHG

## 2021-07-19 DIAGNOSIS — S30.0XXA CONTUSION OF COCCYX, INITIAL ENCOUNTER: Primary | ICD-10-CM

## 2021-07-19 PROBLEM — K13.0 MUCOCELE OF LIP: Status: ACTIVE | Noted: 2021-07-19

## 2021-07-19 PROCEDURE — G8427 DOCREV CUR MEDS BY ELIG CLIN: HCPCS | Performed by: FAMILY MEDICINE

## 2021-07-19 PROCEDURE — G8420 CALC BMI NORM PARAMETERS: HCPCS | Performed by: FAMILY MEDICINE

## 2021-07-19 PROCEDURE — 3017F COLORECTAL CA SCREEN DOC REV: CPT | Performed by: FAMILY MEDICINE

## 2021-07-19 PROCEDURE — 99213 OFFICE O/P EST LOW 20 MIN: CPT | Performed by: FAMILY MEDICINE

## 2021-07-19 PROCEDURE — 4004F PT TOBACCO SCREEN RCVD TLK: CPT | Performed by: FAMILY MEDICINE

## 2021-07-19 RX ORDER — KETOROLAC TROMETHAMINE 10 MG/1
10 TABLET, FILM COATED ORAL EVERY 6 HOURS PRN
Qty: 20 TABLET | Refills: 0 | Status: SHIPPED
Start: 2021-07-19 | End: 2021-09-03

## 2021-07-19 ASSESSMENT — ENCOUNTER SYMPTOMS
ABDOMINAL PAIN: 0
VOMITING: 0
COUGH: 0
ALLERGIC/IMMUNOLOGIC NEGATIVE: 1
EYE REDNESS: 0
PHOTOPHOBIA: 0
SORE THROAT: 0
EYE DISCHARGE: 0
SINUS PAIN: 0
DIARRHEA: 0
NAUSEA: 0
BACK PAIN: 1
BLOOD IN STOOL: 0
CHEST TIGHTNESS: 0
TROUBLE SWALLOWING: 0
SHORTNESS OF BREATH: 0
EYE PAIN: 0

## 2021-07-19 NOTE — H&P
[address] [facility] [place]                       PREOPERATIVE HISTORY AND PHYSICAL    PATIENT NAME: Thu Samano                   :        1970  MED REC NO:   [id number]                            ROOM:  ACCOUNT NO:   [de-identified]                           ADMIT DATE: 2021  PROVIDER:     Kelley Verdin MD    HISTORY OF PRESENT ILLNESS:  A 80-year-old female presents with a lesion  on the upper lip in her left cheek. Noted approximately six months ago. Saw the PCP, referred here. Was found to have a mucocele on the left  buccal mucosa on the right upper lip and she presents now for excision  of right lip mucocele. PAST MEDICAL HISTORY:  Hypertension, arthritis, cervical stenosis,  migraines, anxiety, depression. PAST SURGICAL PROCEDURES:  Include hip and neck procedures,  tonsillectomy. ALLERGIES:  No known drug allergies. MEDICATIONS:  Include nadolol, baclofen, statin, Cymbalta, folic acid,  Valium, lamotrigine, ziprasidone, gabapentin, and _____. SOCIAL HISTORY:  Smokes at least one pack of cigarettes a day. PHYSICAL EXAMINATION:  HEENT:  Tympanic membranes normal.  Nose patent. Small mucocele in the  left buccal mucosa on the right upper lip. NECK:  No neck adenopathy. CHEST:  Clear. CARDIAC:  No cardiac murmur. ABDOMEN:  No abdominal mass. IMPRESSION:  Lip, buccal mucosa mucocele. RECOMMENDATION:  Excision of right lip mucocele. Risks of bleeding,  infection, and recurrence. Risks, outcomes, options, and alternatives  discussed. The patient understands and wished to proceed.         Doroteo Byrne MD    D: 2021 7:08:05       T: 2021 10:39:46     [email or web address]  Job#: [id number]     Doc#: [id number]

## 2021-07-19 NOTE — PROGRESS NOTES
21  Ramon Crawford : 1970 Sex: female  Age: 46 y.o. Assessment and Plan:  Kaylah Sanchez was seen today for tailbone pain. Diagnoses and all orders for this visit:    Contusion of coccyx, initial encounter  -     XR SACRUM COCCYX (MIN 2 VIEWS); Future  -     ketorolac (TORADOL) 10 MG tablet; Take 1 tablet by mouth every 6 hours as needed for Pain    Is to recheck in 7 to 10 days if not improved. Return in about 1 week (around 2021). Chief Complaint   Patient presents with    Tailbone Pain       Fall. Happened 3 days ago, she landed on her tailbone area. Complained of pain stiffness in area with decreased range of motion. There is no radicular pain. Problem with bowel or bladder control. Review of Systems   Constitutional: Negative for appetite change, fatigue and unexpected weight change. HENT: Negative for congestion, ear pain, hearing loss, sinus pain, sore throat and trouble swallowing. Eyes: Negative for photophobia, pain, discharge and redness. Respiratory: Negative for cough, chest tightness and shortness of breath. Cardiovascular: Negative for chest pain, palpitations and leg swelling. Gastrointestinal: Negative for abdominal pain, blood in stool, diarrhea, nausea and vomiting. Endocrine: Negative. Genitourinary: Negative for dysuria, flank pain, frequency and hematuria. Musculoskeletal: Positive for back pain, joint swelling and myalgias. Negative for arthralgias. Lumbar     Skin: Negative. Allergic/Immunologic: Negative. Neurological: Negative for dizziness, seizures, syncope, weakness, light-headedness, numbness and headaches. Hematological: Negative for adenopathy. Does not bruise/bleed easily. Psychiatric/Behavioral: Negative.           Current Outpatient Medications:     ketorolac (TORADOL) 10 MG tablet, Take 1 tablet by mouth every 6 hours as needed for Pain, Disp: 20 tablet, Rfl: 0    desvenlafaxine succinate (PRISTIQ) 25 MG TB24 extended release tablet, take 1 tablet by mouth once daily, Disp: , Rfl:     gabapentin (NEURONTIN) 800 MG tablet, take 1 tablet by mouth three times a day, Disp: , Rfl:     rosuvastatin (CRESTOR) 20 MG tablet, take 1 tablet by mouth nightly ---90DAYS REQUEST, Disp: 90 tablet, Rfl: 1    nadolol (CORGARD) 20 MG tablet, take 1 tablet by mouth once daily, Disp: 30 tablet, Rfl: 1    baclofen (LIORESAL) 10 MG tablet, Take 1 tablet by mouth 3 times daily, Disp: 90 tablet, Rfl: 2    eletriptan (RELPAX) 40 MG tablet, Take 1 tablet by mouth once as needed (Headache) may repeat in 2 hours if necessary, Disp: 6 tablet, Rfl: 1    Docusate Calcium (STOOL SOFTENER PO), Take by mouth three times daily , Disp: , Rfl:     diazepam (VALIUM) 10 MG tablet, Take 10 mg by mouth every 8 hours. Instructed to take am of procedure if needed, Disp: , Rfl:     folic acid (FOLVITE) 1 MG tablet, Take 1 mg by mouth daily. , Disp: , Rfl:     lamoTRIgine (LAMICTAL) 200 MG tablet, Take 200 mg by mouth every evening , Disp: , Rfl:   Allergies   Allergen Reactions    Latex Rash    Sulfa Antibiotics Rash       Past Medical History:   Diagnosis Date    Anxiety     Arthritis     right hip    Bipolar 1 disorder ([facility] [address]     Cervical stenosis of spine     Chronic back pain     Depression     Hypertension     Irritable bowel syndrome     Migraines     Mucocele of lip     for surgery 7/21/21    Other hyperlipidemia 7/29/2019    PTSD (post-traumatic stress disorder)      Past Surgical History:   Procedure Laterality Date    ARTHROGRAPHY Right 1/3/2019    RIGHT HIP ARTHROGRAM AND STEROID INJECTION performed by Steve Fragoso MD at [address] [place] [address] Right 9/10/2020    RIGHT HIP INJECTION UNDER FLUOROSCOPY performed by Steve Fragoso MD at [address] [place] [address] Right 4/13/2021    RIGHT HIP INJECTION UNDER FLUOROSCOPY performed by Steve Fragoso MD at [address] [place] [address] N/A 2/3/2020    DILATATION AND CURETTAGE HYSTEROSCOPY performed by Yared Lei MD at [facility]  02/20/2019    implant for IBS     TONSILLECTOMY      TOOTH EXTRACTION      TUBAL LIGATION      TUMOR REMOVAL      in pancrease      No family history on file. Social History     Socioeconomic History    Marital status:      Spouse name: Not on file    Number of children: Not on file    Years of education: Not on file    Highest education level: Not on file   Occupational History    Not on file   Tobacco Use    Smoking status: Current Every Day Smoker     Packs/day: 1.00     Years: 30.00     Pack years: 30.00     Types: Cigarettes    Smokeless tobacco: Never Used   Vaping Use    Vaping Use: Some days    Substances: Flavoring   Substance and Sexual Activity    Alcohol use: Not Currently    Drug use: No    Sexual activity: Not Currently   Other Topics Concern    Not on file   Social History Narrative    Not on file     Social Determinants of Health     Financial Resource Strain: High Risk    Difficulty of Paying Living Expenses: Hard   Food Insecurity: Food Insecurity Present    Worried About Running Out of Food in the Last Year: Never true    Kaleb of Food in the Last Year: Sometimes true   Transportation Needs:     Lack of Transportation (Medical):      Lack of Transportation (Non-Medical):    Physical Activity:     Days of Exercise per Week:     Minutes of Exercise per Session:    Stress:     Feeling of Stress :    Social Connections:     Frequency of Communication with Friends and Family:     Frequency of Social Gatherings with Friends and Family:     Attends Gnosticist Services:     Active Member of Clubs or Organizations:     Attends Club or Organization Meetings:     Marital Status:    Intimate Partner Violence:     Fear of Current or Ex-Partner:     Emotionally Abused:     Physically Abused:     Sexually Abused:        Vitals:    07/19/21 0914   BP: 130/78   Pulse: 82   Temp: 97.7 °F (36.5 °C)   TempSrc: Temporal   SpO2: 98%   Weight: 108 lb (49 kg)   Height: 5' 2\" (1.575 m)       Physical Exam  Vitals and nursing note reviewed. Constitutional:       Appearance: She is well-developed. HENT:      Head: Atraumatic. Right Ear: External ear normal.      Left Ear: External ear normal.      Nose: Nose normal.      Mouth/Throat:      Pharynx: No oropharyngeal exudate. Eyes:      Conjunctiva/sclera: Conjunctivae normal.      Pupils: Pupils are equal, round, and reactive to light. Neck:      Thyroid: No thyromegaly. Trachea: No tracheal deviation. Cardiovascular:      Rate and Rhythm: Normal rate and regular rhythm. Heart sounds: No murmur heard. No friction rub. No gallop. Pulmonary:      Effort: Pulmonary effort is normal. No respiratory distress. Breath sounds: Normal breath sounds. Abdominal:      General: Bowel sounds are normal.      Palpations: Abdomen is soft. Musculoskeletal:         General: No tenderness or deformity. Normal range of motion. Cervical back: Normal range of motion and neck supple. Comments: Pain, stiffness, creased range of motion lumbar spine. There is no radiculopathy   Lymphadenopathy:      Cervical: No cervical adenopathy. Skin:     General: Skin is warm and dry. Capillary Refill: Capillary refill takes less than 2 seconds. Findings: No rash. Neurological:      Mental Status: She is alert and oriented to person, place, and time. Sensory: No sensory deficit. Motor: No abnormal muscle tone.       Coordination: Coordination normal.      Deep Tendon Reflexes: Reflexes normal.             Seen By:  Renato Andre DO

## 2021-07-19 NOTE — H&P
The H&P has been reviewed, the patient examined, and I concur with the findings of the H & P dated  7/19/2021. The risks, benefits, expected outcomes and alternatives to the recommended procedure have been discussed with pt and family and pt/family wish to proceed. A written consent sheet delineating INDICATIONS, ALTERNATIVES, ANESTHESIA COMPLICATIONS,SURGICAL COMPLICATIONS, AND GENERAL CONSIDERATIONS is present within our office chart and signed by the pt or their representative.

## 2021-07-20 ENCOUNTER — ANESTHESIA EVENT (OUTPATIENT)
Dept: OPERATING ROOM | Age: 51
End: 2021-07-20
Payer: MEDICARE

## 2021-07-21 ENCOUNTER — ANESTHESIA (OUTPATIENT)
Dept: OPERATING ROOM | Age: 51
End: 2021-07-21
Payer: MEDICARE

## 2021-07-21 ENCOUNTER — HOSPITAL ENCOUNTER (OUTPATIENT)
Age: 51
Setting detail: OUTPATIENT SURGERY
Discharge: HOME | End: 2021-07-21
Attending: OTOLARYNGOLOGY | Admitting: OTOLARYNGOLOGY
Payer: MEDICARE

## 2021-07-21 VITALS
RESPIRATION RATE: 19 BRPM | BODY MASS INDEX: 19.88 KG/M2 | DIASTOLIC BLOOD PRESSURE: 71 MMHG | WEIGHT: 108 LBS | HEIGHT: 62 IN | HEART RATE: 69 BPM | SYSTOLIC BLOOD PRESSURE: 125 MMHG | OXYGEN SATURATION: 96 % | TEMPERATURE: 97.9 F

## 2021-07-21 VITALS — DIASTOLIC BLOOD PRESSURE: 56 MMHG | OXYGEN SATURATION: 93 % | SYSTOLIC BLOOD PRESSURE: 99 MMHG

## 2021-07-21 DIAGNOSIS — K13.0 MUCOCELE OF LIP: Primary | ICD-10-CM

## 2021-07-21 PROCEDURE — 6360000002 HC RX W HCPCS

## 2021-07-21 PROCEDURE — 6370000000 HC RX 637 (ALT 250 FOR IP)

## 2021-07-21 PROCEDURE — 2500000003 HC RX 250 WO HCPCS: Performed by: OTOLARYNGOLOGY

## 2021-07-21 PROCEDURE — 7100000010 HC PHASE II RECOVERY - FIRST 15 MIN: Performed by: OTOLARYNGOLOGY

## 2021-07-21 PROCEDURE — 3700000000 HC ANESTHESIA ATTENDED CARE: Performed by: OTOLARYNGOLOGY

## 2021-07-21 PROCEDURE — 3600000002 HC SURGERY LEVEL 2 BASE: Performed by: OTOLARYNGOLOGY

## 2021-07-21 PROCEDURE — 3600000012 HC SURGERY LEVEL 2 ADDTL 15MIN: Performed by: OTOLARYNGOLOGY

## 2021-07-21 PROCEDURE — 3700000001 HC ADD 15 MINUTES (ANESTHESIA): Performed by: OTOLARYNGOLOGY

## 2021-07-21 PROCEDURE — 2709999900 HC NON-CHARGEABLE SUPPLY: Performed by: OTOLARYNGOLOGY

## 2021-07-21 PROCEDURE — 6370000000 HC RX 637 (ALT 250 FOR IP): Performed by: OTOLARYNGOLOGY

## 2021-07-21 PROCEDURE — 7100000011 HC PHASE II RECOVERY - ADDTL 15 MIN: Performed by: OTOLARYNGOLOGY

## 2021-07-21 PROCEDURE — 88304 TISSUE EXAM BY PATHOLOGIST: CPT

## 2021-07-21 PROCEDURE — 2580000003 HC RX 258

## 2021-07-21 RX ORDER — CLINDAMYCIN PHOSPHATE 600 MG/50ML
600 INJECTION INTRAVENOUS
Status: COMPLETED | OUTPATIENT
Start: 2021-07-21 | End: 2021-07-21

## 2021-07-21 RX ORDER — LIDOCAINE HYDROCHLORIDE AND EPINEPHRINE 10; 10 MG/ML; UG/ML
INJECTION, SOLUTION INFILTRATION; PERINEURAL PRN
Status: DISCONTINUED | OUTPATIENT
Start: 2021-07-21 | End: 2021-07-21 | Stop reason: ALTCHOICE

## 2021-07-21 RX ORDER — ONDANSETRON 2 MG/ML
4 INJECTION INTRAMUSCULAR; INTRAVENOUS EVERY 6 HOURS PRN
Status: CANCELLED | OUTPATIENT
Start: 2021-07-21

## 2021-07-21 RX ORDER — SODIUM CHLORIDE 0.9 % (FLUSH) 0.9 %
5-40 SYRINGE (ML) INJECTION EVERY 12 HOURS SCHEDULED
Status: DISCONTINUED | OUTPATIENT
Start: 2021-07-21 | End: 2021-07-21 | Stop reason: HOSPADM

## 2021-07-21 RX ORDER — SODIUM CHLORIDE, SODIUM LACTATE, POTASSIUM CHLORIDE, CALCIUM CHLORIDE 600; 310; 30; 20 MG/100ML; MG/100ML; MG/100ML; MG/100ML
INJECTION, SOLUTION INTRAVENOUS CONTINUOUS PRN
Status: DISCONTINUED | OUTPATIENT
Start: 2021-07-21 | End: 2021-07-21 | Stop reason: SDUPTHER

## 2021-07-21 RX ORDER — PROPOFOL 10 MG/ML
INJECTION, EMULSION INTRAVENOUS CONTINUOUS PRN
Status: DISCONTINUED | OUTPATIENT
Start: 2021-07-21 | End: 2021-07-21 | Stop reason: SDUPTHER

## 2021-07-21 RX ORDER — SODIUM CHLORIDE 9 MG/ML
25 INJECTION, SOLUTION INTRAVENOUS PRN
Status: DISCONTINUED | OUTPATIENT
Start: 2021-07-21 | End: 2021-07-21 | Stop reason: HOSPADM

## 2021-07-21 RX ORDER — HYDROCODONE BITARTRATE AND ACETAMINOPHEN 5; 325 MG/1; MG/1
1 TABLET ORAL EVERY 6 HOURS PRN
Status: DISCONTINUED | OUTPATIENT
Start: 2021-07-21 | End: 2021-07-21 | Stop reason: HOSPADM

## 2021-07-21 RX ORDER — MIDAZOLAM HYDROCHLORIDE 1 MG/ML
INJECTION INTRAMUSCULAR; INTRAVENOUS PRN
Status: DISCONTINUED | OUTPATIENT
Start: 2021-07-21 | End: 2021-07-21 | Stop reason: SDUPTHER

## 2021-07-21 RX ORDER — ACETAMINOPHEN 325 MG/1
650 TABLET ORAL EVERY 4 HOURS PRN
Status: CANCELLED | OUTPATIENT
Start: 2021-07-21

## 2021-07-21 RX ORDER — HYDROCODONE BITARTRATE AND ACETAMINOPHEN 5; 325 MG/1; MG/1
1 TABLET ORAL EVERY 6 HOURS PRN
Qty: 20 TABLET | Refills: 0 | Status: SHIPPED | OUTPATIENT
Start: 2021-07-21 | End: 2021-07-26

## 2021-07-21 RX ORDER — SODIUM CHLORIDE 0.9 % (FLUSH) 0.9 %
5-40 SYRINGE (ML) INJECTION PRN
Status: DISCONTINUED | OUTPATIENT
Start: 2021-07-21 | End: 2021-07-21 | Stop reason: HOSPADM

## 2021-07-21 RX ORDER — SODIUM CHLORIDE, SODIUM LACTATE, POTASSIUM CHLORIDE, CALCIUM CHLORIDE 600; 310; 30; 20 MG/100ML; MG/100ML; MG/100ML; MG/100ML
INJECTION, SOLUTION INTRAVENOUS CONTINUOUS
Status: DISCONTINUED | OUTPATIENT
Start: 2021-07-21 | End: 2021-07-21 | Stop reason: HOSPADM

## 2021-07-21 RX ORDER — FENTANYL CITRATE 50 UG/ML
INJECTION, SOLUTION INTRAMUSCULAR; INTRAVENOUS PRN
Status: DISCONTINUED | OUTPATIENT
Start: 2021-07-21 | End: 2021-07-21 | Stop reason: SDUPTHER

## 2021-07-21 RX ADMIN — HYDROCODONE BITARTRATE AND ACETAMINOPHEN 1 TABLET: 5; 325 TABLET ORAL at 08:27

## 2021-07-21 RX ADMIN — SODIUM CHLORIDE, POTASSIUM CHLORIDE, SODIUM LACTATE AND CALCIUM CHLORIDE: 600; 310; 30; 20 INJECTION, SOLUTION INTRAVENOUS at 07:20

## 2021-07-21 RX ADMIN — PROPOFOL 100 MCG/KG/MIN: 10 INJECTION, EMULSION INTRAVENOUS at 07:25

## 2021-07-21 RX ADMIN — MIDAZOLAM 2 MG: 1 INJECTION INTRAMUSCULAR; INTRAVENOUS at 07:20

## 2021-07-21 RX ADMIN — CLINDAMYCIN PHOSPHATE 600 MG: 600 INJECTION, SOLUTION INTRAVENOUS at 07:27

## 2021-07-21 RX ADMIN — FENTANYL CITRATE 100 MCG: 50 INJECTION, SOLUTION INTRAMUSCULAR; INTRAVENOUS at 07:25

## 2021-07-21 ASSESSMENT — LIFESTYLE VARIABLES: SMOKING_STATUS: 1

## 2021-07-21 ASSESSMENT — PAIN - FUNCTIONAL ASSESSMENT: PAIN_FUNCTIONAL_ASSESSMENT: 0-10

## 2021-07-21 ASSESSMENT — PAIN SCALES - GENERAL: PAINLEVEL_OUTOF10: 2

## 2021-07-21 NOTE — BRIEF OP NOTE
Brief Postoperative Note      Patient: Agatha Coleman  YOB: 1970  MRN: [id number]    Date of Procedure: 7/21/2021    Pre-Op Diagnosis: RIGHT LIP MUCOCELE    Post-Op Diagnosis: Same       Procedure(s):  EXCISION RIGHT LIP MUCOCELE    ++LATEX ALLERGY++    Surgeon(s):  Chadd Pascual MD    Assistant:  * No surgical staff found *    Anesthesia: Monitor Anesthesia Care    Estimated Blood Loss (mL): Minimal    Complications: None    Specimens:   ID Type Source Tests Collected by Time Destination   A : right lip mucocele Tissue Tissue SURGICAL PATHOLOGY Chadd Pascual MD 7/21/2021 1517        Implants:  * No implants in log *      Drains: * No LDAs found *    Findings: lip mucocele    Electronically signed by Chadd Pascual MD on 7/21/2021 at 7:46 AM

## 2021-07-21 NOTE — ANESTHESIA PRE PROCEDURE
Department of Anesthesiology  Preprocedure Note       Name:  Tasha Aguilera   Age:  46 y.o.  :  1970                                          MRN:  [id number]         Date:  2021      Surgeon: Hussein Moreno):  Trish Horton MD    Procedure: EXCISION RIGHT LIP MUCOCELE    ++LATEX ALLERGY++ (Right )    Medications prior to admission:   Prior to Admission medications    Medication Sig Start Date End Date Taking? Authorizing Provider   ketorolac (TORADOL) 10 MG tablet Take 1 tablet by mouth every 6 hours as needed for Pain 21 Yes [place] SHANE Persaud DO   desvenlafaxine succinate (PRISTIQ) 25 MG TB24 extended release tablet take 1 tablet by mouth once daily 21  Yes Historical Provider, MD   gabapentin (NEURONTIN) 800 MG tablet take 1 tablet by mouth three times a day 21  Yes Historical Provider, MD   rosuvastatin (CRESTOR) 20 MG tablet take 1 tablet by mouth nightly ---90DAYS REQUEST 21  Yes [place] SHANE Persaud DO   nadolol (CORGARD) 20 MG tablet take 1 tablet by mouth once daily 21  Yes [place] SHANE Persaud DO   baclofen (LIORESAL) 10 MG tablet Take 1 tablet by mouth 3 times daily 21  Yes Oscodachristen Persaud DO   eletriptan (RELPAX) 40 MG tablet Take 1 tablet by mouth once as needed (Headache) may repeat in 2 hours if necessary 4/15/21 7/19/21 Yes [place] SHANE Persaud DO   Docusate Calcium (STOOL SOFTENER PO) Take by mouth three times daily    Yes Historical Provider, MD   diazepam (VALIUM) 10 MG tablet Take 10 mg by mouth every 8 hours. Instructed to take am of procedure if needed   Yes Historical Provider, MD   folic acid (FOLVITE) 1 MG tablet Take 1 mg by mouth daily.    Yes Historical Provider, MD   lamoTRIgine (LAMICTAL) 200 MG tablet Take 200 mg by mouth every evening    Yes Historical Provider, MD       Current medications:    Current Facility-Administered Medications   Medication Dose Route Frequency Provider Last Rate Last Admin    lactated ringers infusion   Intravenous Continuous Dhara Perez MD        sodium chloride flush 0.9 % injection 5-40 mL  5-40 mL Intravenous 2 times per day Dhara Perez MD        sodium chloride flush 0.9 % injection 5-40 mL  5-40 mL Intravenous PRN Dhara Perez MD        0.9 % sodium chloride infusion  25 mL Intravenous PRN Dhara Perez MD        clindamycin (CLEOCIN) 600 mg in dextrose 5 % 50 mL IVPB  600 mg Intravenous On Call to [address] [place] [address] Avenue, MD           Allergies:     Allergies   Allergen Reactions    Latex Rash    Sulfa Antibiotics Rash       Problem List:    Patient Active Problem List   Diagnosis Code    Right hip pain M25.551    Anxiety F41.9    Recurrent major depression in partial remission ([facility] [address] F33.41    New daily persistent headache G44.52    Tobacco use disorder F17.200    Mixed hyperlipidemia E78.2    History of migraine Z86.69    Frequency of micturition R35.0    Right hip joint effusion M25.451    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       Past Medical History:        Diagnosis Date    Anxiety     Arthritis     right hip    Bipolar 1 disorder ([facility] [address]     Cervical stenosis of spine     Chronic back pain     Depression     Hypertension     Irritable bowel syndrome     Migraines     Mucocele of lip     for surgery 7/21/21    Other hyperlipidemia 7/29/2019    PTSD (post-traumatic stress disorder)        Past Surgical History:        Procedure Laterality Date    ARTHROGRAPHY Right 1/3/2019    RIGHT HIP ARTHROGRAM AND STEROID INJECTION performed by Estrella Hancock MD at [address] [place] [address] Right 9/10/2020    RIGHT HIP INJECTION UNDER FLUOROSCOPY performed by Estrella Hancock MD at [address] [place] [address] Right 4/13/2021    RIGHT HIP INJECTION UNDER FLUOROSCOPY performed by Estrella Hancock MD at [facility] N/A 2/3/2020    DILATATION AND CURETTAGE HYSTEROSCOPY performed by Chely Foster MD at [address] [place] [address]  02/20/2019 implant for IBS     TONSILLECTOMY      TOOTH EXTRACTION      TUBAL LIGATION      TUMOR REMOVAL      in pancrease        Social History:    Social History     Tobacco Use    Smoking status: Current Every Day Smoker     Packs/day: 1.00     Years: 30.00     Pack years: 30.00     Types: Cigarettes    Smokeless tobacco: Never Used   Substance Use Topics    Alcohol use: Not Currently                                Ready to quit: Not Answered  Counseling given: Not Answered      Vital Signs (Current):   Vitals:    07/15/21 1427 07/21/21 0549   BP:  122/88   Pulse:  81   Resp:  16   Temp:  36.6 °C (97.9 °F)   TempSrc:  Temporal   SpO2:  98%   Weight: 110 lb (49.9 kg) 108 lb (49 kg)   Height: 5' 2\" (1.575 m) 5' 2\" (1.575 m)                                              BP Readings from Last 3 Encounters:   07/21/21 122/88   07/19/21 130/78   06/18/21 118/82       NPO Status: Time of last liquid consumption: 2100                        Time of last solid consumption: 2100                        Date of last liquid consumption: 07/20/21                        Date of last solid food consumption: 07/20/21    BMI:   Wt Readings from Last 3 Encounters:   07/21/21 108 lb (49 kg)   07/19/21 108 lb (49 kg)   06/18/21 111 lb 8 oz (50.6 kg)     Body mass index is 19.75 kg/m².     CBC:   Lab Results   Component Value Date    WBC 12.0 03/21/2021    RBC 4.53 03/21/2021    HGB 13.9 03/21/2021    HCT 41.6 03/21/2021    MCV 91.8 03/21/2021    RDW 13.0 03/21/2021     03/21/2021       CMP:   Lab Results   Component Value Date     03/21/2021    K 3.8 03/21/2021    K 3.8 12/18/2020     03/21/2021    CO2 27 03/21/2021    BUN 11 03/21/2021    CREATININE 0.5 03/21/2021    GFRAA >60 03/21/2021    LABGLOM >60 03/21/2021    GLUCOSE 107 03/21/2021    PROT 7.1 03/21/2021    CALCIUM 9.8 03/21/2021    BILITOT 0.2 03/21/2021    ALKPHOS 65 03/21/2021    AST 23 03/21/2021    ALT 9 03/21/2021       POC Tests: No results for input(s): POCGLU, POCNA, POCK, POCCL, POCBUN, POCHEMO, POCHCT in the last 72 hours. Coags: No results found for: PROTIME, INR, APTT    HCG (If Applicable):   Lab Results   Component Value Date    PREGTESTUR NEGATIVE 02/03/2020        ABGs: No results found for: PHART, PO2ART, VQZ3XXQ, FXP3TSI, BEART, I7GXYKFY     Type & Screen (If Applicable):  No results found for: LABABO, [address]    Anesthesia Evaluation  Patient summary reviewed and Nursing notes reviewed no history of anesthetic complications:   Airway: Mallampati: II  TM distance: >3 FB   Neck ROM: full  Mouth opening: > = 3 FB Dental:      Comment: Patient denies any loose or chipped teeth. Pulmonary: breath sounds clear to auscultation  (+) current smoker          Patient did not smoke on day of surgery. Cardiovascular:    (+) hypertension: moderate,       ECG reviewed  Rhythm: regular  Rate: normal           Beta Blocker:  Dose within 24 Hrs      ROS comment: EKG 12/18/2020    Normal sinus rhythm  Normal ECG  No previous ECGs available  Confirmed by Gabby Jansen ([id number]) on 12/19/2020 6:07:46 AM     Neuro/Psych:   (+) headaches: migraine headaches, psychiatric history:depression/anxiety              ROS comment: Anxiety    Bipolar 1 disorder (HCC)   PTSD (post-traumatic stress disorder)        GI/Hepatic/Renal:   (+) GERD: well controlled,          ROS comment: Abnormal delay in gastric emptying. Endo/Other:    (+) : arthritis: OA., .                  ROS comment: Small mucocele in the left buccal mucosa on the right upper lip. Abdominal:         (-) obese       Vascular: negative vascular ROS. Other Findings:             Anesthesia Plan      MAC     ASA 3       Induction: intravenous. MIPS: Postoperative opioids intended and Prophylactic antiemetics administered. Anesthetic plan and risks discussed with patient and spouse. Use of blood products discussed with patient whom consented to blood products.    Plan discussed with attending and CRNA. Attending anesthesiologist reviewed and agrees with Preprocedure content      Vonna Gosselin, RN   7/21/2021    DOS STAFF ADDENDUM:    Pt seen and examined, chart reviewed (including anesthesia, drug and allergy history). Anesthetic plan, risks, benefits, alternatives, and personnel involved discussed with patient. Patient verbalized an understanding and agrees to proceed. Plan discussed with care team members and agreed upon.     Neymar Marrufo MD  Staff Anesthesiologist  6:42 AM

## 2021-07-21 NOTE — ANESTHESIA POSTPROCEDURE EVALUATION
Department of Anesthesiology  Postprocedure Note    Patient: Rainelle Boast  MRN: [id number]  YOB: 1970  Date of evaluation: 7/21/2021  Time:  8:01 AM     Procedure Summary     Date: 07/21/21 Room / Location: [facility] 01 / [facility]    Anesthesia Start: 6592 Anesthesia Stop: 8263    Procedure: EXCISION RIGHT LIP MUCOCELE    ++LATEX ALLERGY++ (Right ) Diagnosis: (RIGHT LIP MUCOCELE)    Surgeons: Pamela Skinner MD Responsible Provider: Yusuf Rizo MD    Anesthesia Type: MAC ASA Status: 3          Anesthesia Type: MAC    Vito Phase I: Vito Score: 10    Vito Phase II:      Last vitals: Reviewed and per EMR flowsheets.        Anesthesia Post Evaluation    Patient location during evaluation: PACU  Patient participation: complete - patient participated  Level of consciousness: awake and alert  Airway patency: patent  Nausea & Vomiting: no vomiting and no nausea  Complications: no  Cardiovascular status: hemodynamically stable  Respiratory status: acceptable  Hydration status: stable

## 2021-07-21 NOTE — ADDENDUM NOTE
Addendum  created 07/21/21 1243 by Sravan Godinez MD    Attestation recorded in [facility] [place], [address] [facility] accepted, Intraprocedure Attestations deleted, [address] filed

## 2021-07-21 NOTE — OP NOTE
[address] [address]                                OPERATIVE REPORT    PATIENT NAME: Felix Cummins                   :        1970  MED REC NO:   [id number]                            ROOM:  ACCOUNT NO:   [de-identified]                           ADMIT DATE: 2021  PROVIDER:     Paramjit Amato MD    DATE OF PROCEDURE:  2021    PREOPERATIVE DIAGNOSIS:  Lip mucocele. POSTOPERATIVE DIAGNOSIS:  Lip mucocele. PROCEDURE PERFORMED:  Excision of right upper lip mucocele. SURGEON:  Paramjit Amato MD.    ASSISTANT:  None. ANESTHESIA:  Local with IV sedation. BLOOD LOSS:  Less than 5 mL. COMPLICATIONS:  None. INDICATION:  Right lip mucocele. DESCRIPTION OF PROCEDURE:  The patient was brought to the operating room  and placed in the supine position. IV sedation was given by Anesthesia  staff in attendance. 1 mL of 1% Xylocaine with 1:100,000 epinephrine  was infiltrated around the right upper lip mucocele. The area was  draped and prepped in usual fashion using Betadine solution. An  elliptical incision was carried out around the lesion down to the  submucosa. The lesion was mobilized. Spot cauterization of superficial  bleeders carried out. The deep layer of the lip was closed using 4-0  interrupted chromic followed by 4-0 interrupted chromics to the mucosa  and submucosa. Hemostasis was achieved. The patient was thoroughly  suctioned out. Awakened and returned to the recovery room in stable  condition. No intraoperative complications.         Jeni Monzon MD    D: 2021 8:23:23       T: 2021 11:43:06     [email or web address]  Job#: [id number]     Doc#: [id number]    CC:

## 2021-07-23 NOTE — TELEPHONE ENCOUNTER
Pt states she no longer sees the provider who had previously prescribed.     Last Appointment:  7/19/2021  Future Appointments   Date Time Provider Woodrow Ji   8/10/2021  9:50 AM Clare Boykin MD [facility]   6/3/2022 11:30 AM FRENCH Mcclure - CNP [facility]

## 2021-07-24 RX ORDER — GABAPENTIN 800 MG/1
TABLET ORAL
Qty: 90 TABLET | Refills: 0 | Status: SHIPPED
Start: 2021-07-24 | End: 2021-08-02

## 2021-07-30 ENCOUNTER — NURSE TRIAGE (OUTPATIENT)
Dept: OTHER | Facility: CLINIC | Age: 51
End: 2021-07-30

## 2021-07-30 ENCOUNTER — OFFICE VISIT (OUTPATIENT)
Dept: FAMILY MEDICINE CLINIC | Age: 51
End: 2021-07-30
Payer: MEDICARE

## 2021-07-30 VITALS
SYSTOLIC BLOOD PRESSURE: 120 MMHG | WEIGHT: 106 LBS | OXYGEN SATURATION: 95 % | TEMPERATURE: 98.8 F | BODY MASS INDEX: 19.39 KG/M2 | HEART RATE: 80 BPM | DIASTOLIC BLOOD PRESSURE: 80 MMHG

## 2021-07-30 DIAGNOSIS — M54.50 CHRONIC LOW BACK PAIN WITHOUT SCIATICA, UNSPECIFIED BACK PAIN LATERALITY: Primary | ICD-10-CM

## 2021-07-30 DIAGNOSIS — G89.29 CHRONIC LOW BACK PAIN WITHOUT SCIATICA, UNSPECIFIED BACK PAIN LATERALITY: Primary | ICD-10-CM

## 2021-07-30 PROCEDURE — 3017F COLORECTAL CA SCREEN DOC REV: CPT | Performed by: INTERNAL MEDICINE

## 2021-07-30 PROCEDURE — 99213 OFFICE O/P EST LOW 20 MIN: CPT | Performed by: INTERNAL MEDICINE

## 2021-07-30 PROCEDURE — G8427 DOCREV CUR MEDS BY ELIG CLIN: HCPCS | Performed by: INTERNAL MEDICINE

## 2021-07-30 PROCEDURE — G8420 CALC BMI NORM PARAMETERS: HCPCS | Performed by: INTERNAL MEDICINE

## 2021-07-30 PROCEDURE — 4004F PT TOBACCO SCREEN RCVD TLK: CPT | Performed by: INTERNAL MEDICINE

## 2021-07-30 PROCEDURE — 96372 THER/PROPH/DIAG INJ SC/IM: CPT | Performed by: INTERNAL MEDICINE

## 2021-07-30 RX ORDER — PREDNISONE 10 MG/1
TABLET ORAL
Qty: 12 TABLET | Refills: 0 | Status: SHIPPED | OUTPATIENT
Start: 2021-07-30 | End: 2021-08-05

## 2021-07-30 RX ORDER — KETOROLAC TROMETHAMINE 30 MG/ML
30 INJECTION, SOLUTION INTRAMUSCULAR; INTRAVENOUS ONCE
Status: COMPLETED | OUTPATIENT
Start: 2021-07-30 | End: 2021-07-30

## 2021-07-30 RX ADMIN — KETOROLAC TROMETHAMINE 30 MG: 30 INJECTION, SOLUTION INTRAMUSCULAR; INTRAVENOUS at 13:48

## 2021-07-30 NOTE — TELEPHONE ENCOUNTER
Received call from [address] at [facility] with Red Flag Complaint. Brief description of triage: patient calling with c/o severe back pain. See below assessment. Triage indicates for patient to see PCP today in office, advised patient if unable to get an appointment in the suggested time frame to go to an [facility] or ED, patient agreeable. Care advice provided, patient verbalizes understanding; denies any other questions or concerns; instructed to call back for any new or worsening symptoms. Writer provided warm transfer to [facility] at [facility] for appointment scheduling. Attention Provider: Thank you for allowing me to participate in the care of your patient. The patient was connected to triage in response to information provided to the [facility]. Please do not respond through this encounter as the response is not directed to a shared pool. Reason for Disposition   SEVERE back pain (e.g., excruciating, unable to do any normal activities) and not improved after pain medicine and CARE ADVICE    Answer Assessment - Initial Assessment Questions  1. ONSET: \"When did the pain begin? \"       A couple months ago    2. LOCATION: \"Where does it hurt? \" (upper, mid or lower back)      Lower    3. SEVERITY: \"How bad is the pain? \"  (e.g., Scale 1-10; mild, moderate, or severe)    - MILD (1-3): doesn't interfere with normal activities     - MODERATE (4-7): interferes with normal activities or awakens from sleep     - SEVERE (8-10): excruciating pain, unable to do any normal activities       9/10    4. PATTERN: \"Is the pain constant? \" (e.g., yes, no; constant, intermittent)       Constant for a couple weeks, getting worse    5. RADIATION: \"Does the pain shoot into your legs or elsewhere? \"      Every once and while it radiates into her thighs when sitting    6. CAUSE:  \"What do you think is causing the back pain? \"       Arthritis getting worse    7.  BACK OVERUSE:  Krystina Stapleton recent lifting of heavy objects, strenuous work or exercise? \"      Denies    8. MEDICATIONS: \"What have you taken so far for the pain? \" (e.g., nothing, acetaminophen, NSAIDS)      Unable to take anything OTC and unable to take narcotics, heat helps when it is applied but the pain comes right back when she takes it off    9. NEUROLOGIC SYMPTOMS: \"Do you have any weakness, numbness, or problems with bowel/bladder control? \"      Denies    10. OTHER SYMPTOMS: \"Do you have any other symptoms? \" (e.g., fever, abdominal pain, burning with urination, blood in urine)        Denies    11. PREGNANCY: \"Is there any chance you are pregnant? \" (e.g., yes, no; LMP)        Denies    Protocols used: BACK PAIN-ADULT-[place]

## 2021-07-31 ASSESSMENT — ENCOUNTER SYMPTOMS: BACK PAIN: 1

## 2021-08-02 ENCOUNTER — TELEPHONE (OUTPATIENT)
Dept: FAMILY MEDICINE CLINIC | Age: 51
End: 2021-08-02

## 2021-08-02 ENCOUNTER — OFFICE VISIT (OUTPATIENT)
Dept: FAMILY MEDICINE CLINIC | Age: 51
End: 2021-08-02
Payer: MEDICARE

## 2021-08-02 VITALS
RESPIRATION RATE: 17 BRPM | TEMPERATURE: 98.2 F | WEIGHT: 111 LBS | HEART RATE: 82 BPM | BODY MASS INDEX: 20.43 KG/M2 | SYSTOLIC BLOOD PRESSURE: 100 MMHG | OXYGEN SATURATION: 98 % | HEIGHT: 62 IN | DIASTOLIC BLOOD PRESSURE: 72 MMHG

## 2021-08-02 DIAGNOSIS — F33.41 RECURRENT MAJOR DEPRESSION IN PARTIAL REMISSION (HCC): ICD-10-CM

## 2021-08-02 DIAGNOSIS — F41.9 ANXIETY: ICD-10-CM

## 2021-08-02 DIAGNOSIS — G89.29 CHRONIC LOW BACK PAIN WITHOUT SCIATICA, UNSPECIFIED BACK PAIN LATERALITY: ICD-10-CM

## 2021-08-02 DIAGNOSIS — M54.50 CHRONIC LOW BACK PAIN WITHOUT SCIATICA, UNSPECIFIED BACK PAIN LATERALITY: ICD-10-CM

## 2021-08-02 DIAGNOSIS — Z86.69 HISTORY OF MIGRAINE: Primary | Chronic | ICD-10-CM

## 2021-08-02 PROCEDURE — 3017F COLORECTAL CA SCREEN DOC REV: CPT | Performed by: FAMILY MEDICINE

## 2021-08-02 PROCEDURE — 99214 OFFICE O/P EST MOD 30 MIN: CPT | Performed by: FAMILY MEDICINE

## 2021-08-02 PROCEDURE — 4004F PT TOBACCO SCREEN RCVD TLK: CPT | Performed by: FAMILY MEDICINE

## 2021-08-02 PROCEDURE — G8427 DOCREV CUR MEDS BY ELIG CLIN: HCPCS | Performed by: FAMILY MEDICINE

## 2021-08-02 PROCEDURE — G8420 CALC BMI NORM PARAMETERS: HCPCS | Performed by: FAMILY MEDICINE

## 2021-08-02 RX ORDER — NAPROXEN 375 MG/1
375 TABLET ORAL DAILY
Qty: 30 TABLET | Refills: 2 | Status: SHIPPED
Start: 2021-08-02 | End: 2021-09-03 | Stop reason: SINTOL

## 2021-08-02 RX ORDER — SUMATRIPTAN 50 MG/1
50 TABLET, FILM COATED ORAL
Qty: 9 TABLET | Refills: 0 | Status: SHIPPED
Start: 2021-08-02 | End: 2021-09-03 | Stop reason: SDUPTHER

## 2021-08-02 ASSESSMENT — ENCOUNTER SYMPTOMS
BACK PAIN: 1
COUGH: 0
SINUS PAIN: 0
PHOTOPHOBIA: 0
EYE PAIN: 0
BLOOD IN STOOL: 0
TROUBLE SWALLOWING: 0
ALLERGIC/IMMUNOLOGIC NEGATIVE: 1
ABDOMINAL PAIN: 0
EYE DISCHARGE: 0
NAUSEA: 0
CHEST TIGHTNESS: 0
SORE THROAT: 0
DIARRHEA: 0
SHORTNESS OF BREATH: 0
EYE REDNESS: 0
VOMITING: 0

## 2021-08-02 NOTE — PROGRESS NOTES
21  Agatha Jared : 1970 Sex: female  Age: 46 y.o. Assessment and Plan:  Crow Lynn was seen today for lower back pain. Diagnoses and all orders for this visit:    History of migraine  -     SUMAtriptan (IMITREX) 50 MG tablet; Take 1 tablet by mouth once as needed for Migraine  Relpax ineffective, will make a change. Also Naprosyn daily may have a positive effect on the headaches as well. Anxiety  Stble at this time. Chronic low back pain without sciatica, unspecified back pain laterality  -     naproxen (NAPROSYN) 375 MG tablet; Take 1 tablet by mouth daily  She is to complete the prednisone taper and then may start on Naprosyn. Recurrent major depression in partial remission ([address]  This is relatively stable. Return in about 4 weeks (around 2021). Chief Complaint   Patient presents with    Lower Back Pain       Patient presents for recheck from express visit last week. Was seen for lower back pain. Is an ongoing and chronic problem for her has been seen at the [facility] clinic, and numerous pain aches of the around the area. Had epidurals and facet injections in the past.  Denied precipitating event with back pain. Was placed on prednisone and he has improved at least 50%. Feels like her migraine rescue medication is not effective anymore. Relpax. Also like to have her gabapentin weaned, currently on 800 mg 3 times a day for headaches and joint aches and pains. Will gently wean this over the course of month. She has had physical therapy in the past which she feels helps for her lower back and joint pain. Will put in referral for another course of physical therapy. Review of Systems   Constitutional: Negative for appetite change, fatigue and unexpected weight change. HENT: Negative for congestion, ear pain, hearing loss, sinus pain, sore throat and trouble swallowing. Eyes: Negative for photophobia, pain, discharge and redness.    Respiratory: Negative for cough, chest tightness and shortness of breath. Cardiovascular: Negative for chest pain, palpitations and leg swelling. Gastrointestinal: Negative for abdominal pain, blood in stool, diarrhea, nausea and vomiting. Endocrine: Negative. Genitourinary: Negative for dysuria, flank pain, frequency and hematuria. Musculoskeletal: Positive for arthralgias, back pain, gait problem and myalgias. Negative for joint swelling. Lumbar   Skin: Negative. Allergic/Immunologic: Negative. Neurological: Negative for dizziness, seizures, syncope, weakness, light-headedness, numbness and headaches. Hematological: Negative for adenopathy. Does not bruise/bleed easily. Psychiatric/Behavioral: Negative. Current Outpatient Medications:     SUMAtriptan (IMITREX) 50 MG tablet, Take 1 tablet by mouth once as needed for Migraine, Disp: 9 tablet, Rfl: 0    naproxen (NAPROSYN) 375 MG tablet, Take 1 tablet by mouth daily, Disp: 30 tablet, Rfl: 2    predniSONE (DELTASONE) 10 MG tablet, Take 3 tablets by mouth daily for 2 days, THEN 2 tablets daily for 2 days, THEN 1 tablet daily for 2 days. , Disp: 12 tablet, Rfl: 0    desvenlafaxine succinate (PRISTIQ) 25 MG TB24 extended release tablet, take 1 tablet by mouth once daily, Disp: , Rfl:     rosuvastatin (CRESTOR) 20 MG tablet, take 1 tablet by mouth nightly ---90DAYS REQUEST, Disp: 90 tablet, Rfl: 1    nadolol (CORGARD) 20 MG tablet, take 1 tablet by mouth once daily, Disp: 30 tablet, Rfl: 1    baclofen (LIORESAL) 10 MG tablet, Take 1 tablet by mouth 3 times daily, Disp: 90 tablet, Rfl: 2    Docusate Calcium (STOOL SOFTENER PO), Take by mouth three times daily , Disp: , Rfl:     diazepam (VALIUM) 10 MG tablet, Take 10 mg by mouth every 8 hours. Instructed to take am of procedure if needed, Disp: , Rfl:     folic acid (FOLVITE) 1 MG tablet, Take 1 mg by mouth daily. , Disp: , Rfl:     lamoTRIgine (LAMICTAL) 200 MG tablet, Take 200 mg by mouth every evening , Disp: , Rfl:     ketorolac (TORADOL) 10 MG tablet, Take 1 tablet by mouth every 6 hours as needed for Pain (Patient not taking: Reported on 7/30/2021), Disp: 20 tablet, Rfl: 0  Allergies   Allergen Reactions    Latex Rash    Sulfa Antibiotics Rash       Past Medical History:   Diagnosis Date    Anxiety     Arthritis     right hip    Bipolar 1 disorder ([address]     Cervical stenosis of spine     Chronic back pain     Depression     Hypertension     Irritable bowel syndrome     Migraines     Mucocele of lip     for surgery 7/21/21    Other hyperlipidemia 7/29/2019    PTSD (post-traumatic stress disorder)      Past Surgical History:   Procedure Laterality Date    ARTHROGRAPHY Right 1/3/2019    RIGHT HIP ARTHROGRAM AND STEROID INJECTION performed by Nae Hager MD at [address] [place] [address] Right 9/10/2020    RIGHT HIP INJECTION UNDER FLUOROSCOPY performed by Nae Hager MD at [address] [place] [address] Right 4/13/2021    RIGHT HIP INJECTION UNDER FLUOROSCOPY performed by Nae Hager MD at [address] [place] [address] N/A 2/3/2020    DILATATION AND CURETTAGE HYSTEROSCOPY performed by Sara García MD at [facility] Right 7/21/2021    EXCISION RIGHT LIP MUCOCELE performed by Satinder Jackson MD at [address] [place] [address] Rd  02/20/2019    implant for IBS     TONSILLECTOMY      TOOTH EXTRACTION      TUBAL LIGATION      TUMOR REMOVAL      in pancrease      No family history on file.   Social History     Socioeconomic History    Marital status:      Spouse name: Not on file    Number of children: Not on file    Years of education: Not on file    Highest education level: Not on file   Occupational History    Not on file   Tobacco Use    Smoking status: Current Every Day Smoker     Packs/day: 1.00     Years: 30.00     Pack years: 30.00     Types: Cigarettes    Smokeless tobacco: Never Used Vaping Use    Vaping Use: Some days    Substances: Flavoring   Substance and Sexual Activity    Alcohol use: Not Currently    Drug use: No    Sexual activity: Not Currently   Other Topics Concern    Not on file   Social History Narrative    Not on file     Social Determinants of Health     Financial Resource Strain: High Risk    Difficulty of Paying Living Expenses: Hard   Food Insecurity: Food Insecurity Present    Worried About Running Out of Food in the Last Year: Never true    Kaleb of Food in the Last Year: Sometimes true   Transportation Needs:     Lack of Transportation (Medical):      Lack of Transportation (Non-Medical):    Physical Activity:     Days of Exercise per Week:     Minutes of Exercise per Session:    Stress:     Feeling of Stress :    Social Connections:     Frequency of Communication with Friends and Family:     Frequency of Social Gatherings with Friends and Family:     Attends Mandaen Services:     Active Member of Clubs or Organizations:     Attends Club or Organization Meetings:     Marital Status:    Intimate Partner Violence:     Fear of Current or Ex-Partner:     Emotionally Abused:     Physically Abused:     Sexually Abused:        Vitals:    08/02/21 1509   BP: 100/72   Pulse: 82   Resp: 17   Temp: 98.2 °F (36.8 °C)   TempSrc: Temporal   SpO2: 98%   Weight: 111 lb (50.3 kg)   Height: 5' 2\" (1.575 m)       Physical Exam        Seen By:  Rut Sher DO

## 2021-08-02 NOTE — TELEPHONE ENCOUNTER
----- Message from Doc Breaux sent at 7/30/2021  2:20 PM EDT -----  Subject: Refill Request    QUESTIONS  Name of Medication? ketorolac (TORADOL) 10 MG tablet  Patient-reported dosage and instructions? 10 mg tablet  How many days do you have left? 0  Preferred Pharmacy? [facility] Information phone number (if available)? [phone]  ---------------------------------------------------------------------------  --------------  CALL BACK INFO  What is the best way for the office to contact you? OK to leave message on   voicemail  Preferred Call Back Phone Number?  [phone]

## 2021-08-02 NOTE — TELEPHONE ENCOUNTER
----- Message from Aleks Pop sent at 7/30/2021  2:20 PM EDT -----  Subject: Refill Request    QUESTIONS  Name of Medication? ketorolac (TORADOL) 10 MG tablet  Patient-reported dosage and instructions? 10 mg tablet  How many days do you have left? 0  Preferred Pharmacy? [facility] phone number (if available)? [phone]  ---------------------------------------------------------------------------  --------------  CALL BACK INFO  What is the best way for the office to contact you? OK to leave message on   voicemail  Preferred Call Back Phone Number?  [phone]

## 2021-08-03 ENCOUNTER — TELEPHONE (OUTPATIENT)
Dept: FAMILY MEDICINE CLINIC | Age: 51
End: 2021-08-03

## 2021-08-03 DIAGNOSIS — G89.29 CHRONIC LOW BACK PAIN WITHOUT SCIATICA, UNSPECIFIED BACK PAIN LATERALITY: Primary | ICD-10-CM

## 2021-08-03 DIAGNOSIS — M54.50 CHRONIC LOW BACK PAIN WITHOUT SCIATICA, UNSPECIFIED BACK PAIN LATERALITY: Primary | ICD-10-CM

## 2021-08-03 NOTE — TELEPHONE ENCOUNTER
Pt calling to confirm it is okay to take the naproxen. She was reading side effects and it mentioned headaches. She states she alrady has issues with headaches and not sure if this is still okay to take. She also is wanting to clarify that the sumatriptan is different than the eletriptan since the eletriptan did not help her last time at all. She is asking if theres something that will help bother her migraines and arthritis? Please advise.

## 2021-08-10 ENCOUNTER — OFFICE VISIT (OUTPATIENT)
Dept: ORTHOPEDIC SURGERY | Age: 51
End: 2021-08-10
Payer: MEDICARE

## 2021-08-10 ENCOUNTER — TELEPHONE (OUTPATIENT)
Dept: FAMILY MEDICINE CLINIC | Age: 51
End: 2021-08-10

## 2021-08-10 VITALS — WEIGHT: 106 LBS | HEIGHT: 62 IN | BODY MASS INDEX: 19.51 KG/M2 | RESPIRATION RATE: 18 BRPM

## 2021-08-10 DIAGNOSIS — M47.812 SPONDYLOSIS OF CERVICAL REGION WITHOUT MYELOPATHY OR RADICULOPATHY: Primary | ICD-10-CM

## 2021-08-10 DIAGNOSIS — M25.551 RIGHT HIP PAIN: Primary | ICD-10-CM

## 2021-08-10 PROCEDURE — 3017F COLORECTAL CA SCREEN DOC REV: CPT | Performed by: ORTHOPAEDIC SURGERY

## 2021-08-10 PROCEDURE — 99213 OFFICE O/P EST LOW 20 MIN: CPT | Performed by: ORTHOPAEDIC SURGERY

## 2021-08-10 PROCEDURE — 4004F PT TOBACCO SCREEN RCVD TLK: CPT | Performed by: ORTHOPAEDIC SURGERY

## 2021-08-10 PROCEDURE — G8420 CALC BMI NORM PARAMETERS: HCPCS | Performed by: ORTHOPAEDIC SURGERY

## 2021-08-10 PROCEDURE — G8427 DOCREV CUR MEDS BY ELIG CLIN: HCPCS | Performed by: ORTHOPAEDIC SURGERY

## 2021-08-10 NOTE — TELEPHONE ENCOUNTER
Glenna Miller calling about [facility] in [facility]. She is seeing them already for therapy on her back. They told her that she needs some therapy on her neck as well. Please put in an order for physical therapy for her neck to send to them this morning.

## 2021-08-10 NOTE — PROGRESS NOTES
Follow Up Visit     Rae Barrientos returns today for follow up visit regarding right hip injection under fluro 4/6/21. She continues to have good relief from her pain. Not having any pain today      REVIEW OF SYSTEMS:     Constitutional:  Negative for weight loss, fevers, chills, fatigue  Cardiovascular: Negative for chest pain, palpitations  Pulmonary: Negative for shortness of breath, labored breathing, cough  GI: negative for abdominal pain, nausea, vomitting   MSK: per HPI  Skin: negative for rash, open wounds    All other systems reviewed and are negative       Physical Exam:     No data recorded    General: Alert and oriented x3, no acute distress  Cardiovascular/pulmonary: No labored breathing, peripheral perfusion intact  Musculoskeletal:    Exam of the hip shows good range of motion without pain. Leg lengths are equivalent. No tenderness laterally    Controlled Substances Monitoring:      Imaging:  No new images. Previous images reviewed      Assessment: Right hip osteoarthritis which has improved with intra-articular injection      Plan:   We discussed her hip today. She continues to do well after injection. We will continue to monitor.   She will follow-up as needed    Marti Celis MD  Orthopaedic Surgery   8/10/21  10:00 AM

## 2021-08-25 ENCOUNTER — TELEPHONE (OUTPATIENT)
Dept: FAMILY MEDICINE CLINIC | Age: 51
End: 2021-08-25

## 2021-08-25 NOTE — TELEPHONE ENCOUNTER
[facility]  She just received the faxed plan of care on this patient, but it was not signed by the doctor. Please sign and then re-fax.

## 2021-08-31 NOTE — TELEPHONE ENCOUNTER
She is calling to tell you that she is having increased dizziness and feeling very light headed. Please advise. Detail Level: Detailed Size Of Lesion In Cm (Optional): 0

## 2021-09-03 ENCOUNTER — OFFICE VISIT (OUTPATIENT)
Dept: FAMILY MEDICINE CLINIC | Age: 51
End: 2021-09-03
Payer: MEDICARE

## 2021-09-03 VITALS
WEIGHT: 111 LBS | TEMPERATURE: 97.1 F | OXYGEN SATURATION: 98 % | SYSTOLIC BLOOD PRESSURE: 116 MMHG | RESPIRATION RATE: 18 BRPM | HEART RATE: 79 BPM | DIASTOLIC BLOOD PRESSURE: 72 MMHG | BODY MASS INDEX: 20.43 KG/M2 | HEIGHT: 62 IN

## 2021-09-03 DIAGNOSIS — Z86.69 HISTORY OF MIGRAINE: Chronic | ICD-10-CM

## 2021-09-03 DIAGNOSIS — M47.812 SPONDYLOSIS OF CERVICAL REGION WITHOUT MYELOPATHY OR RADICULOPATHY: ICD-10-CM

## 2021-09-03 DIAGNOSIS — E78.2 MIXED HYPERLIPIDEMIA: ICD-10-CM

## 2021-09-03 PROCEDURE — G8420 CALC BMI NORM PARAMETERS: HCPCS | Performed by: FAMILY MEDICINE

## 2021-09-03 PROCEDURE — 99213 OFFICE O/P EST LOW 20 MIN: CPT | Performed by: FAMILY MEDICINE

## 2021-09-03 PROCEDURE — 4004F PT TOBACCO SCREEN RCVD TLK: CPT | Performed by: FAMILY MEDICINE

## 2021-09-03 PROCEDURE — 3017F COLORECTAL CA SCREEN DOC REV: CPT | Performed by: FAMILY MEDICINE

## 2021-09-03 PROCEDURE — G8427 DOCREV CUR MEDS BY ELIG CLIN: HCPCS | Performed by: FAMILY MEDICINE

## 2021-09-03 RX ORDER — SUMATRIPTAN 50 MG/1
50 TABLET, FILM COATED ORAL
Qty: 9 TABLET | Refills: 0 | Status: SHIPPED
Start: 2021-09-03 | End: 2021-10-18 | Stop reason: SDUPTHER

## 2021-09-03 RX ORDER — DULOXETIN HYDROCHLORIDE 20 MG/1
60 CAPSULE, DELAYED RELEASE ORAL DAILY
COMMUNITY
End: 2022-03-25

## 2021-09-03 RX ORDER — ROSUVASTATIN CALCIUM 20 MG/1
TABLET, COATED ORAL
Qty: 90 TABLET | Refills: 1 | Status: SHIPPED
Start: 2021-09-03 | End: 2022-03-04 | Stop reason: SDUPTHER

## 2021-09-03 RX ORDER — BACLOFEN 10 MG/1
10 TABLET ORAL 3 TIMES DAILY
Qty: 90 TABLET | Refills: 2 | Status: SHIPPED
Start: 2021-09-03 | End: 2021-12-22 | Stop reason: SDUPTHER

## 2021-09-03 RX ORDER — NADOLOL 20 MG/1
TABLET ORAL
Qty: 30 TABLET | Refills: 1 | Status: SHIPPED
Start: 2021-09-03 | End: 2021-12-30 | Stop reason: SDUPTHER

## 2021-09-03 ASSESSMENT — ENCOUNTER SYMPTOMS
SORE THROAT: 0
BACK PAIN: 1
PHOTOPHOBIA: 0
BLOOD IN STOOL: 0
VOMITING: 0
TROUBLE SWALLOWING: 0
EYE DISCHARGE: 0
ABDOMINAL PAIN: 0
SHORTNESS OF BREATH: 0
EYE PAIN: 0
DIARRHEA: 0
ALLERGIC/IMMUNOLOGIC NEGATIVE: 1
COUGH: 0
EYE REDNESS: 0
NAUSEA: 0
CHEST TIGHTNESS: 0
SINUS PAIN: 0

## 2021-09-03 NOTE — PROGRESS NOTES
9/3/21  Vladimir Brand : 1970 Sex: female  Age: 46 y.o. Assessment and Plan:  Michela Weston was seen today for medication problem and medication refill. Diagnoses and all orders for this visit:    History of migraine  -     SUMAtriptan (IMITREX) 50 MG tablet; Take 1 tablet by mouth once as needed for Migraine  -     nadolol (CORGARD) 20 MG tablet; take 1 tablet by mouth once daily    Mixed hyperlipidemia  -     rosuvastatin (CRESTOR) 20 MG tablet; take 1 tablet by mouth nightly ---90DAYS REQUEST    Spondylosis of cervical region without myelopathy or radiculopathy  -     baclofen (LIORESAL) 10 MG tablet; Take 1 tablet by mouth 3 times daily    He is to continue and finish her physical therapy. Continue with her exercises that she has at home from them. We will recheck her in 1 month reevaluate. Return in about 4 weeks (around 10/1/2021). Chief Complaint   Patient presents with    Medication Problem     follow up / pt stated that the Naproxen is making her heart beat faster than normal and is not helping with the pain     Medication Refill       Patient returns for recheck visit. She has been having some side effects to the Naprosyn, makes her heart race. She is only given physical therapy, has completed 2 weeks to this point. They also given her stretches and exercises to do at home. Complaining of some discomfort therapy but removed it should help in getting her moving again without so much pain. Review of Systems   Constitutional: Negative for appetite change, fatigue and unexpected weight change. HENT: Negative for congestion, ear pain, hearing loss, sinus pain, sore throat and trouble swallowing. Eyes: Negative for photophobia, pain, discharge and redness. Respiratory: Negative for cough, chest tightness and shortness of breath. Cardiovascular: Negative for chest pain, palpitations and leg swelling.    Gastrointestinal: Negative for abdominal pain, blood in stool, diarrhea, nausea and vomiting. Endocrine: Negative. Genitourinary: Negative for dysuria, flank pain, frequency and hematuria. Musculoskeletal: Positive for arthralgias, back pain and myalgias. Negative for joint swelling. C/L spines   Skin: Negative. Allergic/Immunologic: Negative. Neurological: Negative for dizziness, seizures, syncope, weakness, light-headedness, numbness and headaches. Hematological: Negative for adenopathy. Does not bruise/bleed easily. Psychiatric/Behavioral: Negative. Current Outpatient Medications:     DULoxetine (CYMBALTA) 20 MG extended release capsule, Take 20 mg by mouth daily Take 2 times daily, Disp: , Rfl:     SUMAtriptan (IMITREX) 50 MG tablet, Take 1 tablet by mouth once as needed for Migraine, Disp: 9 tablet, Rfl: 0    rosuvastatin (CRESTOR) 20 MG tablet, take 1 tablet by mouth nightly ---90DAYS REQUEST, Disp: 90 tablet, Rfl: 1    nadolol (CORGARD) 20 MG tablet, take 1 tablet by mouth once daily, Disp: 30 tablet, Rfl: 1    baclofen (LIORESAL) 10 MG tablet, Take 1 tablet by mouth 3 times daily, Disp: 90 tablet, Rfl: 2    Docusate Calcium (STOOL SOFTENER PO), Take by mouth three times daily , Disp: , Rfl:     diazepam (VALIUM) 10 MG tablet, Take 10 mg by mouth every 8 hours. Instructed to take am of procedure if needed, Disp: , Rfl:     folic acid (FOLVITE) 1 MG tablet, Take 1 mg by mouth daily. , Disp: , Rfl:     lamoTRIgine (LAMICTAL) 200 MG tablet, Take 200 mg by mouth every evening , Disp: , Rfl:   Allergies   Allergen Reactions    Latex Rash       Past Medical History:   Diagnosis Date    Anxiety     Arthritis     right hip    Bipolar 1 disorder ([facility] [address]     Cervical stenosis of spine     Chronic back pain     Depression     Hypertension     Irritable bowel syndrome     Migraines     Mucocele of lip     for surgery 7/21/21    Other hyperlipidemia 7/29/2019    PTSD (post-traumatic stress disorder)      Past Surgical History: Procedure Laterality Date    ARTHROGRAPHY Right 1/3/2019    RIGHT HIP ARTHROGRAM AND STEROID INJECTION performed by Dagmar Mckinney MD at [facility] Right 9/10/2020    RIGHT HIP INJECTION UNDER FLUOROSCOPY performed by Dagmar Mckinney MD at [facility] Right 4/13/2021    RIGHT HIP INJECTION UNDER FLUOROSCOPY performed by Dagmar Mckinney MD at [facility] N/A 2/3/2020    DILATATION AND CURETTAGE HYSTEROSCOPY performed by Ayana Hall MD at [facility] Right 7/21/2021    EXCISION RIGHT LIP MUCOCELE performed by Andrea Cramer MD at [address] [place] [address] Rd  02/20/2019    implant for IBS     TONSILLECTOMY      TOOTH EXTRACTION      TUBAL LIGATION      TUMOR REMOVAL      in pancrease      No family history on file. Social History     Socioeconomic History    Marital status:      Spouse name: Not on file    Number of children: Not on file    Years of education: Not on file    Highest education level: Not on file   Occupational History    Not on file   Tobacco Use    Smoking status: Current Every Day Smoker     Packs/day: 1.00     Years: 30.00     Pack years: 30.00     Types: Cigarettes    Smokeless tobacco: Never Used   Vaping Use    Vaping Use: Some days    Substances: Flavoring   Substance and Sexual Activity    Alcohol use: Not Currently    Drug use: No    Sexual activity: Not Currently   Other Topics Concern    Not on file   Social History Narrative    Not on file     Social Determinants of Health     Financial Resource Strain: High Risk    Difficulty of Paying Living Expenses: Hard   Food Insecurity: Food Insecurity Present    Worried About Running Out of Food in the Last Year: Never true    Kaleb of Food in the Last Year: Sometimes true   Transportation Needs:     Lack of Transportation (Medical):      Lack of Transportation (Non-Medical):    Physical Activity:  Days of Exercise per Week:     Minutes of Exercise per Session:    Stress:     Feeling of Stress :    Social Connections:     Frequency of Communication with Friends and Family:     Frequency of Social Gatherings with Friends and Family:     Attends [employer] Services:     Active Member of Clubs or Organizations:     Attends Club or Organization Meetings:     Marital Status:    Intimate Partner Violence:     Fear of Current or Ex-Partner:     Emotionally Abused:     Physically Abused:     Sexually Abused:        Vitals:    09/03/21 1136   BP: 116/72   Pulse: 79   Resp: 18   Temp: 97.1 °F (36.2 °C)   TempSrc: Temporal   SpO2: 98%   Weight: 111 lb (50.3 kg)   Height: 5' 2\" (1.575 m)       Physical Exam  Vitals and nursing note reviewed. Constitutional:       Appearance: She is well-developed. HENT:      Head: Atraumatic. Right Ear: External ear normal.      Left Ear: External ear normal.      Nose: Nose normal.      Mouth/Throat:      Pharynx: No oropharyngeal exudate. Eyes:      Conjunctiva/sclera: Conjunctivae normal.      Pupils: Pupils are equal, round, and reactive to light. Neck:      Thyroid: No thyromegaly. Trachea: No tracheal deviation. Cardiovascular:      Rate and Rhythm: Normal rate and regular rhythm. Heart sounds: No murmur heard. No friction rub. No gallop. Pulmonary:      Effort: Pulmonary effort is normal. No respiratory distress. Breath sounds: Normal breath sounds. Abdominal:      General: Bowel sounds are normal.      Palpations: Abdomen is soft. Musculoskeletal:         General: No tenderness or deformity. Normal range of motion. Cervical back: Normal range of motion and neck supple. Comments: Pain, spasm, stiffness, decreased range of motion, cervical, thoracic, lumbar spines. Lymphadenopathy:      Cervical: No cervical adenopathy. Skin:     General: Skin is warm and dry.       Capillary Refill: Capillary refill takes less than 2 seconds. Findings: No rash. Neurological:      Mental Status: She is alert and oriented to person, place, and time. Sensory: No sensory deficit. Motor: No abnormal muscle tone.       Coordination: Coordination normal.      Deep Tendon Reflexes: Reflexes normal.             Seen By:  Francheska Escoto,

## 2021-09-30 ENCOUNTER — OFFICE VISIT (OUTPATIENT)
Dept: FAMILY MEDICINE CLINIC | Age: 51
End: 2021-09-30
Payer: MEDICARE

## 2021-09-30 VITALS
DIASTOLIC BLOOD PRESSURE: 60 MMHG | BODY MASS INDEX: 21.11 KG/M2 | OXYGEN SATURATION: 97 % | SYSTOLIC BLOOD PRESSURE: 120 MMHG | TEMPERATURE: 98.5 F | HEART RATE: 71 BPM | WEIGHT: 115.4 LBS

## 2021-09-30 DIAGNOSIS — G89.29 CHRONIC LOW BACK PAIN WITHOUT SCIATICA, UNSPECIFIED BACK PAIN LATERALITY: Primary | ICD-10-CM

## 2021-09-30 DIAGNOSIS — M54.50 CHRONIC LOW BACK PAIN WITHOUT SCIATICA, UNSPECIFIED BACK PAIN LATERALITY: Primary | ICD-10-CM

## 2021-09-30 PROCEDURE — 4004F PT TOBACCO SCREEN RCVD TLK: CPT | Performed by: FAMILY MEDICINE

## 2021-09-30 PROCEDURE — 3017F COLORECTAL CA SCREEN DOC REV: CPT | Performed by: FAMILY MEDICINE

## 2021-09-30 PROCEDURE — 99213 OFFICE O/P EST LOW 20 MIN: CPT | Performed by: FAMILY MEDICINE

## 2021-09-30 PROCEDURE — G8420 CALC BMI NORM PARAMETERS: HCPCS | Performed by: FAMILY MEDICINE

## 2021-09-30 PROCEDURE — G8427 DOCREV CUR MEDS BY ELIG CLIN: HCPCS | Performed by: FAMILY MEDICINE

## 2021-09-30 ASSESSMENT — ENCOUNTER SYMPTOMS
BLOOD IN STOOL: 0
ALLERGIC/IMMUNOLOGIC NEGATIVE: 1
CHEST TIGHTNESS: 0
NAUSEA: 0
SINUS PAIN: 0
TROUBLE SWALLOWING: 0
PHOTOPHOBIA: 0
VOMITING: 0
DIARRHEA: 0
SORE THROAT: 0
EYE REDNESS: 0
COUGH: 0
BACK PAIN: 1
SHORTNESS OF BREATH: 0
ABDOMINAL PAIN: 0
EYE PAIN: 0
EYE DISCHARGE: 0

## 2021-09-30 NOTE — PROGRESS NOTES
21  Clarita Patel : 1970 Sex: female  Age: 46 y.o. Assessment and Plan:  Curt Aase was seen today for other. Diagnoses and all orders for this visit:    Chronic low back pain without sciatica, unspecified back pain laterality  Continue with physical therapy, stretches, exercise let me know after therapy is completed if lower back is still not where she wants it. No follow-ups on file. Chief Complaint   Patient presents with    Other     follow up       Here for recheck. Physical therapy for the last 4 weeks for upper and lower back. Responding well upper back and shoulders, lower back still lagging. Is also complaining of some pain and stiffness in her knees which I believe is overuse because of therapy. She was encouraged to continue moving and will try something topical for this. Headaches have been well controlled, her mood today is excellent. Review of Systems   Constitutional: Negative for appetite change, fatigue and unexpected weight change. HENT: Negative for congestion, ear pain, hearing loss, sinus pain, sore throat and trouble swallowing. Eyes: Negative for photophobia, pain, discharge and redness. Respiratory: Negative for cough, chest tightness and shortness of breath. Cardiovascular: Negative for chest pain, palpitations and leg swelling. Gastrointestinal: Negative for abdominal pain, blood in stool, diarrhea, nausea and vomiting. Endocrine: Negative. Genitourinary: Negative for dysuria, flank pain, frequency and hematuria. Musculoskeletal: Positive for arthralgias, back pain and myalgias. Negative for joint swelling. Skin: Negative. Allergic/Immunologic: Negative. Neurological: Negative for dizziness, seizures, syncope, weakness, light-headedness, numbness and headaches. Hematological: Negative for adenopathy. Does not bruise/bleed easily. Psychiatric/Behavioral: Negative.           Current Outpatient Medications:    DULoxetine (CYMBALTA) 20 MG extended release capsule, Take 20 mg by mouth daily Take 2 times daily, Disp: , Rfl:     SUMAtriptan (IMITREX) 50 MG tablet, Take 1 tablet by mouth once as needed for Migraine, Disp: 9 tablet, Rfl: 0    rosuvastatin (CRESTOR) 20 MG tablet, take 1 tablet by mouth nightly ---90DAYS REQUEST, Disp: 90 tablet, Rfl: 1    nadolol (CORGARD) 20 MG tablet, take 1 tablet by mouth once daily, Disp: 30 tablet, Rfl: 1    baclofen (LIORESAL) 10 MG tablet, Take 1 tablet by mouth 3 times daily, Disp: 90 tablet, Rfl: 2    Docusate Calcium (STOOL SOFTENER PO), Take by mouth three times daily , Disp: , Rfl:     diazepam (VALIUM) 10 MG tablet, Take 10 mg by mouth every 8 hours. Instructed to take am of procedure if needed, Disp: , Rfl:     folic acid (FOLVITE) 1 MG tablet, Take 1 mg by mouth daily. , Disp: , Rfl:     lamoTRIgine (LAMICTAL) 200 MG tablet, Take 200 mg by mouth every evening , Disp: , Rfl:   Allergies   Allergen Reactions    Latex Rash       Past Medical History:   Diagnosis Date    Anxiety     Arthritis     right hip    Bipolar 1 disorder ([facility] [address]     Cervical stenosis of spine     Chronic back pain     Depression     Hypertension     Irritable bowel syndrome     Migraines     Mucocele of lip     for surgery 7/21/21    Other hyperlipidemia 7/29/2019    PTSD (post-traumatic stress disorder)      Past Surgical History:   Procedure Laterality Date    ARTHROGRAPHY Right 1/3/2019    RIGHT HIP ARTHROGRAM AND STEROID INJECTION performed by Stefanie Martínez MD at [address] [place] [address] Right 9/10/2020    RIGHT HIP INJECTION UNDER FLUOROSCOPY performed by Stefanie Martínez MD at [address] [place] [address] Right 4/13/2021    RIGHT HIP INJECTION UNDER FLUOROSCOPY performed by Stefanie Martínez MD at [address] [place] [address] N/A 2/3/2020    DILATATION AND CURETTAGE HYSTEROSCOPY performed by Sandy Asher MD at [facility] Right 7/21/2021    EXCISION RIGHT LIP MUCOCELE performed by Manjeet Holland MD at [address]  02/20/2019    implant for IBS     TONSILLECTOMY      TOOTH EXTRACTION      TUBAL LIGATION      TUMOR REMOVAL      in pancrease      No family history on file. Social History     Socioeconomic History    Marital status:      Spouse name: Not on file    Number of children: Not on file    Years of education: Not on file    Highest education level: Not on file   Occupational History    Not on file   Tobacco Use    Smoking status: Current Every Day Smoker     Packs/day: 1.00     Years: 30.00     Pack years: 30.00     Types: Cigarettes    Smokeless tobacco: Never Used   Vaping Use    Vaping Use: Some days    Substances: Flavoring   Substance and Sexual Activity    Alcohol use: Not Currently    Drug use: No    Sexual activity: Not Currently   Other Topics Concern    Not on file   Social History Narrative    Not on file     Social Determinants of Health     Financial Resource Strain: High Risk    Difficulty of Paying Living Expenses: Hard   Food Insecurity: Food Insecurity Present    Worried About Running Out of Food in the Last Year: Never true    Kaleb of Food in the Last Year: Sometimes true   Transportation Needs:     Lack of Transportation (Medical):      Lack of Transportation (Non-Medical):    Physical Activity:     Days of Exercise per Week:     Minutes of Exercise per Session:    Stress:     Feeling of Stress :    Social Connections:     Frequency of Communication with Friends and Family:     Frequency of Social Gatherings with Friends and Family:     Attends Je[employer] witness Services:     Active Member of Clubs or Organizations:     Attends Club or Organization Meetings:     Marital Status:    Intimate Partner Violence:     Fear of Current or Ex-Partner:     Emotionally Abused:     Physically Abused:     Sexually Abused:        Vitals:    09/30/21 1125   BP:

## 2021-10-08 ENCOUNTER — TELEPHONE (OUTPATIENT)
Dept: FAMILY MEDICINE CLINIC | Age: 51
End: 2021-10-08

## 2021-10-08 NOTE — TELEPHONE ENCOUNTER
Newarkalfred Ruiz calling to tell you that the cream you gave her for her back pain does help in the middle of the day when the pain is not as severe. When she wakes in extreme pain and can hardly move. It takes awhile to get the pain tolerable. And then late in the day, the severe pain comes back and she suffers until she goes to sleep. She is asking if there is something that she can take or do for the mornings and evenings?

## 2021-10-13 ENCOUNTER — OFFICE VISIT (OUTPATIENT)
Dept: FAMILY MEDICINE CLINIC | Age: 51
End: 2021-10-13
Payer: MEDICARE

## 2021-10-13 VITALS
HEIGHT: 62 IN | WEIGHT: 118 LBS | HEART RATE: 65 BPM | BODY MASS INDEX: 21.71 KG/M2 | RESPIRATION RATE: 16 BRPM | DIASTOLIC BLOOD PRESSURE: 66 MMHG | SYSTOLIC BLOOD PRESSURE: 100 MMHG | OXYGEN SATURATION: 98 % | TEMPERATURE: 97.5 F

## 2021-10-13 DIAGNOSIS — M54.50 CHRONIC LOW BACK PAIN WITHOUT SCIATICA, UNSPECIFIED BACK PAIN LATERALITY: Primary | ICD-10-CM

## 2021-10-13 DIAGNOSIS — M17.0 PRIMARY OSTEOARTHRITIS OF BOTH KNEES: ICD-10-CM

## 2021-10-13 DIAGNOSIS — G89.29 CHRONIC LOW BACK PAIN WITHOUT SCIATICA, UNSPECIFIED BACK PAIN LATERALITY: Primary | ICD-10-CM

## 2021-10-13 PROCEDURE — 99214 OFFICE O/P EST MOD 30 MIN: CPT | Performed by: FAMILY MEDICINE

## 2021-10-13 PROCEDURE — 4004F PT TOBACCO SCREEN RCVD TLK: CPT | Performed by: FAMILY MEDICINE

## 2021-10-13 PROCEDURE — G8427 DOCREV CUR MEDS BY ELIG CLIN: HCPCS | Performed by: FAMILY MEDICINE

## 2021-10-13 PROCEDURE — 3017F COLORECTAL CA SCREEN DOC REV: CPT | Performed by: FAMILY MEDICINE

## 2021-10-13 PROCEDURE — G8484 FLU IMMUNIZE NO ADMIN: HCPCS | Performed by: FAMILY MEDICINE

## 2021-10-13 PROCEDURE — G8420 CALC BMI NORM PARAMETERS: HCPCS | Performed by: FAMILY MEDICINE

## 2021-10-13 ASSESSMENT — ENCOUNTER SYMPTOMS
EYE REDNESS: 0
ABDOMINAL PAIN: 0
TROUBLE SWALLOWING: 0
COUGH: 0
BLOOD IN STOOL: 0
PHOTOPHOBIA: 0
EYE DISCHARGE: 0
DIARRHEA: 0
SHORTNESS OF BREATH: 0
SINUS PAIN: 0
EYE PAIN: 0
VOMITING: 0
BACK PAIN: 1
ALLERGIC/IMMUNOLOGIC NEGATIVE: 1
SORE THROAT: 0
CHEST TIGHTNESS: 0
NAUSEA: 0

## 2021-10-13 NOTE — PROGRESS NOTES
10/13/21  Bhavik Roach : 1970 Sex: female  Age: 46 y.o. Assessment and Plan:  Abby Muller was seen today for knee pain and other. Diagnoses and all orders for this visit:    Chronic low back pain without sciatica, unspecified back pain laterality  He is to continue and finish physical therapy, call me after completion to work her progress. Primary osteoarthritis of both knees  -     Cancel: XR HIP RIGHT (2-3 VIEWS); Future  -     XR KNEE RIGHT (MIN 4 VIEWS); Future  Voltaren gel at home she is to use it at least 3 times a day to the knees for the next 2 weeks and will reassess progress at that time. Return in about 3 months (around 2022). Chief Complaint   Patient presents with    Knee Pain     bilateral x 2-3 weeks    Other     follow up TP for back , will finish in 1-2 weeks       Pt reports pain to the knee is. The patient states the pain has been present for the last 3 weeks. She started physical therapy for her back and her knees, and now her knees are bothering her as well. Pain is currently a 4/10 on the pain scale. Patient is having difficulty ambulating. Pain is worse with weight bearing. Pain is worse with movement. Knee is not locking or buckling. They deny numbness or tingling to the distal extremity. No redness and warmth to the affected area. No fever or chills. Review of Systems   Constitutional: Negative for appetite change, fatigue and unexpected weight change. HENT: Negative for congestion, ear pain, hearing loss, sinus pain, sore throat and trouble swallowing. Eyes: Negative for photophobia, pain, discharge and redness. Respiratory: Negative for cough, chest tightness and shortness of breath. Cardiovascular: Negative for chest pain, palpitations and leg swelling. Gastrointestinal: Negative for abdominal pain, blood in stool, diarrhea, nausea and vomiting. Endocrine: Negative.     Genitourinary: Negative for dysuria, flank pain, frequency and hematuria. Musculoskeletal: Positive for arthralgias, back pain, gait problem, joint swelling and myalgias. Knees   Skin: Negative. Allergic/Immunologic: Negative. Neurological: Negative for dizziness, seizures, syncope, weakness, light-headedness, numbness and headaches. Hematological: Negative for adenopathy. Does not bruise/bleed easily. Psychiatric/Behavioral: Negative. Current Outpatient Medications:     DULoxetine (CYMBALTA) 20 MG extended release capsule, Take 20 mg by mouth daily Take 2 times daily, Disp: , Rfl:     SUMAtriptan (IMITREX) 50 MG tablet, Take 1 tablet by mouth once as needed for Migraine, Disp: 9 tablet, Rfl: 0    rosuvastatin (CRESTOR) 20 MG tablet, take 1 tablet by mouth nightly ---90DAYS REQUEST, Disp: 90 tablet, Rfl: 1    nadolol (CORGARD) 20 MG tablet, take 1 tablet by mouth once daily, Disp: 30 tablet, Rfl: 1    baclofen (LIORESAL) 10 MG tablet, Take 1 tablet by mouth 3 times daily, Disp: 90 tablet, Rfl: 2    Docusate Calcium (STOOL SOFTENER PO), Take by mouth three times daily , Disp: , Rfl:     diazepam (VALIUM) 10 MG tablet, Take 10 mg by mouth every 8 hours. Instructed to take am of procedure if needed, Disp: , Rfl:     folic acid (FOLVITE) 1 MG tablet, Take 1 mg by mouth daily. , Disp: , Rfl:     lamoTRIgine (LAMICTAL) 200 MG tablet, Take 200 mg by mouth every evening , Disp: , Rfl:   Allergies   Allergen Reactions    Latex Rash    Sulfa Antibiotics Rash and Hives       Past Medical History:   Diagnosis Date    Anxiety     Arthritis     right hip    Bipolar 1 disorder ([facility] [address]     Cervical stenosis of spine     Chronic back pain     Depression     Hypertension     Irritable bowel syndrome     Migraines     Mucocele of lip     for surgery 7/21/21    Other hyperlipidemia 7/29/2019    PTSD (post-traumatic stress disorder)      Past Surgical History:   Procedure Laterality Date    ARTHROGRAPHY Right 1/3/2019    RIGHT HIP ARTHROGRAM AND STEROID INJECTION performed by Elizabeth Finch MD at [address] [place] [address] Right 9/10/2020    RIGHT HIP INJECTION UNDER FLUOROSCOPY performed by Elizabeth Finch MD at [address] [place] [address] Right 4/13/2021    RIGHT HIP INJECTION UNDER FLUOROSCOPY performed by Elizabeth Finch MD at [address] [place] [address] N/A 2/3/2020    DILATATION AND CURETTAGE HYSTEROSCOPY performed by Christiane Best MD at [facility] 7/21/2021    EXCISION RIGHT LIP MUCOCELE performed by Salvatore French MD at [address] [place] [address]  02/20/2019    implant for IBS     TONSILLECTOMY      TOOTH EXTRACTION      TUBAL LIGATION      TUMOR REMOVAL      in pancrease      No family history on file. Social History     Socioeconomic History    Marital status:      Spouse name: Not on file    Number of children: Not on file    Years of education: Not on file    Highest education level: Not on file   Occupational History    Not on file   Tobacco Use    Smoking status: Current Every Day Smoker     Packs/day: 1.00     Years: 30.00     Pack years: 30.00     Types: Cigarettes    Smokeless tobacco: Never Used   Vaping Use    Vaping Use: Some days    Substances: Flavoring   Substance and Sexual Activity    Alcohol use: Not Currently    Drug use: No    Sexual activity: Not Currently   Other Topics Concern    Not on file   Social History Narrative    Not on file     Social Determinants of Health     Financial Resource Strain: High Risk    Difficulty of Paying Living Expenses: Hard   Food Insecurity: Food Insecurity Present    Worried About Running Out of Food in the Last Year: Never true    Kaleb of Food in the Last Year: Sometimes true   Transportation Needs:     Lack of Transportation (Medical):      Lack of Transportation (Non-Medical):    Physical Activity:     Days of Exercise per Week:     Minutes of Exercise per Session: Stress:     Feeling of Stress :    Social Connections:     Frequency of Communication with Friends and Family:     Frequency of Social Gatherings with Friends and Family:     Attends Oriental orthodox Services:     Active Member of Clubs or Organizations:     Attends Club or Organization Meetings:     Marital Status:    Intimate Partner Violence:     Fear of Current or Ex-Partner:     Emotionally Abused:     Physically Abused:     Sexually Abused:        Vitals:    10/13/21 0957   BP: 100/66   Pulse: 65   Resp: 16   Temp: 97.5 °F (36.4 °C)   TempSrc: Temporal   SpO2: 98%   Weight: 118 lb (53.5 kg)   Height: 5' 2\" (1.575 m)       Physical Exam  Vitals and nursing note reviewed. Constitutional:       Appearance: She is well-developed. HENT:      Head: Atraumatic. Right Ear: External ear normal.      Left Ear: External ear normal.      Nose: Nose normal.      Mouth/Throat:      Pharynx: No oropharyngeal exudate. Eyes:      Conjunctiva/sclera: Conjunctivae normal.      Pupils: Pupils are equal, round, and reactive to light. Neck:      Thyroid: No thyromegaly. Trachea: No tracheal deviation. Cardiovascular:      Rate and Rhythm: Normal rate and regular rhythm. Heart sounds: No murmur heard. No friction rub. No gallop. Pulmonary:      Effort: Pulmonary effort is normal. No respiratory distress. Breath sounds: Normal breath sounds. Abdominal:      General: Bowel sounds are normal.      Palpations: Abdomen is soft. Musculoskeletal:         General: Tenderness present. No deformity. Normal range of motion. Cervical back: Normal range of motion and neck supple. Comments: Knees bilaterally around the patella. Ligaments are intact. There is no joint effusion or swelling. Lymphadenopathy:      Cervical: No cervical adenopathy. Skin:     General: Skin is warm and dry. Capillary Refill: Capillary refill takes less than 2 seconds. Findings: No rash. Neurological:      Mental Status: She is alert and oriented to person, place, and time. Sensory: No sensory deficit. Motor: No abnormal muscle tone.       Coordination: Coordination normal.      Deep Tendon Reflexes: Reflexes normal.             Seen By:  Shawanda Willoughby DO

## 2021-10-18 DIAGNOSIS — Z86.69 HISTORY OF MIGRAINE: Chronic | ICD-10-CM

## 2021-10-18 RX ORDER — SUMATRIPTAN 50 MG/1
50 TABLET, FILM COATED ORAL
Qty: 9 TABLET | Refills: 2 | Status: SHIPPED
Start: 2021-10-18 | End: 2022-04-13 | Stop reason: SDUPTHER

## 2021-11-08 ENCOUNTER — OFFICE VISIT (OUTPATIENT)
Dept: FAMILY MEDICINE CLINIC | Age: 51
End: 2021-11-08
Payer: MEDICARE

## 2021-11-08 VITALS
OXYGEN SATURATION: 98 % | DIASTOLIC BLOOD PRESSURE: 74 MMHG | WEIGHT: 113 LBS | TEMPERATURE: 96.8 F | HEART RATE: 77 BPM | SYSTOLIC BLOOD PRESSURE: 116 MMHG | HEIGHT: 62 IN | BODY MASS INDEX: 20.8 KG/M2 | RESPIRATION RATE: 18 BRPM

## 2021-11-08 DIAGNOSIS — M54.50 CHRONIC LOW BACK PAIN WITHOUT SCIATICA, UNSPECIFIED BACK PAIN LATERALITY: ICD-10-CM

## 2021-11-08 DIAGNOSIS — M47.812 SPONDYLOSIS OF CERVICAL REGION WITHOUT MYELOPATHY OR RADICULOPATHY: Primary | ICD-10-CM

## 2021-11-08 DIAGNOSIS — Z86.69 HISTORY OF MIGRAINE: ICD-10-CM

## 2021-11-08 DIAGNOSIS — G89.29 CHRONIC LOW BACK PAIN WITHOUT SCIATICA, UNSPECIFIED BACK PAIN LATERALITY: ICD-10-CM

## 2021-11-08 PROCEDURE — 4004F PT TOBACCO SCREEN RCVD TLK: CPT | Performed by: FAMILY MEDICINE

## 2021-11-08 PROCEDURE — 3017F COLORECTAL CA SCREEN DOC REV: CPT | Performed by: FAMILY MEDICINE

## 2021-11-08 PROCEDURE — 99213 OFFICE O/P EST LOW 20 MIN: CPT | Performed by: FAMILY MEDICINE

## 2021-11-08 PROCEDURE — G8427 DOCREV CUR MEDS BY ELIG CLIN: HCPCS | Performed by: FAMILY MEDICINE

## 2021-11-08 PROCEDURE — G8420 CALC BMI NORM PARAMETERS: HCPCS | Performed by: FAMILY MEDICINE

## 2021-11-08 PROCEDURE — G8484 FLU IMMUNIZE NO ADMIN: HCPCS | Performed by: FAMILY MEDICINE

## 2021-11-08 RX ORDER — VIBEGRON 75 MG/1
75 TABLET, FILM COATED ORAL
COMMUNITY
End: 2021-11-29

## 2021-11-08 ASSESSMENT — ENCOUNTER SYMPTOMS
ALLERGIC/IMMUNOLOGIC NEGATIVE: 1
SORE THROAT: 0
DIARRHEA: 0
PHOTOPHOBIA: 0
EYE PAIN: 0
EYE REDNESS: 0
EYE DISCHARGE: 0
TROUBLE SWALLOWING: 0
CHEST TIGHTNESS: 0
SHORTNESS OF BREATH: 0
VOMITING: 0
ABDOMINAL PAIN: 0
BLOOD IN STOOL: 0
BACK PAIN: 1
SINUS PAIN: 0
NAUSEA: 0
COUGH: 0

## 2021-11-08 NOTE — PROGRESS NOTES
21  Angela Fu : 1970 Sex: female  Age: 46 y.o. Assessment and Plan:  Lyssa Vaz was seen today for other. Diagnoses and all orders for this visit:    Spondylosis of cervical region without myelopathy or radiculopathy  Improved after physical therapy to continue with exercises and stretches. History of migraine  Headaches currently well controlled. Chronic low back pain without sciatica, unspecified back pain laterality  Physical therapy successful, exercises and stretches to continue      Return in about 4 months (around 3/8/2022). Chief Complaint   Patient presents with    Other     follow up from physical therapy        Here for recheck of her back pain. She has completed physical therapy and done well. Using exercises and stretches to maintain her status. Her headaches are well controlled. Review of Systems   Constitutional: Negative for appetite change, fatigue and unexpected weight change. HENT: Negative for congestion, ear pain, hearing loss, sinus pain, sore throat and trouble swallowing. Eyes: Negative for photophobia, pain, discharge and redness. Respiratory: Negative for cough, chest tightness and shortness of breath. Cardiovascular: Negative for chest pain, palpitations and leg swelling. Gastrointestinal: Negative for abdominal pain, blood in stool, diarrhea, nausea and vomiting. Endocrine: Negative. Genitourinary: Negative for dysuria, flank pain, frequency and hematuria. Musculoskeletal: Positive for arthralgias, back pain and myalgias. Negative for joint swelling. Cervical and lumbar   Skin: Negative. Allergic/Immunologic: Negative. Neurological: Negative for dizziness, seizures, syncope, weakness, light-headedness, numbness and headaches. Hematological: Negative for adenopathy. Does not bruise/bleed easily. Psychiatric/Behavioral: Negative.           Current Outpatient Medications:     Vibegron (GEMTESA) 75 MG TABS, Take 75 mg by mouth Take one tablet daily at night, Disp: , Rfl:     SUMAtriptan (IMITREX) 50 MG tablet, Take 1 tablet by mouth once as needed for Migraine, Disp: 9 tablet, Rfl: 2    DULoxetine (CYMBALTA) 20 MG extended release capsule, Take 90 mg by mouth daily Take 60 in the morning and 30 at night, Disp: , Rfl:     rosuvastatin (CRESTOR) 20 MG tablet, take 1 tablet by mouth nightly ---90DAYS REQUEST, Disp: 90 tablet, Rfl: 1    nadolol (CORGARD) 20 MG tablet, take 1 tablet by mouth once daily, Disp: 30 tablet, Rfl: 1    baclofen (LIORESAL) 10 MG tablet, Take 1 tablet by mouth 3 times daily, Disp: 90 tablet, Rfl: 2    Docusate Calcium (STOOL SOFTENER PO), Take by mouth three times daily , Disp: , Rfl:     diazepam (VALIUM) 10 MG tablet, Take 10 mg by mouth every 8 hours. Instructed to take am of procedure if needed, Disp: , Rfl:     folic acid (FOLVITE) 1 MG tablet, Take 1 mg by mouth daily. , Disp: , Rfl:     lamoTRIgine (LAMICTAL) 200 MG tablet, Take 200 mg by mouth every evening , Disp: , Rfl:   Allergies   Allergen Reactions    Latex Rash    Sulfa Antibiotics Rash and Hives       Past Medical History:   Diagnosis Date    Anxiety     Arthritis     right hip    Bipolar 1 disorder ([facility] [address]     Cervical stenosis of spine     Chronic back pain     Depression     Hypertension     Irritable bowel syndrome     Migraines     Mucocele of lip     for surgery 7/21/21    Other hyperlipidemia 7/29/2019    PTSD (post-traumatic stress disorder)      Past Surgical History:   Procedure Laterality Date    ARTHROGRAPHY Right 1/3/2019    RIGHT HIP ARTHROGRAM AND STEROID INJECTION performed by Roxane Claude, MD at [address] [place] [address] Right 9/10/2020    RIGHT HIP INJECTION UNDER FLUOROSCOPY performed by Roxane Claude, MD at [address] [place] [address] Right 4/13/2021    RIGHT HIP INJECTION UNDER FLUOROSCOPY performed by Roxane Claude, MD at [address] [place] [address] UTERUS N/A 2/3/2020    DILATATION AND CURETTAGE HYSTEROSCOPY performed by Milton Christiansen MD at [facility] 7/21/2021    EXCISION RIGHT LIP MUCOCELE performed by Hewitt Litten, MD at [address] [place] [address]  02/20/2019    implant for IBS     TONSILLECTOMY      TOOTH EXTRACTION      TUBAL LIGATION      TUMOR REMOVAL      in pancrease      No family history on file. Social History     Socioeconomic History    Marital status:      Spouse name: Not on file    Number of children: Not on file    Years of education: Not on file    Highest education level: Not on file   Occupational History    Not on file   Tobacco Use    Smoking status: Current Every Day Smoker     Packs/day: 1.00     Years: 30.00     Pack years: 30.00     Types: Cigarettes    Smokeless tobacco: Never Used   Vaping Use    Vaping Use: Some days    Substances: Flavoring   Substance and Sexual Activity    Alcohol use: Not Currently    Drug use: No    Sexual activity: Not Currently   Other Topics Concern    Not on file   Social History Narrative    Not on file     Social Determinants of Health     Financial Resource Strain: High Risk    Difficulty of Paying Living Expenses: Hard   Food Insecurity: Food Insecurity Present    Worried About Running Out of Food in the Last Year: Never true    Kaleb of Food in the Last Year: Sometimes true   Transportation Needs:     Lack of Transportation (Medical): Not on file    Lack of Transportation (Non-Medical):  Not on file   Physical Activity:     Days of Exercise per Week: Not on file    Minutes of Exercise per Session: Not on file   Stress:     Feeling of Stress : Not on file   Social Connections:     Frequency of Communication with Friends and Family: Not on file    Frequency of Social Gatherings with Friends and Family: Not on file    Attends Roman Catholic Services: Not on file    Active Member of Clubs or Organizations: Not on file    Attends [employer] oriented to person, place, and time. Sensory: No sensory deficit. Motor: No abnormal muscle tone.       Coordination: Coordination normal.      Deep Tendon Reflexes: Reflexes normal.             Seen By:  Santos Teague DO

## 2021-11-29 ENCOUNTER — OFFICE VISIT (OUTPATIENT)
Dept: FAMILY MEDICINE CLINIC | Age: 51
End: 2021-11-29
Payer: MEDICARE

## 2021-11-29 VITALS
OXYGEN SATURATION: 98 % | DIASTOLIC BLOOD PRESSURE: 74 MMHG | WEIGHT: 108 LBS | TEMPERATURE: 97.4 F | RESPIRATION RATE: 16 BRPM | HEART RATE: 75 BPM | BODY MASS INDEX: 19.88 KG/M2 | HEIGHT: 62 IN | SYSTOLIC BLOOD PRESSURE: 100 MMHG

## 2021-11-29 DIAGNOSIS — J30.89 NON-SEASONAL ALLERGIC RHINITIS DUE TO OTHER ALLERGIC TRIGGER: ICD-10-CM

## 2021-11-29 DIAGNOSIS — R35.0 URINARY FREQUENCY: ICD-10-CM

## 2021-11-29 DIAGNOSIS — B34.9 VIRAL ILLNESS: Primary | ICD-10-CM

## 2021-11-29 DIAGNOSIS — R11.0 NAUSEA: ICD-10-CM

## 2021-11-29 LAB
CONTROL: NORMAL
PREGNANCY TEST URINE, POC: NORMAL

## 2021-11-29 PROCEDURE — G8420 CALC BMI NORM PARAMETERS: HCPCS | Performed by: FAMILY MEDICINE

## 2021-11-29 PROCEDURE — G8427 DOCREV CUR MEDS BY ELIG CLIN: HCPCS | Performed by: FAMILY MEDICINE

## 2021-11-29 PROCEDURE — 81002 URINALYSIS NONAUTO W/O SCOPE: CPT | Performed by: FAMILY MEDICINE

## 2021-11-29 PROCEDURE — 3017F COLORECTAL CA SCREEN DOC REV: CPT | Performed by: FAMILY MEDICINE

## 2021-11-29 PROCEDURE — 81025 URINE PREGNANCY TEST: CPT | Performed by: FAMILY MEDICINE

## 2021-11-29 PROCEDURE — 99213 OFFICE O/P EST LOW 20 MIN: CPT | Performed by: FAMILY MEDICINE

## 2021-11-29 PROCEDURE — G8484 FLU IMMUNIZE NO ADMIN: HCPCS | Performed by: FAMILY MEDICINE

## 2021-11-29 PROCEDURE — 4004F PT TOBACCO SCREEN RCVD TLK: CPT | Performed by: FAMILY MEDICINE

## 2021-11-29 RX ORDER — ONDANSETRON 4 MG/1
4 TABLET, ORALLY DISINTEGRATING ORAL 3 TIMES DAILY PRN
Qty: 21 TABLET | Refills: 0 | Status: SHIPPED
Start: 2021-11-29 | End: 2022-01-03 | Stop reason: SDUPTHER

## 2021-11-29 RX ORDER — PREDNISONE 10 MG/1
10 TABLET ORAL 2 TIMES DAILY
Qty: 10 TABLET | Refills: 0 | Status: SHIPPED | OUTPATIENT
Start: 2021-11-29 | End: 2021-12-04

## 2021-11-29 ASSESSMENT — ENCOUNTER SYMPTOMS
ALLERGIC/IMMUNOLOGIC NEGATIVE: 1
VOMITING: 0
SINUS PAIN: 0
DIARRHEA: 0
SORE THROAT: 0
TROUBLE SWALLOWING: 0
NAUSEA: 1
ABDOMINAL PAIN: 0
COUGH: 0
EYE DISCHARGE: 0
BACK PAIN: 0
EYE PAIN: 0
EYE REDNESS: 0
BLOOD IN STOOL: 0
PHOTOPHOBIA: 0
SHORTNESS OF BREATH: 0
CHEST TIGHTNESS: 0

## 2021-11-29 NOTE — PROGRESS NOTES
21  Adrianne Ibrahim : 1970 Sex: female  Age: 46 y.o. Assessment and Plan:  Berkley Hall was seen today for urinary tract infection, nausea, headache and chills. Diagnoses and all orders for this visit:    Viral illness  -     COVID-19 Ambulatory; Future    Nausea  -     ondansetron (ZOFRAN-ODT) 4 MG disintegrating tablet; Take 1 tablet by mouth 3 times daily as needed for Nausea or Vomiting    Urinary frequency  -     POCT Urinalysis no Micro  -     Culture, Urine; Future    Non-seasonal allergic rhinitis due to other allergic trigger  -     predniSONE (DELTASONE) 10 MG tablet; Take 1 tablet by mouth 2 times daily for 5 days        Return 3 to 5 days if not improved. .    Chief Complaint   Patient presents with    Urinary Tract Infection     x 10 -12 days    Nausea    Headache    Chills       Patient presents for an office visit. Over the last 10 days, she has had frequency nausea, headaches, chills, malaise. Rapid Covid test done 1 week ago was negative. She denies fever, vomiting, cough, earache, sore throat. Review of Systems   Constitutional: Positive for chills. Negative for appetite change, fatigue and unexpected weight change. HENT: Negative for congestion, ear pain, hearing loss, sinus pain, sore throat and trouble swallowing. Eyes: Negative for photophobia, pain, discharge and redness. Respiratory: Negative for cough, chest tightness and shortness of breath. Cardiovascular: Negative for chest pain, palpitations and leg swelling. Gastrointestinal: Positive for nausea. Negative for abdominal pain, blood in stool, diarrhea and vomiting. Endocrine: Negative. Genitourinary: Negative for dysuria, flank pain, frequency and hematuria. Musculoskeletal: Negative for arthralgias, back pain, joint swelling and myalgias. Skin: Negative. Allergic/Immunologic: Negative. Neurological: Positive for headaches.  Negative for dizziness, seizures, syncope, weakness, file   [employer] of Exercise per Session: Not on file   Stress:     Feeling of Stress : Not on file   Social Connections:     Frequency of Communication with Friends and Family: Not on file    Frequency of Social Gatherings with Friends and Family: Not on file    Attends Mormonism Services: Not on file    Active Member of Clubs or Organizations: Not on file    Attends Club or Organization Meetings: Not on file    Marital Status: Not on file   Intimate Partner Violence:     Fear of Current or Ex-Partner: Not on file    Emotionally Abused: Not on file    Physically Abused: Not on file    Sexually Abused: Not on file   Housing Stability:     Unable to Pay for Housing in the Last Year: Not on file    Number of [place] in the Last Year: Not on file    Unstable Housing in the Last Year: Not on file       Vitals:    11/29/21 1419   BP: 100/74   Pulse: 75   Resp: 16   Temp: 97.4 °F (36.3 °C)   TempSrc: Temporal   SpO2: 98%   Weight: 108 lb (49 kg)   Height: 5' 2\" (1.575 m)       Physical Exam  Vitals and nursing note reviewed. Constitutional:       Appearance: She is well-developed. HENT:      Head: Atraumatic. Right Ear: External ear normal.      Left Ear: External ear normal.      Nose: Nose normal.      Mouth/Throat:      Pharynx: No oropharyngeal exudate. Eyes:      Conjunctiva/sclera: Conjunctivae normal.      Pupils: Pupils are equal, round, and reactive to light. Neck:      Thyroid: No thyromegaly. Trachea: No tracheal deviation. Cardiovascular:      Rate and Rhythm: Normal rate and regular rhythm. Heart sounds: No murmur heard. No friction rub. No gallop. Pulmonary:      Effort: Pulmonary effort is normal. No respiratory distress. Breath sounds: Normal breath sounds. Abdominal:      General: Bowel sounds are normal.      Palpations: Abdomen is soft. Musculoskeletal:         General: No tenderness or deformity. Normal range of motion.       Cervical back: Normal range of motion and neck supple. Lymphadenopathy:      Cervical: No cervical adenopathy. Skin:     General: Skin is warm and dry. Capillary Refill: Capillary refill takes less than 2 seconds. Findings: No rash. Neurological:      Mental Status: She is alert and oriented to person, place, and time. Sensory: No sensory deficit. Motor: No abnormal muscle tone.       Coordination: Coordination normal.      Deep Tendon Reflexes: Reflexes normal.             Seen By:  Aysha Garcia,

## 2021-11-30 DIAGNOSIS — B34.9 VIRAL ILLNESS: ICD-10-CM

## 2021-12-01 LAB
SARS-COV-2: NOT DETECTED
SOURCE: NORMAL
URINE CULTURE, ROUTINE: NORMAL

## 2021-12-03 ENCOUNTER — TELEPHONE (OUTPATIENT)
Dept: FAMILY MEDICINE CLINIC | Age: 51
End: 2021-12-03

## 2021-12-03 NOTE — TELEPHONE ENCOUNTER
----- Message from [facility] sent at 12/3/2021  9:20 AM EST -----  Subject: Medication Problem    QUESTIONS  Name of Medication? predniSONE (DELTASONE) 20 MG tablet  Patient-reported dosage and instructions? twice a day 10 MG  What question or problem do you have with the medication? Pt says she was   recently prescribed this and she said that it has worked tremendously for   her and is wondering if this is something that she can stay on or if there   is something similar to this that she can be prescribed. Can you please   follow up with her  Preferred Pharmacy? [facility] [address], [place] [address]  Pharmacy phone number (if available)? [phone]  Additional Information for Provider?   ---------------------------------------------------------------------------  --------------  CALL BACK INFO  What is the best way for the office to contact you? OK to leave message on   voicemail  Preferred Call Back Phone Number? [phone]  ---------------------------------------------------------------------------  --------------  SCRIPT ANSWERS  Relationship to Patient?  Self

## 2021-12-08 ENCOUNTER — TELEPHONE (OUTPATIENT)
Dept: FAMILY MEDICINE CLINIC | Age: 51
End: 2021-12-08

## 2021-12-08 NOTE — TELEPHONE ENCOUNTER
----- Message from Anjali Levy sent at 12/7/2021  3:25 PM EST -----  Subject: Message to Provider    QUESTIONS  Information for Provider? Pt called to add details to previous message   that the pain in her neck and back have returned, which was not an issue   when was on Prazitone.  ---------------------------------------------------------------------------  --------------  [address]  What is the best way for the office to contact you? OK to leave message on   voicemail  Preferred Call Back Phone Number? [phone]  ---------------------------------------------------------------------------  --------------  SCRIPT ANSWERS  Relationship to Patient?  Self

## 2021-12-10 NOTE — TELEPHONE ENCOUNTER
Gerber called to update doctor. She is feeling better and has had no pain for the past 2 days. She still has a runny nose that goes all day, and a cough that is producing some thick phlegm.

## 2021-12-22 DIAGNOSIS — M47.812 SPONDYLOSIS OF CERVICAL REGION WITHOUT MYELOPATHY OR RADICULOPATHY: ICD-10-CM

## 2021-12-22 RX ORDER — BACLOFEN 10 MG/1
10 TABLET ORAL 3 TIMES DAILY
Qty: 90 TABLET | Refills: 2 | Status: SHIPPED
Start: 2021-12-22 | End: 2021-12-30 | Stop reason: SDUPTHER

## 2021-12-22 NOTE — TELEPHONE ENCOUNTER
----- Message from Princess Peres sent at 12/21/2021  8:31 AM EST -----  Subject: Refill Request    QUESTIONS  Name of Medication? baclofen (LIORESAL) 10 MG tablet  Patient-reported dosage and instructions? 3 times a day 10mg  How many days do you have left? 15  Preferred Pharmacy? [address] phone number (if available)? [phone]  ---------------------------------------------------------------------------  --------------  CALL BACK INFO  What is the best way for the office to contact you? OK to leave message on   voicemail  Preferred Call Back Phone Number?  [phone]

## 2021-12-30 DIAGNOSIS — M47.812 SPONDYLOSIS OF CERVICAL REGION WITHOUT MYELOPATHY OR RADICULOPATHY: ICD-10-CM

## 2021-12-30 DIAGNOSIS — Z86.69 HISTORY OF MIGRAINE: Chronic | ICD-10-CM

## 2021-12-30 RX ORDER — NADOLOL 20 MG/1
TABLET ORAL
Qty: 90 TABLET | Refills: 1 | Status: SHIPPED
Start: 2021-12-30 | End: 2022-06-19 | Stop reason: SDUPTHER

## 2021-12-30 RX ORDER — BACLOFEN 10 MG/1
10 TABLET ORAL 3 TIMES DAILY
Qty: 270 TABLET | Refills: 1 | Status: SHIPPED
Start: 2021-12-30 | End: 2022-04-13 | Stop reason: SDUPTHER

## 2021-12-30 NOTE — TELEPHONE ENCOUNTER
Pt would like a 90 day supply on the pended medications. It is more cost effective for her.     Last Appointment:  11/29/2021  Future Appointments   Date Time Provider Woodrow Ji   1/4/2022  2:30 PM Enoch Mirza MD [facility]   3/9/2022 10:00 AM Logan DO WILL [facility]   6/3/2022 11:30 AM FRENCH Mcmanus - CNP [facility]

## 2022-01-03 DIAGNOSIS — R11.0 NAUSEA: ICD-10-CM

## 2022-01-03 RX ORDER — ONDANSETRON 4 MG/1
4 TABLET, ORALLY DISINTEGRATING ORAL 3 TIMES DAILY PRN
Qty: 21 TABLET | Refills: 0 | Status: SHIPPED
Start: 2022-01-03 | End: 2022-03-08

## 2022-01-11 DIAGNOSIS — K21.9 GASTROESOPHAGEAL REFLUX DISEASE WITHOUT ESOPHAGITIS: ICD-10-CM

## 2022-01-11 RX ORDER — OMEPRAZOLE 40 MG/1
CAPSULE, DELAYED RELEASE ORAL
Qty: 90 CAPSULE | Refills: 1 | Status: SHIPPED
Start: 2022-01-11 | End: 2022-02-18

## 2022-01-11 NOTE — TELEPHONE ENCOUNTER
Last Appointment:  11/29/2021  Future Appointments   Date Time Provider Woodrow Ji   3/9/2022 10:00 AM Laurel DO WILL Zhao [facility]   6/3/2022 11:30 AM FRENCH Crews - CNP [facility]

## 2022-02-16 ENCOUNTER — OFFICE VISIT (OUTPATIENT)
Dept: FAMILY MEDICINE CLINIC | Age: 52
End: 2022-02-16
Payer: MEDICARE

## 2022-02-16 VITALS
DIASTOLIC BLOOD PRESSURE: 80 MMHG | OXYGEN SATURATION: 97 % | RESPIRATION RATE: 16 BRPM | HEART RATE: 70 BPM | WEIGHT: 108 LBS | BODY MASS INDEX: 19.88 KG/M2 | SYSTOLIC BLOOD PRESSURE: 110 MMHG | TEMPERATURE: 96.7 F | HEIGHT: 62 IN

## 2022-02-16 DIAGNOSIS — R10.13 EPIGASTRIC PAIN: Primary | ICD-10-CM

## 2022-02-16 DIAGNOSIS — F33.41 RECURRENT MAJOR DEPRESSION IN PARTIAL REMISSION (HCC): ICD-10-CM

## 2022-02-16 DIAGNOSIS — R10.13 EPIGASTRIC PAIN: ICD-10-CM

## 2022-02-16 LAB
ALBUMIN SERPL-MCNC: 4.7 G/DL (ref 3.5–5.2)
ALP BLD-CCNC: 71 U/L (ref 35–104)
ALT SERPL-CCNC: 5 U/L (ref 0–32)
AMORPHOUS: ABNORMAL
ANION GAP SERPL CALCULATED.3IONS-SCNC: 6 MMOL/L (ref 7–16)
AST SERPL-CCNC: 19 U/L (ref 0–31)
BACTERIA: ABNORMAL /HPF
BASOPHILS ABSOLUTE: 0.03 E9/L (ref 0–0.2)
BASOPHILS RELATIVE PERCENT: 0.4 % (ref 0–2)
BILIRUB SERPL-MCNC: 0.3 MG/DL (ref 0–1.2)
BILIRUBIN URINE: NEGATIVE
BLOOD, URINE: ABNORMAL
BUN BLDV-MCNC: 12 MG/DL (ref 6–20)
CALCIUM SERPL-MCNC: 9.5 MG/DL (ref 8.6–10.2)
CHLORIDE BLD-SCNC: 105 MMOL/L (ref 98–107)
CLARITY: ABNORMAL
CO2: 30 MMOL/L (ref 22–29)
COLOR: YELLOW
CREAT SERPL-MCNC: 0.6 MG/DL (ref 0.5–1)
EOSINOPHILS ABSOLUTE: 0.09 E9/L (ref 0.05–0.5)
EOSINOPHILS RELATIVE PERCENT: 1.3 % (ref 0–6)
EPITHELIAL CELLS, UA: ABNORMAL /HPF
GFR AFRICAN AMERICAN: >60
GFR NON-AFRICAN AMERICAN: >60 ML/MIN/1.73
GLUCOSE BLD-MCNC: 77 MG/DL (ref 74–99)
GLUCOSE URINE: NEGATIVE MG/DL
HCT VFR BLD CALC: 45.7 % (ref 34–48)
HEMOGLOBIN: 15.1 G/DL (ref 11.5–15.5)
IMMATURE GRANULOCYTES #: 0.02 E9/L
IMMATURE GRANULOCYTES %: 0.3 % (ref 0–5)
KETONES, URINE: NEGATIVE MG/DL
LEUKOCYTE ESTERASE, URINE: ABNORMAL
LIPASE: 22 U/L (ref 13–60)
LYMPHOCYTES ABSOLUTE: 2.17 E9/L (ref 1.5–4)
LYMPHOCYTES RELATIVE PERCENT: 30.2 % (ref 20–42)
MCH RBC QN AUTO: 30.8 PG (ref 26–35)
MCHC RBC AUTO-ENTMCNC: 33 % (ref 32–34.5)
MCV RBC AUTO: 93.1 FL (ref 80–99.9)
MONOCYTES ABSOLUTE: 0.42 E9/L (ref 0.1–0.95)
MONOCYTES RELATIVE PERCENT: 5.8 % (ref 2–12)
NEUTROPHILS ABSOLUTE: 4.45 E9/L (ref 1.8–7.3)
NEUTROPHILS RELATIVE PERCENT: 62 % (ref 43–80)
NITRITE, URINE: NEGATIVE
PDW BLD-RTO: 12.8 FL (ref 11.5–15)
PH UA: 7 (ref 5–9)
PLATELET # BLD: 237 E9/L (ref 130–450)
PMV BLD AUTO: 10.4 FL (ref 7–12)
POTASSIUM SERPL-SCNC: 4 MMOL/L (ref 3.5–5)
PROTEIN UA: NEGATIVE MG/DL
RBC # BLD: 4.91 E12/L (ref 3.5–5.5)
RBC UA: ABNORMAL /HPF (ref 0–2)
SODIUM BLD-SCNC: 141 MMOL/L (ref 132–146)
SPECIFIC GRAVITY UA: 1.01 (ref 1–1.03)
TOTAL PROTEIN: 7 G/DL (ref 6.4–8.3)
UROBILINOGEN, URINE: 1 E.U./DL
WBC # BLD: 7.2 E9/L (ref 4.5–11.5)
WBC UA: ABNORMAL /HPF (ref 0–5)

## 2022-02-16 PROCEDURE — G8420 CALC BMI NORM PARAMETERS: HCPCS | Performed by: FAMILY MEDICINE

## 2022-02-16 PROCEDURE — G8427 DOCREV CUR MEDS BY ELIG CLIN: HCPCS | Performed by: FAMILY MEDICINE

## 2022-02-16 PROCEDURE — 99214 OFFICE O/P EST MOD 30 MIN: CPT | Performed by: FAMILY MEDICINE

## 2022-02-16 PROCEDURE — 4004F PT TOBACCO SCREEN RCVD TLK: CPT | Performed by: FAMILY MEDICINE

## 2022-02-16 PROCEDURE — G8484 FLU IMMUNIZE NO ADMIN: HCPCS | Performed by: FAMILY MEDICINE

## 2022-02-16 PROCEDURE — 3017F COLORECTAL CA SCREEN DOC REV: CPT | Performed by: FAMILY MEDICINE

## 2022-02-16 ASSESSMENT — ENCOUNTER SYMPTOMS
COUGH: 0
CONSTIPATION: 1
EYE REDNESS: 0
SHORTNESS OF BREATH: 0
CHEST TIGHTNESS: 0
TROUBLE SWALLOWING: 0
SORE THROAT: 0
DIARRHEA: 0
BLOOD IN STOOL: 0
NAUSEA: 1
SINUS PAIN: 0
PHOTOPHOBIA: 0
RECTAL PAIN: 0
EYE PAIN: 0
ABDOMINAL PAIN: 1
ABDOMINAL DISTENTION: 0
EYE DISCHARGE: 0
BACK PAIN: 0
VOMITING: 1
ALLERGIC/IMMUNOLOGIC NEGATIVE: 1

## 2022-02-16 NOTE — PROGRESS NOTES
22  Michael Lomax : 1970 Sex: female  Age: 46 y.o. Assessment and Plan:  Gerber was seen today for abdominal pain, nausea and other. Diagnoses and all orders for this visit:    Epigastric pain  -     XR ABDOMEN (KUB) (SINGLE AP VIEW); Future  -     CBC with Auto Differential; Future  -     Comprehensive Metabolic Panel; Future  -     Lipase; Future  -     Urinalysis; Future  -     US ABDOMEN LIMITED; Future    Recurrent major depression in partial remission ([facility] [address]  Currently in remission. Return in about 2 days (around 2022), or Galll bladder ultrasound. Chief Complaint   Patient presents with    Abdominal Pain     2-3 weeks     Nausea    Other     changes in bowels        The patient states that they have had abdominal pain for the last 4 weeks. The pain is described as crampiong, gnawing and is located in the epigastrium  The patient admitsnausea and vomiting. Bowel movements have been constipated. No diarrhea. No black or bloody stools. The patient denies dysuria, urinary frequency, urgency and or gross hematuria. No flank pain. No fevers or chills. No chest pain or dyspnea He does maintain her weight has remained stable, weight 111 pounds in September, now 108. Review of Systems   Constitutional: Negative for appetite change, fatigue and unexpected weight change. HENT: Negative for congestion, ear pain, hearing loss, sinus pain, sore throat and trouble swallowing. Eyes: Negative for photophobia, pain, discharge and redness. Respiratory: Negative for cough, chest tightness and shortness of breath. Cardiovascular: Negative for chest pain, palpitations and leg swelling. Gastrointestinal: Positive for abdominal pain, constipation, nausea and vomiting. Negative for abdominal distention, blood in stool, diarrhea and rectal pain. Endocrine: Negative. Genitourinary: Negative for dysuria, flank pain, frequency and hematuria.    Musculoskeletal: Negative for arthralgias, back pain, joint swelling and myalgias. Skin: Negative. Allergic/Immunologic: Negative. Neurological: Negative for dizziness, seizures, syncope, weakness, light-headedness, numbness and headaches. Hematological: Negative for adenopathy. Does not bruise/bleed easily. Psychiatric/Behavioral: Negative. Current Outpatient Medications:     omeprazole (PRILOSEC) 40 MG delayed release capsule, take 1 capsule by mouth every morning before breakfast, Disp: 90 capsule, Rfl: 1    ondansetron (ZOFRAN-ODT) 4 MG disintegrating tablet, Take 1 tablet by mouth 3 times daily as needed for Nausea or Vomiting, Disp: 21 tablet, Rfl: 0    baclofen (LIORESAL) 10 MG tablet, Take 1 tablet by mouth 3 times daily, Disp: 270 tablet, Rfl: 1    nadolol (CORGARD) 20 MG tablet, take 1 tablet by mouth once daily, Disp: 90 tablet, Rfl: 1    SUMAtriptan (IMITREX) 50 MG tablet, Take 1 tablet by mouth once as needed for Migraine, Disp: 9 tablet, Rfl: 2    DULoxetine (CYMBALTA) 20 MG extended release capsule, Take 90 mg by mouth daily Take 60 in the morning and 30 at night, Disp: , Rfl:     rosuvastatin (CRESTOR) 20 MG tablet, take 1 tablet by mouth nightly ---90DAYS REQUEST, Disp: 90 tablet, Rfl: 1    Docusate Calcium (STOOL SOFTENER PO), Take by mouth three times daily , Disp: , Rfl:     diazepam (VALIUM) 10 MG tablet, Take 10 mg by mouth every 8 hours. Instructed to take am of procedure if needed, Disp: , Rfl:     folic acid (FOLVITE) 1 MG tablet, Take 1 mg by mouth daily. , Disp: , Rfl:     lamoTRIgine (LAMICTAL) 200 MG tablet, Take 200 mg by mouth every evening , Disp: , Rfl:   Allergies   Allergen Reactions    Latex Rash    Sulfa Antibiotics Rash and Hives       Past Medical History:   Diagnosis Date    Anxiety     Arthritis     right hip    Bipolar 1 disorder (HCC)     Cervical stenosis of spine     Chronic back pain     Depression     Hypertension     Irritable bowel syndrome  Migraines     Mucocele of lip     for surgery 7/21/21    Other hyperlipidemia 7/29/2019    PTSD (post-traumatic stress disorder)      Past Surgical History:   Procedure Laterality Date    ARTHROGRAPHY Right 1/3/2019    RIGHT HIP ARTHROGRAM AND STEROID INJECTION performed by Julia Torres MD at [address] [place] [address] Right 9/10/2020    RIGHT HIP INJECTION UNDER FLUOROSCOPY performed by Julia Torres MD at [address] [place] [address] Right 4/13/2021    RIGHT HIP INJECTION UNDER FLUOROSCOPY performed by Julia Torres MD at [address] [place] [address] N/A 2/3/2020    DILATATION AND CURETTAGE HYSTEROSCOPY performed by Dayana Jain MD at [facility] 7/21/2021    EXCISION RIGHT LIP MUCOCELE performed by Robbie Haji MD at [address] [place] [address]  02/20/2019    implant for IBS     TONSILLECTOMY      TOOTH EXTRACTION      TUBAL LIGATION      TUMOR REMOVAL      in pancrease      No family history on file.   Social History     Socioeconomic History    Marital status:      Spouse name: Not on file    Number of children: Not on file    Years of education: Not on file    Highest education level: Not on file   Occupational History    Not on file   Tobacco Use    Smoking status: Current Every Day Smoker     Packs/day: 1.00     Years: 30.00     Pack years: 30.00     Types: Cigarettes    Smokeless tobacco: Never Used   Vaping Use    Vaping Use: Some days    Substances: Flavoring   Substance and Sexual Activity    Alcohol use: Not Currently    Drug use: No    Sexual activity: Not Currently   Other Topics Concern    Not on file   Social History Narrative    Not on file     Social Determinants of Health     Financial Resource Strain: High Risk    Difficulty of Paying Living Expenses: Hard   Food Insecurity: Food Insecurity Present    Worried About Running Out of Food in the Last Year: Never true    Kaleb of Food in the Last Year: Sometimes true   Transportation Needs:     Lack of Transportation (Medical): Not on file    Lack of Transportation (Non-Medical): Not on file   Physical Activity:     Days of Exercise per Week: Not on file    Minutes of Exercise per Session: Not on file   Stress:     Feeling of Stress : Not on file   Social Connections:     Frequency of Communication with Friends and Family: Not on file    Frequency of Social Gatherings with Friends and Family: Not on file    Attends Caodaism Services: Not on file    Active Member of [address] [place] [address] [employer] or Organizations: Not on file    Attends Club or Organization Meetings: Not on file    Marital Status: Not on file   Intimate Partner Violence:     Fear of Current or Ex-Partner: Not on file    Emotionally Abused: Not on file    Physically Abused: Not on file    Sexually Abused: Not on file   Housing Stability:     Unable to Pay for Housing in the Last Year: Not on file    Number of [place] in the Last Year: Not on file    Unstable Housing in the Last Year: Not on file       Vitals:    02/16/22 1411   BP: 110/80   Pulse: 70   Resp: 16   Temp: 96.7 °F (35.9 °C)   TempSrc: Temporal   SpO2: 97%   Weight: 108 lb (49 kg)   Height: 5' 2\" (1.575 m)       Physical Exam  Vitals and nursing note reviewed. Constitutional:       Appearance: She is well-developed. HENT:      Head: Atraumatic. Right Ear: External ear normal.      Left Ear: External ear normal.      Nose: Nose normal.      Mouth/Throat:      Pharynx: No oropharyngeal exudate. Eyes:      Conjunctiva/sclera: Conjunctivae normal.      Pupils: Pupils are equal, round, and reactive to light. Neck:      Thyroid: No thyromegaly. Trachea: No tracheal deviation. Cardiovascular:      Rate and Rhythm: Normal rate and regular rhythm. Heart sounds: No murmur heard. No friction rub. No gallop. Pulmonary:      Effort: Pulmonary effort is normal. No respiratory distress.       Breath sounds: Normal breath sounds. Abdominal:      General: Bowel sounds are normal.      Palpations: Abdomen is soft. There is no mass. Tenderness: There is abdominal tenderness. There is no right CVA tenderness, left CVA tenderness, guarding or rebound. Hernia: No hernia is present. Musculoskeletal:         General: No tenderness or deformity. Normal range of motion. Cervical back: Normal range of motion and neck supple. Lymphadenopathy:      Cervical: No cervical adenopathy. Skin:     General: Skin is warm and dry. Capillary Refill: Capillary refill takes less than 2 seconds. Findings: No rash. Neurological:      Mental Status: She is alert and oriented to person, place, and time. Sensory: No sensory deficit. Motor: No abnormal muscle tone.       Coordination: Coordination normal.      Deep Tendon Reflexes: Reflexes normal.             Seen By:  Aleida Lee DO

## 2022-02-17 ENCOUNTER — TELEPHONE (OUTPATIENT)
Dept: FAMILY MEDICINE CLINIC | Age: 52
End: 2022-02-17

## 2022-02-17 ENCOUNTER — NURSE ONLY (OUTPATIENT)
Dept: FAMILY MEDICINE CLINIC | Age: 52
End: 2022-02-17

## 2022-02-17 DIAGNOSIS — R82.71 ASYMPTOMATIC BACTERIURIA: Primary | ICD-10-CM

## 2022-02-17 DIAGNOSIS — R82.71 ASYMPTOMATIC BACTERIURIA: ICD-10-CM

## 2022-02-17 NOTE — PROGRESS NOTES
Patient came in on the lab schedule to leave a urine for urine culture.   Urine collected and will be sent out to the lab

## 2022-02-17 NOTE — TELEPHONE ENCOUNTER
Patient came in to office today to drop off a urine specimen. .however there are no orders in her chart for a urine culture. Patient has an U/S appt tomorrow she will just bring another sample in then. Can you please put order in?  Thank you

## 2022-02-17 NOTE — TELEPHONE ENCOUNTER
----- Message from Zac Morales sent at 2/16/2022  3:39 PM EST -----  Subject: Medication Problem    QUESTIONS  Name of Medication? omeprazole (PRILOSEC) 40 MG delayed release capsule  Patient-reported dosage and instructions? 40mg  What question or problem do you have with the medication? Patient does not   know if she should stop taking omeprazole, wanted to double check with the   dr. Please call back. Preferred Pharmacy? [facility] phone number (if available)? [phone]  Additional Information for Provider? Secondary phone [phone] Ravindra Gillespie  ---------------------------------------------------------------------------  --------------  Edgardo MERINO  What is the best way for the office to contact you? OK to leave message on   voicemail  Preferred Call Back Phone Number? [phone]  ---------------------------------------------------------------------------  --------------  SCRIPT ANSWERS  Relationship to Patient?  Self

## 2022-02-17 NOTE — TELEPHONE ENCOUNTER
Luana Stevenson calling back about the Omeprazole. She is not comfortable continuing this because it makes her nauseated and gives her stomach pains. She is asking if there is something else she can try?

## 2022-02-17 NOTE — TELEPHONE ENCOUNTER
Message was in telephone encounter regarding the urine culture. Patient came in and it is completed.

## 2022-02-17 NOTE — TELEPHONE ENCOUNTER
José Miguel Anderson stopped in the office to give a urine sample. She states Dr. Roger Carterles her to come in to give the sample today before 5 pm. There is no orders for the culture. Or any documentation. She will be seen tomorrow for an Ultra Sound and I advised her I was going to reach out to have the order placed. She is planning on doing the urine sample after her Ultra Sound tomorrow. Are you able to put a culture order in for her so she can get this done tomorrow please?!  Thanks,  Samra

## 2022-02-17 NOTE — TELEPHONE ENCOUNTER
Patient was informed and gave a verbal understanding. Patient stated that she feels the omeprazole is not helping at all. Patient stated that she is extremely sick to her stomach. Patient was wondering if there is another medication that she can try instead. Please advise.

## 2022-02-18 ENCOUNTER — OFFICE VISIT (OUTPATIENT)
Dept: FAMILY MEDICINE CLINIC | Age: 52
End: 2022-02-18
Payer: MEDICARE

## 2022-02-18 VITALS
OXYGEN SATURATION: 98 % | SYSTOLIC BLOOD PRESSURE: 126 MMHG | HEART RATE: 69 BPM | DIASTOLIC BLOOD PRESSURE: 94 MMHG | TEMPERATURE: 98.1 F | WEIGHT: 113 LBS | HEIGHT: 62 IN | RESPIRATION RATE: 16 BRPM | BODY MASS INDEX: 20.8 KG/M2

## 2022-02-18 DIAGNOSIS — F33.41 RECURRENT MAJOR DEPRESSION IN PARTIAL REMISSION (HCC): ICD-10-CM

## 2022-02-18 DIAGNOSIS — R11.0 NAUSEA: ICD-10-CM

## 2022-02-18 DIAGNOSIS — R10.13 EPIGASTRIC PAIN: Primary | ICD-10-CM

## 2022-02-18 PROCEDURE — 3017F COLORECTAL CA SCREEN DOC REV: CPT | Performed by: FAMILY MEDICINE

## 2022-02-18 PROCEDURE — 99214 OFFICE O/P EST MOD 30 MIN: CPT | Performed by: FAMILY MEDICINE

## 2022-02-18 PROCEDURE — G8420 CALC BMI NORM PARAMETERS: HCPCS | Performed by: FAMILY MEDICINE

## 2022-02-18 PROCEDURE — G8484 FLU IMMUNIZE NO ADMIN: HCPCS | Performed by: FAMILY MEDICINE

## 2022-02-18 PROCEDURE — G8427 DOCREV CUR MEDS BY ELIG CLIN: HCPCS | Performed by: FAMILY MEDICINE

## 2022-02-18 PROCEDURE — 4004F PT TOBACCO SCREEN RCVD TLK: CPT | Performed by: FAMILY MEDICINE

## 2022-02-18 RX ORDER — PANTOPRAZOLE SODIUM 40 MG/1
40 TABLET, DELAYED RELEASE ORAL
Qty: 90 TABLET | Refills: 1 | Status: SHIPPED
Start: 2022-02-18 | End: 2022-07-13 | Stop reason: SDUPTHER

## 2022-02-18 ASSESSMENT — ENCOUNTER SYMPTOMS
PHOTOPHOBIA: 0
SHORTNESS OF BREATH: 0
VOMITING: 0
SINUS PAIN: 0
TROUBLE SWALLOWING: 0
BLOOD IN STOOL: 0
ABDOMINAL PAIN: 1
EYE PAIN: 0
COUGH: 0
DIARRHEA: 0
NAUSEA: 0
EYE DISCHARGE: 0
CHEST TIGHTNESS: 0
EYE REDNESS: 0
BACK PAIN: 0
ALLERGIC/IMMUNOLOGIC NEGATIVE: 1
SORE THROAT: 0

## 2022-02-18 NOTE — PROGRESS NOTES
22  Arvie Fitting : 1970 Sex: female  Age: 46 y.o. Assessment and Plan:  Amanda Gaston was seen today for abdominal pain. Diagnoses and all orders for this visit:    Epigastric pain  -     pantoprazole (PROTONIX) 40 MG tablet; Take 1 tablet by mouth every morning (before breakfast)  Discontinue omeprazole,, will will trial a course of Protonix    [facility]  Janiya tree monitoring this complaint. Nausea  Zofran prescribed for this complaint    MDM: She has epigastric discomfort, it does not appear to be due to gallstones lower problem in the biliary tree. Omeprazole was ineffective so another PPI will be trialed. She is to continue with the Gaviscon as needed and recheck with me in 2 weeks. Return in about 2 weeks (around 3/4/2022). Chief Complaint   Patient presents with    Abdominal Pain       Returns for recheck. She was seen on Wednesday for our epigastric abdominal discomfort. Resolved does not appear effective any longer in controlling his discomfort. Gallbladder ultrasound was ordered and Gaviscon 15 mL as needed was amended for this pain. She is here today after the gallbladder ultrasound which did not show any stones did show a mildly dilated common bile but no stones were seen along its course. Her blood work results were reviewed from last visit. Review of Systems   Constitutional: Negative for appetite change, fatigue and unexpected weight change. HENT: Negative for congestion, ear pain, hearing loss, sinus pain, sore throat and trouble swallowing. Eyes: Negative for photophobia, pain, discharge and redness. Respiratory: Negative for cough, chest tightness and shortness of breath. Cardiovascular: Negative for chest pain, palpitations and leg swelling. Gastrointestinal: Positive for abdominal pain. Negative for blood in stool, diarrhea, nausea and vomiting. Endocrine: Negative.     Genitourinary: Negative for dysuria, flank pain, frequency and hematuria. Musculoskeletal: Negative for arthralgias, back pain, joint swelling and myalgias. Skin: Negative. Allergic/Immunologic: Negative. Neurological: Negative for dizziness, seizures, syncope, weakness, light-headedness, numbness and headaches. Hematological: Negative for adenopathy. Does not bruise/bleed easily. Psychiatric/Behavioral: Negative. Current Outpatient Medications:     pantoprazole (PROTONIX) 40 MG tablet, Take 1 tablet by mouth every morning (before breakfast), Disp: 90 tablet, Rfl: 1    ondansetron (ZOFRAN-ODT) 4 MG disintegrating tablet, Take 1 tablet by mouth 3 times daily as needed for Nausea or Vomiting, Disp: 21 tablet, Rfl: 0    baclofen (LIORESAL) 10 MG tablet, Take 1 tablet by mouth 3 times daily, Disp: 270 tablet, Rfl: 1    nadolol (CORGARD) 20 MG tablet, take 1 tablet by mouth once daily, Disp: 90 tablet, Rfl: 1    DULoxetine (CYMBALTA) 20 MG extended release capsule, Take 90 mg by mouth daily Take 60 in the morning and 30 at night, Disp: , Rfl:     rosuvastatin (CRESTOR) 20 MG tablet, take 1 tablet by mouth nightly ---90DAYS REQUEST, Disp: 90 tablet, Rfl: 1    Docusate Calcium (STOOL SOFTENER PO), Take by mouth three times daily , Disp: , Rfl:     diazepam (VALIUM) 10 MG tablet, Take 10 mg by mouth every 8 hours. Instructed to take am of procedure if needed, Disp: , Rfl:     folic acid (FOLVITE) 1 MG tablet, Take 1 mg by mouth daily. , Disp: , Rfl:     lamoTRIgine (LAMICTAL) 200 MG tablet, Take 200 mg by mouth every evening , Disp: , Rfl:     SUMAtriptan (IMITREX) 50 MG tablet, Take 1 tablet by mouth once as needed for Migraine, Disp: 9 tablet, Rfl: 2  Allergies   Allergen Reactions    Latex Rash    Sulfa Antibiotics Rash and Hives       Past Medical History:   Diagnosis Date    Anxiety     Arthritis     right hip    Bipolar 1 disorder (HCC)     Cervical stenosis of spine     Chronic back pain     Depression     Hypertension     Irritable bowel syndrome     Migraines     Mucocele of lip     for surgery 7/21/21    Other hyperlipidemia 7/29/2019    PTSD (post-traumatic stress disorder)      Past Surgical History:   Procedure Laterality Date    ARTHROGRAPHY Right 1/3/2019    RIGHT HIP ARTHROGRAM AND STEROID INJECTION performed by Nicole Ritchie MD at [address] [place] [address] Right 9/10/2020    RIGHT HIP INJECTION UNDER FLUOROSCOPY performed by Nicole Ritchie MD at [address] [place] [address] Right 4/13/2021    RIGHT HIP INJECTION UNDER FLUOROSCOPY performed by Nicole Ritchie MD at [address] [place] [address] N/A 2/3/2020    DILATATION AND CURETTAGE HYSTEROSCOPY performed by Pamela Sanford MD at [facility] 7/21/2021    EXCISION RIGHT LIP MUCOCELE performed by Suresh Clarke MD at [address] [place] [address]  02/20/2019    implant for IBS     TONSILLECTOMY      TOOTH EXTRACTION      TUBAL LIGATION      TUMOR REMOVAL      in pancrease      No family history on file.   Social History     Socioeconomic History    Marital status:      Spouse name: Not on file    Number of children: Not on file    Years of education: Not on file    Highest education level: Not on file   Occupational History    Not on file   Tobacco Use    Smoking status: Current Every Day Smoker     Packs/day: 1.00     Years: 30.00     Pack years: 30.00     Types: Cigarettes    Smokeless tobacco: Never Used   Vaping Use    Vaping Use: Some days    Substances: Flavoring   Substance and Sexual Activity    Alcohol use: Not Currently    Drug use: No    Sexual activity: Not Currently   Other Topics Concern    Not on file   Social History Narrative    Not on file     Social Determinants of Health     Financial Resource Strain: High Risk    Difficulty of Paying Living Expenses: Hard   Food Insecurity: Food Insecurity Present    Worried About gallop. Pulmonary:      Effort: Pulmonary effort is normal. No respiratory distress. Breath sounds: Normal breath sounds. Abdominal:      General: Bowel sounds are normal.      Palpations: Abdomen is soft. Tenderness: There is abdominal tenderness. Comments: Epigastrium to palpation   Musculoskeletal:         General: No tenderness or deformity. Normal range of motion. Cervical back: Normal range of motion and neck supple. Lymphadenopathy:      Cervical: No cervical adenopathy. Skin:     General: Skin is warm and dry. Capillary Refill: Capillary refill takes less than 2 seconds. Findings: No rash. Neurological:      Mental Status: She is alert and oriented to person, place, and time. Sensory: No sensory deficit. Motor: No abnormal muscle tone.       Coordination: Coordination normal.      Deep Tendon Reflexes: Reflexes normal.             Seen By:  Bret Santacruz DO

## 2022-02-20 LAB — URINE CULTURE, ROUTINE: NORMAL

## 2022-02-21 ENCOUNTER — TELEPHONE (OUTPATIENT)
Dept: FAMILY MEDICINE CLINIC | Age: 52
End: 2022-02-21

## 2022-02-21 NOTE — TELEPHONE ENCOUNTER
Lucy Wilson called to update you since she was in last week. She is doing better, but felling a little weak and having pain only when he touches his stomach.

## 2022-03-04 ENCOUNTER — TELEPHONE (OUTPATIENT)
Dept: FAMILY MEDICINE CLINIC | Age: 52
End: 2022-03-04

## 2022-03-04 ENCOUNTER — OFFICE VISIT (OUTPATIENT)
Dept: FAMILY MEDICINE CLINIC | Age: 52
End: 2022-03-04
Payer: MEDICARE

## 2022-03-04 VITALS
SYSTOLIC BLOOD PRESSURE: 120 MMHG | TEMPERATURE: 97.4 F | DIASTOLIC BLOOD PRESSURE: 80 MMHG | BODY MASS INDEX: 19.88 KG/M2 | WEIGHT: 108 LBS | HEART RATE: 84 BPM | OXYGEN SATURATION: 98 % | RESPIRATION RATE: 16 BRPM | HEIGHT: 62 IN

## 2022-03-04 VITALS
HEIGHT: 62 IN | WEIGHT: 108 LBS | HEART RATE: 84 BPM | DIASTOLIC BLOOD PRESSURE: 80 MMHG | BODY MASS INDEX: 19.88 KG/M2 | RESPIRATION RATE: 16 BRPM | OXYGEN SATURATION: 98 % | TEMPERATURE: 97.4 F | SYSTOLIC BLOOD PRESSURE: 120 MMHG

## 2022-03-04 DIAGNOSIS — Z00.00 MEDICARE ANNUAL WELLNESS VISIT, SUBSEQUENT: Primary | ICD-10-CM

## 2022-03-04 DIAGNOSIS — Z86.69 HISTORY OF MIGRAINE: Chronic | ICD-10-CM

## 2022-03-04 DIAGNOSIS — K21.9 GASTROESOPHAGEAL REFLUX DISEASE WITHOUT ESOPHAGITIS: Primary | ICD-10-CM

## 2022-03-04 DIAGNOSIS — E78.2 MIXED HYPERLIPIDEMIA: ICD-10-CM

## 2022-03-04 PROCEDURE — G8420 CALC BMI NORM PARAMETERS: HCPCS | Performed by: FAMILY MEDICINE

## 2022-03-04 PROCEDURE — G8427 DOCREV CUR MEDS BY ELIG CLIN: HCPCS | Performed by: FAMILY MEDICINE

## 2022-03-04 PROCEDURE — G0439 PPPS, SUBSEQ VISIT: HCPCS | Performed by: FAMILY MEDICINE

## 2022-03-04 PROCEDURE — 4004F PT TOBACCO SCREEN RCVD TLK: CPT | Performed by: FAMILY MEDICINE

## 2022-03-04 PROCEDURE — 3017F COLORECTAL CA SCREEN DOC REV: CPT | Performed by: FAMILY MEDICINE

## 2022-03-04 PROCEDURE — G8484 FLU IMMUNIZE NO ADMIN: HCPCS | Performed by: FAMILY MEDICINE

## 2022-03-04 PROCEDURE — 99213 OFFICE O/P EST LOW 20 MIN: CPT | Performed by: FAMILY MEDICINE

## 2022-03-04 RX ORDER — ROSUVASTATIN CALCIUM 20 MG/1
TABLET, COATED ORAL
Qty: 90 TABLET | Refills: 1 | Status: SHIPPED
Start: 2022-03-04 | End: 2022-08-30

## 2022-03-04 ASSESSMENT — ENCOUNTER SYMPTOMS
NAUSEA: 1
SORE THROAT: 0
PHOTOPHOBIA: 0
VOMITING: 0
DIARRHEA: 1
BACK PAIN: 0
CHEST TIGHTNESS: 0
EYE PAIN: 0
SHORTNESS OF BREATH: 0
EYE REDNESS: 0
ABDOMINAL PAIN: 1
BLOOD IN STOOL: 0
COUGH: 0
EYE DISCHARGE: 0
TROUBLE SWALLOWING: 0
SINUS PAIN: 0
ALLERGIC/IMMUNOLOGIC NEGATIVE: 1

## 2022-03-04 ASSESSMENT — LIFESTYLE VARIABLES: HOW OFTEN DO YOU HAVE A DRINK CONTAINING ALCOHOL: NEVER

## 2022-03-04 ASSESSMENT — PATIENT HEALTH QUESTIONNAIRE - PHQ9
1. LITTLE INTEREST OR PLEASURE IN DOING THINGS: 2
2. FEELING DOWN, DEPRESSED OR HOPELESS: 2
SUM OF ALL RESPONSES TO PHQ QUESTIONS 1-9: 4
6. FEELING BAD ABOUT YOURSELF - OR THAT YOU ARE A FAILURE OR HAVE LET YOURSELF OR YOUR FAMILY DOWN: 0
3. TROUBLE FALLING OR STAYING ASLEEP: 0
4. FEELING TIRED OR HAVING LITTLE ENERGY: 0
10. IF YOU CHECKED OFF ANY PROBLEMS, HOW DIFFICULT HAVE THESE PROBLEMS MADE IT FOR YOU TO DO YOUR WORK, TAKE CARE OF THINGS AT HOME, OR GET ALONG WITH OTHER PEOPLE: 1
9. THOUGHTS THAT YOU WOULD BE BETTER OFF DEAD, OR OF HURTING YOURSELF: 0
SUM OF ALL RESPONSES TO PHQ QUESTIONS 1-9: 4
SUM OF ALL RESPONSES TO PHQ QUESTIONS 1-9: 4
7. TROUBLE CONCENTRATING ON THINGS, SUCH AS READING THE NEWSPAPER OR WATCHING TELEVISION: 0
SUM OF ALL RESPONSES TO PHQ QUESTIONS 1-9: 4
8. MOVING OR SPEAKING SO SLOWLY THAT OTHER PEOPLE COULD HAVE NOTICED. OR THE OPPOSITE, BEING SO FIGETY OR RESTLESS THAT YOU HAVE BEEN MOVING AROUND A LOT MORE THAN USUAL: 0
SUM OF ALL RESPONSES TO PHQ9 QUESTIONS 1 & 2: 4
5. POOR APPETITE OR OVEREATING: 0

## 2022-03-04 NOTE — PROGRESS NOTES
Medicare Annual Wellness Visit    Greg Stephenson is here for Medicare Neela 57 was seen today for medicare awv. Diagnoses and all orders for this visit:    Medicare annual wellness visit, subsequent         Recommendations for Preventive Services Due: see orders and patient instructions/AVS.  Recommended screening schedule for the next 5-10 years is provided to the patient in written form: see Patient Instructions/AVS.     Return for Medicare Annual Wellness Visit in 1 year. Subjective       Patient's complete Health Risk Assessment and screening values have been reviewed and are found in Flowsheets. The following problems were reviewed today and where indicated follow up appointments were made and/or referrals ordered.     Positive Risk Factor Screenings with Interventions:       Tobacco Use:     Tobacco Use: High Risk    Smoking Tobacco Use: Current Every Day Smoker    Smokeless Tobacco Use: Never Used     E-Cigarettes/Vaping Use     Questions Responses    E-Cigarette/Vaping Use Current Some Day User    Start Date     Passive Exposure     Quit Date     Counseling Given     Comments         Substance Abuse - Tobacco Interventions:  patient is not ready to work toward tobacco cessation at this time         General Health and ACP:  General  In general, how would you say your health is?: (!) Poor  In the past 7 days, have you experienced any of the following: New or Increased Pain, New or Increased Fatigue, Loneliness, Social Isolation, Stress or Anger?: (!) Yes  Select all that apply: (!) New or Increased Pain,New or Increased Fatigue,Stress  Do you get the social and emotional support that you need?: Yes  Do you have a Living Will?: Yes    Advance Directives     Power of  Living Will ACP-Advance Directive ACP-Power of     Not on File Filed on 09/23/11 Filed Not on File      General Health Risk Interventions:  · No Living Will: Patient declines ACP discussion/assistance    Health Habits/Nutrition:     Physical Activity: Unknown    Days of Exercise per Week: 0 days    Minutes of Exercise per Session: Not on file     Have you lost any weight without trying in the past 3 months?: (!) Yes  Body mass index: 19.75  Have you seen the dentist within the past year?: Yes    Health Habits/Nutrition Interventions:  · Nutritional issues:  patient is not ready to address his/her nutritional/weight issues at this time             Objective   Vitals:    03/04/22 1414   BP: 120/80   Pulse: 84   Resp: 16   Temp: 97.4 °F (36.3 °C)   TempSrc: Temporal   SpO2: 98%   Weight: 108 lb (49 kg)   Height: 5' 2\" (1.575 m)      Body mass index is 19.75 kg/m². Allergies   Allergen Reactions    Latex Rash    Sulfa Antibiotics Rash and Hives     Prior to Visit Medications    Medication Sig Taking? Authorizing Provider   rosuvastatin (CRESTOR) 20 MG tablet take 1 tablet by mouth nightly ---90DAYS REQUEST  Milton SHANE Persaud, DO   pantoprazole (PROTONIX) 40 MG tablet Take 1 tablet by mouth every morning (before breakfast)  Milton SHANE Persaud, DO   ondansetron (ZOFRAN-ODT) 4 MG disintegrating tablet Take 1 tablet by mouth 3 times daily as needed for Nausea or Vomiting  Milton SHANE Persaud, DO   baclofen (LIORESAL) 10 MG tablet Take 1 tablet by mouth 3 times daily  Milton SHANE Persaud, DO   nadolol (CORGARD) 20 MG tablet take 1 tablet by mouth once daily  Milton SHANE Persaud, DO   SUMAtriptan (IMITREX) 50 MG tablet Take 1 tablet by mouth once as needed for Migraine  Milton SHANE Persaud DO   DULoxetine (CYMBALTA) 20 MG extended release capsule Take 90 mg by mouth daily Take 60 in the morning and 30 at night  Historical Provider, MD   Docusate Calcium (STOOL SOFTENER PO) Take by mouth three times daily   Historical Provider, MD   diazepam (VALIUM) 10 MG tablet Take 10 mg by mouth every 8 hours.  Instructed to take am of procedure if needed  Historical Provider, MD   folic acid (FOLVITE) 1 MG tablet Take 1 mg by mouth daily. Historical Provider, MD   lamoTRIgine (LAMICTAL) 200 MG tablet Take 200 mg by mouth every evening   Historical Provider, MD [place] (Including outside providers/suppliers regularly involved in providing care):   Patient Care Team:  Bret Santacruz DO as PCP - [address] [place], [place] [address]DO as PCP - [facility] Empaneled Provider    Reviewed and updated this visit:  Tobacco  Allergies  Meds  Problems  Med Hx  Surg Hx  Soc Hx  Fam Hx                   Cardiovascular Disease Risk Counseling: Assessed the patient's risk to develop cardiovascular disease and reviewed main risk factors. Reviewed steps to reduce disease risk including:   · Quitting tobacco use, reducing amount smoked, or not starting the habit  · Making healthy food choices  · Being physically active and gradualy increasing activity levels   · Reduce weight and determine a healthy BMI goal  · Monitor blood pressure and treat if higher than 140/90 mmHg  · Maintain blood total cholesterol levels under 5 mmol/l or 190 mg/dl  · Maintain LDL cholesterol levels under 3.0 mmol/l or 115 mg/dl   · Control blood glucose levels  · Consider taking aspirin (75 mg daily), once blood pressure is controlled   Provided a follow up plan.   Time spent (minutes): 5 min

## 2022-03-04 NOTE — PROGRESS NOTES
Medicare Annual Wellness Visit    Sean Denny is here for Medicare AWV    Assessment & Plan   There are no diagnoses linked to this encounter. Recommendations for Preventive Services Due: see orders and patient instructions/AVS.  Recommended screening schedule for the next 5-10 years is provided to the patient in written form: see Patient Instructions/AVS.     No follow-ups on file. Subjective   {OPTIONAL FOR THIS VISIT TYPE - TO BE USED IF ADDRESSING AN ACUTE OR CHRONIC PROBLEM (THIS WILL AUTO-DELETE IF NOT USED):[id number]::\"The following acute and/or chronic problems were also addressed today:\",\"***\"}    Patient's complete Health Risk Assessment and screening values have been reviewed and are found in Flowsheets. The following problems were reviewed today and where indicated follow up appointments were made and/or referrals ordered.     Positive Risk Factor Screenings with Interventions:         Tobacco Use:     Tobacco Use: High Risk    Smoking Tobacco Use: Current Every Day Smoker    Smokeless Tobacco Use: Never Used     E-Cigarettes/Vaping Use     Questions Responses    E-Cigarette/Vaping Use Current Some Day User    Start Date     Passive Exposure     Quit Date     Counseling Given     Comments         Substance Abuse - Tobacco Interventions:  {Medicare AWV Tobacco Abuse Interventions:451323296}           General Health and ACP:  General  In general, how would you say your health is?: (!) Poor  In the past 7 days, have you experienced any of the following: New or Increased Pain, New or Increased Fatigue, Loneliness, Social Isolation, Stress or Anger?: (!) Yes  Select all that apply: (!) New or Increased Pain,New or Increased Fatigue,Stress  Do you get the social and emotional support that you need?: Yes  Do you have a Living Will?: Yes    Advance Directives     Power of  Living Will ACP-Advance Directive ACP-Power of     Not on File Filed on 09/23/11 Filed Not on File General Health Risk Interventions:  · {Medicare AWV General Health Risk Interventions:190234054}    Health Habits/Nutrition:     Physical Activity: Unknown    Days of Exercise per Week: 0 days    Minutes of Exercise per Session: Not on file     Have you lost any weight without trying in the past 3 months?: (!) Yes  Body mass index: 19.75  Have you seen the dentist within the past year?: Yes    Health Habits/Nutrition Interventions:  · {Medicare AWV Health Habits/Nutrition Interventions:135052199}             Objective   Vitals:    03/04/22 1414   BP: 120/80   Pulse: 84   Resp: 16   Temp: 97.4 °F (36.3 °C)   TempSrc: Temporal   SpO2: 98%   Weight: 108 lb (49 kg)   Height: 5' 2\" (1.575 m)      Body mass index is 19.75 kg/m². {OPTIONAL FOR THIS VISIT TYPE - GENERAL PHYSICAL EXAM (THIS WILL AUTO-DELETE IF NOT TBTA):[id number]}       Allergies   Allergen Reactions    Latex Rash    Sulfa Antibiotics Rash and Hives     Prior to Visit Medications    Medication Sig Taking?  Authorizing Provider   rosuvastatin (CRESTOR) 20 MG tablet take 1 tablet by mouth nightly ---90DAYS REQUEST  Gaines SHANE Persaud, DO   pantoprazole (PROTONIX) 40 MG tablet Take 1 tablet by mouth every morning (before breakfast)  Gaines SHANE Persaud, DO   ondansetron (ZOFRAN-ODT) 4 MG disintegrating tablet Take 1 tablet by mouth 3 times daily as needed for Nausea or Vomiting  Gaines SHANE Persaud, DO   baclofen (LIORESAL) 10 MG tablet Take 1 tablet by mouth 3 times daily  Gaines SHANE Persaud, DO   nadolol (CORGARD) 20 MG tablet take 1 tablet by mouth once daily  Gaines SHANE Persaud, DO   SUMAtriptan (IMITREX) 50 MG tablet Take 1 tablet by mouth once as needed for Migraine  Gaines SHANE Persaud, DO   DULoxetine (CYMBALTA) 20 MG extended release capsule Take 90 mg by mouth daily Take 60 in the morning and 30 at night  Historical Provider, MD   Docusate Calcium (STOOL SOFTENER PO) Take by mouth three times daily   Historical Provider, MD   diazepam (VALIUM) 10 MG tablet Take 10 mg by mouth every 8 hours. Instructed to take am of procedure if needed  Historical Provider, MD   folic acid (FOLVITE) 1 MG tablet Take 1 mg by mouth daily.   Historical Provider, MD   lamoTRIgine (LAMICTAL) 200 MG tablet Take 200 mg by mouth every evening   Historical Provider, MD Dum[place] (Including outside providers/suppliers regularly involved in providing care):   Patient Care Team:  Loki Simpson DO as PCP - [address] [place], [place] [address]DO as PCP - [facility] Empaneled Provider    Reviewed and updated this visit:  Tobacco  Allergies  Meds  Problems  Med Hx  Surg Hx  Soc Hx  Fam Hx

## 2022-03-04 NOTE — PATIENT INSTRUCTIONS
Personalized Preventive Plan for [facility] - 3/4/2022  Medicare offers a range of preventive health benefits. Some of the tests and screenings are paid in full while other may be subject to a deductible, co-insurance, and/or copay. Some of these benefits include a comprehensive review of your medical history including lifestyle, illnesses that may run in your family, and various assessments and screenings as appropriate. After reviewing your medical record and screening and assessments performed today your provider may have ordered immunizations, labs, imaging, and/or referrals for you. A list of these orders (if applicable) as well as your Preventive Care list are included within your After Visit Summary for your review. Other Preventive Recommendations:    · A preventive eye exam performed by an eye specialist is recommended every 1-2 years to screen for glaucoma; cataracts, macular degeneration, and other eye disorders. · A preventive dental visit is recommended every 6 months. · Try to get at least 150 minutes of exercise per week or 10,000 steps per day on a pedometer . · Order or download the FREE \"Exercise & Physical Activity: Your Everyday Guide\" from [employer]. Call 4-362.179.1965 or search [employer] online. · You need 5672-2559 mg of calcium and 1638-7430 IU of vitamin D per day. It is possible to meet your calcium requirement with diet alone, but a vitamin D supplement is usually necessary to meet this goal.  · When exposed to the sun, use a sunscreen that protects against both UVA and UVB radiation with an SPF of 30 or greater. Reapply every 2 to 3 hours or after sweating, drying off with a towel, or swimming. · Always wear a seat belt when traveling in a car. Always wear a helmet when riding a bicycle or motorcycle. Personalized Preventive Plan for [facility] - 3/4/2022  Medicare offers a range of preventive health benefits.  Some of the tests and screenings are paid in full while other may be subject to a deductible, co-insurance, and/or copay. Some of these benefits include a comprehensive review of your medical history including lifestyle, illnesses that may run in your family, and various assessments and screenings as appropriate. After reviewing your medical record and screening and assessments performed today your provider may have ordered immunizations, labs, imaging, and/or referrals for you. A list of these orders (if applicable) as well as your Preventive Care list are included within your After Visit Summary for your review. Other Preventive Recommendations:    A preventive eye exam performed by an eye specialist is recommended every 1-2 years to screen for glaucoma; cataracts, macular degeneration, and other eye disorders. A preventive dental visit is recommended every 6 months. Try to get at least 150 minutes of exercise per week or 10,000 steps per day on a pedometer . Order or download the FREE \"Exercise & Physical Activity: Your Everyday Guide\" from [employer]. Call 5-620.516.7490 or search [employer] online. You need 9225-8382 mg of calcium and 9261-4772 IU of vitamin D per day. It is possible to meet your calcium requirement with diet alone, but a vitamin D supplement is usually necessary to meet this goal.  When exposed to the sun, use a sunscreen that protects against both UVA and UVB radiation with an SPF of 30 or greater. Reapply every 2 to 3 hours or after sweating, drying off with a towel, or swimming. Always wear a seat belt when traveling in a car. Always wear a helmet when riding a bicycle or motorcycle.

## 2022-03-04 NOTE — TELEPHONE ENCOUNTER
Patient Sanna Mckeon called in the office regarding her [phone]. Patient stated that Dr. Tashia Johnson told her in the office that the radiologist saw something, yet patient stated that she does not remember what that was. Please advise.

## 2022-03-04 NOTE — PROGRESS NOTES
3/4/22  [facility], [place] : 1970 Sex: female  Age: 46 y.o. Assessment and Plan:  Diagnoses and all orders for this visit:    Gastroesophageal reflux disease without esophagitis  -     Roshni Moreno MD, Gastroenterology, [facility]  Improved with the Protonix, to continue this as directed. Mixed hyperlipidemia  -     rosuvastatin (CRESTOR) 20 MG tablet; take 1 tablet by mouth nightly ---90DAYS REQUEST  Statin is effective and well-tolerated. Lipids well controlled. History of migraine  Sumatriptan rolling takes, able over the last several months. MDM: This patient appears like GERD will obtain GI referral for evaluation and further treatment. In the meantime, she is to continue on her PPI, and use Gaviscon as needed. Return in about 4 weeks (around 2022). No chief complaint on file. Patient returns for recheck abdominal pain. She continues with epigastric discomfort after eating especially. Intermittent diarrhea and moving her bowels. Denies any mucus or blood present Protonix appears to be effective in controlling her complaints then the omeprazole. With continuing complaints however, will get GI referral.      Review of Systems   Constitutional: Negative for appetite change, fatigue and unexpected weight change. HENT: Negative for congestion, ear pain, hearing loss, sinus pain, sore throat and trouble swallowing. Eyes: Negative for photophobia, pain, discharge and redness. Respiratory: Negative for cough, chest tightness and shortness of breath. Cardiovascular: Negative for chest pain, palpitations and leg swelling. Gastrointestinal: Positive for abdominal pain, diarrhea and nausea. Negative for blood in stool and vomiting. Endocrine: Negative. Genitourinary: Negative for dysuria, flank pain, frequency and hematuria. Musculoskeletal: Negative for arthralgias, back pain, joint swelling and myalgias. Skin: Negative.     Allergic/Immunologic: Negative. Neurological: Negative for dizziness, seizures, syncope, weakness, light-headedness, numbness and headaches. Hematological: Negative for adenopathy. Does not bruise/bleed easily. Psychiatric/Behavioral: Negative. Current Outpatient Medications:     rosuvastatin (CRESTOR) 20 MG tablet, take 1 tablet by mouth nightly ---90DAYS REQUEST, Disp: 90 tablet, Rfl: 1    pantoprazole (PROTONIX) 40 MG tablet, Take 1 tablet by mouth every morning (before breakfast), Disp: 90 tablet, Rfl: 1    ondansetron (ZOFRAN-ODT) 4 MG disintegrating tablet, Take 1 tablet by mouth 3 times daily as needed for Nausea or Vomiting, Disp: 21 tablet, Rfl: 0    baclofen (LIORESAL) 10 MG tablet, Take 1 tablet by mouth 3 times daily, Disp: 270 tablet, Rfl: 1    nadolol (CORGARD) 20 MG tablet, take 1 tablet by mouth once daily, Disp: 90 tablet, Rfl: 1    SUMAtriptan (IMITREX) 50 MG tablet, Take 1 tablet by mouth once as needed for Migraine, Disp: 9 tablet, Rfl: 2    DULoxetine (CYMBALTA) 20 MG extended release capsule, Take 90 mg by mouth daily Take 60 in the morning and 30 at night, Disp: , Rfl:     Docusate Calcium (STOOL SOFTENER PO), Take by mouth three times daily , Disp: , Rfl:     diazepam (VALIUM) 10 MG tablet, Take 10 mg by mouth every 8 hours. Instructed to take am of procedure if needed, Disp: , Rfl:     folic acid (FOLVITE) 1 MG tablet, Take 1 mg by mouth daily. , Disp: , Rfl:     lamoTRIgine (LAMICTAL) 200 MG tablet, Take 200 mg by mouth every evening , Disp: , Rfl:   Allergies   Allergen Reactions    Latex Rash    Sulfa Antibiotics Rash and Hives       Past Medical History:   Diagnosis Date    Anxiety     Arthritis     right hip    Bipolar 1 disorder ([address]     Cervical stenosis of spine     Chronic back pain     Depression     Hypertension     Irritable bowel syndrome     Migraines     Mucocele of lip     for surgery 7/21/21    Other hyperlipidemia 7/29/2019    PTSD (post-traumatic stress disorder)      Past Surgical History:   Procedure Laterality Date    ARTHROGRAPHY Right 1/3/2019    RIGHT HIP ARTHROGRAM AND STEROID INJECTION performed by Lily Marks MD at [facility] Right 9/10/2020    RIGHT HIP INJECTION UNDER FLUOROSCOPY performed by Lily Marks MD at [address] [place] [address] Right 4/13/2021    RIGHT HIP INJECTION UNDER FLUOROSCOPY performed by Lily Marks MD at [address] [place] [address] N/A 2/3/2020    DILATATION AND CURETTAGE HYSTEROSCOPY performed by Sridevi Quinonez MD at [facility] Right 7/21/2021    EXCISION RIGHT LIP MUCOCELE performed by Padma Perez MD at [address] [place] [address]  02/20/2019    implant for IBS     TONSILLECTOMY      TOOTH EXTRACTION      TUBAL LIGATION      [facility]      [facility]      No family history on file. Social History     Socioeconomic History    Marital status:      Spouse name: Not on file    Number of children: Not on file    Years of education: Not on file    Highest education level: Not on file   Occupational History    Not on file   Tobacco Use    Smoking status: Current Every Day Smoker     Packs/day: 1.00     Years: 30.00     Pack years: 30.00     Types: Cigarettes    Smokeless tobacco: Never Used   Vaping Use    Vaping Use: Some days    Substances: Flavoring   Substance and Sexual Activity    Alcohol use: Not Currently    Drug use: No    Sexual activity: Not Currently   Other Topics Concern    Not on file   Social History Narrative    Not on file     Social Determinants of Health     Financial Resource Strain: High Risk    Difficulty of Paying Living Expenses: Hard   Food Insecurity: Food Insecurity Present    Worried About Running Out of Food in the Last Year: Never true    Kaleb of Food in the Last Year: Sometimes true   Transportation Needs:     Lack of Transportation (Medical):  Not on file    Lack of Transportation (Non-Medical): Not on file   Physical Activity:     Days of Exercise per Week: Not on file    Minutes of Exercise per Session: Not on file   Stress:     Feeling of Stress : Not on file   Social Connections:     Frequency of Communication with Friends and Family: Not on file    Frequency of Social Gatherings with Friends and Family: Not on file    Attends [employer] Services: Not on file    Active Member of [address] [place] [address] or Organizations: Not on file    Attends Club or Organization Meetings: Not on file    Marital Status: Not on file   Intimate Partner Violence:     Fear of Current or Ex-Partner: Not on file    Emotionally Abused: Not on file    Physically Abused: Not on file    Sexually Abused: Not on file   Housing Stability:     Unable to Pay for Housing in the Last Year: Not on file    Number of [place] in the Last Year: Not on file    Unstable Housing in the Last Year: Not on file       Vitals:    03/04/22 1326   BP: 120/80   Pulse: 84   Resp: 16   Temp: 97.4 °F (36.3 °C)   TempSrc: Temporal   SpO2: 98%   Weight: 108 lb (49 kg)   Height: 5' 2\" (1.575 m)       Physical Exam  Vitals and nursing note reviewed. Constitutional:       Appearance: She is well-developed. HENT:      Head: Atraumatic. Right Ear: External ear normal.      Left Ear: External ear normal.      Nose: Nose normal.      Mouth/Throat:      Pharynx: No oropharyngeal exudate. Eyes:      Conjunctiva/sclera: Conjunctivae normal.      Pupils: Pupils are equal, round, and reactive to light. Neck:      Thyroid: No thyromegaly. Trachea: No tracheal deviation. Cardiovascular:      Rate and Rhythm: Normal rate and regular rhythm. Heart sounds: No murmur heard. No friction rub. No gallop. Pulmonary:      Effort: Pulmonary effort is normal. No respiratory distress. Breath sounds: Normal breath sounds. Abdominal:      General: Bowel sounds are normal.      Palpations: Abdomen is soft.  There is no mass.      Tenderness: There is abdominal tenderness. There is no right CVA tenderness, left CVA tenderness, guarding or rebound. Hernia: No hernia is present. Musculoskeletal:         General: No tenderness or deformity. Normal range of motion. Cervical back: Normal range of motion and neck supple. Lymphadenopathy:      Cervical: No cervical adenopathy. Skin:     General: Skin is warm and dry. Capillary Refill: Capillary refill takes less than 2 seconds. Findings: No rash. Neurological:      Mental Status: She is alert and oriented to person, place, and time. Sensory: No sensory deficit. Motor: No abnormal muscle tone.       Coordination: Coordination normal.      Deep Tendon Reflexes: Reflexes normal.             Seen By:  Ralf Emmanuel DO

## 2022-03-04 NOTE — PROGRESS NOTES
Cardiovascular Disease Risk Counseling: Assessed the patient's risk to develop cardiovascular disease and reviewed main risk factors. Reviewed steps to reduce disease risk including:   · Quitting tobacco use, reducing amount smoked, or not starting the habit  · Making healthy food choices  · Being physically active and gradualy increasing activity levels   · Reduce weight and determine a healthy BMI goal  · Monitor blood pressure and treat if higher than 140/90 mmHg  · Maintain blood total cholesterol levels under 5 mmol/l or 190 mg/dl  · Maintain LDL cholesterol levels under 3.0 mmol/l or 115 mg/dl   · Control blood glucose levels  · Consider taking aspirin (75 mg daily), once blood pressure is controlled   Provided a follow up plan.   Time spent (minutes): ***

## 2022-03-08 ENCOUNTER — INITIAL CONSULT (OUTPATIENT)
Dept: GASTROENTEROLOGY | Age: 52
End: 2022-03-08
Payer: MEDICARE

## 2022-03-08 ENCOUNTER — HOSPITAL ENCOUNTER (EMERGENCY)
Age: 52
Discharge: HOME | DRG: 394 | End: 2022-03-08
Payer: MEDICARE

## 2022-03-08 ENCOUNTER — APPOINTMENT (OUTPATIENT)
Dept: CT IMAGING | Age: 52
DRG: 394 | End: 2022-03-08
Payer: MEDICARE

## 2022-03-08 VITALS
BODY MASS INDEX: 19.32 KG/M2 | TEMPERATURE: 97.8 F | HEART RATE: 67 BPM | WEIGHT: 105 LBS | SYSTOLIC BLOOD PRESSURE: 127 MMHG | DIASTOLIC BLOOD PRESSURE: 71 MMHG | HEIGHT: 62 IN | RESPIRATION RATE: 16 BRPM | OXYGEN SATURATION: 98 %

## 2022-03-08 VITALS — WEIGHT: 105 LBS | BODY MASS INDEX: 19.32 KG/M2 | HEIGHT: 62 IN

## 2022-03-08 DIAGNOSIS — R11.0 NAUSEA: ICD-10-CM

## 2022-03-08 DIAGNOSIS — R11.0 NAUSEA: Primary | ICD-10-CM

## 2022-03-08 DIAGNOSIS — R10.13 EPIGASTRIC PAIN: Primary | ICD-10-CM

## 2022-03-08 DIAGNOSIS — K86.9 PANCREATIC LESION: ICD-10-CM

## 2022-03-08 DIAGNOSIS — R10.9 ACUTE ABDOMINAL PAIN: ICD-10-CM

## 2022-03-08 LAB
ALBUMIN SERPL-MCNC: 4.8 G/DL (ref 3.5–5.2)
ALP BLD-CCNC: 78 U/L (ref 35–104)
ALT SERPL-CCNC: 7 U/L (ref 0–32)
ANION GAP SERPL CALCULATED.3IONS-SCNC: 11 MMOL/L (ref 7–16)
AST SERPL-CCNC: 17 U/L (ref 0–31)
BACTERIA: NORMAL /HPF
BASOPHILS ABSOLUTE: 0.03 E9/L (ref 0–0.2)
BASOPHILS RELATIVE PERCENT: 0.5 % (ref 0–2)
BILIRUB SERPL-MCNC: 0.5 MG/DL (ref 0–1.2)
BILIRUBIN URINE: NEGATIVE
BLOOD, URINE: ABNORMAL
BUN BLDV-MCNC: 13 MG/DL (ref 6–20)
CALCIUM SERPL-MCNC: 9.7 MG/DL (ref 8.6–10.2)
CHLORIDE BLD-SCNC: 104 MMOL/L (ref 98–107)
CLARITY: CLEAR
CO2: 27 MMOL/L (ref 22–29)
COLOR: YELLOW
CREAT SERPL-MCNC: 0.7 MG/DL (ref 0.5–1)
EOSINOPHILS ABSOLUTE: 0.1 E9/L (ref 0.05–0.5)
EOSINOPHILS RELATIVE PERCENT: 1.5 % (ref 0–6)
EPITHELIAL CELLS, UA: NORMAL /HPF
GFR AFRICAN AMERICAN: >60
GFR NON-AFRICAN AMERICAN: >60 ML/MIN/1.73
GLUCOSE BLD-MCNC: 97 MG/DL (ref 74–99)
GLUCOSE URINE: NEGATIVE MG/DL
HCT VFR BLD CALC: 45 % (ref 34–48)
HEMOGLOBIN: 15.1 G/DL (ref 11.5–15.5)
IMMATURE GRANULOCYTES #: 0.01 E9/L
IMMATURE GRANULOCYTES %: 0.2 % (ref 0–5)
KETONES, URINE: 15 MG/DL
LACTIC ACID: 1 MMOL/L (ref 0.5–2.2)
LEUKOCYTE ESTERASE, URINE: NEGATIVE
LIPASE: 17 U/L (ref 13–60)
LYMPHOCYTES ABSOLUTE: 2.32 E9/L (ref 1.5–4)
LYMPHOCYTES RELATIVE PERCENT: 34.9 % (ref 20–42)
MCH RBC QN AUTO: 31.1 PG (ref 26–35)
MCHC RBC AUTO-ENTMCNC: 33.6 % (ref 32–34.5)
MCV RBC AUTO: 92.8 FL (ref 80–99.9)
MONOCYTES ABSOLUTE: 0.32 E9/L (ref 0.1–0.95)
MONOCYTES RELATIVE PERCENT: 4.8 % (ref 2–12)
NEUTROPHILS ABSOLUTE: 3.87 E9/L (ref 1.8–7.3)
NEUTROPHILS RELATIVE PERCENT: 58.1 % (ref 43–80)
NITRITE, URINE: NEGATIVE
PDW BLD-RTO: 12.7 FL (ref 11.5–15)
PH UA: 6.5 (ref 5–9)
PLATELET # BLD: 237 E9/L (ref 130–450)
PMV BLD AUTO: 10.2 FL (ref 7–12)
POTASSIUM REFLEX MAGNESIUM: 4 MMOL/L (ref 3.5–5)
PROTEIN UA: NEGATIVE MG/DL
RBC # BLD: 4.85 E12/L (ref 3.5–5.5)
RBC UA: NORMAL /HPF (ref 0–2)
SODIUM BLD-SCNC: 142 MMOL/L (ref 132–146)
SPECIFIC GRAVITY UA: 1.02 (ref 1–1.03)
TOTAL PROTEIN: 7.1 G/DL (ref 6.4–8.3)
UROBILINOGEN, URINE: 0.2 E.U./DL
WBC # BLD: 6.7 E9/L (ref 4.5–11.5)
WBC UA: NORMAL /HPF (ref 0–5)

## 2022-03-08 PROCEDURE — 83690 ASSAY OF LIPASE: CPT

## 2022-03-08 PROCEDURE — 96375 TX/PRO/DX INJ NEW DRUG ADDON: CPT

## 2022-03-08 PROCEDURE — 2580000003 HC RX 258

## 2022-03-08 PROCEDURE — 6360000002 HC RX W HCPCS

## 2022-03-08 PROCEDURE — 83605 ASSAY OF LACTIC ACID: CPT

## 2022-03-08 PROCEDURE — 81001 URINALYSIS AUTO W/SCOPE: CPT

## 2022-03-08 PROCEDURE — 99202 OFFICE O/P NEW SF 15 MIN: CPT | Performed by: NURSE PRACTITIONER

## 2022-03-08 PROCEDURE — 80053 COMPREHEN METABOLIC PANEL: CPT

## 2022-03-08 PROCEDURE — 74177 CT ABD & PELVIS W/CONTRAST: CPT

## 2022-03-08 PROCEDURE — 85025 COMPLETE CBC W/AUTO DIFF WBC: CPT

## 2022-03-08 PROCEDURE — 96374 THER/PROPH/DIAG INJ IV PUSH: CPT

## 2022-03-08 PROCEDURE — 99282 EMERGENCY DEPT VISIT SF MDM: CPT

## 2022-03-08 PROCEDURE — 6360000004 HC RX CONTRAST MEDICATION: Performed by: RADIOLOGY

## 2022-03-08 RX ORDER — ONDANSETRON 4 MG/1
4 TABLET, ORALLY DISINTEGRATING ORAL 3 TIMES DAILY PRN
Qty: 21 TABLET | Refills: 0 | Status: SHIPPED | OUTPATIENT
Start: 2022-03-08

## 2022-03-08 RX ORDER — MORPHINE SULFATE 2 MG/ML
2 INJECTION, SOLUTION INTRAMUSCULAR; INTRAVENOUS ONCE
Status: COMPLETED | OUTPATIENT
Start: 2022-03-08 | End: 2022-03-08

## 2022-03-08 RX ORDER — ONDANSETRON 2 MG/ML
4 INJECTION INTRAMUSCULAR; INTRAVENOUS ONCE
Status: COMPLETED | OUTPATIENT
Start: 2022-03-08 | End: 2022-03-08

## 2022-03-08 RX ORDER — HYDROCODONE BITARTRATE AND ACETAMINOPHEN 5; 325 MG/1; MG/1
1 TABLET ORAL EVERY 6 HOURS PRN
Qty: 12 TABLET | Refills: 0 | Status: ON HOLD | OUTPATIENT
Start: 2022-03-08 | End: 2022-03-12

## 2022-03-08 RX ORDER — 0.9 % SODIUM CHLORIDE 0.9 %
1000 INTRAVENOUS SOLUTION INTRAVENOUS ONCE
Status: COMPLETED | OUTPATIENT
Start: 2022-03-08 | End: 2022-03-08

## 2022-03-08 RX ORDER — DICYCLOMINE HCL 20 MG
20 TABLET ORAL 4 TIMES DAILY
Qty: 120 TABLET | Refills: 2 | Status: SHIPPED
Start: 2022-03-08 | End: 2022-03-25

## 2022-03-08 RX ADMIN — ONDANSETRON 4 MG: 2 INJECTION INTRAMUSCULAR; INTRAVENOUS at 14:18

## 2022-03-08 RX ADMIN — SODIUM CHLORIDE 1000 ML: 9 INJECTION, SOLUTION INTRAVENOUS at 14:18

## 2022-03-08 RX ADMIN — IOPAMIDOL 75 ML: 755 INJECTION, SOLUTION INTRAVENOUS at 15:37

## 2022-03-08 RX ADMIN — MORPHINE SULFATE 2 MG: 2 INJECTION, SOLUTION INTRAMUSCULAR; INTRAVENOUS at 14:19

## 2022-03-08 ASSESSMENT — PAIN SCALES - GENERAL
PAINLEVEL_OUTOF10: 8
PAINLEVEL_OUTOF10: 4

## 2022-03-08 NOTE — ED NOTES
Department of Emergency Medicine    FIRST PROVIDER TRIAGE NOTE             Independent MLP           3/8/22  11:41 AM EST    Date of Encounter: 3/8/22   MRN: [id number]    Vitals:    03/08/22 1152   BP: 127/71   Pulse: 67   Temp: 97.8 °F (36.6 °C)   SpO2: 98%   Weight: 105 lb (47.6 kg)   Height: 5' 2\" (1.575 m)      HPI: Jo Ann Bustamante is a 46 y.o. female who presents to the ED for Abdominal Pain (epigastric, upper abdomen) and Emesis     ROS: Negative for cp, sob or fever. Physical Exam:   Gen Appearance/Constitutional: alert  CV: regular rate     Initial Plan of Care: All treatment areas with department are currently occupied. Plan to order/Initiate the following while awaiting opening in ED: labs, EKG and imaging studies.     Initial Plan of Care: Initiate Treatment-Testing, Proceed toTreatment Area When Bed Available for ED Attending/MLP to Continue Care    Electronically signed by Lisa Mcallister PA-C   DD: 3/8/22       Lsia Mcallister PA-C  03/08/22 1159

## 2022-03-08 NOTE — ED PROVIDER NOTES
[address] [facility]  Department of Emergency Medicine   ED  Encounter Note  Admit Date/RoomTime: 3/8/2022 12:30 PM  ED Room: 35/35    NAME: Mykel Bosch  : 1970  MRN: [id number]     Chief Complaint:  Abdominal Pain (epigastric, upper abdomen) and Emesis    History of Present Illness       Mykel Bosch is a 46 y.o. old female who presents to the emergency department by private vehicle, for persistent burning, sharp, stabbing pain in the epigastrium and in the RUQ without radiation which began 3 week(s) prior to arrival.   There has been similar episodes in the past.  Since onset the symptoms have been intermittent. The pain is associated with nausea, vomiting, diarrhea and constipation. The pain is aggravated by eating and relieved by nothing. There has been NO chest pain, shortness of breath, fever, chills, dysuria or hematuria. Patient states that she has been following with her primary care doctor, and had an ultrasound today which she states showed bile duct dilation, but there was no stones present in the gallbladder. Patient states that she has been dealing with this pain for the past 3 weeks. She states she was at the gastroenterologist office this morning, and was told to come to the emergency room due to her increased abdominal pain. Patient states that she has been experiencing nausea, but today she began to vomit. Patient states that she has lost approximately 8 pounds over the past 3 weeks. She denies fevers or chills. Denies back pain. Patient states that she does have a stimulator placed for constipation, and she states that she has had intermittent diarrhea and constipation over the past 3 weeks as well. She denies use of alcohol or illegal substances. No other complaints or concerns at this time    . ROS   Pertinent positives and negatives are stated within HPI, all other systems reviewed and are negative.     Past Medical History:  has a past medical history of Anxiety, Arthritis, Bipolar 1 disorder ([address] Cervical stenosis of spine, Chronic back pain, Depression, Hypertension, Irritable bowel syndrome, Migraines, Mucocele of lip, Other hyperlipidemia, and PTSD (post-traumatic stress disorder). Surgical History has a past surgical history that includes tumor removal; Tonsillectomy; Tooth Extraction; Tubal ligation; Colonoscopy; arthrography (Right, 1/3/2019); Small intestine surgery (02/20/2019); Dilation and curettage of uterus (N/A, 2/3/2020); arthrography (Right, 9/10/2020); arthrography (Right, 4/13/2021); and Mouth surgery (Right, 7/21/2021). Social History:  reports that she has been smoking cigarettes. She has a 30.00 pack-year smoking history. She has never used smokeless tobacco. She reports previous alcohol use. She reports that she does not use drugs. Family History: family history includes COPD in her mother; GERD in her mother; No Known Problems in her father; Parkinsonism in her mother. Allergies: Latex and Sulfa antibiotics    Physical Exam   Oxygen Saturation Interpretation: Normal on room air analysis. ED Triage Vitals [03/08/22 1152]   BP Temp Temp src Pulse Resp SpO2 Height Weight   127/71 97.8 °F (36.6 °C) -- 67 16 98 % 5' 2\" (1.575 m) 105 lb (47.6 kg)        Physical Exam  General Appearance/Constitutional:  Alert, development consistent with age. HEENT:  NC/NT. PERRLA. Airway patent. Neck:  Supple. No lymphadenopathy. Respiratory:  No retractions. Lungs Clear to auscultation and breath sounds equal.  CV:  Regular rate and rhythm. GI:  normal appearing, non-distended with no visible hernias. Bowel sounds: normal bowel sounds. Tenderness: There is moderate tenderness present - located in the epigastrium and in the RUQ., There is no distension., No guarding There is no pulsatile mass., Lindsey's Sign: positive. Liver: non-tender. Spleen:  non-palpable. Back: CVA Tenderness: No CVA tenderness. : /Pelvic examination deferred / declined. Integument:  Normal turgor. Warm, dry, without visible rash, unless noted elsewhere. Lymphatics: No edema, cap.refill <3sec. Neurological:  Orientation age-appropriate. Motor functions intact.     Lab / Imaging Results   (All laboratory and radiology results have been personally reviewed by myself)  Labs:  Results for orders placed or performed during the hospital encounter of 03/08/22   CBC with Auto Differential   Result Value Ref Range    WBC 6.7 4.5 - 11.5 E9/L    RBC 4.85 3.50 - 5.50 E12/L    Hemoglobin 15.1 11.5 - 15.5 g/dL    Hematocrit 45.0 34.0 - 48.0 %    MCV 92.8 80.0 - 99.9 fL    MCH 31.1 26.0 - 35.0 pg    MCHC 33.6 32.0 - 34.5 %    RDW 12.7 11.5 - 15.0 fL    Platelets 247 931 - 775 E9/L    MPV 10.2 7.0 - 12.0 fL    Neutrophils % 58.1 43.0 - 80.0 %    Immature Granulocytes % 0.2 0.0 - 5.0 %    Lymphocytes % 34.9 20.0 - 42.0 %    Monocytes % 4.8 2.0 - 12.0 %    Eosinophils % 1.5 0.0 - 6.0 %    Basophils % 0.5 0.0 - 2.0 %    Neutrophils Absolute 3.87 1.80 - 7.30 E9/L    Immature Granulocytes # 0.01 E9/L    Lymphocytes Absolute 2.32 1.50 - 4.00 E9/L    Monocytes Absolute 0.32 0.10 - 0.95 E9/L    Eosinophils Absolute 0.10 0.05 - 0.50 E9/L    Basophils Absolute 0.03 0.00 - 0.20 E9/L   Comprehensive Metabolic Panel w/ Reflex to MG   Result Value Ref Range    Sodium 142 132 - 146 mmol/L    Potassium reflex Magnesium 4.0 3.5 - 5.0 mmol/L    Chloride 104 98 - 107 mmol/L    CO2 27 22 - 29 mmol/L    Anion Gap 11 7 - 16 mmol/L    Glucose 97 74 - 99 mg/dL    BUN 13 6 - 20 mg/dL    CREATININE 0.7 0.5 - 1.0 mg/dL    GFR Non-African American >60 >=60 mL/min/1.73    GFR African American >60     Calcium 9.7 8.6 - 10.2 mg/dL    Total Protein 7.1 6.4 - 8.3 g/dL    Albumin 4.8 3.5 - 5.2 g/dL    Total Bilirubin 0.5 0.0 - 1.2 mg/dL    Alkaline Phosphatase 78 35 - 104 U/L    ALT 7 0 - 32 U/L    AST 17 0 - 31 U/L   Lipase   Result Value Ref Range    Lipase 17 13 - 60 U/L   Lactic Acid   Result Value Ref Range    Lactic Acid 1.0 0.5 - 2.2 mmol/L   Urinalysis   Result Value Ref Range    Color, UA Yellow Straw/Yellow    Clarity, UA Clear Clear    Glucose, Ur Negative Negative mg/dL    Bilirubin Urine Negative Negative    Ketones, Urine 15 (A) Negative mg/dL    Specific Gravity, UA 1.020 1.005 - 1.030    Blood, Urine TRACE-LYSED Negative    pH, UA 6.5 5.0 - 9.0    Protein, UA Negative Negative mg/dL    Urobilinogen, Urine 0.2 <2.0 E.U./dL    Nitrite, Urine Negative Negative    Leukocyte Esterase, Urine Negative Negative   Microscopic Urinalysis   Result Value Ref Range    WBC, UA NONE 0 - 5 /HPF    RBC, UA 1-3 0 - 2 /HPF    Epithelial Cells, UA RARE /HPF    Bacteria, UA NONE SEEN None Seen /HPF     Imaging: All Radiology results interpreted by Radiologist unless otherwise noted. CT ABDOMEN PELVIS W IV CONTRAST Additional Contrast? None   Final Result   Marked heterogeneity of the liver, likely due to intrinsic parenchymal   disease and steatosis. Dedicated MRI of the liver is recommended for further   evaluation. Periportal edema. No biliary dilatation. Two cystic lesions in the pancreas, likely representing focal duct   dilatations; however, pseudocysts and cystic IPMN cannot be excluded. MRI/MRCP suggested. Normal appendix. Enlarged left gonadal veins in the pelvis. Clinical correlation for signs   and symptoms of pelvic congestion syndrome is recommended.            ED Course / Medical Decision Making     Medications   0.9 % sodium chloride bolus (0 mLs IntraVENous Stopped 3/8/22 1533)   morphine (PF) injection 2 mg (2 mg IntraVENous Given 3/8/22 1419)   ondansetron (ZOFRAN) injection 4 mg (4 mg IntraVENous Given 3/8/22 1418)   iopamidol (ISOVUE-370) 76 % injection 75 mL (75 mLs IntraVENous Given 3/8/22 1537)          Re-Evaluations:  3/8/22      Time: 1701    Patients condition is improving after treatment. Consultations:            Consult was placed to gastroenterologist Dr. Aleshia De Santiago at this time. 1810-spoke with Meena Brewster NP who is on-call for Dr. Tyrone solano. She spoke with Dr. Aleshia De Santiago who stated that if patient's pain is controlled due to her liver enzymes being normal he will schedule her for an outpatient ERCP next week. Procedures:   none    MDM: Patient presents to the emergency department for midepigastric and right upper quadrant pain. Patient had previous ultrasound of right upper quadrant that showed bile duct dilation. Blood work and imaging were obtained. Blood work shows no evidence of a leukocytosis, anemia, or electrolyte imbalances. Patient's liver enzymes and lipase are within normal limits. Abdomen and pelvis showed an enlarged left gonadal vein, which patient has no pelvic pain or lower abdominal pain. She denies any urinary symptoms or hematuria. There was also 2 cystic lesions found in the pancreas likely representing focal duct dilations however pseudocysts and cystic IPMN not be excluded MRI or MRCP is suggested. After being medicated in the department patient's pain is under control. Patient appears well, nontoxic, and in no acute distress. The findings were discussed with the patient, and she verbalized understanding. Patient is advised that she will be scheduled with Dr. Aleshia De Santiago for an ERCP next week. Plan is for discharge and follow-up outpatient, and patient is agreeable to the plan. Advised to return to the emergency department for any worsening of symptoms. At this time the patient is without objective evidence of an acute process requiring hospitalization or inpatient management. They have remained hemodynamically stable throughout their entire ED visit and are stable for discharge with outpatient follow-up. The plan has been discussed in detail and they are aware of the specific conditions for emergent return, as well as the importance of follow-up. Plan of Care/Counseling:  FRENCH Rice CNP reviewed today's visit with the patient in addition to providing specific details for the plan of care and counseling regarding the diagnosis and prognosis. Questions are answered at this time and are agreeable with the plan. Assessment      1. Epigastric pain New Problem   2. Pancreatic lesion New Problem   3. Nausea      This patient's ED course included: a personal history and physicial examination  This patient has remained hemodynamically stable and improved during their ED course. Plan   Discharged home  Patient condition is good. New Medications     New Prescriptions    HYDROCODONE-ACETAMINOPHEN (NORCO) 5-325 MG PER TABLET    Take 1 tablet by mouth every 6 hours as needed for Pain for up to 3 days. Intended supply: 3 days. Take lowest dose possible to manage pain    ONDANSETRON (ZOFRAN-ODT) 4 MG DISINTEGRATING TABLET    Take 1 tablet by mouth 3 times daily as needed for Nausea or Vomiting     Electronically signed by FRECNH Rice CNP   DD: 3/8/22  **This report was transcribed using voice recognition software. Every effort was made to ensure accuracy; however, inadvertent computerized transcription errors may be present.   END OF PROVIDER NOTE     FRENCH Rice CNP  03/08/22 4772

## 2022-03-08 NOTE — PROGRESS NOTES
Anthony Garcia (:  1970) is a 46 y.o. female, here for evaluation of the following chief complaint(s):  Gastroesophageal Reflux (ref from dr Nery Talley for GERD)      SUBJECTIVE/OBJECTIVE:  HPI:    [facility] is a very pleasant 46year old female that presents today with acute abdominal pain with nausea and dry heaves. States the abdominal pain presented 3 weeks ago and has progressively worsened. Patient is crying in the room today. The pain is aggravated by eating. The abdominal pain is constant and \"severe\". Has tired Gaviscon but this does not satisfy. Currently using zofran for nausea. Protonix started a few days ago. Patient has lost 8 lbs in the last 3 weeks. This patient has a stimulator for constipation. Upon direct questioning, the patient states her pain is upper abdominal and near the umbilicus. Recent ultrasound showed   Dilatation of the common bile duct in the festus hepatis measuring up to 10   mm. Patient is unsteady on her feet with notable pain when laying back on exam table. Abdominal exam was significant for severe pain  above the umbilicus making the abdominal exam difficult. ROS:  General: Patient denies n/v/f/c or weight loss. HEENT: Patient denies persistent postnasal drip, scleral icterus, drooling, persistent bleeding from nose/mouth. Resp: Patient denies SOB, wheezing, productive cough. Cards: Patient denies CP, palpitations, significant edema  GI: As above. Derm: Patient denies jaundice/rashes. Musc: Patient denies diffuse/irregular joint swelling or myalgias.       Objective   Wt Readings from Last 3 Encounters:   22 105 lb (47.6 kg)   22 108 lb (49 kg)   22 108 lb (49 kg)     Temp Readings from Last 3 Encounters:   22 97.4 °F (36.3 °C) (Temporal)   22 97.4 °F (36.3 °C) (Temporal)   22 98.1 °F (36.7 °C) (Temporal)     BP Readings from Last 3 Encounters:   22 120/80   22 120/80   22 (!) 126/94 Pulse Readings from Last 3 Encounters:   03/04/22 84   03/04/22 84   02/18/22 69        Physical Exam  Abdominal:      Comments: Abdominal exam difficult due to patients guarding. Severe pain with palpation above the umbilicus and upper abdomen.            Past Medical History:   Diagnosis Date    Anxiety     Arthritis     right hip    Bipolar 1 disorder ([address]     Cervical stenosis of spine     Chronic back pain     Depression     Hypertension     Irritable bowel syndrome     Migraines     Mucocele of lip     for surgery 7/21/21    Other hyperlipidemia 7/29/2019    PTSD (post-traumatic stress disorder)       Past Surgical History:   Procedure Laterality Date    ARTHROGRAPHY Right 1/3/2019    RIGHT HIP ARTHROGRAM AND STEROID INJECTION performed by Adela Fletcher MD at [address] [place] [address] Right 9/10/2020    RIGHT HIP INJECTION UNDER FLUOROSCOPY performed by Aedla Fletcher MD at [address] [place] [address] Right 4/13/2021    RIGHT HIP INJECTION UNDER FLUOROSCOPY performed by Adela Fletcher MD at [address] [place] [address] AND CURETTAGE OF UTERUS N/A 2/3/2020    DILATATION AND CURETTAGE HYSTEROSCOPY performed by Jhonatan Ding MD at [facility] 7/21/2021    EXCISION RIGHT LIP MUCOCELE performed by Emilio Diehl MD at [address] [place] [address]  02/20/2019    implant for IBS     TONSILLECTOMY      TOOTH EXTRACTION      TUBAL LIGATION      TUMOR REMOVAL      in pancrease       Family History   Problem Relation Age of Onset    GERD Mother     Parkinsonism Mother     COPD Mother     No Known Problems Father         Lab Results   Component Value Date    WBC 7.2 02/16/2022    HGB 15.1 02/16/2022    HCT 45.7 02/16/2022    MCV 93.1 02/16/2022     02/16/2022      Lab Results   Component Value Date     02/16/2022    K 4.0 02/16/2022     02/16/2022    CO2 30 (H) 02/16/2022    BUN 12 02/16/2022    CREATININE 0.6 02/16/2022

## 2022-03-09 ENCOUNTER — HOSPITAL ENCOUNTER (INPATIENT)
Age: 52
LOS: 3 days | Discharge: HOME | DRG: 394 | End: 2022-03-12
Attending: STUDENT IN AN ORGANIZED HEALTH CARE EDUCATION/TRAINING PROGRAM | Admitting: STUDENT IN AN ORGANIZED HEALTH CARE EDUCATION/TRAINING PROGRAM
Payer: MEDICARE

## 2022-03-09 DIAGNOSIS — R10.9 INTRACTABLE ABDOMINAL PAIN: Primary | ICD-10-CM

## 2022-03-09 DIAGNOSIS — K86.9 PANCREATIC LESION: ICD-10-CM

## 2022-03-09 DIAGNOSIS — R10.13 EPIGASTRIC PAIN: ICD-10-CM

## 2022-03-09 PROBLEM — K86.89 PANCREATIC MASS: Status: ACTIVE | Noted: 2022-03-09

## 2022-03-09 LAB
ALBUMIN SERPL-MCNC: 4.8 G/DL (ref 3.5–5.2)
ALP BLD-CCNC: 77 U/L (ref 35–104)
ALT SERPL-CCNC: 8 U/L (ref 0–32)
ANION GAP SERPL CALCULATED.3IONS-SCNC: 14 MMOL/L (ref 7–16)
AST SERPL-CCNC: 20 U/L (ref 0–31)
BASOPHILS ABSOLUTE: 0.03 E9/L (ref 0–0.2)
BASOPHILS RELATIVE PERCENT: 0.4 % (ref 0–2)
BILIRUB SERPL-MCNC: 0.5 MG/DL (ref 0–1.2)
BILIRUBIN DIRECT: <0.2 MG/DL (ref 0–0.3)
BILIRUBIN, INDIRECT: NORMAL MG/DL (ref 0–1)
BUN BLDV-MCNC: 13 MG/DL (ref 6–20)
C-REACTIVE PROTEIN: 0.3 MG/DL (ref 0–0.4)
CALCIUM SERPL-MCNC: 9.4 MG/DL (ref 8.6–10.2)
CHLORIDE BLD-SCNC: 104 MMOL/L (ref 98–107)
CO2: 22 MMOL/L (ref 22–29)
CREAT SERPL-MCNC: 0.7 MG/DL (ref 0.5–1)
EOSINOPHILS ABSOLUTE: 0.06 E9/L (ref 0.05–0.5)
EOSINOPHILS RELATIVE PERCENT: 0.8 % (ref 0–6)
GFR AFRICAN AMERICAN: >60
GFR NON-AFRICAN AMERICAN: >60 ML/MIN/1.73
GLUCOSE BLD-MCNC: 89 MG/DL (ref 74–99)
HCT VFR BLD CALC: 46.2 % (ref 34–48)
HEMOGLOBIN: 15.2 G/DL (ref 11.5–15.5)
IMMATURE GRANULOCYTES #: 0.05 E9/L
IMMATURE GRANULOCYTES %: 0.7 % (ref 0–5)
LACTIC ACID: 1.7 MMOL/L (ref 0.5–2.2)
LIPASE: 17 U/L (ref 13–60)
LYMPHOCYTES ABSOLUTE: 2.02 E9/L (ref 1.5–4)
LYMPHOCYTES RELATIVE PERCENT: 27.4 % (ref 20–42)
MCH RBC QN AUTO: 30.8 PG (ref 26–35)
MCHC RBC AUTO-ENTMCNC: 32.9 % (ref 32–34.5)
MCV RBC AUTO: 93.5 FL (ref 80–99.9)
MONOCYTES ABSOLUTE: 0.23 E9/L (ref 0.1–0.95)
MONOCYTES RELATIVE PERCENT: 3.1 % (ref 2–12)
NEUTROPHILS ABSOLUTE: 4.97 E9/L (ref 1.8–7.3)
NEUTROPHILS RELATIVE PERCENT: 67.6 % (ref 43–80)
PDW BLD-RTO: 12.5 FL (ref 11.5–15)
PLATELET # BLD: 219 E9/L (ref 130–450)
PMV BLD AUTO: 10.2 FL (ref 7–12)
POTASSIUM SERPL-SCNC: 3.9 MMOL/L (ref 3.5–5)
PROCALCITONIN: <0.02 NG/ML (ref 0–0.08)
RBC # BLD: 4.94 E12/L (ref 3.5–5.5)
SEDIMENTATION RATE, ERYTHROCYTE: 0 MM/HR (ref 0–20)
SODIUM BLD-SCNC: 140 MMOL/L (ref 132–146)
TOTAL PROTEIN: 7 G/DL (ref 6.4–8.3)
WBC # BLD: 7.4 E9/L (ref 4.5–11.5)

## 2022-03-09 PROCEDURE — 85651 RBC SED RATE NONAUTOMATED: CPT

## 2022-03-09 PROCEDURE — 83605 ASSAY OF LACTIC ACID: CPT

## 2022-03-09 PROCEDURE — 85025 COMPLETE CBC W/AUTO DIFF WBC: CPT

## 2022-03-09 PROCEDURE — 80076 HEPATIC FUNCTION PANEL: CPT

## 2022-03-09 PROCEDURE — 99222 1ST HOSP IP/OBS MODERATE 55: CPT | Performed by: TRANSPLANT SURGERY

## 2022-03-09 PROCEDURE — 2580000003 HC RX 258: Performed by: STUDENT IN AN ORGANIZED HEALTH CARE EDUCATION/TRAINING PROGRAM

## 2022-03-09 PROCEDURE — 84145 PROCALCITONIN (PCT): CPT

## 2022-03-09 PROCEDURE — 1200000000 HC SEMI PRIVATE

## 2022-03-09 PROCEDURE — 86140 C-REACTIVE PROTEIN: CPT

## 2022-03-09 PROCEDURE — 96375 TX/PRO/DX INJ NEW DRUG ADDON: CPT

## 2022-03-09 PROCEDURE — 83690 ASSAY OF LIPASE: CPT

## 2022-03-09 PROCEDURE — 96374 THER/PROPH/DIAG INJ IV PUSH: CPT

## 2022-03-09 PROCEDURE — 6370000000 HC RX 637 (ALT 250 FOR IP): Performed by: STUDENT IN AN ORGANIZED HEALTH CARE EDUCATION/TRAINING PROGRAM

## 2022-03-09 PROCEDURE — 99282 EMERGENCY DEPT VISIT SF MDM: CPT

## 2022-03-09 PROCEDURE — 80048 BASIC METABOLIC PNL TOTAL CA: CPT

## 2022-03-09 PROCEDURE — 6360000002 HC RX W HCPCS: Performed by: STUDENT IN AN ORGANIZED HEALTH CARE EDUCATION/TRAINING PROGRAM

## 2022-03-09 PROCEDURE — C9113 INJ PANTOPRAZOLE SODIUM, VIA: HCPCS | Performed by: STUDENT IN AN ORGANIZED HEALTH CARE EDUCATION/TRAINING PROGRAM

## 2022-03-09 RX ORDER — SENNA PLUS 8.6 MG/1
1 TABLET ORAL DAILY PRN
Status: DISCONTINUED | OUTPATIENT
Start: 2022-03-09 | End: 2022-03-12 | Stop reason: HOSPADM

## 2022-03-09 RX ORDER — POTASSIUM CHLORIDE 20 MEQ/1
40 TABLET, EXTENDED RELEASE ORAL PRN
Status: DISCONTINUED | OUTPATIENT
Start: 2022-03-09 | End: 2022-03-12 | Stop reason: HOSPADM

## 2022-03-09 RX ORDER — SODIUM CHLORIDE 9 MG/ML
25 INJECTION, SOLUTION INTRAVENOUS PRN
Status: DISCONTINUED | OUTPATIENT
Start: 2022-03-09 | End: 2022-03-12 | Stop reason: HOSPADM

## 2022-03-09 RX ORDER — BACLOFEN 10 MG/1
10 TABLET ORAL 3 TIMES DAILY
Status: DISCONTINUED | OUTPATIENT
Start: 2022-03-09 | End: 2022-03-12 | Stop reason: HOSPADM

## 2022-03-09 RX ORDER — ONDANSETRON 2 MG/ML
4 INJECTION INTRAMUSCULAR; INTRAVENOUS EVERY 6 HOURS PRN
Status: DISCONTINUED | OUTPATIENT
Start: 2022-03-09 | End: 2022-03-12 | Stop reason: HOSPADM

## 2022-03-09 RX ORDER — NADOLOL 20 MG/1
20 TABLET ORAL DAILY
Status: DISCONTINUED | OUTPATIENT
Start: 2022-03-10 | End: 2022-03-12 | Stop reason: HOSPADM

## 2022-03-09 RX ORDER — NICOTINE 21 MG/24HR
1 PATCH, TRANSDERMAL 24 HOURS TRANSDERMAL DAILY
Status: DISCONTINUED | OUTPATIENT
Start: 2022-03-09 | End: 2022-03-12 | Stop reason: HOSPADM

## 2022-03-09 RX ORDER — SODIUM CHLORIDE 0.9 % (FLUSH) 0.9 %
10 SYRINGE (ML) INJECTION EVERY 12 HOURS SCHEDULED
Status: DISCONTINUED | OUTPATIENT
Start: 2022-03-09 | End: 2022-03-12 | Stop reason: HOSPADM

## 2022-03-09 RX ORDER — FENTANYL CITRATE 50 UG/ML
50 INJECTION, SOLUTION INTRAMUSCULAR; INTRAVENOUS ONCE
Status: COMPLETED | OUTPATIENT
Start: 2022-03-09 | End: 2022-03-09

## 2022-03-09 RX ORDER — ONDANSETRON 4 MG/1
4 TABLET, ORALLY DISINTEGRATING ORAL EVERY 8 HOURS PRN
Status: DISCONTINUED | OUTPATIENT
Start: 2022-03-09 | End: 2022-03-12 | Stop reason: HOSPADM

## 2022-03-09 RX ORDER — PANTOPRAZOLE SODIUM 40 MG/10ML
40 INJECTION, POWDER, LYOPHILIZED, FOR SOLUTION INTRAVENOUS DAILY
Status: DISCONTINUED | OUTPATIENT
Start: 2022-03-09 | End: 2022-03-12 | Stop reason: HOSPADM

## 2022-03-09 RX ORDER — ACETAMINOPHEN 325 MG/1
650 TABLET ORAL EVERY 6 HOURS PRN
Status: DISCONTINUED | OUTPATIENT
Start: 2022-03-09 | End: 2022-03-12 | Stop reason: HOSPADM

## 2022-03-09 RX ORDER — POTASSIUM CHLORIDE 7.45 MG/ML
10 INJECTION INTRAVENOUS PRN
Status: DISCONTINUED | OUTPATIENT
Start: 2022-03-09 | End: 2022-03-12 | Stop reason: HOSPADM

## 2022-03-09 RX ORDER — ONDANSETRON 2 MG/ML
4 INJECTION INTRAMUSCULAR; INTRAVENOUS ONCE
Status: COMPLETED | OUTPATIENT
Start: 2022-03-09 | End: 2022-03-09

## 2022-03-09 RX ORDER — SODIUM CHLORIDE 9 MG/ML
INJECTION, SOLUTION INTRAVENOUS CONTINUOUS
Status: DISCONTINUED | OUTPATIENT
Start: 2022-03-09 | End: 2022-03-11

## 2022-03-09 RX ORDER — OXYCODONE HYDROCHLORIDE 5 MG/1
5 TABLET ORAL EVERY 6 HOURS PRN
Status: DISCONTINUED | OUTPATIENT
Start: 2022-03-09 | End: 2022-03-12 | Stop reason: HOSPADM

## 2022-03-09 RX ORDER — SODIUM CHLORIDE 0.9 % (FLUSH) 0.9 %
10 SYRINGE (ML) INJECTION PRN
Status: DISCONTINUED | OUTPATIENT
Start: 2022-03-09 | End: 2022-03-12 | Stop reason: HOSPADM

## 2022-03-09 RX ORDER — ACETAMINOPHEN 650 MG/1
650 SUPPOSITORY RECTAL EVERY 6 HOURS PRN
Status: DISCONTINUED | OUTPATIENT
Start: 2022-03-09 | End: 2022-03-12 | Stop reason: HOSPADM

## 2022-03-09 RX ORDER — LAMOTRIGINE 100 MG/1
200 TABLET ORAL EVERY EVENING
Status: DISCONTINUED | OUTPATIENT
Start: 2022-03-09 | End: 2022-03-12 | Stop reason: HOSPADM

## 2022-03-09 RX ORDER — FOLIC ACID 1 MG/1
1 TABLET ORAL DAILY
Status: DISCONTINUED | OUTPATIENT
Start: 2022-03-10 | End: 2022-03-12 | Stop reason: HOSPADM

## 2022-03-09 RX ADMIN — ONDANSETRON 4 MG: 2 INJECTION INTRAMUSCULAR; INTRAVENOUS at 11:45

## 2022-03-09 RX ADMIN — LAMOTRIGINE 200 MG: 100 TABLET ORAL at 21:10

## 2022-03-09 RX ADMIN — BACLOFEN 10 MG: 10 TABLET ORAL at 21:10

## 2022-03-09 RX ADMIN — OXYCODONE 5 MG: 5 TABLET ORAL at 21:10

## 2022-03-09 RX ADMIN — HYDROMORPHONE HYDROCHLORIDE 0.5 MG: 1 INJECTION, SOLUTION INTRAMUSCULAR; INTRAVENOUS; SUBCUTANEOUS at 18:15

## 2022-03-09 RX ADMIN — ONDANSETRON 4 MG: 2 INJECTION INTRAMUSCULAR; INTRAVENOUS at 18:20

## 2022-03-09 RX ADMIN — Medication 10 ML: at 21:11

## 2022-03-09 RX ADMIN — FENTANYL CITRATE 50 MCG: 50 INJECTION INTRAMUSCULAR; INTRAVENOUS at 11:45

## 2022-03-09 RX ADMIN — ENOXAPARIN SODIUM 40 MG: 100 INJECTION SUBCUTANEOUS at 18:26

## 2022-03-09 RX ADMIN — PANTOPRAZOLE SODIUM 40 MG: 40 INJECTION, POWDER, FOR SOLUTION INTRAVENOUS at 18:17

## 2022-03-09 RX ADMIN — SODIUM CHLORIDE: 9 INJECTION, SOLUTION INTRAVENOUS at 18:23

## 2022-03-09 ASSESSMENT — PAIN - FUNCTIONAL ASSESSMENT: PAIN_FUNCTIONAL_ASSESSMENT: 0-10

## 2022-03-09 ASSESSMENT — ENCOUNTER SYMPTOMS
CONSTIPATION: 1
DIARRHEA: 0
ABDOMINAL PAIN: 1
ABDOMINAL DISTENTION: 1
CHEST TIGHTNESS: 0
VOMITING: 1
NAUSEA: 1
COLOR CHANGE: 0
BACK PAIN: 0
COUGH: 0
DIARRHEA: 1
SHORTNESS OF BREATH: 0

## 2022-03-09 ASSESSMENT — PAIN DESCRIPTION - LOCATION: LOCATION: ABDOMEN

## 2022-03-09 ASSESSMENT — PAIN DESCRIPTION - PAIN TYPE: TYPE: ACUTE PAIN

## 2022-03-09 ASSESSMENT — PAIN SCALES - GENERAL
PAINLEVEL_OUTOF10: 10
PAINLEVEL_OUTOF10: 9

## 2022-03-09 NOTE — ED PROVIDER NOTES
Vinnie Singleton is a 46 y.o. female with a PMHx significant for HTN, HLD who presents for evaluation of abdominal pain, beginning prior to arrival.  The complaint has been persistent, severe in severity, and worsened by eating. The patient states that she was seen yesterday for similar symptoms and was found to have a pancreatic growth. She was to follow up with her GI physicain, Dr. Quynh Parr, however he is out of town. She had an outpatient ERCP scheduled, but states the pain is so severe she can not wait. She has had abdominal pain as well as nausea with vomiting. No blood in the emesis. She notes some constipation, last bowel movement was yesterday. States these symptoms started three weeks ago and have been getting progressively worse. Last saw GI last week. Review of Systems   Constitutional: Negative for chills, fatigue and fever. HENT: Negative for congestion. Respiratory: Negative for cough and shortness of breath. Cardiovascular: Negative for chest pain. Gastrointestinal: Positive for abdominal pain, nausea and vomiting. Negative for diarrhea. Genitourinary: Negative for difficulty urinating, dysuria, hematuria, vaginal bleeding and vaginal discharge. Musculoskeletal: Negative for back pain. Skin: Negative for color change. All other systems reviewed and are negative. Physical Exam  Vitals and nursing note reviewed. Constitutional:       Appearance: Normal appearance. HENT:      Head: Normocephalic and atraumatic. Nose: Nose normal. No congestion. Mouth/Throat:      Mouth: Mucous membranes are moist.      Pharynx: Oropharynx is clear. Eyes:      Conjunctiva/sclera: Conjunctivae normal.      Pupils: Pupils are equal, round, and reactive to light. Cardiovascular:      Rate and Rhythm: Normal rate and regular rhythm. Pulses: Normal pulses. Heart sounds: Normal heart sounds.    Pulmonary:      Effort: Pulmonary effort is normal. No respiratory distress. Breath sounds: Normal breath sounds. Abdominal:      Comments: Epigastric and right upper quadrant tenderness to palpation   Musculoskeletal:         General: Normal range of motion. Cervical back: Normal range of motion and neck supple. Skin:     General: Skin is warm and dry. Capillary Refill: Capillary refill takes less than 2 seconds. Neurological:      General: No focal deficit present. Mental Status: She is alert. Procedures     MDM   59-year-old female presenting to emergency department for evaluation of abdominal pain. Patient seen here yesterday for similar. Patient with cystic lesions on her pancreas as well as focal duct dilatation. Patient supposed to have ERCP next week however still having pain was referred to come back here. Spoke with general surgery and resident for hepatobiliary surgery. Patient to be admitted for pain control at this time and consultation by surgical team.  ED Course as of 03/09/22 2238   Wed Mar 09, 2022   1404 ATTENDING PROVIDER ATTESTATION:     Bisi Fatimah presented to the emergency department for evaluation of [unfilled] and was initially evaluated by the Medical Resident. See Original ED Note for H&P and ED course above. I have reviewed and discussed the case, including pertinent history (medical, surgical, family and social) and exam findings with the Medical Resident assigned to Bisi Sheridan. I have personally performed and/or participated in the history, exam, medical decision making, and procedures and agree with all pertinent clinical information. I, Dr. Gaurang Bagley, am the primary provider of record     I have reviewed my findings and recommendations with the assigned Medical Resident, Bisi Sheridan and members of family present at the time of disposition. My findings/plan: patient with recent visit for similar symptoms. Due to have outpatient ERCP, but continues to be symptomatic.  Called her GI physician Dr. Rei Fagan who is out of town. Told her to go to the ER and ask for Dr. Deborah Betancur. Plan for labs, imaging, supportive care. [BB]   [id number] Spoke with Dr. Deborah Betancur, admit  for pain control and consult Dr. Jo Ann Ball [FG]   [phone] with Resident for Dr. Jo Ann Ball, to see patient in ED. Admit under Dr. Zeke Espino [FG]      ED Course User Index  [BB] Twan Gutierres DO  [FG] Horacio Morillo DO        ED Course as of 03/09/22 2238   Wed Mar 09, 2022   1404 ATTENDING PROVIDER ATTESTATION:     Matt Mueller presented to the emergency department for evaluation of [unfilled] and was initially evaluated by the Medical Resident. See Original ED Note for H&P and ED course above. I have reviewed and discussed the case, including pertinent history (medical, surgical, family and social) and exam findings with the Medical Resident assigned to Matt Mueller. I have personally performed and/or participated in the history, exam, medical decision making, and procedures and agree with all pertinent clinical information. I, Dr. Madelyn Key, am the primary provider of record     I have reviewed my findings and recommendations with the assigned Medical Resident, Matt Mueller and members of family present at the time of disposition. My findings/plan: patient with recent visit for similar symptoms. Due to have outpatient ERCP, but continues to be symptomatic. Called her GI physician Dr. Rei Fagan who is out of town. Told her to go to the ER and ask for Dr. Deborah Betancur. Plan for labs, imaging, supportive care. [BB]   [id number] Spoke with Dr. Deborah Betancur, admit  for pain control and consult Dr. Jo Ann Ball [FG]   [phone] with Resident for Dr. Jo Ann Ball, to see patient in ED.  Admit under Dr. Zeke Espino [FG]      ED Course User Index  [BB] Twan Gutierres DO  [FG] Horacio Morillo DO       --------------------------------------------- PAST HISTORY ---------------------------------------------  Past Medical History:  has a past medical history of Anxiety, Arthritis, Bipolar 1 disorder ([facility] [address] Cervical stenosis of spine, Chronic back pain, Depression, Hypertension, Irritable bowel syndrome, Migraines, Mucocele of lip, Other hyperlipidemia, and PTSD (post-traumatic stress disorder). Past Surgical History:  has a past surgical history that includes Tonsillectomy; Tooth Extraction; Tubal ligation; Colonoscopy; arthrography (Right, 1/3/2019); Small intestine surgery (02/20/2019); Dilation and curettage of uterus (N/A, 2/3/2020); arthrography (Right, 9/10/2020); arthrography (Right, 4/13/2021); Mouth surgery (Right, 7/21/2021); and Stimulator Surgery. Social History:  reports that she has been smoking cigarettes. She has a 45.00 pack-year smoking history. She has never used smokeless tobacco. She reports previous alcohol use. She reports current drug use. Drug: Marijuana Rondi Baba). Family History: family history includes COPD in her mother; GERD in her mother; No Known Problems in her father; Parkinsonism in her mother. The patients home medications have been reviewed.     Allergies: Latex and Sulfa antibiotics    -------------------------------------------------- RESULTS -------------------------------------------------    LABS:  Results for orders placed or performed during the hospital encounter of 03/09/22   CBC with Auto Differential   Result Value Ref Range    WBC 7.4 4.5 - 11.5 E9/L    RBC 4.94 3.50 - 5.50 E12/L    Hemoglobin 15.2 11.5 - 15.5 g/dL    Hematocrit 46.2 34.0 - 48.0 %    MCV 93.5 80.0 - 99.9 fL    MCH 30.8 26.0 - 35.0 pg    MCHC 32.9 32.0 - 34.5 %    RDW 12.5 11.5 - 15.0 fL    Platelets 982 968 - 016 E9/L    MPV 10.2 7.0 - 12.0 fL    Neutrophils % 67.6 43.0 - 80.0 %    Immature Granulocytes % 0.7 0.0 - 5.0 %    Lymphocytes % 27.4 20.0 - 42.0 %    Monocytes % 3.1 2.0 - 12.0 %    Eosinophils % 0.8 0.0 - 6.0 %    Basophils % 0.4 0.0 - 2.0 %    Neutrophils Absolute 4.97 1.80 - 7.30 E9/L    Immature Granulocytes # 0.05 E9/L    Lymphocytes Absolute 2.02 1.50 - 4.00 E9/L    Monocytes Absolute 0.23 0.10 - 0.95 E9/L    Eosinophils Absolute 0.06 0.05 - 0.50 E9/L    Basophils Absolute 0.03 0.00 - 0.20 E9/L   Lipase   Result Value Ref Range    Lipase 17 13 - 60 U/L   Lactic Acid   Result Value Ref Range    Lactic Acid 1.7 0.5 - 2.2 mmol/L   Basic Metabolic Panel   Result Value Ref Range    Sodium 140 132 - 146 mmol/L    Potassium 3.9 3.5 - 5.0 mmol/L    Chloride 104 98 - 107 mmol/L    CO2 22 22 - 29 mmol/L    Anion Gap 14 7 - 16 mmol/L    Glucose 89 74 - 99 mg/dL    BUN 13 6 - 20 mg/dL    CREATININE 0.7 0.5 - 1.0 mg/dL    GFR Non-African American >60 >=60 mL/min/1.73    GFR African American >60     Calcium 9.4 8.6 - 10.2 mg/dL   Hepatic Function Panel   Result Value Ref Range    Total Protein 7.0 6.4 - 8.3 g/dL    Albumin 4.8 3.5 - 5.2 g/dL    Alkaline Phosphatase 77 35 - 104 U/L    ALT 8 0 - 32 U/L    AST 20 0 - 31 U/L    Total Bilirubin 0.5 0.0 - 1.2 mg/dL    Bilirubin, Direct <0.2 0.0 - 0.3 mg/dL    Bilirubin, Indirect see below 0.0 - 1.0 mg/dL   Sedimentation Rate   Result Value Ref Range    Sed Rate 0 0 - 20 mm/Hr   C-Reactive Protein   Result Value Ref Range    CRP 0.3 0.0 - 0.4 mg/dL   Procalcitonin   Result Value Ref Range    Procalcitonin <0.02 0.00 - 0.08 ng/mL       RADIOLOGY:  NM HEPATOBILIARY SCAN W EJECTION FRACTION    (Results Pending)         ------------------------- NURSING NOTES AND VITALS REVIEWED ---------------------------  Date / Time Roomed:  3/9/2022 11:07 AM  ED Bed Assignment:  [id number]    The nursing notes within the ED encounter and vital signs as below have been reviewed.      Patient Vitals for the past 24 hrs:   BP Temp Temp src Pulse Resp SpO2   03/09/22 2100 (!) 103/55 97.6 °F (36.4 °C) Oral 71 18 95 %   03/09/22 1755 120/66 98.1 °F (36.7 °C) Oral 58 18 95 %   03/09/22 1358 127/67 98.4 °F (36.9 °C) Oral 57 16 96 %   03/09/22 1029 -- -- -- -- -- 98 %   03/09/22 1027 (!) 133/54 98.2 °F (36.8 °C) Temporal 70 20 --       Oxygen Saturation Interpretation: Normal  Counseling:  I have spoken with the patient and discussed todays results, in addition to providing specific details for the plan of care and counseling regarding the diagnosis and prognosis. Their questions are answered at this time and they are agreeable with the plan of admission. This patient has remained hemodynamically stable during their ED course. Diagnosis:  1. Intractable abdominal pain        Disposition:  Patient's disposition: Admit to med/surg floor  Patient's condition is stable.            Angel Barrientos DO  Resident  03/09/22 9175

## 2022-03-09 NOTE — CONSULTS
GENERAL SURGERY  CONSULT NOTE  3/9/2022    Physician Consulted: Dr. Sandi Robles  Reason for Consult: pancreatic mass  Referring Physician: Dr. Gabbie Lamar    ANETA Elder is a 46 y.o. female who presents to the general surgery service for evaluation of abdominal pain and imaging showing two cystic pancreatic masses. The patient reports three weeks of epigastric pain, accompanied by nausea, vomiting, and inability to tolerate p.o. intake. She has tried taking Gaviscon, with no relief. The patient was seen by Dr. Gustavo Lilly office yesterday, with plans for ERCP next week. She presented to the ED today due to worsening epigastric pain and recurrent vomiting. Medical history is significant for IBD. She has a bowel stimulator in place, which was placed 3 years ago at [facility]. She states that she is not able to undergo MRI because of this. Prior to presentation today, right upper quadrant ultrasound was done which showed CBD measuring 10 mm, no pericholecystic fluid or gallbladder wall thickening. The patient denies other prior abdominal surgeries. Last colonoscopy was 5 years ago, which she states was normal.  She smokes cigarettes, but denies alcohol or other recreational drug use.       Past Medical History:   Diagnosis Date    Anxiety     Arthritis     right hip    Bipolar 1 disorder ([address]     Cervical stenosis of spine     Chronic back pain     Depression     Hypertension     Irritable bowel syndrome     Migraines     Mucocele of lip     for surgery 7/21/21    Other hyperlipidemia 7/29/2019    PTSD (post-traumatic stress disorder)        Past Surgical History:   Procedure Laterality Date    ARTHROGRAPHY Right 1/3/2019    RIGHT HIP ARTHROGRAM AND STEROID INJECTION performed by Devang Henriquez MD at [address] [place] [address] Right 9/10/2020    RIGHT HIP INJECTION UNDER FLUOROSCOPY performed by Devang Henriquez MD at [address] [place] [address] Right 4/13/2021    RIGHT HIP INJECTION UNDER FLUOROSCOPY performed by Neeta Kearns MD at [facility] N/A 2/3/2020    DILATATION AND CURETTAGE HYSTEROSCOPY performed by Georgia Whittington MD at [facility] 7/21/2021    EXCISION RIGHT LIP MUCOCELE performed by Larry Lenz MD at [address] [place] [address]  02/20/2019    implant for IBS     TONSILLECTOMY      TOOTH EXTRACTION      TUBAL LIGATION      TUMOR REMOVAL      in pancrease        Medications Prior to Admission:    Prior to Admission medications    Medication Sig Start Date End Date Taking? Authorizing Provider   dicyclomine (BENTYL) 20 MG tablet Take 1 tablet by mouth 4 times daily 3/8/22   FRENCH Negrete CNP   ondansetron (ZOFRAN-ODT) 4 MG disintegrating tablet Take 1 tablet by mouth 3 times daily as needed for Nausea or Vomiting 3/8/22   FRENCH Wyatt CNP   HYDROcodone-acetaminophen (NORCO) 5-325 MG per tablet Take 1 tablet by mouth every 6 hours as needed for Pain for up to 3 days. Intended supply: 3 days.  Take lowest dose possible to manage pain 3/8/22 3/11/22  FRENCH Wyatt CNP   rosuvastatin (CRESTOR) 20 MG tablet take 1 tablet by mouth nightly ---90DAYS REQUEST 3/4/22   [place] SHANE Persaud DO   pantoprazole (PROTONIX) 40 MG tablet Take 1 tablet by mouth every morning (before breakfast) 2/18/22   [place] SHANE Persaud, DO   baclofen (LIORESAL) 10 MG tablet Take 1 tablet by mouth 3 times daily 12/30/21   [place] SHANE Persaud DO   nadolol (CORGARD) 20 MG tablet take 1 tablet by mouth once daily 12/30/21   [place] SHANE Persaud DO   SUMAtriptan (IMITREX) 50 MG tablet Take 1 tablet by mouth once as needed for Migraine 10/18/21 3/4/22  [place] SHANE Persaud, DO   DULoxetine (CYMBALTA) 20 MG extended release capsule Take 90 mg by mouth daily Take 60 in the morning and 30 at night    Historical Provider, MD   Docusate Calcium (STOOL SOFTENER PO) Take by mouth three times daily     Historical Provider, MD diazepam (VALIUM) 10 MG tablet Take 10 mg by mouth every 8 hours. Instructed to take am of procedure if needed    Historical Provider, MD   folic acid (FOLVITE) 1 MG tablet Take 1 mg by mouth daily. Historical Provider, MD   lamoTRIgine (LAMICTAL) 200 MG tablet Take 200 mg by mouth every evening     Historical Provider, MD       Allergies   Allergen Reactions    Latex Rash    Sulfa Antibiotics Rash and Hives       Family History   Problem Relation Age of Onset   Jose Carbon GERD Mother    Jose Carbon Parkinsonism Mother     COPD Mother     No Known Problems Father        Social History     Tobacco Use    Smoking status: Current Every Day Smoker     Packs/day: 1.00     Years: 30.00     Pack years: 30.00     Types: Cigarettes    Smokeless tobacco: Never Used   Vaping Use    Vaping Use: Some days    Substances: Flavoring   Substance Use Topics    Alcohol use: Not Currently    Drug use: No         Review of Systems   Constitutional: Positive for appetite change and chills. Negative for fever. Respiratory: Negative for chest tightness and shortness of breath. Cardiovascular: Negative for chest pain and palpitations. Gastrointestinal: Positive for abdominal distention, abdominal pain, constipation, diarrhea, nausea and vomiting. Genitourinary: Negative for difficulty urinating and dysuria. Skin: Negative for color change and rash. Neurological: Positive for light-headedness. Negative for weakness. PHYSICAL EXAM:    Vitals:    03/09/22 1029   BP:    Pulse:    Resp:    Temp:    SpO2: 98%       General Appearance:  awake, alert, oriented  Skin:  Skin color, texture, turgor normal. No rashes or lesions. Head:  NCAT. No scleral icterus or conjunctival pallor  Lungs/Chest:  Normal expansion. No chest wall tenderness  Cardiovascular:  Warm throughout. No chest pain  Abdomen:  Soft, moderately tender in epigastrium , mildly distended. No guarding. No palpable masses.   Extremities: Extremities warm to touch      LABS:    CBC  Recent Labs     03/09/22  1142   WBC 7.4   HGB 15.2   HCT 46.2        BMP  Recent Labs     03/09/22  1142      K 3.9      CO2 22   BUN 13   CREATININE 0.7   CALCIUM 9.4     Liver Function  Recent Labs     03/09/22  1142   LIPASE 17   BILITOT 0.5   BILIDIR <0.2   AST 20   ALT 8   ALKPHOS 77   PROT 7.0   LABALBU 4.8     No results for input(s): LACTATE in the last 72 hours. No results for input(s): INR, PTT in the last 72 hours. Invalid input(s): PT    RADIOLOGY    No results found. I have personally reviewed all relevant labs and imaging. ASSESSMENT:  46 y.o. female with epigastric pain, nausea, and vomiting. 2 cystic lesions seen of pancreas on CT imaging. Following with Dr. Jayesh Lee, plans for ERCP next week. Unable to have MRI due to bowel stimulator    Low likelihood that her symptoms are due to pancreatic lesions. Suspect biliary etiology    PLAN:  - HIDA with CCK  - daily CBC, hepatic function panel  - monitor abdominal exam  - IVF  - okay for clear liquids, NPO for HIDA    Discussed with Dr. Eric Oviedo    Electronically signed by Fili Christensen DO on 3/9/22 at 4:50 PM EST    General Surgery Progress Note  [facility]    Patient's Name/Date of Birth: Madelyn Hilliard / 1970    Date: March 11, 2022     Surgeon: Eric Oviedo MD    Chief Complaint: Nausea, vomiting    Patient Active Problem List   Diagnosis    Right hip pain    Anxiety    Recurrent major depression in partial remission ([facility] [address]    New daily persistent headache    Tobacco use disorder    Mixed hyperlipidemia    History of migraine    Frequency of micturition    Right hip joint effusion    Mucocele of lip    Chronic low back pain without sciatica    Spondylosis of cervical region without myelopathy or radiculopathy    Intractable abdominal pain    Pancreatic mass       Subjective: HPI as above. Reports no vomiting today. HIDA scan negative.     Objective:  /60   Pulse 67 Temp 98 °F (36.7 °C) (Oral)   Resp 18   Wt 117 lb 4.6 oz (53.2 kg)   LMP 12/28/2018 (LMP Unknown)   SpO2 98%   BMI 21.45 kg/m²   Labs:  Recent Labs     03/09/22  1142 03/10/22  0538 03/11/22  0400   WBC 7.4 6.2 6.4   HGB 15.2 12.9 12.8   HCT 46.2 38.2 38.5     Lab Results   Component Value Date    CREATININE 0.7 03/11/2022    BUN 7 03/11/2022     03/11/2022    K 3.4 (L) 03/11/2022     (H) 03/11/2022    CO2 26 03/11/2022     Recent Labs     03/08/22  1427 03/09/22  1142   LIPASE 17 17     CBC:   Lab Results   Component Value Date    WBC 6.4 03/11/2022    RBC 4.18 03/11/2022    HGB 12.8 03/11/2022    HCT 38.5 03/11/2022    MCV 92.1 03/11/2022    MCH 30.6 03/11/2022    MCHC 33.2 03/11/2022    RDW 12.6 03/11/2022     03/11/2022    MPV 10.1 03/11/2022     CMP:    Lab Results   Component Value Date     03/11/2022    K 3.4 03/11/2022     03/11/2022    CO2 26 03/11/2022    BUN 7 03/11/2022    CREATININE 0.7 03/11/2022    GFRAA >60 03/11/2022    LABGLOM >60 03/11/2022    GLUCOSE 107 03/11/2022    PROT 5.7 03/11/2022    LABALBU 3.9 03/11/2022    CALCIUM 8.5 03/11/2022    BILITOT 0.2 03/11/2022    ALKPHOS 64 03/11/2022    AST 14 03/11/2022    ALT 6 03/11/2022       General appearance:  NAD  Head: NCAT, PERRLA, EOMI, red conjunctiva  Neck: supple, no masses  Lungs: CTAB, equal chest rise bilateral  Heart: Reg rate  Abdomen: soft, nondistended, non tender  Skin; no lesions  Gu: no cva tenderness  Extremities: extremities normal, atraumatic, no cyanosis or edema      Assessment/Plan:  Madelyn Hilliard is a 46 y.o. female with intractable nausea/vomiting    History if IBS s/p stimulator implant   ?  Idiopathic gastroparesis vs hyperemesis from marijuana use  Diet as tolerated  Consider gastric emptying study if not improved  Trial nita Logan MD  3/11/2022  5:03 AM

## 2022-03-09 NOTE — CONSULTS
[facility] SURGERY  CONSULT NOTE  3/9/2022    Physician Consulted: Dr. Zain Pascual  Reason for Consult: pancreatic mass  Referring Physician: Dr. Yanelis CORNELL  Zoey Green is a 46 y.o. female who presents to the general surgery service for evaluation of abdominal pain and imaging showing two cystic pancreatic masses. The patient reports three weeks of epigastric pain, accompanied by nausea, vomiting, and inability to tolerate p.o. intake. She has tried taking Gaviscon, with no relief. The patient was seen by Dr. Hardy Cost office yesterday, with plans for ERCP next week. She presented to the ED today due to worsening epigastric pain and recurrent vomiting. Medical history is significant for IBD. She has a bowel stimulator in place, which was placed 3 years ago at [facility]. She states that she is not able to undergo MRI because of this. Prior to presentation today, right upper quadrant ultrasound was done which showed CBD measuring 10 mm, no pericholecystic fluid or gallbladder wall thickening. The patient denies other prior abdominal surgeries. Last colonoscopy was 5 years ago, which she states was normal.  She smokes cigarettes up to 1.5 PPD, but denies alcohol or other recreational drug use. However, she was positive for marijuana on Tox screen. She states that she recently tried it on Monday but it did not help.         Past Medical History:   Diagnosis Date    Anxiety     Arthritis     right hip    Bipolar 1 disorder ([address]     Cervical stenosis of spine     Chronic back pain     Depression     Hypertension     Irritable bowel syndrome     Migraines     Mucocele of lip     for surgery 7/21/21    Other hyperlipidemia 7/29/2019    PTSD (post-traumatic stress disorder)        Past Surgical History:   Procedure Laterality Date    ARTHROGRAPHY Right 1/3/2019    RIGHT HIP ARTHROGRAM AND STEROID INJECTION performed by Dilip Arana MD at [address] [place] [address] Right 9/10/2020    RIGHT HIP INJECTION UNDER FLUOROSCOPY performed by Anabella Allen MD at [address] [place] Right 4/13/2021    RIGHT HIP INJECTION UNDER FLUOROSCOPY performed by Anabella Allen MD at [facility] N/A 2/3/2020    DILATATION AND CURETTAGE HYSTEROSCOPY performed by Amy Orta MD at [facility] Right 7/21/2021    EXCISION RIGHT LIP MUCOCELE performed by Tammy Nath MD at [address] [facility]  02/20/2019    implant for IBS     TONSILLECTOMY      TOOTH EXTRACTION      TUBAL LIGATION      TUMOR REMOVAL      in pancrease        Medications Prior to Admission:    Prior to Admission medications    Medication Sig Start Date End Date Taking? Authorizing Provider   dicyclomine (BENTYL) 20 MG tablet Take 1 tablet by mouth 4 times daily 3/8/22   FRENCH Fournier CNP   ondansetron (ZOFRAN-ODT) 4 MG disintegrating tablet Take 1 tablet by mouth 3 times daily as needed for Nausea or Vomiting 3/8/22   FRENCH Keller CNP   HYDROcodone-acetaminophen (NORCO) 5-325 MG per tablet Take 1 tablet by mouth every 6 hours as needed for Pain for up to 3 days. Intended supply: 3 days.  Take lowest dose possible to manage pain 3/8/22 3/11/22  FRENCH Keller CNP   rosuvastatin (CRESTOR) 20 MG tablet take 1 tablet by mouth nightly ---90DAYS REQUEST 3/4/22   [place] SHANE Persaud, DO   pantoprazole (PROTONIX) 40 MG tablet Take 1 tablet by mouth every morning (before breakfast) 2/18/22   [place] SHANE Persaud, DO   baclofen (LIORESAL) 10 MG tablet Take 1 tablet by mouth 3 times daily 12/30/21   [place] SHANE Persaud, DO   nadolol (CORGARD) 20 MG tablet take 1 tablet by mouth once daily 12/30/21   [place] L Hung, DO   SUMAtriptan (IMITREX) 50 MG tablet Take 1 tablet by mouth once as needed for Migraine 10/18/21 3/4/22  [place] SHANE Persaud, DO   DULoxetine (CYMBALTA) 20 MG extended release capsule Take 90 mg by mouth daily Take 60 in the morning and 30 at night    Historical Provider, MD   Docusate Calcium (STOOL SOFTENER PO) Take by mouth three times daily     Historical Provider, MD   diazepam (VALIUM) 10 MG tablet Take 10 mg by mouth every 8 hours. Instructed to take am of procedure if needed    Historical Provider, MD   folic acid (FOLVITE) 1 MG tablet Take 1 mg by mouth daily. Historical Provider, MD   lamoTRIgine (LAMICTAL) 200 MG tablet Take 200 mg by mouth every evening     Historical Provider, MD       Allergies   Allergen Reactions    Latex Rash    Sulfa Antibiotics Rash and Hives       Family History   Problem Relation Age of Onset   Coughlin Saliva GERD Mother    Coughlin Saliva Parkinsonism Mother     COPD Mother     No Known Problems Father        Social History     Tobacco Use    Smoking status: Current Every Day Smoker     Packs/day: 1.00     Years: 30.00     Pack years: 30.00     Types: Cigarettes    Smokeless tobacco: Never Used   Vaping Use    Vaping Use: Some days    Substances: Flavoring   Substance Use Topics    Alcohol use: Not Currently    Drug use: No         Review of Systems   Constitutional: Positive for appetite change and chills. Negative for fever. Respiratory: Negative for chest tightness and shortness of breath. Cardiovascular: Negative for chest pain and palpitations. Gastrointestinal: Positive for abdominal distention, abdominal pain, constipation, diarrhea, nausea and vomiting. Genitourinary: Negative for difficulty urinating and dysuria. Skin: Negative for color change and rash. Neurological: Positive for light-headedness. Negative for weakness. PHYSICAL EXAM:    BP (!) 97/55   Pulse 67   Temp 98.3 °F (36.8 °C) (Oral)   Resp 18   Wt 112 lb 3.4 oz (50.9 kg)   LMP 12/28/2018 (LMP Unknown)   SpO2 100%   BMI 20.52 kg/m²     General Appearance:  awake, alert, oriented  Skin:  Skin color, texture, turgor normal. No rashes or lesions. Head:  NCAT.  No scleral icterus or conjunctival pallor  Lungs/Chest:  Normal

## 2022-03-09 NOTE — PROGRESS NOTES
Admission database completed to best of this RN's ability. Care plan and education initiated. Pt independent from home with significant other. Denies any DME or HHC prior to admission. Pt has a bowel stimulator implanted into R gluteus muscle.

## 2022-03-10 ENCOUNTER — APPOINTMENT (OUTPATIENT)
Dept: NUCLEAR MEDICINE | Age: 52
DRG: 394 | End: 2022-03-10
Payer: MEDICARE

## 2022-03-10 LAB
ALBUMIN SERPL-MCNC: 4 G/DL (ref 3.5–5.2)
ALP BLD-CCNC: 65 U/L (ref 35–104)
ALT SERPL-CCNC: 5 U/L (ref 0–32)
AMPHETAMINE SCREEN, URINE: NOT DETECTED
ANION GAP SERPL CALCULATED.3IONS-SCNC: 14 MMOL/L (ref 7–16)
AST SERPL-CCNC: 15 U/L (ref 0–31)
BARBITURATE SCREEN URINE: NOT DETECTED
BASOPHILS ABSOLUTE: 0.04 E9/L (ref 0–0.2)
BASOPHILS RELATIVE PERCENT: 0.6 % (ref 0–2)
BENZODIAZEPINE SCREEN, URINE: POSITIVE
BILIRUB SERPL-MCNC: 0.3 MG/DL (ref 0–1.2)
BUN BLDV-MCNC: 15 MG/DL (ref 6–20)
CALCIUM SERPL-MCNC: 8.6 MG/DL (ref 8.6–10.2)
CANNABINOID SCREEN URINE: POSITIVE
CHLORIDE BLD-SCNC: 104 MMOL/L (ref 98–107)
CO2: 20 MMOL/L (ref 22–29)
COCAINE METABOLITE SCREEN URINE: NOT DETECTED
CREAT SERPL-MCNC: 0.6 MG/DL (ref 0.5–1)
EOSINOPHILS ABSOLUTE: 0.07 E9/L (ref 0.05–0.5)
EOSINOPHILS RELATIVE PERCENT: 1.1 % (ref 0–6)
FENTANYL SCREEN, URINE: POSITIVE
GFR AFRICAN AMERICAN: >60
GFR NON-AFRICAN AMERICAN: >60 ML/MIN/1.73
GLUCOSE BLD-MCNC: 52 MG/DL (ref 74–99)
HCT VFR BLD CALC: 38.2 % (ref 34–48)
HEMOGLOBIN: 12.9 G/DL (ref 11.5–15.5)
IMMATURE GRANULOCYTES #: 0.02 E9/L
IMMATURE GRANULOCYTES %: 0.3 % (ref 0–5)
LYMPHOCYTES ABSOLUTE: 2.66 E9/L (ref 1.5–4)
LYMPHOCYTES RELATIVE PERCENT: 43 % (ref 20–42)
Lab: ABNORMAL
MCH RBC QN AUTO: 31.2 PG (ref 26–35)
MCHC RBC AUTO-ENTMCNC: 33.8 % (ref 32–34.5)
MCV RBC AUTO: 92.5 FL (ref 80–99.9)
METER GLUCOSE: 125 MG/DL (ref 74–99)
METHADONE SCREEN, URINE: NOT DETECTED
MONOCYTES ABSOLUTE: 0.37 E9/L (ref 0.1–0.95)
MONOCYTES RELATIVE PERCENT: 6 % (ref 2–12)
NEUTROPHILS ABSOLUTE: 3.03 E9/L (ref 1.8–7.3)
NEUTROPHILS RELATIVE PERCENT: 49 % (ref 43–80)
OPIATE SCREEN URINE: NOT DETECTED
OXYCODONE URINE: POSITIVE
PDW BLD-RTO: 12.4 FL (ref 11.5–15)
PHENCYCLIDINE SCREEN URINE: NOT DETECTED
PLATELET # BLD: 178 E9/L (ref 130–450)
PMV BLD AUTO: 10.2 FL (ref 7–12)
POTASSIUM REFLEX MAGNESIUM: 3.8 MMOL/L (ref 3.5–5)
RBC # BLD: 4.13 E12/L (ref 3.5–5.5)
SODIUM BLD-SCNC: 138 MMOL/L (ref 132–146)
TOTAL PROTEIN: 5.7 G/DL (ref 6.4–8.3)
WBC # BLD: 6.2 E9/L (ref 4.5–11.5)

## 2022-03-10 PROCEDURE — 80307 DRUG TEST PRSMV CHEM ANLYZR: CPT

## 2022-03-10 PROCEDURE — 36415 COLL VENOUS BLD VENIPUNCTURE: CPT

## 2022-03-10 PROCEDURE — 3430000000 HC RX DIAGNOSTIC RADIOPHARMACEUTICAL: Performed by: RADIOLOGY

## 2022-03-10 PROCEDURE — 1200000000 HC SEMI PRIVATE

## 2022-03-10 PROCEDURE — 82962 GLUCOSE BLOOD TEST: CPT

## 2022-03-10 PROCEDURE — 6370000000 HC RX 637 (ALT 250 FOR IP): Performed by: STUDENT IN AN ORGANIZED HEALTH CARE EDUCATION/TRAINING PROGRAM

## 2022-03-10 PROCEDURE — 97165 OT EVAL LOW COMPLEX 30 MIN: CPT | Performed by: OCCUPATIONAL THERAPIST

## 2022-03-10 PROCEDURE — C9113 INJ PANTOPRAZOLE SODIUM, VIA: HCPCS | Performed by: STUDENT IN AN ORGANIZED HEALTH CARE EDUCATION/TRAINING PROGRAM

## 2022-03-10 PROCEDURE — A9537 TC99M MEBROFENIN: HCPCS | Performed by: RADIOLOGY

## 2022-03-10 PROCEDURE — 6360000002 HC RX W HCPCS: Performed by: STUDENT IN AN ORGANIZED HEALTH CARE EDUCATION/TRAINING PROGRAM

## 2022-03-10 PROCEDURE — 85025 COMPLETE CBC W/AUTO DIFF WBC: CPT

## 2022-03-10 PROCEDURE — 99221 1ST HOSP IP/OBS SF/LOW 40: CPT | Performed by: SURGERY

## 2022-03-10 PROCEDURE — 80053 COMPREHEN METABOLIC PANEL: CPT

## 2022-03-10 PROCEDURE — 97161 PT EVAL LOW COMPLEX 20 MIN: CPT

## 2022-03-10 PROCEDURE — 78227 HEPATOBIL SYST IMAGE W/DRUG: CPT

## 2022-03-10 PROCEDURE — 2580000003 HC RX 258: Performed by: STUDENT IN AN ORGANIZED HEALTH CARE EDUCATION/TRAINING PROGRAM

## 2022-03-10 RX ORDER — DOCUSATE SODIUM 100 MG/1
100 CAPSULE, LIQUID FILLED ORAL EVERY 8 HOURS
Status: DISCONTINUED | OUTPATIENT
Start: 2022-03-10 | End: 2022-03-12 | Stop reason: HOSPADM

## 2022-03-10 RX ORDER — MORPHINE SULFATE 2 MG/ML
2 INJECTION, SOLUTION INTRAMUSCULAR; INTRAVENOUS EVERY 4 HOURS PRN
Status: DISCONTINUED | OUTPATIENT
Start: 2022-03-10 | End: 2022-03-11

## 2022-03-10 RX ORDER — DIAZEPAM 5 MG/1
10 TABLET ORAL EVERY 8 HOURS
Status: DISCONTINUED | OUTPATIENT
Start: 2022-03-10 | End: 2022-03-12 | Stop reason: HOSPADM

## 2022-03-10 RX ORDER — DEXTROSE MONOHYDRATE 50 MG/ML
100 INJECTION, SOLUTION INTRAVENOUS PRN
Status: DISCONTINUED | OUTPATIENT
Start: 2022-03-10 | End: 2022-03-12 | Stop reason: HOSPADM

## 2022-03-10 RX ORDER — NICOTINE POLACRILEX 4 MG
15 LOZENGE BUCCAL PRN
Status: DISCONTINUED | OUTPATIENT
Start: 2022-03-10 | End: 2022-03-12 | Stop reason: HOSPADM

## 2022-03-10 RX ORDER — DEXTROSE MONOHYDRATE 25 G/50ML
12.5 INJECTION, SOLUTION INTRAVENOUS PRN
Status: DISCONTINUED | OUTPATIENT
Start: 2022-03-10 | End: 2022-03-10 | Stop reason: ALTCHOICE

## 2022-03-10 RX ORDER — DULOXETIN HYDROCHLORIDE 60 MG/1
60 CAPSULE, DELAYED RELEASE ORAL DAILY
Status: DISCONTINUED | OUTPATIENT
Start: 2022-03-11 | End: 2022-03-12 | Stop reason: HOSPADM

## 2022-03-10 RX ADMIN — ENOXAPARIN SODIUM 40 MG: 100 INJECTION SUBCUTANEOUS at 09:53

## 2022-03-10 RX ADMIN — DIAZEPAM 10 MG: 5 TABLET ORAL at 10:45

## 2022-03-10 RX ADMIN — SODIUM CHLORIDE 100 ML/HR: 9 INJECTION, SOLUTION INTRAVENOUS at 03:38

## 2022-03-10 RX ADMIN — DOCUSATE SODIUM 100 MG: 100 CAPSULE, LIQUID FILLED ORAL at 10:45

## 2022-03-10 RX ADMIN — DOCUSATE SODIUM 100 MG: 100 CAPSULE, LIQUID FILLED ORAL at 18:19

## 2022-03-10 RX ADMIN — Medication 10 ML: at 20:14

## 2022-03-10 RX ADMIN — DIAZEPAM 10 MG: 5 TABLET ORAL at 18:19

## 2022-03-10 RX ADMIN — SODIUM CHLORIDE: 9 INJECTION, SOLUTION INTRAVENOUS at 16:28

## 2022-03-10 RX ADMIN — MORPHINE SULFATE 2 MG: 2 INJECTION, SOLUTION INTRAMUSCULAR; INTRAVENOUS at 20:14

## 2022-03-10 RX ADMIN — FOLIC ACID 1 MG: 1 TABLET ORAL at 09:53

## 2022-03-10 RX ADMIN — PANTOPRAZOLE SODIUM 40 MG: 40 INJECTION, POWDER, FOR SOLUTION INTRAVENOUS at 09:55

## 2022-03-10 RX ADMIN — ONDANSETRON 4 MG: 2 INJECTION INTRAMUSCULAR; INTRAVENOUS at 09:55

## 2022-03-10 RX ADMIN — Medication 10 ML: at 09:55

## 2022-03-10 RX ADMIN — BACLOFEN 10 MG: 10 TABLET ORAL at 09:53

## 2022-03-10 RX ADMIN — Medication 6 MILLICURIE: at 07:05

## 2022-03-10 RX ADMIN — BACLOFEN 10 MG: 10 TABLET ORAL at 20:13

## 2022-03-10 RX ADMIN — DULOXETINE HYDROCHLORIDE 60 MG: 60 CAPSULE, DELAYED RELEASE ORAL at 09:53

## 2022-03-10 RX ADMIN — HYDROMORPHONE HYDROCHLORIDE 0.5 MG: 1 INJECTION, SOLUTION INTRAMUSCULAR; INTRAVENOUS; SUBCUTANEOUS at 00:40

## 2022-03-10 RX ADMIN — BACLOFEN 10 MG: 10 TABLET ORAL at 14:07

## 2022-03-10 RX ADMIN — LAMOTRIGINE 200 MG: 100 TABLET ORAL at 20:13

## 2022-03-10 RX ADMIN — NADOLOL 20 MG: 20 TABLET ORAL at 09:53

## 2022-03-10 ASSESSMENT — PAIN DESCRIPTION - PAIN TYPE: TYPE: ACUTE PAIN

## 2022-03-10 ASSESSMENT — PAIN SCALES - GENERAL
PAINLEVEL_OUTOF10: 7
PAINLEVEL_OUTOF10: 7
PAINLEVEL_OUTOF10: 0
PAINLEVEL_OUTOF10: 7

## 2022-03-10 ASSESSMENT — PAIN DESCRIPTION - LOCATION: LOCATION: ABDOMEN

## 2022-03-10 NOTE — H&P
Internal Medicine History & Physical     Name: Jennifer Yoder  : 1970  Chief Complaint: Abdominal Pain (abd pain, needs ercp unable to get into see her GI doctor was told to come back to er and have gi paged)  Primary Care Physician: Walt Valle DO  Admission date: 3/9/2022  Date of service: 3/10/2022     History of Present Illness  Devonte French is a 46y.o. year old female presented with a chief complaint of severe abdominal pain    The patient presented to the ED for evaluation of severe intractable abdominal pain. She states it is located in her epigastric region and that she has been unable to eat due to nausea and pain associated with this. CT of the abdomen showed pancreatic cysts. Per chart review, the lesions on her present CT scan were present in 2018 as well and the findings were discussed with the patient, when I asked her about this today she denied any past knowledge of this. Of note, her chart review reveals that she has had repeated ER visits for \"excruciating\" pain 10/10 that has prompted her chronic opiate use. She also uses benzodiazepines chronically. Urine drug screen shows marijuana use. She states this only happened once. She does smoke almost 2 packs of cigarettes per day.      She was admitted for workup of acute on chronic abdominal pain and suspicious lesions found on CT imaging of the abdomen        Past Medical History:   Diagnosis Date    Anxiety     Arthritis     right hip    Bipolar 1 disorder ([address]     Cervical stenosis of spine     Chronic back pain     Depression     Hypertension     Irritable bowel syndrome     Migraines     Mucocele of lip     for surgery 21    Other hyperlipidemia 2019    PTSD (post-traumatic stress disorder)        Past Surgical History:   Procedure Laterality Date    ARTHROGRAPHY Right 1/3/2019    RIGHT HIP ARTHROGRAM AND STEROID INJECTION performed by Miki Carter MD at [address] [place] [address] Right 9/10/2020 RIGHT HIP INJECTION UNDER FLUOROSCOPY performed by Som Villa MD at [address] [facility] Right 4/13/2021    RIGHT HIP INJECTION UNDER FLUOROSCOPY performed by Som Villa MD at [address] [facility] AND CURETTAGE OF UTERUS N/A 2/3/2020    DILATATION AND CURETTAGE HYSTEROSCOPY performed by Desmond Combs MD at [facility] 7/21/2021    EXCISION RIGHT LIP MUCOCELE performed by Anya Rehman MD at [address] [place] [address]  02/20/2019    implant for IBS     STIMULATOR SURGERY      bowel    TONSILLECTOMY      TOOTH EXTRACTION      TUBAL LIGATION         Family History   Problem Relation Age of Onset    GERD Mother     Parkinsonism Mother     COPD Mother     No Known Problems Father          Social History  Patient lives at home. At baseline patient ambulates with out assistance   Illicit drugs: Denies   TOBACCO:   reports that she has been smoking cigarettes. She has a 45.00 pack-year smoking history. She has never used smokeless tobacco.  ETOH:   reports previous alcohol use. Home Medications  Prior to Admission medications    Medication Sig Start Date End Date Taking? Authorizing Provider   dicyclomine (BENTYL) 20 MG tablet Take 1 tablet by mouth 4 times daily 3/8/22  Yes FRENCH Alfredo CNP   ondansetron (ZOFRAN-ODT) 4 MG disintegrating tablet Take 1 tablet by mouth 3 times daily as needed for Nausea or Vomiting 3/8/22  Yes FRENCH Jimenez CNP   HYDROcodone-acetaminophen (NORCO) 5-325 MG per tablet Take 1 tablet by mouth every 6 hours as needed for Pain for up to 3 days. Intended supply: 3 days.  Take lowest dose possible to manage pain 3/8/22 3/11/22 Yes FRENCH Jimenez CNP   rosuvastatin (CRESTOR) 20 MG tablet take 1 tablet by mouth nightly ---90DAYS REQUEST 3/4/22  Yes [place] SHANE Persaud DO   pantoprazole (PROTONIX) 40 MG tablet Take 1 tablet by mouth every morning (before breakfast) 2/18/22  Yes Alexis LYNN Hung,    baclofen (LIORESAL) 10 MG tablet Take 1 tablet by mouth 3 times daily 12/30/21  Yes [place] L DO Hung   nadolol (CORGARD) 20 MG tablet take 1 tablet by mouth once daily 12/30/21  Yes [place] L DO Hung   SUMAtriptan (IMITREX) 50 MG tablet Take 1 tablet by mouth once as needed for Migraine 10/18/21 3/9/22 Yes [place] L DO Hung   DULoxetine (CYMBALTA) 20 MG extended release capsule Take 60 mg by mouth daily    Yes Historical Provider, MD   Docusate Calcium (STOOL SOFTENER PO) Take 100 mg by mouth three times daily    Yes Historical Provider, MD   diazepam (VALIUM) 10 MG tablet Take 10 mg by mouth every 8 hours. Instructed to take am of procedure if needed   Yes Historical Provider, MD   folic acid (FOLVITE) 1 MG tablet Take 1 mg by mouth daily. Yes Historical Provider, MD   lamoTRIgine (LAMICTAL) 200 MG tablet Take 200 mg by mouth every evening    Yes Historical Provider, MD       Allergies  Allergies   Allergen Reactions    Latex Rash    Sulfa Antibiotics Rash and Hives       Review of Systems  Please see HPI above. All bolded are positive. All un-bolded are negative.   Constitutional Symptoms: fever, chills, fatigue, generalized weakness, diaphoresis, increase in thirst, loss of appetite  Eyes: vision change   Ears, Nose, Mouth, Throat: hearing loss, nasal congestion, sores in the mouth  Cardiovascular: chest pain, chest heaviness, palpitations  Respiratory: shortness of breath, wheezing, coughing  Gastrointestinal: abdominal pain, nausea, vomiting, diarrhea, constipation, melena, hematochezia, hematemesis  Genitourinary: dysuria, hematuria, increased frequency  Musculoskeletal: lower extremity edema, myalgias, arthralgias, back pain  Integumentary: rashes, itching   Neurological: headache, lightheadedness, dizziness, confusion, syncope, numbness, tingling, focal weakness  Psychiatric: depression, suicidal ideation, anxiety  Endocrine: unintentional weight change  Hematologic/Lymphatic: lymphadenopathy, easy bruising, easy bleeding   Allergic/Immunologic: recurrent infections      Objective  VITALS:  BP (!) 97/55   Pulse 67   Temp 98.3 °F (36.8 °C) (Oral)   Resp 18   Wt 112 lb 3.4 oz (50.9 kg)   LMP 12/28/2018 (LMP Unknown)   SpO2 100%   BMI 20.52 kg/m²     Physical Exam:  General: awake, alert, oriented to person, place, time, and purpose, appears stated age, cooperative, no acute distress, pleasant, appropriate mood  Eyes: conjunctivae/corneas clear, sclera non icteric, EOMI  Ears: no obvious scars, no lesions, no masses, hearing intact  Mouth: mucous membranes moist, no obvious oral sores  Head: normocephalic, atraumatic  Neck: no JVD, no adenopathy, no thyromegaly, neck is supple, trachea is midline  Back: ROM normal, no CVA tenderness.   Chest: no pain on palpation  Lungs: clear to auscultation bilaterally, without rhonchi, crackle, wheezing, or rale, no retractions or use of accessory muscles  Heart: regular rate and regular rhythm, no murmur, normal S1, S2  Abdomen: soft, non-tender; bowel sounds normal; no masses, no organomegaly  : Deferred   Extremities: no lower extremity edema, extremities atraumatic, no cyanosis, no clubbing, 2+ pedal pulses palpated  Skin: normal color, normal texture, normal turgor, no rashes, no lesions  Neurologic:5/5 muscle strength throughout, normal muscle tone throughout, face symmetric, hearing intact, tongue midline, speech appropriate without slurring, sensation to fine touch intact in upper and lower extremities    Labs-   Lab Results   Component Value Date    WBC 6.2 03/10/2022    HGB 12.9 03/10/2022    HCT 38.2 03/10/2022     03/10/2022     03/10/2022    K 3.8 03/10/2022     03/10/2022    CREATININE 0.6 03/10/2022    BUN 15 03/10/2022    CO2 20 (L) 03/10/2022    GLUCOSE 52 (L) 03/10/2022    ALT 5 03/10/2022    AST 15 03/10/2022     Lab Results   Component Value Date    TROPONINI <0.01 12/18/2020       Last echocardiogram:      Recent Radiological Studies:  NM HEPATOBILIARY SCAN W EJECTION FRACTION   Final Result   No evidence of biliary dyskinesia. RECOMMENDATIONS:   Unavailable             Assessment  Active Hospital Problems    Diagnosis     Intractable abdominal pain [R10.9]     Pancreatic mass [K86.89]     Spondylosis of cervical region without myelopathy or radiculopathy [M47.812]     Chronic low back pain without sciatica [M54.50, G89.29]     History of migraine [Z86.69]     Mixed hyperlipidemia [E78.2]     Tobacco use disorder [F17.200]     Anxiety [F41.9]     Recurrent major depression in partial remission ([address] [F33.41]        Patient Active Problem List    Diagnosis Date Noted    Intractable abdominal pain 03/09/2022    Pancreatic mass 03/09/2022    Spondylosis of cervical region without myelopathy or radiculopathy 11/08/2021    Chronic low back pain without sciatica 08/02/2021    Mucocele of lip 07/19/2021    Right hip joint effusion 03/23/2021    Frequency of micturition 12/16/2020    Tobacco use disorder 07/29/2019    Mixed hyperlipidemia 07/29/2019    History of migraine 07/29/2019    Right hip pain     Anxiety 08/02/2017    Recurrent major depression in partial remission ([facility] [address] 08/02/2017    New daily persistent headache 08/02/2017       Plan  · Cystic IPMN/Intractable abdominal pain   · Admitted for pain control  · We will progressively decrease her opiates over the course of her stay   · GI/General surgery evaluation to ensue   · HIDA scan was negative   · Polysubstance abuse/concern for Cyclic Vomiting Syndrome   · UDS noted   · She needs to stop smoking Marijuana and Tobacco   · Pelvic congestions syndrome ?   · She should have close op fu for this   · Benzodiazepine and opiate dependency   · Wean to her home regimen as tolerated   · Nursing staff confirmed her medications at her pharmacy   · She is also using other substances as evident by UDS + for cannabinoid · PT/OT  · Home medications to be reconciled and confirmed prior to being ordered  · DVT prophylaxis   · Code Status   · Discharge plan: TBD pending clinical improvement     Xiao Valentine MD  Internal medicine   3/10/2022  5:09 PM    I can be reached through [employer]. NOTE:  This report was transcribed using voice recognition software. Every effort was made to ensure accuracy; however, inadvertent computerized transcription errors may be present.

## 2022-03-10 NOTE — PROGRESS NOTES
Occupational Therapy  OCCUPATIONAL THERAPY INITIAL EVALUATION     [facility], [place]       Date:3/10/2022  Patient Name: Angel Elder  MRN: [id number]  : 1970  Room: 8492/2490-E    Evaluating OT: Wendy Reginaldo [place], OTR/L #[id number]    Referring Provider: Jl Connors MD  Specific Provider Orders/Date: eval and treat 3/9/2022    Diagnosis:  Intractable abdominal pain [R10.9]  Pancreatic mass [K86.89]       Pertinent Medical History:   Past Medical History:   Diagnosis Date    Anxiety     Arthritis     right hip    Bipolar 1 disorder ([facility] [address]     Cervical stenosis of spine     Chronic back pain     Depression     Hypertension     Irritable bowel syndrome     Migraines     Mucocele of lip     for surgery 21    Other hyperlipidemia 2019    PTSD (post-traumatic stress disorder)       Precautions:  fall risk    Assessment of current deficits   [x] Functional mobility  [x]ADLs  [x] Strength               []Cognition   [x] Functional transfers   [x] IADLs         [x] Safety Awareness   [x]Endurance   [] Fine Coordination              [x] Balance      [] Vision/perception   []Sensation    []Gross Motor Coordination  [] ROM  [] Delirium                   [] Motor Control     OT PLAN OF CARE   OT POC based on physician orders, patient diagnosis and results of clinical assessment    Frequency/Duration 2-5 days/wk for 10-14 days PRN   Specific OT Treatment Interventions to include:   * Instruction/training on adapted ADL techniques and AE recommendations to increase functional independence within precautions       * Training on energy conservation strategies, correct breathing pattern and techniques to improve independence/tolerance for self-care routine  * Functional transfer/mobility training/DME recommendations for increased independence, safety, and fall prevention  * Patient/Family education to increase follow through with safety techniques and functional independence  * Recommendation of environmental modifications for increased safety with functional transfers/mobility and ADLs  * Therapeutic exercise to improve motor endurance, ROM, and functional strength for ADLs/functional transfers  * Therapeutic activities to facilitate/challenge dynamic balance, stand tolerance for increased safety and independence with ADLs    Recommended Adaptive Equipment: TBD     Home Living: Pt lives with  in a 1 story home with 1+1 step to enter. Bathroom setup: tub shower with grab bars  Equipment Owned: walker    Prior Level of Function: independent  with ADLs, independent with IADLs. Completed functional mobility with no AD  Driving: yes    Pain Level: no pain reported    Cognition: A&O: 4/4.     Problem solving:  Fair +   Sequencing: Fair +   Memory: Fair +   Judgement/safety:  Fair +, impulsivity noted while completing transfers to and from the commode, cues required for pace and to regain slight LOB     Functional Assessment: AM-PAC Daily Activity Raw Score: 19/24   Initial Eval Status  Date: 3/10/22 Treatment session:  STGs=LTGS  Timeframe 10-14 days     Feeding  independent      Grooming  SBA  Independent     UB Dressing  SBA  Independent     LB Dressing SBA  Able to cross legs and agata socks while long sitting in bed  Independent     Bathing SBA    Independent     Toileting SBA  Able to preform thorough pericare  Verbal cues for stand to sit to the commode   Independent     Bed Mobility  Supine to sit: supervision  Sit to Supine: supervision  Independent    Functional Transfers SBA  Sit <> stand from bed Cues for hand placement and safe technique   Independent    Functional Mobility SBA  To and from bathroom and in room with no AD-unsteady on feet minimal signs LOB noted  Mod I with AD as needed with good tolerance   Balance Sitting:        Static: good       Dynamic: fair +    Standing: fair +  Sitting:        Static: good       Dynamic: good    Standing: good   Activity Tolerance Fair +  Pt limited by overall decreased endurance and fatigue, no signs of SOB noted  good during ADL activity. Pt will verbalize and demonstrate good understanding of ECWS techniques     Hand Dominance: right   Strength ROM Additional Info:    RUE  WFL WFL good  and FMC/dexterity noted during ADL tasks     LUE WFL WFL good  and FMC/dexterity noted during ADL tasks         Hearing: WFL   Vision: WFL   Sensation:  No c/o numbness or tingling   Tone: WFL   Edema: none      Comments: Upon arrival, patient supine in bed. At end of session, patient supine in bed with call light and phone within reach, all lines and tubes intact. Overall patient demonstrated decreased independence and safety during completion of ADL/functional transfer/mobility tasks. Pt would benefit from continued skilled OT to increase safety and independence with completion of ADL/IADL tasks for functional independence and quality of life. Rehab Potential: Good for established goals     Patient/Family Goal: To get home. Patient and/or family were instructed on functional diagnosis, prognosis/goals and OT plan of care. Pt verbalized understanding.       Eval Complexity: Low    Time In: 10:55  Time Out: 11:05  Total Treatment Time: 0    Min Units   OT Eval Low 97165  x  1   OT Eval Medium 67937      OT Eval High 66122       OT Re-Eval [id number]       Therapeutic Ex 09121       Therapeutic Activities 24340       ADL/Self Care 54988       Orthotic Management 54499       Neuro Re-Ed 91532       Non-Billable Time          Evaluation time includes thorough review of current medical information, gathering information on past medical history/social history and prior level of function, completion of standardized testing/informal observation of tasks, assessment of data, and development of POC/Goals    Kishor [facility], OTR/L #[id number]

## 2022-03-10 NOTE — PROGRESS NOTES
Physical Therapy    Facility/Department: [address] MED SURG/TELE  Initial Assessment    NAME: Jennifer Yoder  : 1970  MRN: [id number]    Date of Service: 3/10/2022       REQUIRES PT FOLLOW UP: Yes       Patient Diagnosis(es): The encounter diagnosis was Intractable abdominal pain. has a past medical history of Anxiety, Arthritis, Bipolar 1 disorder ([address] Cervical stenosis of spine, Chronic back pain, Depression, Hypertension, Irritable bowel syndrome, Migraines, Mucocele of lip, Other hyperlipidemia, and PTSD (post-traumatic stress disorder). has a past surgical history that includes Tonsillectomy; Tooth Extraction; Tubal ligation; Colonoscopy; arthrography (Right, 1/3/2019); Small intestine surgery (2019); Dilation and curettage of uterus (N/A, 2/3/2020); arthrography (Right, 9/10/2020); arthrography (Right, 2021); Mouth surgery (Right, 2021); and Stimulator Surgery. Evaluating Therapist: Ria Montoya PT     Referring Provider:  Imer Ty MD    PT order : PT eval and treat     Room #:  536   DIAGNOSIS:  Intractable abd pain     PRECAUTIONS: falls     Social:  Pt lives with  Spouse  in a  1  floor plan  1+1  steps and  No  rails to enter. Prior to admission pt walked with no AD. Pt has a borrowed ww which spouse reports pt refused to use      Initial Evaluation  Date:  3/10/2022  Treatment      Short Term/ Long Term   Goals   Was pt agreeable to Eval/treatment? yes      Does pt have pain? B knee arthritic pain      Bed Mobility  Rolling:  Independent   Supine to sit:   Independent   Sit to supine:  Independent   Scooting:  independent   Independent    Transfers Sit to stand:  Independent   Stand to sit:  independent   Stand pivot:  NT    independent    Ambulation   15 and 120  feet with no AD  with  SBA    200  feet with no AD  with  independent        Stair negotiation: ascended and descended NT    4  steps with  1  rail with independent    LE ROM  WFL     LE strength  4/ 5 AM- PAC RAW score  22/24            Pt is alert and Oriented x  4      Balance:  SBA, mildly unsteady, fall risk   Endurance: [facility]   Bed/Chair alarm: Yes      ASSESSMENT  Pt displays functional ability as noted in the objective portion of this evaluation. Conditions Requiring Skilled Therapeutic Intervention:    [x]Decreased strength     []Decreased ROM  [x]Decreased functional mobility  [x]Decreased balance   [x]Decreased endurance   []Decreased posture  []Decreased sensation  []Decreased coordination   []Decreased vision  []Decreased safety awareness   []Increased pain       Treatment/Education:    Pt in bed upon arrival . Pt reports she does not think she needs PT, but needed to use restroom. Mobility as above. Once up, pt mildly unsteady with gait. Amb with narrow MERCEDES and short step length. May benefit from trial with AD, but pt does not want to use. May benefit from outpatient PT . Pt educated on fall risk,  Safe and proper technique with mobility        Patient response to education:   Pt verbalized understanding Pt demonstrated skill Pt requires further education in this area   x  x x       Comments:  Pt left in bed per pt request after session, with call light in reach. Rehab potential is Good for reaching above PT goals. Pts/ family goals   1. None stated      Patient and or family understand(s) diagnosis, prognosis, and plan of care. - yes     PLAN  PT care will be provided in accordance with the objectives noted above. Whenever appropriate, clear delegation orders will be provided for nursing staff. Exercises and functional mobility practice will be used as well as appropriate assistive devices or modalities to obtain goals. Patient and family education will also be administered as needed.         PLAN OF CARE:    Current Treatment Recommendations     [x] Strengthening to improve independence with functional mobility   [] ROM to improve independence with functional mobility   [x] Balance Training to improve static/dynamic balance and to reduce fall risk  [x] Endurance Training to improve activity tolerance during functional mobility   [] Transfer Training to improve safety and independence with all functional transfers   [x] Gait Training to improve gait mechanics, endurance and assess need for appropriate assistive device  [x] Stair Training in preparation for safe discharge home and/or into the community   [x] Positioning to prevent skin breakdown and contractures  [] Safety and Education Training   [] Patient/Caregiver Education   [] HEP  [] Other     Frequency of treatments will be 1- 3 x/week x 7 days. Time in: 1055  Time out:  1105      Evaluation Time includes thorough review of current medical information, gathering information on past medical history/social history and prior level of function, completion of standardized testing/informal observation of tasks, assessment of data and education on plan of care and goals.     CPT codes:  [x] Low Complexity PT evaluation 72791  [] Moderate Complexity PT evaluation 37700  [] High Complexity PT evaluation 57194  [] PT Re-evaluation 59221  [] Gait training 53825  minutes  [] Therapeutic activities 60315  minutes  [] Therapeutic exercises 07505  minutes  [] Neuromuscular reeducation 16756  minutes       Basim 18 number:  PT 5277

## 2022-03-10 NOTE — CARE COORDINATION
Social Work discharge planning   SW consult \"discharge planning\" noted. SW met pt, who said it was ok to speak with friend Johnny Evans in room. She said 'we live together so it's ok\". Pt said she is independent with ambulation. Pt said she is already in counseling in [facility] office. She does not drive, so she said Johnny Evans is her transportation resource. She said she also has support from her Rod Blackwood and her son Milly Bell. Her PCP is Dr Partha Easton. She uses [facility] in [place]. Pt denied needs at this time. Encouraged her to ask for SW if planning needs arise.   Electronically signed by Cait Sanchez on 3/10/2022 at 11:13 AM

## 2022-03-11 LAB
ALBUMIN SERPL-MCNC: 3.9 G/DL (ref 3.5–5.2)
ALP BLD-CCNC: 64 U/L (ref 35–104)
ALT SERPL-CCNC: 6 U/L (ref 0–32)
ANION GAP SERPL CALCULATED.3IONS-SCNC: 7 MMOL/L (ref 7–16)
AST SERPL-CCNC: 14 U/L (ref 0–31)
BASOPHILS ABSOLUTE: 0.04 E9/L (ref 0–0.2)
BASOPHILS RELATIVE PERCENT: 0.6 % (ref 0–2)
BILIRUB SERPL-MCNC: 0.2 MG/DL (ref 0–1.2)
BUN BLDV-MCNC: 7 MG/DL (ref 6–20)
CALCIUM SERPL-MCNC: 8.5 MG/DL (ref 8.6–10.2)
CHLORIDE BLD-SCNC: 108 MMOL/L (ref 98–107)
CO2: 26 MMOL/L (ref 22–29)
CREAT SERPL-MCNC: 0.7 MG/DL (ref 0.5–1)
EOSINOPHILS ABSOLUTE: 0.11 E9/L (ref 0.05–0.5)
EOSINOPHILS RELATIVE PERCENT: 1.7 % (ref 0–6)
GFR AFRICAN AMERICAN: >60
GFR NON-AFRICAN AMERICAN: >60 ML/MIN/1.73
GLUCOSE BLD-MCNC: 107 MG/DL (ref 74–99)
HCT VFR BLD CALC: 38.5 % (ref 34–48)
HEMOGLOBIN: 12.8 G/DL (ref 11.5–15.5)
IMMATURE GRANULOCYTES #: 0.03 E9/L
IMMATURE GRANULOCYTES %: 0.5 % (ref 0–5)
LYMPHOCYTES ABSOLUTE: 2.85 E9/L (ref 1.5–4)
LYMPHOCYTES RELATIVE PERCENT: 44.7 % (ref 20–42)
MAGNESIUM: 1.6 MG/DL (ref 1.6–2.6)
MCH RBC QN AUTO: 30.6 PG (ref 26–35)
MCHC RBC AUTO-ENTMCNC: 33.2 % (ref 32–34.5)
MCV RBC AUTO: 92.1 FL (ref 80–99.9)
METER GLUCOSE: 104 MG/DL (ref 74–99)
METER GLUCOSE: 105 MG/DL (ref 74–99)
METER GLUCOSE: 121 MG/DL (ref 74–99)
METER GLUCOSE: 123 MG/DL (ref 74–99)
MONOCYTES ABSOLUTE: 0.42 E9/L (ref 0.1–0.95)
MONOCYTES RELATIVE PERCENT: 6.6 % (ref 2–12)
NEUTROPHILS ABSOLUTE: 2.92 E9/L (ref 1.8–7.3)
NEUTROPHILS RELATIVE PERCENT: 45.9 % (ref 43–80)
PDW BLD-RTO: 12.6 FL (ref 11.5–15)
PLATELET # BLD: 173 E9/L (ref 130–450)
PMV BLD AUTO: 10.1 FL (ref 7–12)
POTASSIUM REFLEX MAGNESIUM: 3.4 MMOL/L (ref 3.5–5)
POTASSIUM SERPL-SCNC: 3.9 MMOL/L (ref 3.5–5)
RBC # BLD: 4.18 E12/L (ref 3.5–5.5)
SODIUM BLD-SCNC: 141 MMOL/L (ref 132–146)
TOTAL PROTEIN: 5.7 G/DL (ref 6.4–8.3)
WBC # BLD: 6.4 E9/L (ref 4.5–11.5)

## 2022-03-11 PROCEDURE — 6370000000 HC RX 637 (ALT 250 FOR IP): Performed by: STUDENT IN AN ORGANIZED HEALTH CARE EDUCATION/TRAINING PROGRAM

## 2022-03-11 PROCEDURE — 80053 COMPREHEN METABOLIC PANEL: CPT

## 2022-03-11 PROCEDURE — 6360000002 HC RX W HCPCS: Performed by: STUDENT IN AN ORGANIZED HEALTH CARE EDUCATION/TRAINING PROGRAM

## 2022-03-11 PROCEDURE — 1200000000 HC SEMI PRIVATE

## 2022-03-11 PROCEDURE — 83735 ASSAY OF MAGNESIUM: CPT

## 2022-03-11 PROCEDURE — 2580000003 HC RX 258: Performed by: STUDENT IN AN ORGANIZED HEALTH CARE EDUCATION/TRAINING PROGRAM

## 2022-03-11 PROCEDURE — 84132 ASSAY OF SERUM POTASSIUM: CPT

## 2022-03-11 PROCEDURE — C9113 INJ PANTOPRAZOLE SODIUM, VIA: HCPCS | Performed by: STUDENT IN AN ORGANIZED HEALTH CARE EDUCATION/TRAINING PROGRAM

## 2022-03-11 PROCEDURE — 85025 COMPLETE CBC W/AUTO DIFF WBC: CPT

## 2022-03-11 PROCEDURE — 36415 COLL VENOUS BLD VENIPUNCTURE: CPT

## 2022-03-11 PROCEDURE — 97530 THERAPEUTIC ACTIVITIES: CPT

## 2022-03-11 PROCEDURE — 82962 GLUCOSE BLOOD TEST: CPT

## 2022-03-11 RX ORDER — MORPHINE SULFATE 2 MG/ML
1 INJECTION, SOLUTION INTRAMUSCULAR; INTRAVENOUS EVERY 4 HOURS PRN
Status: DISCONTINUED | OUTPATIENT
Start: 2022-03-11 | End: 2022-03-12 | Stop reason: HOSPADM

## 2022-03-11 RX ADMIN — DIAZEPAM 10 MG: 5 TABLET ORAL at 10:15

## 2022-03-11 RX ADMIN — PANTOPRAZOLE SODIUM 40 MG: 40 INJECTION, POWDER, FOR SOLUTION INTRAVENOUS at 08:26

## 2022-03-11 RX ADMIN — DIAZEPAM 10 MG: 5 TABLET ORAL at 18:08

## 2022-03-11 RX ADMIN — DOCUSATE SODIUM 100 MG: 100 CAPSULE, LIQUID FILLED ORAL at 10:15

## 2022-03-11 RX ADMIN — Medication 10 ML: at 21:57

## 2022-03-11 RX ADMIN — Medication 10 ML: at 08:33

## 2022-03-11 RX ADMIN — DOCUSATE SODIUM 100 MG: 100 CAPSULE, LIQUID FILLED ORAL at 03:00

## 2022-03-11 RX ADMIN — MORPHINE SULFATE 1 MG: 2 INJECTION, SOLUTION INTRAMUSCULAR; INTRAVENOUS at 08:26

## 2022-03-11 RX ADMIN — DIAZEPAM 10 MG: 5 TABLET ORAL at 03:01

## 2022-03-11 RX ADMIN — FOLIC ACID 1 MG: 1 TABLET ORAL at 08:26

## 2022-03-11 RX ADMIN — SODIUM CHLORIDE: 9 INJECTION, SOLUTION INTRAVENOUS at 01:51

## 2022-03-11 RX ADMIN — BACLOFEN 10 MG: 10 TABLET ORAL at 21:57

## 2022-03-11 RX ADMIN — MORPHINE SULFATE 2 MG: 2 INJECTION, SOLUTION INTRAMUSCULAR; INTRAVENOUS at 03:01

## 2022-03-11 RX ADMIN — POTASSIUM CHLORIDE 40 MEQ: 1500 TABLET, EXTENDED RELEASE ORAL at 06:58

## 2022-03-11 RX ADMIN — LAMOTRIGINE 200 MG: 100 TABLET ORAL at 21:57

## 2022-03-11 RX ADMIN — ENOXAPARIN SODIUM 40 MG: 100 INJECTION SUBCUTANEOUS at 08:25

## 2022-03-11 RX ADMIN — BACLOFEN 10 MG: 10 TABLET ORAL at 14:45

## 2022-03-11 RX ADMIN — BACLOFEN 10 MG: 10 TABLET ORAL at 08:24

## 2022-03-11 RX ADMIN — ONDANSETRON 4 MG: 2 INJECTION INTRAMUSCULAR; INTRAVENOUS at 08:26

## 2022-03-11 RX ADMIN — DULOXETINE HYDROCHLORIDE 60 MG: 60 CAPSULE, DELAYED RELEASE ORAL at 08:24

## 2022-03-11 RX ADMIN — DOCUSATE SODIUM 100 MG: 100 CAPSULE, LIQUID FILLED ORAL at 18:08

## 2022-03-11 ASSESSMENT — PAIN SCALES - GENERAL
PAINLEVEL_OUTOF10: 6
PAINLEVEL_OUTOF10: 6
PAINLEVEL_OUTOF10: 7
PAINLEVEL_OUTOF10: 0

## 2022-03-11 NOTE — PROGRESS NOTES
Notified surgical team that gastric emptying study will be completed 3/12 to allow 48 hr between nuclear exams. (HIDA completed 3/10).

## 2022-03-11 NOTE — CARE COORDINATION
Advancing diet; pt with hx cannabis use (reason for n/v?). May need EUS in future. for gastric emptying study. Plan is home with family, no needs. Will follow clinically. Prieto Onofre.

## 2022-03-11 NOTE — PROGRESS NOTES
Internal Medicine Progress Note    Patient's name: Jennifer Yoder  : 1970  Chief complaints (on day of admission): Abdominal Pain (abd pain, needs ercp unable to get into see her GI doctor was told to come back to er and have gi paged)  Admission date: 3/9/2022  Date of service: 3/11/2022   Room: [address] MED SURG/TELE  Primary care physician: Walt Valle DO  Reason for visit: Follow-up for abdominal pain    Subjective  Devonte French was seen and examined at bedside she is tearful on examination complaining of epigastric abd pain. She states that every time she eats it triggers the pain. In addition to this she has not had a bowel movement in 5 days despite use of stool softeners. Patient states she would like to be discharged home asap   Son at bedside   Lengthy discussion  All of their questions answered     Current treatment plan discussed and all questions answered    Current medications being prescribed discussed and patient expresses verbal understanding     Review of Systems  There are no new complaints of chest pain, shortness of breath, abdominal pain, nausea, vomiting, diarrhea, constipation unless otherwise mentioned above.      Hospital Medications  Current Facility-Administered Medications   Medication Dose Route Frequency Provider Last Rate Last Admin    [facility]) 1.02 mcg in sodium chloride 0.9 % 100 mL infusion  0.02 mcg/kg IntraVENous Once Michael Marcus MD        DULoxetine (CYMBALTA) extended release capsule 60 mg  60 mg Oral Daily Imer Ty MD        docusate sodium (COLACE) capsule 100 mg  100 mg Oral Q8H Imer Ty MD   100 mg at 22 0300    diazePAM (VALIUM) tablet 10 mg  10 mg Oral Q8H Imer Ty MD   10 mg at 22 0301    glucose (GLUTOSE) 40 % oral gel 15 g  15 g Oral PRN Imer Ty MD        glucagon (rDNA) injection 1 mg  1 mg IntraMUSCular PRN Imer Ty MD        dextrose 5 % solution  100 mL/hr IntraVENous PRN MD Dionne Carmen morphine (PF) injection 2 mg  2 mg IntraVENous Q4H PRN Dub Hodgkin, MD   2 mg at 03/11/22 0301    dextrose bolus (hypoglycemia) 10% 125 mL  125 mL IntraVENous PRN Dub Hodgkin, MD        Or    dextrose bolus (hypoglycemia) 10% 250 mL  250 mL IntraVENous PRN Dub Hodgkin, MD        sodium chloride flush 0.9 % injection 10 mL  10 mL IntraVENous 2 times per day Dub Hodgkin, MD   10 mL at 03/10/22 2014    sodium chloride flush 0.9 % injection 10 mL  10 mL IntraVENous PRN Dub Hodgkin, MD        0.9 % sodium chloride infusion  25 mL IntraVENous PRN Dub Hodgkin, MD        potassium chloride (KLOR-CON M) extended release tablet 40 mEq  40 mEq Oral PRN Dub Hodgkin, MD   40 mEq at 03/11/22 1123    Or    potassium bicarb-citric acid (EFFER-K) effervescent tablet 40 mEq  40 mEq Oral PRN Dub Hodgkin, MD        Or    potassium chloride 10 mEq/100 mL IVPB (Peripheral Line)  10 mEq IntraVENous PRN Dub Hodgkin, MD        enoxaparin (LOVENOX) injection 40 mg  40 mg SubCUTAneous Daily Dub Hodgkin, MD   40 mg at 03/10/22 0953    ondansetron (ZOFRAN-ODT) disintegrating tablet 4 mg  4 mg Oral Q8H PRN Dub Hodgkin, MD        Or    ondansetron [facility]) injection 4 mg  4 mg IntraVENous Q6H PRN Dub Hodgkin, MD   4 mg at 03/10/22 0955    senna (SENOKOT) tablet 8.6 mg  1 tablet Oral Daily PRN Dub Hodgkin, MD        acetaminophen (TYLENOL) tablet 650 mg  650 mg Oral Q6H PRN Dub Hodgkin, MD        Or    acetaminophen (TYLENOL) suppository 650 mg  650 mg Rectal Q6H PRN Dub Hodgkin, MD        0.9 % sodium chloride infusion   IntraVENous Continuous Dub Hodgkin,  mL/hr at 03/11/22 0151 New Bag at 03/11/22 0151    oxyCODONE (ROXICODONE) immediate release tablet 5 mg  5 mg Oral Q6H PRN Dub Hodgkin, MD   5 mg at 03/09/22 2110    pantoprazole (PROTONIX) injection 40 mg  40 mg IntraVENous Daily Dub Hodgkin, MD   40 mg at 03/10/22 0955    baclofen (LIORESAL) tablet 10 mg  10 mg Oral TID Dub Hodgkin, MD   10 mg at 07/61/37 9790    folic acid (FOLVITE) tablet 1 mg  1 mg Oral Daily Lexy Marks MD   1 mg at 03/10/22 4785    lamoTRIgine (LAMICTAL) tablet 200 mg  200 mg Oral QPM Lexy Marks MD   200 mg at 03/10/22 2013    nadolol (CORGARD) tablet 20 mg  20 mg Oral Daily Lexy Marks MD   20 mg at 03/10/22 0953    nicotine (NICODERM CQ) 21 MG/24HR 1 patch  1 patch TransDERmal Daily Lexy Marks MD   1 patch at 03/10/22 1819       PRN Medications  glucose, glucagon (rDNA), dextrose, morphine, dextrose bolus (hypoglycemia) **OR** dextrose bolus (hypoglycemia), sodium chloride flush, sodium chloride, potassium chloride **OR** potassium alternative oral replacement **OR** potassium chloride, ondansetron **OR** ondansetron, senna, acetaminophen **OR** acetaminophen, oxyCODONE    Objective  Most Recent Recorded Vitals  /60   Pulse 67   Temp 98 °F (36.7 °C) (Oral)   Resp 18   Wt 117 lb 4.6 oz (53.2 kg)   LMP 12/28/2018 (LMP Unknown)   SpO2 98%   BMI 21.45 kg/m²   I/O last 3 completed shifts: In: 2201.7 [I.V.:2201.7]  Out: -   No intake/output data recorded.     Physical Exam:  General: AAO to person/place/time/purpose, NAD, no labored breathing  Eyes: conjunctivae/corneas clear, sclera non icteric  Skin: color/texture/turgor normal, no rashes or lesions  Lungs: CTAB, no retractions/use of accessory muscles, no vocal fremitus, no rhonchi, no crackle, no rales  Heart: regular rate, regular rhythm, no murmur  Abdomen: soft, epigastric tenderness, no rebound or gaurding  Extremities: atraumatic, no edema  Neurologic: cranial nerves 2-12 grossly intact, no slurred speech    Most Recent Labs  Lab Results   Component Value Date    WBC 6.4 03/11/2022    HGB 12.8 03/11/2022    HCT 38.5 03/11/2022     03/11/2022     03/11/2022    K 3.4 (L) 03/11/2022     (H) 03/11/2022    CREATININE 0.7 03/11/2022    BUN 7 03/11/2022    CO2 26 03/11/2022    GLUCOSE 107 (H) 03/11/2022    ALT 6 03/11/2022    AST 14 03/11/2022 TSH 0.576 03/02/2021       NM HEPATOBILIARY SCAN W EJECTION FRACTION   Final Result   No evidence of biliary dyskinesia. RECOMMENDATIONS:   Unavailable             Echocardiogram       Assessment   Active Hospital Problems    Diagnosis     Intractable abdominal pain [R10.9]     Pancreatic mass [K86.89]     Spondylosis of cervical region without myelopathy or radiculopathy [M47.812]     Chronic low back pain without sciatica [M54.50, G89.29]     History of migraine [Z86.69]     Mixed hyperlipidemia [E78.2]     Tobacco use disorder [F17.200]     Anxiety [F41.9]     Recurrent major depression in partial remission ([facility] [F33.41]          Plan  · Cystic IPMN/Intractable abdominal pain   ? Admitted for pain control  ? We will progressively decrease her opiates over the course of her stay   ? GI/General surgery evaluation to ensue   ? HIDA scan was negative   ? Gastric emptying study ordered and pending   · Polysubstance abuse/concern for Cyclic Vomiting Syndrome   ? UDS noted   ? She needs to stop smoking Marijuana and Tobacco   · Pelvic congestions syndrome ? ? She should have close op fu for this   · Benzodiazepine and opiate dependency   ? Wean to her home regimen as tolerated   ? Nursing staff confirmed her medications at her pharmacy   ? She is also using other substances as evident by UDS + for cannabinoid   · PT AM-PAC-- TBD  · DVT prophylaxis   · Code status Full   · Medications, labs and imaging reviewed   · Discharge plan: TBD pending clinical improvement      Shahbaz Jackson, PGY-1  3/11/2022 9:00 AM  Patient was seen and evaluated independently prior to being seen with attending physician Dr. Ervin Woo. Patient was independently examined and discussed with Dr. Ervin Woo All plans discussed with Dr. Ervin Woo. Dr. Ervin Woo can be reached through [facility]. Addendum: I have personally participated in a face-to-face history and physical exam on the date of service with the patient.  I have discussed the case with the resident. I also participated in medical decision making with the resident on the date of service and I agree with all of the pertinent clinical information unless indicated in my editing of the note. I have reviewed and edited the note above based on my findings during my history, exam, and decision making on the same day of service. The vitals, labs, imaging, medications and treatment plan were reviewed independently by myself in addition to with the resident doctor. I agree with the above documentation and treatment plan     Electronically signed by Trina Raza MD on 3/11/2022 at 2:06 PM    I can be reached through [address] [place] [address].

## 2022-03-11 NOTE — PROGRESS NOTES
Occupational Therapy  OT BEDSIDE TREATMENT NOTE      Date:3/11/2022  Patient Name: Vinnie Singleton  MRN: [id number]  : 1970  Room: [address] [place] [address]     Per OT Eval:  [address], OTR/L #[id number]     Referring Provider: Kori Ruff MD  Specific Provider Orders/Date: eval and treat 3/9/2022     Diagnosis:  Intractable abdominal pain [R10.9]  Pancreatic mass [K86.89]       Pertinent Medical History:   Past Medical History        Past Medical History:   Diagnosis Date    Anxiety      Arthritis       right hip    Bipolar 1 disorder ([facility] [address]      Cervical stenosis of spine      Chronic back pain      Depression      Hypertension      Irritable bowel syndrome      Migraines      Mucocele of lip       for surgery 21    Other hyperlipidemia 2019    PTSD (post-traumatic stress disorder)           Precautions:  fall risk     Assessment of current deficits   [x]? Functional mobility             [x]?ADLs           [x]? Strength                  []?Cognition   [x]? Functional transfers           [x]? IADLs         [x]? Safety Awareness   [x]? Endurance   []? Fine Coordination              [x]? Balance      []? Vision/perception   []? Sensation     []? Gross Motor Coordination  []? ROM           []?  Delirium                   []? Motor Control      OT PLAN OF CARE   OT POC based on physician orders, patient diagnosis and results of clinical assessment     Frequency/Duration 2-5 days/wk for 10-14 days PRN   Specific OT Treatment Interventions to include:   * Instruction/training on adapted ADL techniques and AE recommendations to increase functional independence within precautions       * Training on energy conservation strategies, correct breathing pattern and techniques to improve independence/tolerance for self-care routine  * Functional transfer/mobility training/DME recommendations for increased independence, safety, and fall prevention  * Patient/Family education to increase follow through with safety techniques and functional independence  * Recommendation of environmental modifications for increased safety with functional transfers/mobility and ADLs  * Therapeutic exercise to improve motor endurance, ROM, and functional strength for ADLs/functional transfers  * Therapeutic activities to facilitate/challenge dynamic balance, stand tolerance for increased safety and independence with ADLs     Recommended Adaptive Equipment: TBD     Home Living: Pt lives with  in a 1 story home with 1+1 step to enter. Bathroom setup: tub shower with grab bars  Equipment Owned: walker     Prior Level of Function: independent  with ADLs, independent with IADLs. Completed functional mobility with no AD  Driving: yes     Pain Level: no pain reported     Cognition: A&O: pt alert and pleasant during session. Pt agreeable to therapy.                 Functional Assessment: AM-PAC Daily Activity Raw Score: 19/24    Initial Eval Status  Date: 3/10/22 Treatment session:  STGs=LTGS  Timeframe 10-14 days      Feeding  independent        Grooming  SBA SBA  To stand at sink and brush teeth  Independent     UB Dressing  SBA   Independent     LB Dressing SBA  Able to cross legs and agata socks while long sitting in bed SBA  To doff/don socks seated EOB  Independent     Bathing SBA     Independent     Toileting SBA  Able to preform thorough pericare  Verbal cues for stand to sit to the commode    Independent     Bed Mobility  Supine to sit: supervision  Sit to Supine: supervision  Supine to sit: Sup  Sit to Supine: Sup Independent    Functional Transfers SBA  Sit <> stand from bed Cues for hand placement and safe technique  SBA with no device  Sit<>stand from EOB  Independent    Functional Mobility SBA  To and from bathroom and in room with no AD-unsteady on feet minimal signs LOB noted  SBA   To and from bathroom and short distance in room  Some unsteadiness noted Mod I with AD as needed with good tolerance   Balance Sitting: Static: good       Dynamic: fair +     Standing: fair +  Sitting:  Static: good  Dynamic: fair+  Standing: fair   Sitting:        Static: good       Dynamic: good     Standing: good   Activity Tolerance Fair +  Pt limited by overall decreased endurance and fatigue, no signs of SOB noted Fair with light activity good during ADL activity. Pt will verbalize and demonstrate good understanding of ECWS techniques       Comments: Upon arrival, patient lying in bed. At end of session, patient returned to bed with call light and phone within reach and all lines and tubes intact. Treatment: OT treatment provided this date included:    Instruction/training on safety and adapted techniques for completion of ADLs. Pt SBA to stand at sink for oral hygiene. Good FMC and  noted while opening and squeezing toothpaste. Pt with one LOB while standing at sink but able to self correct. Pt able to doff/don socks with SBA while seated EOB. Good cross over technique noted and increased time to manage over toes.   Instruction/training on safe functional mobility/transfer techniques. Pt was SBA to stand from EOB and walked to bathroom with SBA. Pt with unsteadiness noted and no dizziness or lightheadedness reports. Further skilled OT treatment indicated to increase patient's safety and independence with completion of ADL/IADL tasks in order to maximize patient's functional independence and quality of life. Education: Patient education provided regardin) safe functional transfers and mobility. Patient demonstrated good understanding. · Patient has made progress towards set goals. · Continue OT plan of care.     Time In: 1439  Time Out: 1451  Total Treatment Time: 12 minutes      Minutes Units   Therapeutic Ex 93416     Therapeutic Activities 45924 48 0   ADL/Self Care 10752     Orthotic Management 36116     Neuro Re-Ed 16449     Non-Billable Time  ---       JUSTINA Cheatham/SHANE  License Number: [id number]

## 2022-03-11 NOTE — PROGRESS NOTES
GENERAL SURGERY  DAILY PROGRESS NOTE  3/11/2022    Subjective:  Patient reports tolerating dinner last evening, but developing nausea and increased epigastric pain after eating cookies for dessert. Abdominal pain persists    Objective:  /61   Pulse 75   Temp 97.5 °F (36.4 °C) (Oral)   Resp 16   Wt 117 lb 4.6 oz (53.2 kg)   LMP 12/28/2018 (LMP Unknown)   SpO2 97%   BMI 21.45 kg/m²     GENERAL: Alert and interactive. Oriented x3. LUNGS:  Symmetric chest rise, no audible wheezes. On room air  CARDIOVASC:  Warm throughout, no chest pain. ABDOMEN:  Soft, mildly distended, moderately tender, worst in epigastrium. No guarding / rigidity / rebound. EXTREMITIES: Moves all extremities. No edema. I have personally reviewed all relevant labs and imaging. Assessment/Plan:  46 y.o. female with intractable nausea/vomiting. History if IBS s/p stimulator implant Differential diagnosis includes idiopathic gastroparesis vs hyperemesis from marijuana use. Also found to have 2 benign appearing cystic lesions of the pancreas    - gastric emptying study  - trial reglan  - will likely benefit from EUS with biopsy in the future    Electronically signed by Cecil Menjivar DO on 3/11/2022 at 8:48 AM     As above.    Trial of Reglan after gastric emptying study  Okay for discharge when tolerating diet and emesis control  We will plan for outpatient endoscopic ultrasound to evaluate pancreatic body cyst    Larry Alvarado MD

## 2022-03-11 NOTE — PROGRESS NOTES
GENERAL SURGERY  DAILY PROGRESS NOTE  3/11/2022    Subjective:  Patient reports tolerating dinner last evening, but developing nausea and increased epigastric pain after eating cookies for dessert. Abdominal pain persists    Objective:  /60   Pulse 67   Temp 98 °F (36.7 °C) (Oral)   Resp 18   Wt 117 lb 4.6 oz (53.2 kg)   LMP 12/28/2018 (LMP Unknown)   SpO2 98%   BMI 21.45 kg/m²     GENERAL: Alert and interactive. Oriented x3. LUNGS:  Symmetric chest rise, no audible wheezes. On room air  CARDIOVASC:  Warm throughout, no chest pain. ABDOMEN:  Soft, mildly distended, moderately tender, worst in epigastrium. No guarding / rigidity / rebound. EXTREMITIES: Moves all extremities. No edema. I have personally reviewed all relevant labs and imaging. Assessment/Plan:  46 y.o. female with epigastric pain, nausea, and vomiting. 2 benign appearing cystic lesions seen of pancreas - appear to be branch duct IPMN's with biggest being 1.2cm     PLAN:  - HIDA negative   - emesis could be related to cannabis use  - she does have some dilation of her pancreatic duct - however the cysts appear to be branch duct IPMN's  - EUS will definitely tell us if she has gland architecture consistent with chronic pancreatitis - which is possible due to her chronic smoking history at 1.5PPD  - we also discussed that with BD-IPMN's we only become concerned with rapid growth or if they are greater than 3cm.   - we discussed that her cysts will require long term surveillance but are not the cause of her symptoms    Electronically signed by Carlos Gates DO on 3/11/2022 at 5:46 AM

## 2022-03-12 ENCOUNTER — APPOINTMENT (OUTPATIENT)
Dept: NUCLEAR MEDICINE | Age: 52
DRG: 394 | End: 2022-03-12
Payer: MEDICARE

## 2022-03-12 VITALS
SYSTOLIC BLOOD PRESSURE: 133 MMHG | WEIGHT: 118.2 LBS | DIASTOLIC BLOOD PRESSURE: 88 MMHG | HEART RATE: 77 BPM | TEMPERATURE: 98.1 F | RESPIRATION RATE: 18 BRPM | OXYGEN SATURATION: 98 % | BODY MASS INDEX: 21.62 KG/M2

## 2022-03-12 LAB
ALBUMIN SERPL-MCNC: 4.3 G/DL (ref 3.5–5.2)
ALP BLD-CCNC: 66 U/L (ref 35–104)
ALT SERPL-CCNC: 8 U/L (ref 0–32)
ANION GAP SERPL CALCULATED.3IONS-SCNC: 8 MMOL/L (ref 7–16)
AST SERPL-CCNC: 22 U/L (ref 0–31)
BASOPHILS ABSOLUTE: 0.03 E9/L (ref 0–0.2)
BASOPHILS RELATIVE PERCENT: 0.5 % (ref 0–2)
BILIRUB SERPL-MCNC: 0.4 MG/DL (ref 0–1.2)
BUN BLDV-MCNC: 9 MG/DL (ref 6–20)
CALCIUM SERPL-MCNC: 9.9 MG/DL (ref 8.6–10.2)
CHLORIDE BLD-SCNC: 107 MMOL/L (ref 98–107)
CO2: 28 MMOL/L (ref 22–29)
CREAT SERPL-MCNC: 0.7 MG/DL (ref 0.5–1)
EOSINOPHILS ABSOLUTE: 0.15 E9/L (ref 0.05–0.5)
EOSINOPHILS RELATIVE PERCENT: 2.7 % (ref 0–6)
GFR AFRICAN AMERICAN: >60
GFR NON-AFRICAN AMERICAN: >60 ML/MIN/1.73
GLUCOSE BLD-MCNC: 107 MG/DL (ref 74–99)
HCT VFR BLD CALC: 40.8 % (ref 34–48)
HEMOGLOBIN: 14 G/DL (ref 11.5–15.5)
IMMATURE GRANULOCYTES #: 0.01 E9/L
IMMATURE GRANULOCYTES %: 0.2 % (ref 0–5)
LYMPHOCYTES ABSOLUTE: 2.46 E9/L (ref 1.5–4)
LYMPHOCYTES RELATIVE PERCENT: 45.1 % (ref 20–42)
MCH RBC QN AUTO: 31.1 PG (ref 26–35)
MCHC RBC AUTO-ENTMCNC: 34.3 % (ref 32–34.5)
MCV RBC AUTO: 90.7 FL (ref 80–99.9)
METER GLUCOSE: 90 MG/DL (ref 74–99)
MONOCYTES ABSOLUTE: 0.4 E9/L (ref 0.1–0.95)
MONOCYTES RELATIVE PERCENT: 7.3 % (ref 2–12)
NEUTROPHILS ABSOLUTE: 2.41 E9/L (ref 1.8–7.3)
NEUTROPHILS RELATIVE PERCENT: 44.2 % (ref 43–80)
PDW BLD-RTO: 12.6 FL (ref 11.5–15)
PLATELET # BLD: 191 E9/L (ref 130–450)
PMV BLD AUTO: 10.1 FL (ref 7–12)
POTASSIUM REFLEX MAGNESIUM: 4 MMOL/L (ref 3.5–5)
RBC # BLD: 4.5 E12/L (ref 3.5–5.5)
SODIUM BLD-SCNC: 143 MMOL/L (ref 132–146)
TOTAL PROTEIN: 6.4 G/DL (ref 6.4–8.3)
WBC # BLD: 5.5 E9/L (ref 4.5–11.5)

## 2022-03-12 PROCEDURE — 80053 COMPREHEN METABOLIC PANEL: CPT

## 2022-03-12 PROCEDURE — A9540 TC99M MAA: HCPCS | Performed by: RADIOLOGY

## 2022-03-12 PROCEDURE — 6370000000 HC RX 637 (ALT 250 FOR IP): Performed by: INTERNAL MEDICINE

## 2022-03-12 PROCEDURE — 82962 GLUCOSE BLOOD TEST: CPT

## 2022-03-12 PROCEDURE — 78264 GASTRIC EMPTYING IMG STUDY: CPT

## 2022-03-12 PROCEDURE — 85025 COMPLETE CBC W/AUTO DIFF WBC: CPT

## 2022-03-12 PROCEDURE — 6370000000 HC RX 637 (ALT 250 FOR IP): Performed by: STUDENT IN AN ORGANIZED HEALTH CARE EDUCATION/TRAINING PROGRAM

## 2022-03-12 PROCEDURE — 36415 COLL VENOUS BLD VENIPUNCTURE: CPT

## 2022-03-12 PROCEDURE — 6360000002 HC RX W HCPCS: Performed by: STUDENT IN AN ORGANIZED HEALTH CARE EDUCATION/TRAINING PROGRAM

## 2022-03-12 PROCEDURE — 2580000003 HC RX 258: Performed by: STUDENT IN AN ORGANIZED HEALTH CARE EDUCATION/TRAINING PROGRAM

## 2022-03-12 PROCEDURE — 3430000000 HC RX DIAGNOSTIC RADIOPHARMACEUTICAL: Performed by: RADIOLOGY

## 2022-03-12 PROCEDURE — C9113 INJ PANTOPRAZOLE SODIUM, VIA: HCPCS | Performed by: STUDENT IN AN ORGANIZED HEALTH CARE EDUCATION/TRAINING PROGRAM

## 2022-03-12 RX ORDER — SENNA PLUS 8.6 MG/1
1 TABLET ORAL DAILY PRN
Qty: 30 TABLET | Refills: 0 | Status: SHIPPED | OUTPATIENT
Start: 2022-03-12 | End: 2022-04-11

## 2022-03-12 RX ORDER — POLYETHYLENE GLYCOL 3350 17 G/17G
17 POWDER, FOR SOLUTION ORAL DAILY PRN
Qty: 510 G | Refills: 0 | Status: SHIPPED | OUTPATIENT
Start: 2022-03-12 | End: 2022-04-11

## 2022-03-12 RX ORDER — BISACODYL 10 MG
10 SUPPOSITORY, RECTAL RECTAL ONCE
Status: COMPLETED | OUTPATIENT
Start: 2022-03-12 | End: 2022-03-12

## 2022-03-12 RX ORDER — HYDROCODONE BITARTRATE AND ACETAMINOPHEN 5; 325 MG/1; MG/1
1 TABLET ORAL EVERY 6 HOURS PRN
Qty: 12 TABLET | Refills: 0 | Status: SHIPPED | OUTPATIENT
Start: 2022-03-12 | End: 2022-03-12 | Stop reason: HOSPADM

## 2022-03-12 RX ORDER — NICOTINE 21 MG/24HR
1 PATCH, TRANSDERMAL 24 HOURS TRANSDERMAL DAILY
Qty: 30 PATCH | Refills: 3 | Status: SHIPPED | OUTPATIENT
Start: 2022-03-12 | End: 2022-07-13

## 2022-03-12 RX ADMIN — PANTOPRAZOLE SODIUM 40 MG: 40 INJECTION, POWDER, FOR SOLUTION INTRAVENOUS at 14:21

## 2022-03-12 RX ADMIN — NADOLOL 20 MG: 20 TABLET ORAL at 14:22

## 2022-03-12 RX ADMIN — ENOXAPARIN SODIUM 40 MG: 100 INJECTION SUBCUTANEOUS at 14:21

## 2022-03-12 RX ADMIN — DULOXETINE HYDROCHLORIDE 60 MG: 60 CAPSULE, DELAYED RELEASE ORAL at 14:22

## 2022-03-12 RX ADMIN — Medication 1 MILLICURIE: at 09:28

## 2022-03-12 RX ADMIN — Medication 10 ML: at 14:26

## 2022-03-12 RX ADMIN — DOCUSATE SODIUM 100 MG: 100 CAPSULE, LIQUID FILLED ORAL at 03:58

## 2022-03-12 RX ADMIN — DIAZEPAM 10 MG: 5 TABLET ORAL at 14:25

## 2022-03-12 RX ADMIN — BISACODYL 10 MG: 10 SUPPOSITORY RECTAL at 14:22

## 2022-03-12 RX ADMIN — BACLOFEN 10 MG: 10 TABLET ORAL at 14:22

## 2022-03-12 RX ADMIN — DOCUSATE SODIUM 100 MG: 100 CAPSULE, LIQUID FILLED ORAL at 14:25

## 2022-03-12 RX ADMIN — DIAZEPAM 10 MG: 5 TABLET ORAL at 03:58

## 2022-03-12 RX ADMIN — FOLIC ACID 1 MG: 1 TABLET ORAL at 14:22

## 2022-03-12 ASSESSMENT — PAIN SCALES - GENERAL: PAINLEVEL_OUTOF10: 0

## 2022-03-12 NOTE — PLAN OF CARE
Problem: Falls - Risk of:  Goal: Will remain free from falls  Description: Will remain free from falls  3/12/2022 1754 by Kay Parra RN  Outcome: Ongoing  3/12/2022 1601 by Kay Parra RN  Outcome: Ongoing  Goal: Absence of physical injury  Description: Absence of physical injury  3/12/2022 1754 by Kay Parra RN  Outcome: Ongoing  3/12/2022 1601 by Kay Parra RN  Outcome: Ongoing     Problem: Pain:  Goal: Pain level will decrease  Description: Pain level will decrease  3/12/2022 1754 by Kay Parra RN  Outcome: Ongoing  3/12/2022 1601 by Kay Parra RN  Outcome: Ongoing  Goal: Control of acute pain  Description: Control of acute pain  3/12/2022 1754 by Kay Parra RN  Outcome: Ongoing  3/12/2022 1601 by Kay Parra RN  Outcome: Ongoing  Goal: Control of chronic pain  Description: Control of chronic pain  3/12/2022 1754 by Kay Parra RN  Outcome: Ongoing  3/12/2022 1601 by Kay Parra RN  Outcome: Ongoing     Problem: Nausea/Vomiting:  Goal: Absence of nausea/vomiting  Description: Absence of nausea/vomiting  3/12/2022 1754 by Kay Parra RN  Outcome: Ongoing  3/12/2022 1601 by Kay Parra RN  Outcome: Ongoing  Goal: Able to drink  Description: Able to drink  3/12/2022 1754 by Kay Parra RN  Outcome: Ongoing  3/12/2022 1601 by Kay Parra RN  Outcome: Ongoing  Goal: Able to eat  Description: Able to eat  3/12/2022 1754 by Kay Parra RN  Outcome: Ongoing  3/12/2022 1601 by Kay Parra RN  Outcome: Ongoing  Goal: Ability to achieve adequate nutritional intake will improve  Description: Ability to achieve adequate nutritional intake will improve  3/12/2022 1754 by Kay Parra RN  Outcome: Ongoing  3/12/2022 1601 by Kay Parra RN  Outcome: Ongoing

## 2022-03-12 NOTE — PROGRESS NOTES
Internal Medicine Progress Note    Patient's name: Trena Dejesus  : 1970  Chief complaints (on day of admission): Abdominal Pain (abd pain, needs ercp unable to get into see her GI doctor was told to come back to er and have gi paged)  Admission date: 3/9/2022  Date of service: 3/12/2022   Room: [address] SURG/TELE  Primary care physician: Yinka Irving DO  Reason for visit: Follow-up for abdominal pain    Subjective  Praveena Centeno was seen and examined down in Radiology - still getting her gastric study done. She has still been having abdominal pains. She is also still very constipated. Current treatment plan discussed and all questions answered    Current medications being prescribed discussed and patient expresses verbal understanding     Review of Systems  There are no new complaints of chest pain, shortness of breath, abdominal pain, nausea, vomiting, diarrhea, constipation unless otherwise mentioned above.      Hospital Medications  Current Facility-Administered Medications   Medication Dose Route Frequency Provider Last Rate Last Admin    bisacodyl (DULCOLAX) suppository 10 mg  10 mg Rectal Once Kevin Alberto MD        morphine (PF) injection 1 mg  1 mg IntraVENous Q4H PRN Alejandra Sher MD   1 mg at 22 0826    [facility]) 1.02 mcg in sodium chloride 0.9 % 100 mL infusion  0.02 mcg/kg IntraVENous Once Bruno Roman MD        DULoxetine (CYMBALTA) extended release capsule 60 mg  60 mg Oral Daily Alejandra Sher MD   60 mg at 22 0824    docusate sodium (COLACE) capsule 100 mg  100 mg Oral Q8H Alejandra Sher MD   100 mg at 22 0358    diazePAM (VALIUM) tablet 10 mg  10 mg Oral Q8H Alejandra Sher MD   10 mg at 22 0358    glucose (GLUTOSE) 40 % oral gel 15 g  15 g Oral PRN Alejandra Sher MD        glucagon (rDNA) injection 1 mg  1 mg IntraMUSCular PRN Alejandra Sher MD        dextrose 5 % solution  100 mL/hr IntraVENous PRN Alejandra Sher MD        dextrose bolus (hypoglycemia) 10% 125 mL  125 mL IntraVENous PRN Carmela Yañez MD        Or    dextrose bolus (hypoglycemia) 10% 250 mL  250 mL IntraVENous PRN Carmela Yañez MD        sodium chloride flush 0.9 % injection 10 mL  10 mL IntraVENous 2 times per day Carmela Yañez MD   10 mL at 03/11/22 2157    sodium chloride flush 0.9 % injection 10 mL  10 mL IntraVENous PRN Carmela Yañez MD        0.9 % sodium chloride infusion  25 mL IntraVENous PRN Carmela Yañez MD        potassium chloride (KLOR-CON M) extended release tablet 40 mEq  40 mEq Oral PRN Carmela Yañez MD   40 mEq at 03/11/22 8375    Or    potassium bicarb-citric acid (EFFER-K) effervescent tablet 40 mEq  40 mEq Oral PRN Carmela Yañez MD        Or    potassium chloride 10 mEq/100 mL IVPB (Peripheral Line)  10 mEq IntraVENous PRN Carmela Yañez MD        enoxaparin (LOVENOX) injection 40 mg  40 mg SubCUTAneous Daily Carmela Yañez MD   40 mg at 03/11/22 0825    ondansetron (ZOFRAN-ODT) disintegrating tablet 4 mg  4 mg Oral Q8H PRN Carmela Yañez MD        Or    [facility]ron [facility]) injection 4 mg  4 mg IntraVENous Q6H PRN Carmela Yañez MD   4 mg at 03/11/22 6513    senna (SENOKOT) tablet 8.6 mg  1 tablet Oral Daily PRN Carmela Yañez MD        acetaminophen (TYLENOL) tablet 650 mg  650 mg Oral Q6H PRN Carmela Yañez MD        Or    acetaminophen (TYLENOL) suppository 650 mg  650 mg Rectal Q6H PRN Carmela Yañez MD        oxyCODONE (ROXICODONE) immediate release tablet 5 mg  5 mg Oral Q6H PRN Carmela Yañez MD   5 mg at 03/09/22 2110    pantoprazole (PROTONIX) injection 40 mg  40 mg IntraVENous Daily Carmela Yañez MD   40 mg at 03/11/22 5436    baclofen (LIORESAL) tablet 10 mg  10 mg Oral TID Carmela Yañez MD   10 mg at 11/02/95 1822    folic acid (FOLVITE) tablet 1 mg  1 mg Oral Daily Carmela Yañez MD   1 mg at 03/11/22 0826    lamoTRIgine (LAMICTAL) tablet 200 mg  200 mg Oral QPM Carmela Yañez MD   200 mg at 03/11/22 2242    nadolol (CORGARD) tablet 20 mg Problems    Diagnosis     Intractable abdominal pain [R10.9]     Pancreatic mass [K86.89]     Spondylosis of cervical region without myelopathy or radiculopathy [M47.812]     Chronic low back pain without sciatica [M54.50, G89.29]     History of migraine [Z86.69]     Mixed hyperlipidemia [E78.2]     Tobacco use disorder [F17.200]     Anxiety [F41.9]     Recurrent major depression in partial remission ([facility] [address] [F33.41]          Plan  · Cystic IPMN/Intractable abdominal pain   ? Admitted for pain control  ? We will progressively decrease her opiates over the course of her stay   ? GI/General surgery evaluation to ensue   ? HIDA scan was negative   ? Gastric emptying study - currently being done  · Polysubstance abuse/concern for Cyclic Vomiting Syndrome   ? UDS noted   ? She needs to stop smoking Marijuana and Tobacco   · Pelvic congestions syndrome ? ? She should have close op fu for this   · Benzodiazepine and opiate dependency   ? Wean to her home regimen as tolerated   ? Nursing staff confirmed her medications at her pharmacy   ? She is also using other substances as evident by UDS + for cannabinoid   · PT AM-PAC-- TBD  · DVT prophylaxis   · Code status Full   · Medications, labs and imaging reviewed   · Discharge plan:   Home when nausea/pain controlled and pending consultants input. Patient would like to go home ASAP - discussed need to figure out her pains. Electronically signed by Ginger Boyer MD on 3/12/2022 at 9:53 AM    I can be reached through [address] [place] [address].

## 2022-03-12 NOTE — DISCHARGE SUMMARY
Internal Medicine Discharge Summary    NAME: Mykel Bosch :  1970  MRN:  [id number] PCP:Alexis Persaud DO    ADMITTED: 3/9/2022   DISCHARGED: 3/12/2022     ADMITTING PHYSICIAN: Chica Mcnamara MD    CONSULTANT(S):   IP CONSULT TO GENERAL SURGERY  IP CONSULT TO GENERAL SURGERY  IP CONSULT TO INTERNAL MEDICINE  IP CONSULT TO CASE MANAGEMENT  IP CONSULT TO SOCIAL WORK     ADMITTING DIAGNOSIS:   Intractable abdominal pain [R10.9]  Pancreatic mass [K86.89]     DISCHARGE DIAGNOSES:   Patient Active Problem List:     Right hip pain     Anxiety     Recurrent major depression in partial remission ([facility] [address]     New daily persistent headache     Tobacco use disorder     Mixed hyperlipidemia     History of migraine     Frequency of micturition     Right hip joint effusion     Mucocele of lip     Chronic low back pain without sciatica     Spondylosis of cervical region without myelopathy or radiculopathy     Intractable abdominal pain     Pancreatic mass      BRIEF HISTORY OF PRESENT ILLNESS: Mykel Bosch is a 46 y.o. female patient of Jannet Ricardo DO who  has a past medical history of Anxiety, Arthritis, Bipolar 1 disorder ([facility] [address] Cervical stenosis of spine, Chronic back pain, Depression, Hypertension, Irritable bowel syndrome, Migraines, Mucocele of lip, Other hyperlipidemia, and PTSD (post-traumatic stress disorder). who originally had concerns including Abdominal Pain (abd pain, needs ercp unable to get into see her GI doctor was told to come back to er and have gi paged). at presentation on 3/9/2022, and was found to have Intractable abdominal pain [R10.9]  Pancreatic mass [K86.89] after workup. HOSPITAL COURSE: The patient was admitted for abdominal pain. · Cystic IPMN/Intractable abdominal pain   ? Admitted for pain control  ? We will progressively decrease her opiates over the course of her stay   ? General surgery evaluation was done   ? HIDA scan was negative   ?  Gastric emptying study - was normal  · Polysubstance abuse/concern for Cyclic Vomiting Syndrome   ? UDS noted   ? She needs to stop smoking Marijuana and Tobacco   · Pelvic congestions syndrome ? ? She should have close op fu for this with ob/gyn  · Benzodiazepine and opiate dependency   ? Wean to her home regimen as tolerated   ? She is also using other substances as evident by UDS + for cannabinoid          BRIEF PHYSICAL EXAMINATION AND LABORATORIES ON DAY OF DISCHARGE:  VITALS:  /88   Pulse 77   Temp 98.1 °F (36.7 °C) (Oral)   Resp 18   Wt 118 lb 3.2 oz (53.6 kg)   LMP 12/28/2018 (LMP Unknown)   SpO2 98%   BMI 21.62 kg/m²   General: AAO to person/place/time/purpose, NAD, no labored breathing  Eyes: conjunctivae/corneas clear, sclera non icteric  Skin: color/texture/turgor normal, no rashes or lesions  Lungs: CTAB, no retractions/use of accessory muscles, no vocal fremitus, no rhonchi, no crackle, no rales  Heart: regular rate, regular rhythm, no murmur  Abdomen: soft, epigastric tenderness, no rebound or gaurding  Extremities: atraumatic, no edema  Neurologic: cranial nerves 2-12 grossly intact, no slurred speech    LABS[de-identified]  Recent Labs     03/10/22  0538 03/10/22  0538 03/11/22  0400 03/11/22  0942 03/12/22 0353     --  141  --  143   K 3.8   < > 3.4* 3.9 4.0     --  108*  --  107   CO2 20*  --  26  --  28   BUN 15  --  7  --  9   CREATININE 0.6  --  0.7  --  0.7   GLUCOSE 52*  --  107*  --  107*   CALCIUM 8.6  --  8.5*  --  9.9    < > = values in this interval not displayed.      Recent Labs     03/10/22  0538 03/11/22  0400 03/12/22  0353   ALKPHOS 65 64 66   ALT 5 6 8   AST 15 14 22   PROT 5.7* 5.7* 6.4   BILITOT 0.3 0.2 0.4   LABALBU 4.0 3.9 4.3     Recent Labs     03/10/22  0538 03/11/22  0400 03/12/22  0353   WBC 6.2 6.4 5.5   RBC 4.13 4.18 4.50   HGB 12.9 12.8 14.0   HCT 38.2 38.5 40.8   MCV 92.5 92.1 90.7   MCH 31.2 30.6 31.1   MCHC 33.8 33.2 34.3   RDW 12.4 12.6 12.6    173 191   MPV 10.2 10.1 10.1 No results found for: LABA1C  No results found for: INR, PROTIME   Lab Results   Component Value Date    TSH 0.576 03/02/2021    TSH 0.862 06/29/2020    TSH 0.720 06/11/2019     Lab Results   Component Value Date    TRIG 118 03/02/2021    TRIG 99 06/29/2020    TRIG 163 (H) 11/04/2019    HDL 48 03/02/2021    HDL 48 06/29/2020    HDL 56 11/04/2019    LDLCALC 76 03/02/2021    LDLCALC 68 06/29/2020    LDLCALC 173 (H) 11/04/2019     No results for input(s): CKTOTAL, CKMB, TROPONINI in the last 72 hours. Recent Labs     03/11/22  0400   MG 1.6       No results for input(s): PHART, PO2ART, TPO4QBT, FDR6SCQ, BEART, L2CEQLWD in the last 72 hours. No results for input(s): LACTA in the last 72 hours. No results for input(s): BNP in the last 72 hours. No results for input(s): LIPASE in the last 72 hours. No results for input(s): IONCA in the last 72 hours. IMAGING:  XR ABDOMEN (KUB) (SINGLE AP VIEW)    Result Date: 2/16/2022  EXAMINATION: ONE SUPINE XRAY VIEW(S) OF THE ABDOMEN 2/16/2022 1:40 pm COMPARISON: 4 June 2018 HISTORY: ORDERING SYSTEM PROVIDED HISTORY: Epigastric pain FINDINGS: There is a right sacral neurostimulator. And electrode fragment from a left sacral neurostimulator is also present. No free air, bowel wall pneumatosis or dilated bowel. No abnormal accumulation of fecal material within the lower GI tract. No active process. See above. NM GASTRIC EMPTYING    Result Date: 3/12/2022  EXAMINATION: NUCLEAR MEDICINE GASTRIC EMPTYING STUDY 3/12/2022 9:29 am TECHNIQUE: Following ingestion of a standard solid meal labeled with 1.1 mCi Tc 99m MAA, planar images of the chest and abdomen were obtained at 0, 1, 2 and 4 hours in both anterior and posterior projections. Regions of interest were drawn around the stomach and geometric means were calculated. All medications capable of altering motility were held. COMPARISON: March 8, 2022.  HISTORY: ORDERING SYSTEM PROVIDED HISTORY: yordyal for gastroparesis TECHNOLOGIST PROVIDED HISTORY: Reason for exam:->eval for gastroparesis What reading provider will be dictating this exam?->CRC FINDINGS: The gastric emptying were calculated as follows: At 60 min, there is 9%% emptying of the stomach. (Normal range is 10-70%) At 120 min, there is 46% emptying of the stomach. (Normal is >40%) At 240 min, there is 92% emptying of the stomach. (Normal is >90%) No significant esophageal retention, hiatal hernia or reflux was observed. Normal gastric emptying of solids. CT ABDOMEN PELVIS W IV CONTRAST Additional Contrast? None    Result Date: 3/8/2022  EXAMINATION: CT OF THE ABDOMEN AND PELVIS WITH CONTRAST 3/8/2022 3:36 pm TECHNIQUE: CT of the abdomen and pelvis was performed with the administration of intravenous contrast. Multiplanar reformatted images are provided for review. Dose modulation, iterative reconstruction, and/or weight based adjustment of the mA/kV was utilized to reduce the radiation dose to as low as reasonably achievable. COMPARISON: None. HISTORY: ORDERING SYSTEM PROVIDED HISTORY: mid epigastric, RUQ abdominal pain TECHNOLOGIST PROVIDED HISTORY: Reason for exam:->mid epigastric, RUQ abdominal pain Additional Contrast?->None Decision Support Exception - unselect if not a suspected or confirmed emergency medical condition->Emergency Medical Condition (MA) FINDINGS: Lower Chest: Lung bases clear. Organs: Marked heterogeneity the liver, likely due to intrinsic parenchymal disease and steatosis. Periportal edema. No biliary dilatation. An approximately 1.6 x 1.3 x 0.7 cm cystic lesion in the pancreatic body. A 0.3 cm cystic lesion in the uncinate process of the pancreas. Mild pancreatic duct dilatation, 0.4 cm in diameter. No definite cholelithiasis. Unremarkable spleen, adrenals, and bilateral kidneys. GI/Bowel: Normal appendix. Bowel loops nonobstructed. Pelvis: Enlarged left gonadal veins. No adnexal mass.   Urinary bladder grossly unremarkable. Nerve stimulator electrodes in place. Peritoneum/Retroperitoneum: No free air or free fluid. No adenopathy. Vascular calcification. No abdominal aortic aneurysm. Bones/Soft Tissues: Multilevel lumbar spondylosis. Osteoarthritis of the bilateral hip joints. Marked heterogeneity of the liver, likely due to intrinsic parenchymal disease and steatosis. Dedicated MRI of the liver is recommended for further evaluation. Periportal edema. No biliary dilatation. Two cystic lesions in the pancreas, likely representing focal duct dilatations; however, pseudocysts and cystic IPMN cannot be excluded. MRI/MRCP suggested. Normal appendix. Enlarged left gonadal veins in the pelvis. Clinical correlation for signs and symptoms of pelvic congestion syndrome is recommended. US ABDOMEN LIMITED    Result Date: 2/18/2022  EXAMINATION: RIGHT UPPER QUADRANT ULTRASOUND 2/18/2022 8:54 am COMPARISON: None. HISTORY: ORDERING SYSTEM PROVIDED HISTORY: Epigastric pain TECHNOLOGIST PROVIDED HISTORY: What reading provider will be dictating this exam?->CRC FINDINGS: LIVER:  The liver demonstrates normal echogenicity without evidence of intrahepatic biliary ductal dilatation. IVC and hepatic veins are patent. Portal vein is patent. No significant intrahepatic biliary ductal dilatation. BILIARY SYSTEM:  Gallbladder is unremarkable without evidence of pericholecystic fluid, wall thickening or stones. Negative sonographic Lindsey's sign. Common bile duct is dilated measuring 10 mm. RIGHT KIDNEY: The right kidney is grossly unremarkable without evidence of hydronephrosis. 10.9 cm in length. PANCREAS:  Visualized portions of the pancreas are unremarkable. OTHER: No evidence of right upper quadrant ascites. Dilatation of the common bile duct in the festus hepatis measuring up to 10 mm. Please correlate with serum bilirubin/alkaline phosphatase. No ultrasound evidence of choledocholithiasis.      Gunnar Louis EJECTION FRACTION    Result Date: 3/10/2022  EXAMINATION: NUCLEAR MEDICINE HEPATOBILIARY SCINTIGRAPHY (HIDA SCAN). 3/10/2022 7:06 am TECHNIQUE: Approximately 7.9 mCi Tc-99m Mebrofenin (Choletec) was administered IV. Then, dynamic images of the abdomen were obtained in the anterior projection for 60 min(s). A right lateral view was also obtained at 60 min(s). COMPARISON: CT abdomen pelvis March 8, 2022 HISTORY: ORDERING SYSTEM PROVIDED HISTORY: Rule out biliary dyskinesia TECHNOLOGIST PROVIDED HISTORY: Reason for exam:->Rule out biliary dyskinesia What reading provider will be dictating this exam?->CRC FINDINGS: Prompt, homogenous uptake by the liver is noted with normal appearance of radiotracer excretion into the biliary system. Clearance of blood pool activity appears appropriate. Gallbladder and small bowel is visualized in appropriate sequence. The gallbladder ejection fraction is 60%     No evidence of biliary dyskinesia. RECOMMENDATIONS: Unavailable           DISPOSITION:  The patient's condition is fair. At this time the patient is without objective evidence of an acute process requiring continuing hospitalization or inpatient management. They are stable for discharge with outpatient follow-up. I have spoken with the patient and discussed the results of the current hospitalization, in addition to providing specific details for the plan of care and counseling regarding the diagnosis and prognosis. The plan has been discussed in detail and they are aware of the specific conditions for emergent return, as well as the importance of follow-up.   Their questions are answered at this time and they are agreeable with the plan for discharge to home    DISCHARGE MEDICATIONS:      Medication List      START taking these medications    nicotine 21 MG/24HR  Commonly known as: NICODERM CQ  Place 1 patch onto the skin daily     polyethylene glycol 17 GM/SCOOP powder  Commonly known as: GLYCOLAX  Take 17 g by mouth daily as needed (constipation)     senna 8.6 MG tablet  Commonly known as: SENOKOT  Take 1 tablet by mouth daily as needed (Constipation)        CONTINUE taking these medications    baclofen 10 MG tablet  Commonly known as: LIORESAL  Take 1 tablet by mouth 3 times daily     Cymbalta 20 MG extended release capsule  Generic drug: DULoxetine     diazePAM 10 MG tablet  Commonly known as: VALIUM     dicyclomine 20 MG tablet  Commonly known as: BENTYL  Take 1 tablet by mouth 4 times daily     folic acid 1 MG tablet  Commonly known as: FOLVITE     HYDROcodone-acetaminophen 5-325 MG per tablet  Commonly known as: Norco  Take 1 tablet by mouth every 6 hours as needed for Pain for up to 3 days. Intended supply: 3 days.  Take lowest dose possible to manage pain     lamoTRIgine 200 MG tablet  Commonly known as: LAMICTAL     nadolol 20 MG tablet  Commonly known as: CORGARD  take 1 tablet by mouth once daily     ondansetron 4 MG disintegrating tablet  Commonly known as: ZOFRAN-ODT  Take 1 tablet by mouth 3 times daily as needed for Nausea or Vomiting     pantoprazole 40 MG tablet  Commonly known as: PROTONIX  Take 1 tablet by mouth every morning (before breakfast)     rosuvastatin 20 MG tablet  Commonly known as: CRESTOR  take 1 tablet by mouth nightly ---90DAYS REQUEST     STOOL SOFTENER PO     SUMAtriptan 50 MG tablet  Commonly known as: IMITREX  Take 1 tablet by mouth once as needed for Migraine           Where to Get Your Medications      These medications were sent to [address] [place] [address] [facility] [address]    Phone: [phone]   · nicotine 21 MG/24HR  · polyethylene glycol 17 GM/SCOOP powder  · senna 8.6 MG tablet     You can get these medications from any pharmacy    Bring a paper prescription for each of these medications  · HYDROcodone-acetaminophen 5-325 MG per tablet         Current Discharge Medication List CONTINUE these medications which have CHANGED    Details   HYDROcodone-acetaminophen (NORCO) 5-325 MG per tablet Take 1 tablet by mouth every 6 hours as needed for Pain for up to 3 days. Intended supply: 3 days. Take lowest dose possible to manage pain  Qty: 12 tablet, Refills: 0    Comments: Reduce doses taken as pain becomes manageable  Associated Diagnoses: Epigastric pain; Pancreatic lesion           Current Discharge Medication List        Current Discharge Medication List      START taking these medications    Details   senna (SENOKOT) 8.6 MG tablet Take 1 tablet by mouth daily as needed (Constipation)  Qty: 30 tablet, Refills: 0      nicotine (NICODERM CQ) 21 MG/24HR Place 1 patch onto the skin daily  Qty: 30 patch, Refills: 3      polyethylene glycol (GLYCOLAX) 17 GM/SCOOP powder Take 17 g by mouth daily as needed (constipation)  Qty: 510 g, Refills: 0             FOLLOW UP/INSTRUCTIONS:  · Follow-up with primary care physician in 7-14 days. · Follow-up with the consults listed above as directed by them. · Resume home medications  as directed. · If recurrence of symptoms go to the ED. · Activity: as tolerated  · Diet: high fiber  · Oxygenation: none    Preparing for this patient's discharge, including paperwork, orders, instructions, and meeting with patient did required 40 minutes.     Electronically signed by Wendi Rosa MD on 3/12/2022 at 5:47 PM

## 2022-03-14 ENCOUNTER — CARE COORDINATION (OUTPATIENT)
Dept: CASE MANAGEMENT | Age: 52
End: 2022-03-14

## 2022-03-14 NOTE — CARE COORDINATION
[facility] Transitions Initial Follow Up Call    Call within 2 business days of discharge: Yes    Patient: Sherron Larios Patient : 1970   MRN: [id number]  Reason for Admission: abdominal pain   Discharge Date: 3/12/22 RARS: Readmission Risk Score: 8.1 ( )      Last Discharge [facility]       Complaint Diagnosis Description Type Department Provider    3/9/22 Abdominal Pain Intractable abdominal pain . .. ED to Hosp-Admission (Discharged) (ADMITTED) ISAAC Rubalcava MD; Marely Bueno DO      First attempt to reach the patient for initial Care Transition call post hospital discharge. Message left with CTN's contact information requesting return phone call.       Follow Up  Future Appointments   Date Time Provider Woodrow Ji   2022  2:30 PM [place] DO WILL Dave [facility]   6/3/2022 11:30 AM FRENCH Alejandra - CNP [facility] [address] [place] [address]       Brionna Longoria RN

## 2022-03-15 ENCOUNTER — CARE COORDINATION (OUTPATIENT)
Dept: CASE MANAGEMENT | Age: 52
End: 2022-03-15

## 2022-03-15 NOTE — CARE COORDINATION
Miya 45 Transitions Initial Follow Up Call    Call within 2 business days of discharge: Yes    Patient: Gio Tolentino Patient : 1970   MRN: [id number]  Reason for Admission: abdominal pain   Discharge Date: 3/12/22 RARS: Readmission Risk Score: 8.1 ( )      Last Discharge  [place] [id number]       Complaint Diagnosis Description Type Department Provider    3/9/22 Abdominal Pain Intractable abdominal pain . .. ED to Hosp-Admission (Discharged) (ADMITTED) ISAAC GOLDENB Charity Song MD; Rand Eddy DO        Second and final attempt to reach the patient for initial Care Transition call post hospital discharge. Call went straight to [employer], message left with CTN's contact information requesting return phone call. Will route a message to Dr. Deanna Smith office requesting they attempt to contact patient to schedule TCM visit within 7 days of discharge.      Follow Up  Future Appointments   Date Time Provider Woodrow Ji   3/22/2022  3:15 PM Aysha Celis MD BDM GEN SURG [facility]   2022  2:30 PM DO WILL White [facility]   6/3/2022 11:30 AM FRENCH Villarreal - CNP ADV [address] [place] [address] Yaz Hodgson RN

## 2022-03-16 NOTE — TELEPHONE ENCOUNTER
Please advise. Would you like patient scheduled for [facility] follow up?  She will need to be scheduled by 3/19/22

## 2022-03-18 ENCOUNTER — OFFICE VISIT (OUTPATIENT)
Dept: FAMILY MEDICINE CLINIC | Age: 52
End: 2022-03-18
Payer: MEDICARE

## 2022-03-18 VITALS
RESPIRATION RATE: 16 BRPM | TEMPERATURE: 97.8 F | WEIGHT: 106 LBS | HEIGHT: 62 IN | BODY MASS INDEX: 19.51 KG/M2 | HEART RATE: 66 BPM | SYSTOLIC BLOOD PRESSURE: 118 MMHG | DIASTOLIC BLOOD PRESSURE: 80 MMHG | OXYGEN SATURATION: 95 %

## 2022-03-18 DIAGNOSIS — R10.13 EPIGASTRIC PAIN: Primary | ICD-10-CM

## 2022-03-18 DIAGNOSIS — K59.09 OTHER CONSTIPATION: ICD-10-CM

## 2022-03-18 DIAGNOSIS — F17.200 TOBACCO USE DISORDER: Chronic | ICD-10-CM

## 2022-03-18 DIAGNOSIS — K86.2 PANCREATIC CYST: ICD-10-CM

## 2022-03-18 PROCEDURE — 99214 OFFICE O/P EST MOD 30 MIN: CPT | Performed by: FAMILY MEDICINE

## 2022-03-18 PROCEDURE — 1111F DSCHRG MED/CURRENT MED MERGE: CPT | Performed by: FAMILY MEDICINE

## 2022-03-18 PROCEDURE — G8420 CALC BMI NORM PARAMETERS: HCPCS | Performed by: FAMILY MEDICINE

## 2022-03-18 PROCEDURE — G8427 DOCREV CUR MEDS BY ELIG CLIN: HCPCS | Performed by: FAMILY MEDICINE

## 2022-03-18 PROCEDURE — 3017F COLORECTAL CA SCREEN DOC REV: CPT | Performed by: FAMILY MEDICINE

## 2022-03-18 PROCEDURE — 4004F PT TOBACCO SCREEN RCVD TLK: CPT | Performed by: FAMILY MEDICINE

## 2022-03-18 PROCEDURE — G8484 FLU IMMUNIZE NO ADMIN: HCPCS | Performed by: FAMILY MEDICINE

## 2022-03-18 ASSESSMENT — ENCOUNTER SYMPTOMS
DIARRHEA: 0
COUGH: 0
BACK PAIN: 0
EYE REDNESS: 0
ALLERGIC/IMMUNOLOGIC NEGATIVE: 1
CHEST TIGHTNESS: 0
ABDOMINAL PAIN: 1
EYE PAIN: 0
CONSTIPATION: 1
SORE THROAT: 0
SHORTNESS OF BREATH: 0
NAUSEA: 1
EYE DISCHARGE: 0
BLOOD IN STOOL: 0
VOMITING: 0
SINUS PAIN: 0
PHOTOPHOBIA: 0
TROUBLE SWALLOWING: 0

## 2022-03-18 NOTE — PROGRESS NOTES
3/18/22  Viry Reyes : 1970 Sex: female  Age: 46 y.o. Assessment and Plan:  Maury Macias was seen today for follow-up from hospital.    Diagnoses and all orders for this visit:    Epigastric pain  He is to continue on the stool softeners as directed. Also Zofran as needed for nausea. Other constipation  Hold the dicyclomine for now. Tenuous stool softeners and MiraLAX. Has a GI appointment next Wednesday for follow-up. Pancreatic cyst  May be small duct associated cyst or just how they were viewed on the CAT scan. Tobacco use disorder  Will not quit smoking. MDM: Her pain was improved. But no definitive answer for what is causing it. She has an appointment with GI next week and will follow there. In the meantime, will hold the dicyclomine and see if we can get her bowels moving better. To new all the current treatment for constipation at this time. Should any complaints worsen in any way. She should recheck here or to the emergency room. Return in about 4 weeks (around 4/15/2022). Chief Complaint   Patient presents with    Follow-Up from [address] [place] [address] for post hospital visit. He was admitted to [facility] last week with acute abdominal pain. Went 4 days of testing and treatment doubt definitive answer at this point. Her scans, lab work and hospital notes were reviewed by me. Continues with abdominal discomfort epigastric down to the umbilicus with nausea, no vomiting remains constipated right stool softener senna and MiraLAX. Review of Systems   Constitutional: Negative for appetite change, fatigue and unexpected weight change. HENT: Negative for congestion, ear pain, hearing loss, sinus pain, sore throat and trouble swallowing. Eyes: Negative for photophobia, pain, discharge and redness. Respiratory: Negative for cough, chest tightness and shortness of breath.     Cardiovascular: Negative for chest pain, palpitations and leg swelling. Gastrointestinal: Positive for abdominal pain, constipation and nausea. Negative for blood in stool, diarrhea and vomiting. Endocrine: Negative. Genitourinary: Negative for dysuria, flank pain, frequency and hematuria. Musculoskeletal: Negative for arthralgias, back pain, joint swelling and myalgias. Skin: Negative. Allergic/Immunologic: Negative. Neurological: Negative for dizziness, seizures, syncope, weakness, light-headedness, numbness and headaches. Hematological: Negative for adenopathy. Does not bruise/bleed easily. Psychiatric/Behavioral: Negative. Current Outpatient Medications:     senna (SENOKOT) 8.6 MG tablet, Take 1 tablet by mouth daily as needed (Constipation), Disp: 30 tablet, Rfl: 0    polyethylene glycol (GLYCOLAX) 17 GM/SCOOP powder, Take 17 g by mouth daily as needed (constipation), Disp: 510 g, Rfl: 0    dicyclomine (BENTYL) 20 MG tablet, Take 1 tablet by mouth 4 times daily, Disp: 120 tablet, Rfl: 2    ondansetron (ZOFRAN-ODT) 4 MG disintegrating tablet, Take 1 tablet by mouth 3 times daily as needed for Nausea or Vomiting, Disp: 21 tablet, Rfl: 0    rosuvastatin (CRESTOR) 20 MG tablet, take 1 tablet by mouth nightly ---90DAYS REQUEST, Disp: 90 tablet, Rfl: 1    pantoprazole (PROTONIX) 40 MG tablet, Take 1 tablet by mouth every morning (before breakfast), Disp: 90 tablet, Rfl: 1    baclofen (LIORESAL) 10 MG tablet, Take 1 tablet by mouth 3 times daily, Disp: 270 tablet, Rfl: 1    nadolol (CORGARD) 20 MG tablet, take 1 tablet by mouth once daily, Disp: 90 tablet, Rfl: 1    SUMAtriptan (IMITREX) 50 MG tablet, Take 1 tablet by mouth once as needed for Migraine, Disp: 9 tablet, Rfl: 2    DULoxetine (CYMBALTA) 20 MG extended release capsule, Take 60 mg by mouth daily , Disp: , Rfl:     Docusate Calcium (STOOL SOFTENER PO), Take 100 mg by mouth three times daily , Disp: , Rfl:     diazepam (VALIUM) 10 MG tablet, Take 10 mg by mouth every 8 hours. Instructed to take am of procedure if needed, Disp: , Rfl:     folic acid (FOLVITE) 1 MG tablet, Take 1 mg by mouth daily. , Disp: , Rfl:     lamoTRIgine (LAMICTAL) 200 MG tablet, Take 200 mg by mouth every evening , Disp: , Rfl:     nicotine (NICODERM CQ) 21 MG/24HR, Place 1 patch onto the skin daily (Patient not taking: Reported on 3/18/2022), Disp: 30 patch, Rfl: 3  Allergies   Allergen Reactions    Latex Rash    Sulfa Antibiotics Rash and Hives       Past Medical History:   Diagnosis Date    Anxiety     Arthritis     right hip    Bipolar 1 disorder ([facility] [address]     Cervical stenosis of spine     Chronic back pain     Depression     Hypertension     Irritable bowel syndrome     Migraines     Mucocele of lip     for surgery 7/21/21    Other hyperlipidemia 7/29/2019    PTSD (post-traumatic stress disorder)      Past Surgical History:   Procedure Laterality Date    ARTHROGRAPHY Right 1/3/2019    RIGHT HIP ARTHROGRAM AND STEROID INJECTION performed by Miguelina Brown MD at [address] [place] [address] Right 9/10/2020    RIGHT HIP INJECTION UNDER FLUOROSCOPY performed by Miguelina Brown MD at [address] [place] [address] 4/13/2021    RIGHT HIP INJECTION UNDER FLUOROSCOPY performed by Miguelina Brown MD at [address] [place] [address] AND CURETTAGE OF UTERUS N/A 2/3/2020    DILATATION AND CURETTAGE HYSTEROSCOPY performed by Carolina Yanez MD at [facility] 7/21/2021    EXCISION RIGHT LIP MUCOCELE performed by Royal Ailyn MD at [place] [address]  02/20/2019    implant for IBS     STIMULATOR SURGERY      bowel    TONSILLECTOMY      TOOTH EXTRACTION      TUBAL LIGATION       Family History   Problem Relation Age of Onset    GERD Mother     Parkinsonism Mother     COPD Mother     No Known Problems Father      Social History     Socioeconomic History    Marital status:      Spouse name: Not on file    Number of children: Not on file  Years of education: Not on file    Highest education level: Not on file   Occupational History    Not on file   Tobacco Use    Smoking status: Current Every Day Smoker     Packs/day: 1.50     Years: 30.00     Pack years: 45.00     Types: Cigarettes    Smokeless tobacco: Never Used   Vaping Use    Vaping Use: Some days    Substances: Flavoring   Substance and Sexual Activity    Alcohol use: Not Currently    Drug use: Yes     Types: Marijuana Keesha Kobi)    Sexual activity: Not Currently   Other Topics Concern    Not on file   Social History Narrative    Not on file     Social Determinants of Health     Financial Resource Strain: High Risk    Difficulty of Paying Living Expenses: Hard   Food Insecurity: Food Insecurity Present    Worried About Running Out of Food in the Last Year: Never true    Kaleb of Food in the Last Year: Sometimes true   Transportation Needs:     Lack of Transportation (Medical): Not on file    Lack of Transportation (Non-Medical):  Not on file   Physical Activity: Unknown    Days of Exercise per Week: 0 days    Minutes of Exercise per Session: Not on file   Stress:     Feeling of Stress : Not on file   Social Connections:     Frequency of Communication with Friends and Family: Not on file    Frequency of Social Gatherings with Friends and Family: Not on file    Attends [employer] Services: Not on file    Active Member of Clubs or Organizations: Not on file    Attends Club or Organization Meetings: Not on file    Marital Status: Not on file   Intimate Partner Violence:     Fear of Current or Ex-Partner: Not on file    Emotionally Abused: Not on file    Physically Abused: Not on file    Sexually Abused: Not on file   Housing Stability:     Unable to Pay for Housing in the Last Year: Not on file    Number of [place] in the Last Year: Not on file    Unstable Housing in the Last Year: Not on file       Vitals:    03/18/22 1052   BP: 118/80   Pulse: 66   Resp: 16 Temp: 97.8 °F (36.6 °C)   TempSrc: Temporal   SpO2: 95%   Weight: 106 lb (48.1 kg)   Height: 5' 2\" (1.575 m)       Physical Exam  Vitals and nursing note reviewed. Constitutional:       Appearance: She is well-developed. HENT:      Head: Atraumatic. Right Ear: External ear normal.      Left Ear: External ear normal.      Nose: Nose normal.      Mouth/Throat:      Pharynx: No oropharyngeal exudate. Eyes:      Conjunctiva/sclera: Conjunctivae normal.      Pupils: Pupils are equal, round, and reactive to light. Neck:      Thyroid: No thyromegaly. Trachea: No tracheal deviation. Cardiovascular:      Rate and Rhythm: Normal rate and regular rhythm. Heart sounds: No murmur heard. No friction rub. No gallop. Pulmonary:      Effort: Pulmonary effort is normal. No respiratory distress. Breath sounds: Normal breath sounds. Abdominal:      General: Bowel sounds are normal.      Palpations: Abdomen is soft. Tenderness: There is abdominal tenderness. There is no right CVA tenderness, left CVA tenderness, guarding or rebound. Comments: Tenderness to palpation epigastrium. No rebound or referred rebound. Musculoskeletal:         General: No tenderness or deformity. Normal range of motion. Cervical back: Normal range of motion and neck supple. Lymphadenopathy:      Cervical: No cervical adenopathy. Skin:     General: Skin is warm and dry. Capillary Refill: Capillary refill takes less than 2 seconds. Findings: No rash. Neurological:      Mental Status: She is alert and oriented to person, place, and time. Sensory: No sensory deficit. Motor: No abnormal muscle tone.       Coordination: Coordination normal.      Deep Tendon Reflexes: Reflexes normal.             Seen By:  Aleida Lee DO

## 2022-03-21 DIAGNOSIS — K21.9 GASTROESOPHAGEAL REFLUX DISEASE WITHOUT ESOPHAGITIS: ICD-10-CM

## 2022-03-21 RX ORDER — OMEPRAZOLE 40 MG/1
CAPSULE, DELAYED RELEASE ORAL
Qty: 90 CAPSULE | Refills: 1 | OUTPATIENT
Start: 2022-03-21

## 2022-03-22 ENCOUNTER — TELEPHONE (OUTPATIENT)
Dept: SURGERY | Age: 52
End: 2022-03-22

## 2022-03-22 ENCOUNTER — OFFICE VISIT (OUTPATIENT)
Dept: SURGERY | Age: 52
End: 2022-03-22
Payer: MEDICARE

## 2022-03-22 VITALS
HEIGHT: 62 IN | BODY MASS INDEX: 18.99 KG/M2 | TEMPERATURE: 98 F | WEIGHT: 103.2 LBS | RESPIRATION RATE: 16 BRPM | OXYGEN SATURATION: 97 % | HEART RATE: 75 BPM

## 2022-03-22 DIAGNOSIS — K86.2 PANCREATIC CYST: Primary | ICD-10-CM

## 2022-03-22 PROCEDURE — G8420 CALC BMI NORM PARAMETERS: HCPCS | Performed by: SURGERY

## 2022-03-22 PROCEDURE — G8484 FLU IMMUNIZE NO ADMIN: HCPCS | Performed by: SURGERY

## 2022-03-22 PROCEDURE — 1111F DSCHRG MED/CURRENT MED MERGE: CPT | Performed by: SURGERY

## 2022-03-22 PROCEDURE — G8427 DOCREV CUR MEDS BY ELIG CLIN: HCPCS | Performed by: SURGERY

## 2022-03-22 PROCEDURE — 3017F COLORECTAL CA SCREEN DOC REV: CPT | Performed by: SURGERY

## 2022-03-22 PROCEDURE — 99212 OFFICE O/P EST SF 10 MIN: CPT | Performed by: SURGERY

## 2022-03-22 PROCEDURE — 4004F PT TOBACCO SCREEN RCVD TLK: CPT | Performed by: SURGERY

## 2022-03-22 RX ORDER — DULOXETIN HYDROCHLORIDE 60 MG/1
90 CAPSULE, DELAYED RELEASE ORAL DAILY
COMMUNITY
Start: 2022-03-02

## 2022-03-22 NOTE — TELEPHONE ENCOUNTER
Per the order of Dr. Lizbeth Brown, patient has been scheduled for EUS with biopsy and possible ERCP on 3.28.2022. Patient provided with procedure information during office visit and agreed to have procedure done at [facility]. Patient instructed to please contact our office with any questions. Patient scheduled to follow up in [facility] office for results. Procedure scheduled through iQueue. Dr. Lizbeth Brown to enter orders.     Electronically signed by Javad Miles on 3/22/22 at 2:00 PM EDT

## 2022-03-23 NOTE — TELEPHONE ENCOUNTER
Prior Authorization Form:      DEMOGRAPHICS:                     Patient Name:  Trena Cook  Patient :  1970            Insurance:  Payor: Jacklyn Griffin / Plan: [place] / Product Type: *No Product type* /   Insurance ID Number:    Payor/Plan Subscr  Sex Relation Sub. Ins. ID Effective Group Num   1.  [address] [place] A 1970 Female Self [id number]                                   [address] [address]         DIAGNOSIS & PROCEDURE:                       Procedure/Operation: EUS with biopsy, possible ERCP           CPT Code: 92005    Diagnosis:  Pancreatic Mass    ICD10 Code: K86.89    Location:  [address] [place] [address]    Surgeon:  Vipul Herndon INFORMATION:                          Date: 3.28.2022    Time: TBD              Anesthesia:  MAC/TIVA                                                       Status:  Outpatient        Special Comments:         Electronically signed by Mony Leavitt on 3/23/2022 at 7:53 AM

## 2022-03-24 NOTE — PROGRESS NOTES
General Surgery History and Physical  [facility]    Patient's Name/Date of Birth: Gio Tolentino / 1970    Date: March 24, 2022     Surgeon: Aysha Celis MD    PCP: Libia Koenig DO     Chief Complaint: abdominal pain    HPI:   Gio Tolentino is a 46 y.o. female who presents for evaluation of epigastric abdominal pain. She was recently admitted in the hospital with this. She underwent work-up that revealed new pancreatic body cystic mass. HIDA scan and gastric emptying study were negative for any abnormality. The patient has persistent intractable nausea and upper abdominal pain. She was discharged from the hospital however is not improved. She has poor p.o. intake due to this. .    Patient Active Problem List   Diagnosis    Right hip pain    Anxiety    Recurrent major depression in partial remission ([address]    New daily persistent headache    Tobacco use disorder    Mixed hyperlipidemia    History of migraine    Frequency of micturition    Right hip joint effusion    Mucocele of lip    Chronic low back pain without sciatica    Spondylosis of cervical region without myelopathy or radiculopathy    Intractable abdominal pain    Pancreatic mass       Past Medical History:   Diagnosis Date    Anxiety     Arthritis     right hip    Bipolar 1 disorder ([address]     Cervical stenosis of spine     Chronic back pain     Depression     Hypertension     Irritable bowel syndrome     Migraines     Mucocele of lip     for surgery 7/21/21    Other hyperlipidemia 7/29/2019    PTSD (post-traumatic stress disorder)        Past Surgical History:   Procedure Laterality Date    ARTHROGRAPHY Right 1/3/2019    RIGHT HIP ARTHROGRAM AND STEROID INJECTION performed by Serena Guardado MD at [address] [place] [address] Right 9/10/2020    RIGHT HIP INJECTION UNDER FLUOROSCOPY performed by Serena Guardado MD at [address] [place] [address] Right 4/13/2021    RIGHT HIP INJECTION UNDER FLUOROSCOPY performed by Key Saldana MD at [address] N/A 2/3/2020    DILATATION AND CURETTAGE HYSTEROSCOPY performed by Kathy Abraham MD at [facility] 7/21/2021    EXCISION RIGHT LIP MUCOCELE performed by Anushka Terry MD at [address] [facility] [address]  02/20/2019    implant for IBS     STIMULATOR SURGERY      bowel    TONSILLECTOMY      TOOTH EXTRACTION      TUBAL LIGATION         Allergies   Allergen Reactions    Latex Rash    Sulfa Antibiotics Rash and Hives       The patient has a family history that is negative for severe cardiovascular or respiratory issues, negative for reaction to anesthesia. Time spent reviewing past medical, surgical, social and family history, vitals, nursing assessment and images. No changes from above documented history. Social History     Socioeconomic History    Marital status:      Spouse name: Not on file    Number of children: Not on file    Years of education: Not on file    Highest education level: Not on file   Occupational History    Not on file   Tobacco Use    Smoking status: Current Every Day Smoker     Packs/day: 1.50     Years: 30.00     Pack years: 45.00     Types: Cigarettes    Smokeless tobacco: Never Used   Vaping Use    Vaping Use: Some days    Substances: Flavoring   Substance and Sexual Activity    Alcohol use: Not Currently    Drug use: Yes     Types: Marijuana [place])    Sexual activity: Not Currently   Other Topics Concern    Not on file   Social History Narrative    Not on file     Social Determinants of Health     Financial Resource Strain: High Risk    Difficulty of Paying Living Expenses: Hard   Food Insecurity: Food Insecurity Present    Worried About Running Out of Food in the Last Year: Never true    Kaleb of Food in the Last Year: Sometimes true   Transportation Needs:     Lack of Transportation (Medical):  Not on file    Lack of Transportation (Non-Medical): Not on file   Physical Activity: Unknown    Days of Exercise per Week: 0 days    Minutes of Exercise per Session: Not on file   Stress:     Feeling of Stress : Not on file   Social Connections:     Frequency of Communication with Friends and Family: Not on file    Frequency of Social Gatherings with Friends and Family: Not on file    Attends Uatsdin Services: Not on file    Active Member of [address] [place] [address] or Organizations: Not on file    Attends Club or Organization Meetings: Not on file    Marital Status: Not on file   Intimate Partner Violence:     Fear of Current or Ex-Partner: Not on file    Emotionally Abused: Not on file    Physically Abused: Not on file    Sexually Abused: Not on file   Housing Stability:     Unable to Pay for Housing in the Last Year: Not on file    Number of [place] in the Last Year: Not on file    Unstable Housing in the Last Year: Not on file       I have reviewed relevant labs from this admission and interpretation is included in my assessment and plan    Review of Systems    A complete 10 system review was performed and are otherwise negative unless mentioned in the above HPI. Specific negatives are listed below but may not include all those reviewed.     General ROS: negative obtundation, AMS  ENT ROS: negative rhinorrhea, epistaxis  Allergy and Immunology ROS: negative itchy/watery eyes or nasal congestion  Hematological and Lymphatic ROS: negative spontaneous bleeding or bruising  Endocrine ROS: negative  lethargy, mood swings, palpitations or polydipsia/polyuria  Respiratory ROS: negative sputum changes, stridor, tachypnea or wheezing  Cardiovascular ROS: negative for - loss of consciousness, murmur or orthopnea  Gastrointestinal ROS: negative for - hematochezia or hematemesis  Genito-Urinary ROS: negative for -  genital discharge or hematuria  Musculoskeletal ROS: negative for - focal weakness, gangrene  Psych/Neuro ROS: negative for - visual or auditory hallucinations, suicidal ideation    Physical exam:   Pulse 75   Temp 98 °F (36.7 °C)   Resp 16   Ht 5' 2\" (1.575 m)   Wt 103 lb 3.2 oz (46.8 kg)   LMP 12/28/2018 (LMP Unknown)   SpO2 97%   BMI 18.88 kg/m²   General appearance:  NAD, appears stated age  Head: NCAT, PERRLA, EOMI, red conjunctiva  Neck: supple, no masses, trachea midline  Lungs: Equal chest rise bilateral, no retractions, no wheezing  Heart: Reg rate  Abdomen: soft, tender tender epigastrium and right upper quadrant abdomen, nondistended  Skin; warm and dry, no cyanosis  Gu: no cva tenderness  Extremities: atraumatic, no focal motor deficits, no open wounds  Psych: No tremor, visual hallucinations      Radiology: I reviewed relevant abdominal imaging from this admission and that available in the EMR including CT abd/pel from 3/8/2022. My assessment is normal gallbladder. Mildly dilated common bile duct. Pancreatic cyst    Assessment:  Yolis Bianchi is a 46 y.o. female with intractable nausea and upper abdominal pain  Patient Active Problem List   Diagnosis    Right hip pain    Anxiety    Recurrent major depression in partial remission (HCC)    New daily persistent headache    Tobacco use disorder    Mixed hyperlipidemia    History of migraine    Frequency of micturition    Right hip joint effusion    Mucocele of lip    Chronic low back pain without sciatica    Spondylosis of cervical region without myelopathy or radiculopathy    Intractable abdominal pain    Pancreatic mass         Plan:  Proceed with EGD, EUS, possible ERCP  -The procedure, risks, benefits and alternatives were discussed with patient. she   agrees to proceed.         Jessica Cordova MD  3:38 PM

## 2022-03-25 NOTE — PRE-PROCEDURE INSTRUCTIONS
[address] [facility]                                                                                                                    PRE OP INSTRUCTIONS FOR  Rock Lee        Date: 3/25/2022    Date of surgery: 3/28/22   Arrival Time: Hospital will call you between 5pm and 7pm with your final arrival time for surgery    1. Do not eat or drink anything after midnight prior to surgery. This includes no water, chewing gum, mints or ice chips. 2. Take the following medications with a small sip of water on the morning of Surgery: Baclofen, Nadolol, Valium PRN, Cymbalta, Zofran PRN, Imitrex PRN OK per       3. Diabetics may take evening dose of insulin but none after midnight. If you feel symptomatic or low blood sugar morning of surgery drink 1-2 ounces of apple juice only. 4. Aspirin, Ibuprofen, Advil, Naproxen, Vitamin E and other Anti-inflammatory products should be stopped  before surgery  as directed by your physician. Take Tylenol only unless instructed otherwise by your surgeon. 5. Check with your Doctor regarding stopping Plavix, Coumadin, Lovenox, Eliquis, Effient, or other blood thinners. 6. Do not smoke,use illicit drugs and do not drink any alcoholic beverages 24 hours prior to surgery. 7. You may brush your teeth the morning of surgery. DO NOT SWALLOW WATER     8. You MUST make arrangements for a responsible adult to take you home after your surgery. You will not be allowed to leave alone or drive yourself home. It is strongly suggested someone stay with you the first 24 hrs. Your surgery will be cancelled if you do not have a ride home. 9. PEDIATRIC PATIENTS ONLY:  A parent/legal guardian must accompany a child scheduled for surgery and plan to stay at the hospital until the child is discharged. Please do not bring other children with you.     10. Please wear simple, loose fitting clothing to the hospital.  Do not bring valuables (money, credit cards, checkbooks, etc.) Do not wear any makeup (including no eye makeup) or nail polish on your fingers or toes. 11. DO NOT wear any jewelry or piercings on day of surgery. All body piercing jewelry must be removed. 12. Shower the night before surgery with _x_Antibacterial soap /VI WIPES________    13. TOTAL JOINT REPLACEMENT/HYSTERECTOMY PATIENTS ONLY---Remember to bring Blood Bank bracelet to the hospital on the day of surgery. 14. If you have a Living Will and Durable Power of  for Healthcare, please bring in a copy. 15. If appropriate bring crutches, inspirex, WALKER, CANE etc... 12. Notify your Surgeon if you develop any illness between now and surgery time, cough, cold, fever, sore throat, nausea, vomiting, etc.  Please notify your surgeon if you experience dizziness, shortness of breath or blurred vision between now & the time of your surgery. 17. If you have ___dentures, they will be removed before going to the OR; we will provide you a container. If you wear ___contact lenses or ___glasses, they will be removed; please bring a case for them. 18. To provide excellent care visitors will be limited to 2 in the room at any given time. 19. Please bring picture ID and insurance card. 20. Sleep apnea patients need to bring CPAP AND SETTINGS to hospital on day of surgery. 21. During flu season no children under the age of 15 are permitted in the hospital for the safety of all patients. 22. Other Check in at the  in the main lobby                 Please call AMBULATORY CARE if you have any further questions.    [address] [facility]     [address]

## 2022-03-27 ENCOUNTER — ANESTHESIA EVENT (OUTPATIENT)
Dept: OPERATING ROOM | Age: 52
End: 2022-03-27
Payer: MEDICARE

## 2022-03-27 ASSESSMENT — LIFESTYLE VARIABLES: SMOKING_STATUS: 1

## 2022-03-27 NOTE — ANESTHESIA PRE PROCEDURE
Department of Anesthesiology  Preprocedure Note       Name:  Fabian Ocampo   Age:  46 y.o.  :  1970                                          MRN:  [id number]         Date:  3/28/2022      Surgeon: Tee Cleary):  Hank Leung MD    Procedure: EGD W/EUS FNA, POSSIBLE ERCP (N/A )  ERCP ENDOSCOPIC RETROGRADE CHOLANGIOPANCREATOGRAPHY (N/A )    Medications prior to admission:   Prior to Admission medications    Medication Sig Start Date End Date Taking? Authorizing Provider   DULoxetine (CYMBALTA) 60 MG extended release capsule take 1 capsule by mouth once daily 3/2/22   Historical Provider, MD   senna (SENOKOT) 8.6 MG tablet Take 1 tablet by mouth daily as needed (Constipation) 3/12/22 4/11/22  Giovanna Arroyo MD   nicotine (NICODERM CQ) 21 MG/24HR Place 1 patch onto the skin daily  Patient not taking: Reported on 3/18/2022 3/12/22   Giovanna Arroyo MD   polyethylene glycol (GLYCOLAX) 17 GM/SCOOP powder Take 17 g by mouth daily as needed (constipation) 3/12/22 4/11/22  Giovanna Arroyo MD   ondansetron (ZOFRAN-ODT) 4 MG disintegrating tablet Take 1 tablet by mouth 3 times daily as needed for Nausea or Vomiting 3/8/22   FRENCH Portillo - CNP   rosuvastatin (CRESTOR) 20 MG tablet take 1 tablet by mouth nightly ---90DAYS REQUEST 3/4/22   Neligh SHANE Persaud DO   pantoprazole (PROTONIX) 40 MG tablet Take 1 tablet by mouth every morning (before breakfast) 22   Neligh SHANE Persaud,    baclofen (LIORESAL) 10 MG tablet Take 1 tablet by mouth 3 times daily 21   Neligh L Hung, DO   nadolol (CORGARD) 20 MG tablet take 1 tablet by mouth once daily 21   Neligh SHANE Persaud, DO   SUMAtriptan (IMITREX) 50 MG tablet Take 1 tablet by mouth once as needed for Migraine 10/18/21 3/18/22  Neligh L DO Hung   Docusate Calcium (STOOL SOFTENER PO) Take 100 mg by mouth three times daily     Historical Provider, MD   diazepam (VALIUM) 10 MG tablet Take 10 mg by mouth every 8 hours.  Instructed to take am of procedure if needed    Historical Provider, MD   folic acid (FOLVITE) 1 MG tablet Take 1 mg by mouth daily. Historical Provider, MD   lamoTRIgine (LAMICTAL) 200 MG tablet Take 200 mg by mouth every evening     Historical Provider, MD       Current medications:    Current Facility-Administered Medications   Medication Dose Route Frequency Provider Last Rate Last Admin    sodium chloride flush 0.9 % injection 5-40 mL  5-40 mL IntraVENous PRN Gagandeep Martinez MD        lactated ringers infusion   IntraVENous Continuous Gagandeep Martinez MD 75 mL/hr at 03/28/22 0917 New Bag at 03/28/22 0917    sodium chloride flush 0.9 % injection 5-40 mL  5-40 mL IntraVENous 2 times per day David Kim MD        sodium chloride flush 0.9 % injection 5-40 mL  5-40 mL IntraVENous PRN David Kim MD        0.9 % sodium chloride infusion  25 mL IntraVENous PRN David Kim MD           Allergies:     Allergies   Allergen Reactions    Latex Rash    Sulfa Antibiotics Rash and Hives       Problem List:    Patient Active Problem List   Diagnosis Code    Right hip pain M25.551    Anxiety F41.9    Recurrent major depression in partial remission (HCC) F33.41    New daily persistent headache G44.52    Tobacco use disorder F17.200    Mixed hyperlipidemia E78.2    History of migraine Z86.69    Frequency of micturition R35.0    Right hip joint effusion M25.451    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    Chronic low back pain without sciatica M54.50, G89.29    Spondylosis of cervical region without myelopathy or radiculopathy M47.812    Intractable abdominal pain R10.9    Pancreatic mass K86.89       Past Medical History:        Diagnosis Date    Anxiety     Arthritis     right hip    Bipolar 1 disorder (HCC)     Cervical stenosis of spine     Chronic back pain     Depression     Hypertension     Irritable bowel syndrome     Migraines     Mucocele of lip     for surgery 7/21/21    Other hyperlipidemia 7/29/2019    PTSD (post-traumatic stress disorder)        Past Surgical History:        Procedure Laterality Date    ARTHROGRAPHY Right 1/3/2019    RIGHT HIP ARTHROGRAM AND STEROID INJECTION performed by Domenic Haji MD at [address] [place] [address] Right 9/10/2020    RIGHT HIP INJECTION UNDER FLUOROSCOPY performed by Domenic Haji MD at [address] [place] [address] Right 4/13/2021    RIGHT HIP INJECTION UNDER FLUOROSCOPY performed by Domenic Haji MD at [address] [place] [address] AND CURETTAGE OF UTERUS N/A 2/3/2020    DILATATION AND CURETTAGE HYSTEROSCOPY performed by Dustin Chen MD at [facility] Right 7/21/2021    EXCISION RIGHT LIP MUCOCELE performed by Karla Antonio MD at [address] [place] [address] Rd  02/20/2019    implant for IBS     STIMULATOR SURGERY      bowel    TONSILLECTOMY      TOOTH EXTRACTION      TUBAL LIGATION         Social History:    Social History     Tobacco Use    Smoking status: Former Smoker     Packs/day: 1.50     Years: 30.00     Pack years: 45.00     Types: Cigarettes    Smokeless tobacco: Never Used    Tobacco comment: trying to quit, wears patch   Substance Use Topics    Alcohol use: Not Currently                                Counseling given: Not Answered  Comment: trying to quit, wears patch      Vital Signs (Current):   Vitals:    03/28/22 0846   BP: 118/68   Pulse: 67   Resp: 16   Temp: 98.3 °F (36.8 °C)   TempSrc: Infrared   SpO2: 99%   Weight: 101 lb (45.8 kg)                                              BP Readings from Last 3 Encounters:   03/28/22 118/68   03/18/22 118/80   03/12/22 133/88       NPO Status: Time of last liquid consumption: 1900                        Time of last solid consumption: 1900                        Date of last liquid consumption: 03/27/22                        Date of last solid food consumption: 03/27/22    BMI:   Wt Readings from Last 3 Encounters:   03/28/22 101 lb (45.8 kg)   03/22/22 103 lb 3.2 oz (46.8 kg) 03/18/22 106 lb (48.1 kg)     Body mass index is 18.47 kg/m². CBC:   Lab Results   Component Value Date    WBC 5.5 03/12/2022    RBC 4.50 03/12/2022    HGB 14.0 03/12/2022    HCT 40.8 03/12/2022    MCV 90.7 03/12/2022    RDW 12.6 03/12/2022     03/12/2022       CMP:   Lab Results   Component Value Date     03/12/2022    K 4.0 03/12/2022     03/12/2022    CO2 28 03/12/2022    BUN 9 03/12/2022    CREATININE 0.7 03/12/2022    GFRAA >60 03/12/2022    LABGLOM >60 03/12/2022    GLUCOSE 107 03/12/2022    PROT 6.4 03/12/2022    CALCIUM 9.9 03/12/2022    BILITOT 0.4 03/12/2022    ALKPHOS 66 03/12/2022    AST 22 03/12/2022    ALT 8 03/12/2022       POC Tests: No results for input(s): POCGLU, POCNA, POCK, POCCL, POCBUN, POCHEMO, POCHCT in the last 72 hours. Coags: No results found for: PROTIME, INR, APTT    HCG (If Applicable):   Lab Results   Component Value Date    PREGTESTUR neg 11/29/2021        ABGs: No results found for: PHART, PO2ART, VWO5BLB, HRB5FML, BEART, X8QVYUFU     Type & Screen (If Applicable):  No results found for: LABABO, [address]    Anesthesia Evaluation  Patient summary reviewed and Nursing notes reviewed no history of anesthetic complications:   Airway: Mallampati: II  TM distance: >3 FB   Neck ROM: full  Mouth opening: > = 3 FB Dental:      Comment: Patient denies any loose or chipped teeth. Pulmonary: breath sounds clear to auscultation  (+) current smoker          Patient did not smoke on day of surgery.                  Cardiovascular:    (+) hypertension: moderate,       ECG reviewed  Rhythm: regular  Rate: normal           Beta Blocker:  Dose within 24 Hrs      ROS comment: EKG 12/18/2020    Normal sinus rhythm  Normal ECG  No previous ECGs available  Confirmed by Yamilex Main ([id number]) on 12/19/2020 6:07:46 AM     Neuro/Psych:   (+) headaches: migraine headaches, psychiatric history:depression/anxiety              ROS comment: Anxiety    Bipolar 1 disorder (HCC)   PTSD (post-traumatic stress disorder)        GI/Hepatic/Renal:   (+) GERD: well controlled,          ROS comment: Abnormal delay in gastric emptying  Pancreatic mass. Endo/Other:    (+) : arthritis: OA., .                  ROS comment: Small mucocele in the left buccal mucosa on the right upper lip. Abdominal:         (-) obese       Vascular: negative vascular ROS. Other Findings:               Anesthesia Plan      MAC     ASA 3       Induction: intravenous. MIPS: Postoperative opioids intended and Prophylactic antiemetics administered. Anesthetic plan and risks discussed with patient. Plan discussed with CRNA. PAT Chart Review:  Chart reviewed per routine by Milan Bender MD.  Above represents information available via shared medical record including previous anesthesia history, drug and allergy history. Confirmation of above and final plan per Day of Surgery (DOS) anesthesiologist.    DOS STAFF ADDENDUM:    Pt seen and examined, chart reviewed (including anesthesia, drug and allergy history). Anesthetic plan, risks, benefits, alternatives, and personnel involved discussed with patient. Patient verbalized an understanding and agrees to proceed. Plan discussed with care team members and agreed upon.     Milan Bender MD  Staff Anesthesiologist  9:25 AM  Milan Bender MD   3/28/2022

## 2022-03-28 ENCOUNTER — HOSPITAL ENCOUNTER (OUTPATIENT)
Age: 52
Setting detail: OUTPATIENT SURGERY
Discharge: HOME | End: 2022-03-28
Attending: SURGERY | Admitting: SURGERY
Payer: MEDICARE

## 2022-03-28 ENCOUNTER — ANESTHESIA (OUTPATIENT)
Dept: OPERATING ROOM | Age: 52
End: 2022-03-28
Payer: MEDICARE

## 2022-03-28 ENCOUNTER — HOSPITAL ENCOUNTER (OUTPATIENT)
Dept: GENERAL RADIOLOGY | Age: 52
Discharge: HOME | End: 2022-03-30
Attending: SURGERY
Payer: MEDICARE

## 2022-03-28 VITALS
TEMPERATURE: 97.2 F | OXYGEN SATURATION: 98 % | WEIGHT: 101 LBS | SYSTOLIC BLOOD PRESSURE: 110 MMHG | RESPIRATION RATE: 18 BRPM | DIASTOLIC BLOOD PRESSURE: 61 MMHG | BODY MASS INDEX: 18.47 KG/M2 | HEART RATE: 62 BPM

## 2022-03-28 VITALS
RESPIRATION RATE: 21 BRPM | OXYGEN SATURATION: 96 % | SYSTOLIC BLOOD PRESSURE: 102 MMHG | DIASTOLIC BLOOD PRESSURE: 65 MMHG

## 2022-03-28 DIAGNOSIS — K86.2 PANCREATIC CYST: ICD-10-CM

## 2022-03-28 PROCEDURE — C1753 CATH, INTRAVAS ULTRASOUND: HCPCS | Performed by: SURGERY

## 2022-03-28 PROCEDURE — 2709999900 HC NON-CHARGEABLE SUPPLY: Performed by: SURGERY

## 2022-03-28 PROCEDURE — 7100000010 HC PHASE II RECOVERY - FIRST 15 MIN: Performed by: SURGERY

## 2022-03-28 PROCEDURE — C1769 GUIDE WIRE: HCPCS | Performed by: SURGERY

## 2022-03-28 PROCEDURE — 2580000003 HC RX 258

## 2022-03-28 PROCEDURE — 3700000000 HC ANESTHESIA ATTENDED CARE: Performed by: SURGERY

## 2022-03-28 PROCEDURE — 7100000011 HC PHASE II RECOVERY - ADDTL 15 MIN: Performed by: SURGERY

## 2022-03-28 PROCEDURE — 6360000002 HC RX W HCPCS

## 2022-03-28 PROCEDURE — 2720000010 HC SURG SUPPLY STERILE: Performed by: SURGERY

## 2022-03-28 PROCEDURE — 2580000003 HC RX 258: Performed by: ANESTHESIOLOGY

## 2022-03-28 PROCEDURE — 43237 ENDOSCOPIC US EXAM ESOPH: CPT | Performed by: SURGERY

## 2022-03-28 PROCEDURE — 6370000000 HC RX 637 (ALT 250 FOR IP): Performed by: SURGERY

## 2022-03-28 PROCEDURE — 3700000001 HC ADD 15 MINUTES (ANESTHESIA): Performed by: SURGERY

## 2022-03-28 PROCEDURE — 3600007513: Performed by: SURGERY

## 2022-03-28 PROCEDURE — 3600007503: Performed by: SURGERY

## 2022-03-28 RX ORDER — SODIUM CHLORIDE 0.9 % (FLUSH) 0.9 %
5-40 SYRINGE (ML) INJECTION PRN
Status: DISCONTINUED | OUTPATIENT
Start: 2022-03-28 | End: 2022-03-28 | Stop reason: HOSPADM

## 2022-03-28 RX ORDER — LABETALOL HYDROCHLORIDE 5 MG/ML
10 INJECTION, SOLUTION INTRAVENOUS
Status: DISCONTINUED | OUTPATIENT
Start: 2022-03-28 | End: 2022-03-28 | Stop reason: HOSPADM

## 2022-03-28 RX ORDER — MEPERIDINE HYDROCHLORIDE 25 MG/ML
12.5 INJECTION INTRAMUSCULAR; INTRAVENOUS; SUBCUTANEOUS EVERY 5 MIN PRN
Status: DISCONTINUED | OUTPATIENT
Start: 2022-03-28 | End: 2022-03-28 | Stop reason: HOSPADM

## 2022-03-28 RX ORDER — SODIUM CHLORIDE, SODIUM LACTATE, POTASSIUM CHLORIDE, CALCIUM CHLORIDE 600; 310; 30; 20 MG/100ML; MG/100ML; MG/100ML; MG/100ML
INJECTION, SOLUTION INTRAVENOUS CONTINUOUS PRN
Status: DISCONTINUED | OUTPATIENT
Start: 2022-03-28 | End: 2022-03-28 | Stop reason: SDUPTHER

## 2022-03-28 RX ORDER — SODIUM CHLORIDE 9 MG/ML
25 INJECTION, SOLUTION INTRAVENOUS PRN
Status: DISCONTINUED | OUTPATIENT
Start: 2022-03-28 | End: 2022-03-28 | Stop reason: HOSPADM

## 2022-03-28 RX ORDER — HYDRALAZINE HYDROCHLORIDE 20 MG/ML
10 INJECTION INTRAMUSCULAR; INTRAVENOUS
Status: DISCONTINUED | OUTPATIENT
Start: 2022-03-28 | End: 2022-03-28 | Stop reason: HOSPADM

## 2022-03-28 RX ORDER — FENTANYL CITRATE 50 UG/ML
25 INJECTION, SOLUTION INTRAMUSCULAR; INTRAVENOUS EVERY 5 MIN PRN
Status: DISCONTINUED | OUTPATIENT
Start: 2022-03-28 | End: 2022-03-28 | Stop reason: HOSPADM

## 2022-03-28 RX ORDER — PROPOFOL 10 MG/ML
INJECTION, EMULSION INTRAVENOUS PRN
Status: DISCONTINUED | OUTPATIENT
Start: 2022-03-28 | End: 2022-03-28 | Stop reason: SDUPTHER

## 2022-03-28 RX ORDER — FENTANYL CITRATE 50 UG/ML
50 INJECTION, SOLUTION INTRAMUSCULAR; INTRAVENOUS EVERY 5 MIN PRN
Status: DISCONTINUED | OUTPATIENT
Start: 2022-03-28 | End: 2022-03-28 | Stop reason: HOSPADM

## 2022-03-28 RX ORDER — SODIUM CHLORIDE 0.9 % (FLUSH) 0.9 %
5-40 SYRINGE (ML) INJECTION EVERY 12 HOURS SCHEDULED
Status: DISCONTINUED | OUTPATIENT
Start: 2022-03-28 | End: 2022-03-28 | Stop reason: HOSPADM

## 2022-03-28 RX ORDER — SODIUM CHLORIDE, SODIUM LACTATE, POTASSIUM CHLORIDE, CALCIUM CHLORIDE 600; 310; 30; 20 MG/100ML; MG/100ML; MG/100ML; MG/100ML
INJECTION, SOLUTION INTRAVENOUS CONTINUOUS
Status: DISCONTINUED | OUTPATIENT
Start: 2022-03-28 | End: 2022-03-28 | Stop reason: HOSPADM

## 2022-03-28 RX ORDER — MIDAZOLAM HYDROCHLORIDE 1 MG/ML
INJECTION INTRAMUSCULAR; INTRAVENOUS PRN
Status: DISCONTINUED | OUTPATIENT
Start: 2022-03-28 | End: 2022-03-28 | Stop reason: SDUPTHER

## 2022-03-28 RX ADMIN — SODIUM CHLORIDE, POTASSIUM CHLORIDE, SODIUM LACTATE AND CALCIUM CHLORIDE: 600; 310; 30; 20 INJECTION, SOLUTION INTRAVENOUS at 10:42

## 2022-03-28 RX ADMIN — PROPOFOL 50 MG: 10 INJECTION, EMULSION INTRAVENOUS at 10:36

## 2022-03-28 RX ADMIN — SODIUM CHLORIDE, POTASSIUM CHLORIDE, SODIUM LACTATE AND CALCIUM CHLORIDE: 600; 310; 30; 20 INJECTION, SOLUTION INTRAVENOUS at 09:17

## 2022-03-28 RX ADMIN — SODIUM CHLORIDE, POTASSIUM CHLORIDE, SODIUM LACTATE AND CALCIUM CHLORIDE: 600; 310; 30; 20 INJECTION, SOLUTION INTRAVENOUS at 10:32

## 2022-03-28 RX ADMIN — PROPOFOL 75 MCG/KG/MIN: 10 INJECTION, EMULSION INTRAVENOUS at 10:33

## 2022-03-28 RX ADMIN — INDOMETHACIN 100 MG: 50 SUPPOSITORY RECTAL at 09:09

## 2022-03-28 RX ADMIN — MIDAZOLAM 2 MG: 1 INJECTION INTRAMUSCULAR; INTRAVENOUS at 10:33

## 2022-03-28 ASSESSMENT — PULMONARY FUNCTION TESTS
PIF_VALUE: 1
PIF_VALUE: 0
PIF_VALUE: 1

## 2022-03-28 ASSESSMENT — PAIN SCALES - GENERAL
PAINLEVEL_OUTOF10: 0
PAINLEVEL_OUTOF10: 0

## 2022-03-28 ASSESSMENT — PAIN - FUNCTIONAL ASSESSMENT
PAIN_FUNCTIONAL_ASSESSMENT: 0-10
PAIN_FUNCTIONAL_ASSESSMENT: 0-10

## 2022-03-28 NOTE — H&P
General Surgery History and Physical  [facility]    Patient's Name/Date of Birth: Bong Cuello / 1970    Date: March 28, 2022     Surgeon: Elias Motta MD    PCP: Aleida Lee DO     Chief Complaint: abdominal pain    HPI:   Bong Cuello is a 46 y.o. female who presents for evaluation of epigastric abdominal pain. She was recently admitted in the hospital with this. She underwent work-up that revealed new pancreatic body cystic mass. HIDA scan and gastric emptying study were negative for any abnormality. The patient has persistent intractable nausea and upper abdominal pain. She was discharged from the hospital however is not improved. She has poor p.o. intake due to this. .    Patient Active Problem List   Diagnosis    Right hip pain    Anxiety    Recurrent major depression in partial remission ([address]    New daily persistent headache    Tobacco use disorder    Mixed hyperlipidemia    History of migraine    Frequency of micturition    Right hip joint effusion    Mucocele of lip    Chronic low back pain without sciatica    Spondylosis of cervical region without myelopathy or radiculopathy    Intractable abdominal pain    Pancreatic mass       Past Medical History:   Diagnosis Date    Anxiety     Arthritis     right hip    Bipolar 1 disorder ([address]     Cervical stenosis of spine     Chronic back pain     Depression     Hypertension     Irritable bowel syndrome     Migraines     Mucocele of lip     for surgery 7/21/21    Other hyperlipidemia 7/29/2019    PTSD (post-traumatic stress disorder)        Past Surgical History:   Procedure Laterality Date    ARTHROGRAPHY Right 1/3/2019    RIGHT HIP ARTHROGRAM AND STEROID INJECTION performed by Laci Calderon MD at [address] [place] [address] Right 9/10/2020    RIGHT HIP INJECTION UNDER FLUOROSCOPY performed by Laci Calderon MD at [address] [place] [address] Right 4/13/2021    RIGHT HIP INJECTION UNDER FLUOROSCOPY performed by Sridevi Fontanez MD at [facility] N/A 2/3/2020    DILATATION AND CURETTAGE HYSTEROSCOPY performed by Brandy Duke MD at [facility] 7/21/2021    EXCISION RIGHT LIP MUCOCELE performed by Caroline Shea MD at [address] [place] [address]  02/20/2019    implant for IBS     STIMULATOR SURGERY      bowel    TONSILLECTOMY      TOOTH EXTRACTION      TUBAL LIGATION         Allergies   Allergen Reactions    Latex Rash    Sulfa Antibiotics Rash and Hives       The patient has a family history that is negative for severe cardiovascular or respiratory issues, negative for reaction to anesthesia. Time spent reviewing past medical, surgical, social and family history, vitals, nursing assessment and images. No changes from above documented history.     Social History     Socioeconomic History    Marital status:      Spouse name: Not on file    Number of children: Not on file    Years of education: Not on file    Highest education level: Not on file   Occupational History    Not on file   Tobacco Use    Smoking status: Former Smoker     Packs/day: 1.50     Years: 30.00     Pack years: 45.00     Types: Cigarettes    Smokeless tobacco: Never Used    Tobacco comment: trying to quit, wears patch   Vaping Use    Vaping Use: Former   Substance and Sexual Activity    Alcohol use: Not Currently    Drug use: Not Currently     Types: Marijuana Radames Don)    Sexual activity: Not Currently   Other Topics Concern    Not on file   Social History Narrative    Not on file     Social Determinants of Health     Financial Resource Strain: High Risk    Difficulty of Paying Living Expenses: Hard   Food Insecurity: Food Insecurity Present    Worried About Running Out of Food in the Last Year: Never true    Kaleb of Food in the Last Year: Sometimes true   Transportation Needs:     Lack of Transportation (Medical): Not on file    Lack of Transportation (Non-Medical): Not on file   Physical Activity: Unknown    Days of Exercise per Week: 0 days    Minutes of Exercise per Session: Not on file   Stress:     Feeling of Stress : Not on file   Social Connections:     Frequency of Communication with Friends and Family: Not on file    Frequency of Social Gatherings with Friends and Family: Not on file    Attends Gnosticism Services: Not on file    Active Member of [address] [place] [address] or Organizations: Not on file    Attends Club or Organization Meetings: Not on file    Marital Status: Not on file   Intimate Partner Violence:     Fear of Current or Ex-Partner: Not on file    Emotionally Abused: Not on file    Physically Abused: Not on file    Sexually Abused: Not on file   Housing Stability:     Unable to Pay for Housing in the Last Year: Not on file    Number of [place] in the Last Year: Not on file    Unstable Housing in the Last Year: Not on file       I have reviewed relevant labs from this admission and interpretation is included in my assessment and plan    Review of Systems    A complete 10 system review was performed and are otherwise negative unless mentioned in the above HPI. Specific negatives are listed below but may not include all those reviewed.     General ROS: negative obtundation, AMS  ENT ROS: negative rhinorrhea, epistaxis  Allergy and Immunology ROS: negative itchy/watery eyes or nasal congestion  Hematological and Lymphatic ROS: negative spontaneous bleeding or bruising  Endocrine ROS: negative  lethargy, mood swings, palpitations or polydipsia/polyuria  Respiratory ROS: negative sputum changes, stridor, tachypnea or wheezing  Cardiovascular ROS: negative for - loss of consciousness, murmur or orthopnea  Gastrointestinal ROS: negative for - hematochezia or hematemesis  Genito-Urinary ROS: negative for -  genital discharge or hematuria  Musculoskeletal ROS: negative for - focal weakness, gangrene  Psych/Neuro ROS: negative for - visual or auditory hallucinations, suicidal ideation    Physical exam:   /68   Pulse 67   Temp 98.3 °F (36.8 °C) (Infrared)   Resp 16   Wt 101 lb (45.8 kg)   LMP 12/28/2018 (LMP Unknown)   SpO2 99%   BMI 18.47 kg/m²   General appearance:  NAD, appears stated age  Head: NCAT, PERRLA, EOMI, red conjunctiva  Neck: supple, no masses, trachea midline  Lungs: Equal chest rise bilateral, no retractions, no wheezing  Heart: Reg rate  Abdomen: soft, tender tender epigastrium and right upper quadrant abdomen, nondistended  Skin; warm and dry, no cyanosis  Gu: no cva tenderness  Extremities: atraumatic, no focal motor deficits, no open wounds  Psych: No tremor, visual hallucinations      Radiology: I reviewed relevant abdominal imaging from this admission and that available in the EMR including CT abd/pel from 3/8/2022. My assessment is normal gallbladder. Mildly dilated common bile duct. Pancreatic cyst    Assessment:  Greg Stephenson is a 46 y.o. female with intractable nausea and upper abdominal pain  Patient Active Problem List   Diagnosis    Right hip pain    Anxiety    Recurrent major depression in partial remission (HCC)    New daily persistent headache    Tobacco use disorder    Mixed hyperlipidemia    History of migraine    Frequency of micturition    Right hip joint effusion    Mucocele of lip    Chronic low back pain without sciatica    Spondylosis of cervical region without myelopathy or radiculopathy    Intractable abdominal pain    Pancreatic mass         Plan:  Proceed with EGD, EUS, possible ERCP  -The procedure, risks, benefits and alternatives were discussed with patient. she   agrees to proceed.         Jessica Rosales MD  9:28 AM

## 2022-03-28 NOTE — ANESTHESIA PRE PROCEDURE
Department of Anesthesiology  Preprocedure Note       Name:  Zoey Green   Age:  46 y.o.  :  1970                                          MRN:  [id number]         Date:  3/28/2022      Surgeon: Ulises Araujo):  Larry Alvarado MD    Procedure: Procedure(s):  EGD W/EUS FNA, POSSIBLE ERCP  ERCP ENDOSCOPIC RETROGRADE CHOLANGIOPANCREATOGRAPHY    Medications prior to admission:   Prior to Admission medications    Medication Sig Start Date End Date Taking? Authorizing Provider   DULoxetine (CYMBALTA) 60 MG extended release capsule take 1 capsule by mouth once daily 3/2/22   Historical Provider, MD   senna (SENOKOT) 8.6 MG tablet Take 1 tablet by mouth daily as needed (Constipation) 3/12/22 4/11/22  Noa Corado MD   nicotine (NICODERM CQ) 21 MG/24HR Place 1 patch onto the skin daily  Patient not taking: Reported on 3/18/2022 3/12/22   Noa Corado MD   polyethylene glycol (GLYCOLAX) 17 GM/SCOOP powder Take 17 g by mouth daily as needed (constipation) 3/12/22 4/11/22  Noa Corado MD   ondansetron (ZOFRAN-ODT) 4 MG disintegrating tablet Take 1 tablet by mouth 3 times daily as needed for Nausea or Vomiting 3/8/22   FRENCH Ling - CNP   rosuvastatin (CRESTOR) 20 MG tablet take 1 tablet by mouth nightly ---90DAYS REQUEST 3/4/22   Sublette SHANE Persaud DO   pantoprazole (PROTONIX) 40 MG tablet Take 1 tablet by mouth every morning (before breakfast) 22   Sublette SHANE Persaud, DO   baclofen (LIORESAL) 10 MG tablet Take 1 tablet by mouth 3 times daily 21   Sublette L Hung, DO   nadolol (CORGARD) 20 MG tablet take 1 tablet by mouth once daily 21   Sublette SHANE Persaud, DO   SUMAtriptan (IMITREX) 50 MG tablet Take 1 tablet by mouth once as needed for Migraine 10/18/21 3/18/22  Sublette L DO Hung   Docusate Calcium (STOOL SOFTENER PO) Take 100 mg by mouth three times daily     Historical Provider, MD   diazepam (VALIUM) 10 MG tablet Take 10 mg by mouth every 8 hours.  Instructed to take am of procedure if needed    Historical Provider, MD   folic acid (FOLVITE) 1 MG tablet Take 1 mg by mouth daily. Historical Provider, MD   lamoTRIgine (LAMICTAL) 200 MG tablet Take 200 mg by mouth every evening     Historical Provider, MD       Current medications:    Current Facility-Administered Medications   Medication Dose Route Frequency Provider Last Rate Last Admin    sodium chloride flush 0.9 % injection 5-40 mL  5-40 mL IntraVENous PRN Harrison Gann MD        lactated ringers infusion   IntraVENous Continuous Harrison Gann MD        sodium chloride flush 0.9 % injection 5-40 mL  5-40 mL IntraVENous 2 times per day Denver Rucks, MD        sodium chloride flush 0.9 % injection 5-40 mL  5-40 mL IntraVENous PRN Denver Rucks, MD        0.9 % sodium chloride infusion  25 mL IntraVENous PRN Denver Rucks, MD        indomethacin (INDOCIN) 50 MG suppository 100 mg  100 mg Rectal On Call Denver Rucks, MD           Allergies:     Allergies   Allergen Reactions    Latex Rash    Sulfa Antibiotics Rash and Hives       Problem List:    Patient Active Problem List   Diagnosis Code    Right hip pain M25.551    Anxiety F41.9    Recurrent major depression in partial remission (HCC) F33.41    New daily persistent headache G44.52    Tobacco use disorder F17.200    Mixed hyperlipidemia E78.2    History of migraine Z86.69    Frequency of micturition R35.0    Right hip joint effusion M25.451    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    Chronic low back pain without sciatica M54.50, G89.29    Spondylosis of cervical region without myelopathy or radiculopathy M47.812    Intractable abdominal pain R10.9    Pancreatic mass K86.89       Past Medical History:        Diagnosis Date    Anxiety     Arthritis     right hip    Bipolar 1 disorder (HCC)     Cervical stenosis of spine     Chronic back pain     Depression     Hypertension     Irritable bowel syndrome     Migraines     Mucocele of lip     for surgery 7/21/21    Other hyperlipidemia 7/29/2019    PTSD (post-traumatic stress disorder)        Past Surgical History:        Procedure Laterality Date    ARTHROGRAPHY Right 1/3/2019    RIGHT HIP ARTHROGRAM AND STEROID INJECTION performed by David Best MD at [address] [place] [address] Right 9/10/2020    RIGHT HIP INJECTION UNDER FLUOROSCOPY performed by David Best MD at [address] [place] [address] Right 4/13/2021    RIGHT HIP INJECTION UNDER FLUOROSCOPY performed by David Best MD at [address] [place] [address] AND CURETTAGE OF UTERUS N/A 2/3/2020    DILATATION AND CURETTAGE HYSTEROSCOPY performed by Cathlene Leventhal, MD at [facility] 7/21/2021    EXCISION RIGHT LIP MUCOCELE performed by Frida Rodriguez MD at [address] [place] [address]  02/20/2019    implant for IBS     STIMULATOR SURGERY      bowel    TONSILLECTOMY      TOOTH EXTRACTION      TUBAL LIGATION         Social History:    Social History     Tobacco Use    Smoking status: Former Smoker     Packs/day: 1.50     Years: 30.00     Pack years: 45.00     Types: Cigarettes    Smokeless tobacco: Never Used    Tobacco comment: trying to quit, wears patch   Substance Use Topics    Alcohol use: Not Currently                                Counseling given: Not Answered  Comment: trying to quit, wears patch      Vital Signs (Current):   Vitals:    03/28/22 0846   BP: 118/68   Pulse: 67   Resp: 16   Temp: 36.8 °C (98.3 °F)   TempSrc: Infrared   SpO2: 99%   Weight: 101 lb (45.8 kg)                                              BP Readings from Last 3 Encounters:   03/28/22 118/68   03/18/22 118/80   03/12/22 133/88       NPO Status: Time of last liquid consumption: 1900                        Time of last solid consumption: 1900                        Date of last liquid consumption: 03/27/22                        Date of last solid food consumption: 03/27/22    BMI:   Wt Readings from Last 3 Encounters:   03/28/22 101 lb (45.8 kg)   03/22/22 103 lb 3.2 oz (46.8 kg)   03/18/22 106 lb (48.1 kg)     Body mass index is 18.47 kg/m². CBC:   Lab Results   Component Value Date    WBC 5.5 03/12/2022    RBC 4.50 03/12/2022    HGB 14.0 03/12/2022    HCT 40.8 03/12/2022    MCV 90.7 03/12/2022    RDW 12.6 03/12/2022     03/12/2022       CMP:   Lab Results   Component Value Date     03/12/2022    K 4.0 03/12/2022     03/12/2022    CO2 28 03/12/2022    BUN 9 03/12/2022    CREATININE 0.7 03/12/2022    GFRAA >60 03/12/2022    LABGLOM >60 03/12/2022    GLUCOSE 107 03/12/2022    PROT 6.4 03/12/2022    CALCIUM 9.9 03/12/2022    BILITOT 0.4 03/12/2022    ALKPHOS 66 03/12/2022    AST 22 03/12/2022    ALT 8 03/12/2022       POC Tests: No results for input(s): POCGLU, POCNA, POCK, POCCL, POCBUN, POCHEMO, POCHCT in the last 72 hours.     Coags: No results found for: PROTIME, INR, APTT    HCG (If Applicable):   Lab Results   Component Value Date    PREGTESTUR neg 11/29/2021        ABGs: No results found for: PHART, PO2ART, XTE4NDK, WIS8FJL, BEART, O0UBCFLQ     Type & Screen (If Applicable):  No results found for: LABABO, LABRH    Drug/Infectious Status (If Applicable):  No results found for: HIV, HEPCAB    COVID-19 Screening (If Applicable):   Lab Results   Component Value Date    COVID19 Not Detected 11/29/2021           Anesthesia Evaluation     Anesthesia Plan        [facility], RN   3/28/2022

## 2022-04-01 ENCOUNTER — TELEPHONE (OUTPATIENT)
Dept: FAMILY MEDICINE CLINIC | Age: 52
End: 2022-04-01

## 2022-04-01 DIAGNOSIS — F41.9 ANXIETY: Primary | ICD-10-CM

## 2022-04-01 NOTE — TELEPHONE ENCOUNTER
Indira Alvarez is asking you to check her thyroid. She believes that there ay be something going on with that as she is still not feeling good.

## 2022-04-04 DIAGNOSIS — F41.9 ANXIETY: ICD-10-CM

## 2022-04-04 LAB
T4 FREE: 1.26 NG/DL (ref 0.93–1.7)
TSH SERPL DL<=0.05 MIU/L-ACNC: 1.49 UIU/ML (ref 0.27–4.2)

## 2022-04-05 ENCOUNTER — OFFICE VISIT (OUTPATIENT)
Dept: SURGERY | Age: 52
End: 2022-04-05
Payer: MEDICARE

## 2022-04-05 VITALS
BODY MASS INDEX: 19.51 KG/M2 | HEIGHT: 62 IN | OXYGEN SATURATION: 94 % | WEIGHT: 106 LBS | HEART RATE: 74 BPM | TEMPERATURE: 98.1 F | SYSTOLIC BLOOD PRESSURE: 98 MMHG | RESPIRATION RATE: 18 BRPM | DIASTOLIC BLOOD PRESSURE: 60 MMHG

## 2022-04-05 DIAGNOSIS — R11.2 INTRACTABLE NAUSEA AND VOMITING: ICD-10-CM

## 2022-04-05 DIAGNOSIS — K86.2 PANCREATIC CYST: Primary | ICD-10-CM

## 2022-04-05 PROCEDURE — 99211 OFF/OP EST MAY X REQ PHY/QHP: CPT | Performed by: SURGERY

## 2022-04-05 PROCEDURE — G8427 DOCREV CUR MEDS BY ELIG CLIN: HCPCS | Performed by: SURGERY

## 2022-04-05 PROCEDURE — G8420 CALC BMI NORM PARAMETERS: HCPCS | Performed by: SURGERY

## 2022-04-08 PROBLEM — R11.2 INTRACTABLE NAUSEA AND VOMITING: Status: ACTIVE | Noted: 2022-04-08

## 2022-04-08 PROBLEM — K86.2 PANCREATIC CYST: Status: ACTIVE | Noted: 2022-03-09

## 2022-04-08 NOTE — PROGRESS NOTES
General Surgery Office Note  Formerly [facility] Surgery  Consandre ТАТЬЯНА. Kianna Cuadra MD    Patient's Name/Date of Birth: Monica Patrick / 1970    Date: April 8, 2022     Surgeon: Kianna Cuadra MD    Chief Complaint:   Chief Complaint   Patient presents with    Results     follow up for EUS        Patient Active Problem List   Diagnosis    Right hip pain    Anxiety    Recurrent major depression in partial remission ([address]    New daily persistent headache    Tobacco use disorder    Mixed hyperlipidemia    History of migraine    Frequency of micturition    Right hip joint effusion    Mucocele of lip    Chronic low back pain without sciatica    Spondylosis of cervical region without myelopathy or radiculopathy    Intractable abdominal pain    Pancreatic cyst    Intractable nausea and vomiting       Subjective: Feeling much better since endoscopy. Nausea is much improved. Patient believes it was due to taking her medication on an empty stomach. She is working through this and her symptoms have been better. Objective:  BP 98/60   Pulse 74   Temp 98.1 °F (36.7 °C) (Infrared)   Resp 18   Ht 5' 2\" (1.575 m)   Wt 106 lb (48.1 kg)   LMP 12/28/2018 (LMP Unknown)   SpO2 94%   BMI 19.39 kg/m²   Labs:  No results for input(s): WBC, HGB, HCT in the last 72 hours. Invalid input(s): PLR  Lab Results   Component Value Date    CREATININE 0.7 03/12/2022    BUN 9 03/12/2022     03/12/2022    K 4.0 03/12/2022     03/12/2022    CO2 28 03/12/2022     No results for input(s): LIPASE, AMYLASE in the last 72 hours. General appearance: AA, NAD  HEENT: NCAT, PERRLA, EOMI  Lungs: Clear, equal rise bilateral  Heart: Reg  Abdomen: soft, nondistended, nontender  Skin: No lesions  Psych: No distress, conversive, no hallucinations  : No ulcers or lesions  Rectal: No bleeding    A complete 10 system review was performed and are otherwise negative unless mentioned in the above HPI.  Specific negatives are listed below but may not include all those reviewed. General ROS: negative obtundation, AMS  ENT ROS: negative rhinorrhea, epistaxis  Allergy and Immunology ROS: negative itchy/watery eyes or nasal congestion  Hematological and Lymphatic ROS: negative spontaneous bleeding or bruising  Endocrine ROS: negative  lethargy, mood swings, palpitations or polydipsia/polyuria  Respiratory ROS: negative sputum changes, stridor, tachypnea or wheezing  Cardiovascular ROS: negative for - loss of consciousness, murmur or orthopnea  Gastrointestinal ROS: negative for - hematochezia or hematemesis  Genito-Urinary ROS: negative for -  genital discharge or hematuria  Musculoskeletal ROS: negative for - focal weakness, gangrene  Psych/Neuro ROS: negative for - visual or auditory hallucinations, suicidal ideation      Time spent reviewing past medical, surgical, social and family history, vitals, nursing assessment and images. Imaging:  n/a    Pathology: n/a    Assessment/Plan:  Clarice Claros is a 46 y.o. female pancreatic cyst, intractable nausea improved    Follow-up as needed.      Physician Signature: Electronically signed by Dr. Luis Flores  4/8/2022

## 2022-04-13 ENCOUNTER — OFFICE VISIT (OUTPATIENT)
Dept: FAMILY MEDICINE CLINIC | Age: 52
End: 2022-04-13
Payer: MEDICARE

## 2022-04-13 VITALS — BODY MASS INDEX: 20.06 KG/M2 | HEIGHT: 62 IN | WEIGHT: 109 LBS | RESPIRATION RATE: 16 BRPM

## 2022-04-13 DIAGNOSIS — M47.812 SPONDYLOSIS OF CERVICAL REGION WITHOUT MYELOPATHY OR RADICULOPATHY: ICD-10-CM

## 2022-04-13 DIAGNOSIS — F17.200 TOBACCO USE DISORDER: Chronic | ICD-10-CM

## 2022-04-13 DIAGNOSIS — Z86.69 HISTORY OF MIGRAINE: Chronic | ICD-10-CM

## 2022-04-13 DIAGNOSIS — K86.2 PANCREATIC CYST: Primary | ICD-10-CM

## 2022-04-13 PROCEDURE — 3017F COLORECTAL CA SCREEN DOC REV: CPT | Performed by: FAMILY MEDICINE

## 2022-04-13 PROCEDURE — 1036F TOBACCO NON-USER: CPT | Performed by: FAMILY MEDICINE

## 2022-04-13 PROCEDURE — G8427 DOCREV CUR MEDS BY ELIG CLIN: HCPCS | Performed by: FAMILY MEDICINE

## 2022-04-13 PROCEDURE — 99213 OFFICE O/P EST LOW 20 MIN: CPT | Performed by: FAMILY MEDICINE

## 2022-04-13 PROCEDURE — G8420 CALC BMI NORM PARAMETERS: HCPCS | Performed by: FAMILY MEDICINE

## 2022-04-13 RX ORDER — BACLOFEN 10 MG/1
10 TABLET ORAL 3 TIMES DAILY
Qty: 360 TABLET | Refills: 1 | Status: SHIPPED | OUTPATIENT
Start: 2022-04-13

## 2022-04-13 RX ORDER — SUMATRIPTAN 50 MG/1
50 TABLET, FILM COATED ORAL
Qty: 9 TABLET | Refills: 2 | Status: SHIPPED
Start: 2022-04-13 | End: 2022-04-21

## 2022-04-13 ASSESSMENT — ENCOUNTER SYMPTOMS
VOMITING: 1
EYE PAIN: 0
PHOTOPHOBIA: 0
BLOOD IN STOOL: 0
SORE THROAT: 0
ABDOMINAL PAIN: 1
SHORTNESS OF BREATH: 0
NAUSEA: 1
EYE DISCHARGE: 0
BACK PAIN: 0
TROUBLE SWALLOWING: 0
ABDOMINAL DISTENTION: 1
COUGH: 0
CHEST TIGHTNESS: 0
ALLERGIC/IMMUNOLOGIC NEGATIVE: 1
EYE REDNESS: 0
SINUS PAIN: 0
DIARRHEA: 0

## 2022-04-13 NOTE — PROGRESS NOTES
22  Agatha Coleman : 1970 Sex: female  Age: 46 y.o. Assessment and Plan:  Crow Lynn was seen today for gi problem, neck pain and migraine. Diagnoses and all orders for this visit:    Pancreatic cyst    Tobacco use disorder    History of migraine  -     Moraima Belcher MD, Neurology, [facility]    She has stabilized. Taking her medications with food on her stomach as reduced her abdominal discomfort. The nausea and vomiting have resolved. Her headaches are becoming more problematic and is time for recheck with neurology for this. Can discuss newer migraine treatments to see if they are opiate for her. Critic cyst was small and will be observed. He continues to smoke. Return in about 3 months (around 2022). Chief Complaint   Patient presents with    GI Problem    Neck Pain     x 1-2 week     Migraine       Patient here for post hospital visit. He was admitted to the hospital because of intractable nausea vomiting abdominal pain. Small pancreatic cyst and normal upper GI endoscopy. Occasions were adjusted by GI and she is feeling much better now she has some food on her stomach when she takes her pills. Her headaches are beginning to aggravate again and he is overdue for follow-up with neurology for some of the newer treatments for migraines. Review of Systems   Constitutional: Negative for appetite change, fatigue and unexpected weight change. HENT: Negative for congestion, ear pain, hearing loss, sinus pain, sore throat and trouble swallowing. Eyes: Negative for photophobia, pain, discharge and redness. Respiratory: Negative for cough, chest tightness and shortness of breath. Cardiovascular: Negative for chest pain, palpitations and leg swelling. Gastrointestinal: Positive for abdominal distention, abdominal pain, nausea and vomiting. Negative for blood in stool and diarrhea. Endocrine: Negative.     Genitourinary: Negative for dysuria, flank pain, frequency and hematuria. Musculoskeletal: Negative for arthralgias, back pain, joint swelling and myalgias. Skin: Negative. Allergic/Immunologic: Negative. Neurological: Positive for headaches. Negative for dizziness, seizures, syncope, weakness, light-headedness and numbness. Hematological: Negative for adenopathy. Does not bruise/bleed easily. Psychiatric/Behavioral: Negative. Current Outpatient Medications:     DULoxetine (CYMBALTA) 60 MG extended release capsule, take 1 capsule by mouth once daily, Disp: , Rfl:     nicotine (NICODERM CQ) 21 MG/24HR, Place 1 patch onto the skin daily, Disp: 30 patch, Rfl: 3    ondansetron (ZOFRAN-ODT) 4 MG disintegrating tablet, Take 1 tablet by mouth 3 times daily as needed for Nausea or Vomiting, Disp: 21 tablet, Rfl: 0    rosuvastatin (CRESTOR) 20 MG tablet, take 1 tablet by mouth nightly ---90DAYS REQUEST, Disp: 90 tablet, Rfl: 1    pantoprazole (PROTONIX) 40 MG tablet, Take 1 tablet by mouth every morning (before breakfast), Disp: 90 tablet, Rfl: 1    baclofen (LIORESAL) 10 MG tablet, Take 1 tablet by mouth 3 times daily, Disp: 270 tablet, Rfl: 1    nadolol (CORGARD) 20 MG tablet, take 1 tablet by mouth once daily, Disp: 90 tablet, Rfl: 1    SUMAtriptan (IMITREX) 50 MG tablet, Take 1 tablet by mouth once as needed for Migraine, Disp: 9 tablet, Rfl: 2    Docusate Calcium (STOOL SOFTENER PO), Take 100 mg by mouth three times daily , Disp: , Rfl:     diazepam (VALIUM) 10 MG tablet, Take 10 mg by mouth every 8 hours. Instructed to take am of procedure if needed, Disp: , Rfl:     folic acid (FOLVITE) 1 MG tablet, Take 1 mg by mouth daily. , Disp: , Rfl:     lamoTRIgine (LAMICTAL) 200 MG tablet, Take 200 mg by mouth every evening , Disp: , Rfl:   Allergies   Allergen Reactions    Latex Rash    Sulfa Antibiotics Rash and Hives       Past Medical History:   Diagnosis Date    Anxiety     Arthritis     right hip    Bipolar 1 disorder ([facility] [address] Drug use: Not Currently     Types: Marijuana Lolis Milner)    Sexual activity: Not Currently   Other Topics Concern    Not on file   Social History Narrative    Not on file     Social Determinants of Health     Financial Resource Strain: High Risk    Difficulty of Paying Living Expenses: Hard   Food Insecurity: Food Insecurity Present    Worried About Running Out of Food in the Last Year: Never true    Kaleb of Food in the Last Year: Sometimes true   Transportation Needs:     Lack of Transportation (Medical): Not on file    Lack of Transportation (Non-Medical): Not on file   Physical Activity: Unknown    Days of Exercise per Week: 0 days    Minutes of Exercise per Session: Not on file   Stress:     Feeling of Stress : Not on file   Social Connections:     Frequency of Communication with Friends and Family: Not on file    Frequency of Social Gatherings with Friends and Family: Not on file    Attends Protestant Services: Not on file    Active Member of [address] [place] [address] or Organizations: Not on file    Attends Club or Organization Meetings: Not on file    Marital Status: Not on file   Intimate Partner Violence:     Fear of Current or Ex-Partner: Not on file    Emotionally Abused: Not on file    Physically Abused: Not on file    Sexually Abused: Not on file   Housing Stability:     Unable to Pay for Housing in the Last Year: Not on file    Number of [place] in the Last Year: Not on file    Unstable Housing in the Last Year: Not on file       Vitals:    04/13/22 1328   Resp: 16   Weight: 109 lb (49.4 kg)   Height: 5' 2\" (1.575 m)       Physical Exam  Vitals and nursing note reviewed. Constitutional:       Appearance: She is well-developed. HENT:      Head: Atraumatic. Right Ear: External ear normal.      Left Ear: External ear normal.      Nose: Nose normal.      Mouth/Throat:      Pharynx: No oropharyngeal exudate.    Eyes:      Conjunctiva/sclera: Conjunctivae normal.      Pupils: Pupils are equal, round, and reactive to light. Neck:      Thyroid: No thyromegaly. Trachea: No tracheal deviation. Cardiovascular:      Rate and Rhythm: Normal rate and regular rhythm. Heart sounds: No murmur heard. No friction rub. No gallop. Pulmonary:      Effort: Pulmonary effort is normal. No respiratory distress. Breath sounds: Normal breath sounds. Abdominal:      General: Bowel sounds are normal.      Palpations: Abdomen is soft. Musculoskeletal:         General: No tenderness or deformity. Normal range of motion. Cervical back: Normal range of motion and neck supple. Lymphadenopathy:      Cervical: No cervical adenopathy. Skin:     General: Skin is warm and dry. Capillary Refill: Capillary refill takes less than 2 seconds. Findings: No rash. Neurological:      Mental Status: She is alert and oriented to person, place, and time. Sensory: No sensory deficit. Motor: No abnormal muscle tone.       Coordination: Coordination normal.      Deep Tendon Reflexes: Reflexes normal.             Seen By:  Cassandra Meade DO

## 2022-04-13 NOTE — TELEPHONE ENCOUNTER
Liset Todd calling for a refill on Imitrex and asking for a new script for Baclofen with directions for 4 times a day. She said you ok'd her to take it 4 times a day, so I have them ready to send to [address] [place] [address].

## 2022-04-21 ENCOUNTER — OFFICE VISIT (OUTPATIENT)
Dept: NEUROLOGY | Age: 52
End: 2022-04-21
Payer: MEDICARE

## 2022-04-21 VITALS
WEIGHT: 109 LBS | HEART RATE: 80 BPM | TEMPERATURE: 97.4 F | HEIGHT: 62 IN | DIASTOLIC BLOOD PRESSURE: 70 MMHG | OXYGEN SATURATION: 97 % | BODY MASS INDEX: 20.06 KG/M2 | SYSTOLIC BLOOD PRESSURE: 100 MMHG

## 2022-04-21 DIAGNOSIS — G44.321 INTRACTABLE CHRONIC POST-TRAUMATIC HEADACHE: ICD-10-CM

## 2022-04-21 DIAGNOSIS — G43.719 INTRACTABLE CHRONIC MIGRAINE WITHOUT AURA AND WITHOUT STATUS MIGRAINOSUS: Primary | ICD-10-CM

## 2022-04-21 PROCEDURE — 99214 OFFICE O/P EST MOD 30 MIN: CPT | Performed by: PSYCHIATRY & NEUROLOGY

## 2022-04-21 PROCEDURE — G8420 CALC BMI NORM PARAMETERS: HCPCS | Performed by: PSYCHIATRY & NEUROLOGY

## 2022-04-21 PROCEDURE — 1036F TOBACCO NON-USER: CPT | Performed by: PSYCHIATRY & NEUROLOGY

## 2022-04-21 PROCEDURE — 3017F COLORECTAL CA SCREEN DOC REV: CPT | Performed by: PSYCHIATRY & NEUROLOGY

## 2022-04-21 PROCEDURE — G8427 DOCREV CUR MEDS BY ELIG CLIN: HCPCS | Performed by: PSYCHIATRY & NEUROLOGY

## 2022-04-21 ASSESSMENT — ENCOUNTER SYMPTOMS
ALLERGIC/IMMUNOLOGIC NEGATIVE: 1
EYES NEGATIVE: 1
RESPIRATORY NEGATIVE: 1
GASTROINTESTINAL NEGATIVE: 1

## 2022-04-21 NOTE — PROGRESS NOTES
Neurology Consult Note:    Patient: Anthony Lei  : 1970  Date: 22  Referring provider: Sunday Henry DO    Referral to Neurology: Chronic migraines, chronic daily headache. Dear Sunday Henry DO:    I have seen Anthony Lei a 70-year-old alert woman with history of chronic migraines and chronic daily headache pattern, a patient of [facility] headache center for several years most recently 2021 when Curtis Landau was recommended to be tried. However, review of the chart record indicates she did not call back or follow-up with her migraine doctor. She has tried Nurtec and many other prophylactic and rescue headache medications including sumatriptan, baclofen, is also prescribed duloxetine, nadolol and lamotrigine. Headache medication hx: Has tried all conventional migraine meds. Including triptans, Imitrex, Relpax, Nurtec, Ubrelvey, Emgality, Ajovy, Aimovig, Botox, is taking Cymbalta, Corgard; topiramate, divalproex-doesn't work and triptans not effective. Patient reports posttraumatic holocranial headaches with mild nausea, photophobia and sonophobia since  when she fell off a ladder and suffered a closed head injury. She also has chronic neck and low back pain. Her mother has a history of migraines. She reports a severe, pounding or aching headache pain which is constant, waxing waning in intensity throughout the day and occurs on a chronic daily basis. There are no specific triggers she can otherwise identify. She consumes 1 cup of coffee per day. [facility] records reviewed of Keron Peterson PA-C, headache center, most recently July through 2021. Lab Data: Reviewed from 2022, 3/12/2022, blood glucose 107, 121.     Imaging Data: MR brain scan with and without contrast, 2020:  1.  Mild burden of subcentimeter, nonspecific foci located in subcortical   white matter of both hemispheres.  This finding is stable since prior and may be related to areas of chronic microvascular ischemic change, remote trauma,   or migraine.  No periventricular foci of demyelination suggest multiple   sclerosis. 2.  No acute intracranial ischemia, mass, or edema. Current Outpatient Medications   Medication Sig Dispense Refill    Atogepant 10 MG TABS Take 10 mg by mouth daily Indications: Treatment to Prevent Migraine Headaches, chronic daily headache pattern, post traumatic headaches 30 tablet 11    baclofen (LIORESAL) 10 MG tablet Take 1 tablet by mouth 3 times daily 360 tablet 1    DULoxetine (CYMBALTA) 60 MG extended release capsule take 1 capsule by mouth once daily      nicotine (NICODERM CQ) 21 MG/24HR Place 1 patch onto the skin daily 30 patch 3    ondansetron (ZOFRAN-ODT) 4 MG disintegrating tablet Take 1 tablet by mouth 3 times daily as needed for Nausea or Vomiting 21 tablet 0    rosuvastatin (CRESTOR) 20 MG tablet take 1 tablet by mouth nightly ---90DAYS REQUEST 90 tablet 1    pantoprazole (PROTONIX) 40 MG tablet Take 1 tablet by mouth every morning (before breakfast) 90 tablet 1    nadolol (CORGARD) 20 MG tablet take 1 tablet by mouth once daily 90 tablet 1    Docusate Calcium (STOOL SOFTENER PO) Take 100 mg by mouth three times daily       diazepam (VALIUM) 10 MG tablet Take 10 mg by mouth every 8 hours. Instructed to take am of procedure if needed      folic acid (FOLVITE) 1 MG tablet Take 1 mg by mouth daily.  lamoTRIgine (LAMICTAL) 200 MG tablet Take 200 mg by mouth every evening        No current facility-administered medications for this visit.        Allergies   Allergen Reactions    Latex Rash    Sulfa Antibiotics Rash and Hives       Patient Active Problem List   Diagnosis    Right hip pain    Anxiety    Recurrent major depression in partial remission (HCC)    New daily persistent headache    Tobacco use disorder    Mixed hyperlipidemia    History of migraine    Frequency of micturition    Right hip joint effusion    Mucocele of lip    Chronic low back pain without sciatica    Spondylosis of cervical region without myelopathy or radiculopathy    Intractable abdominal pain    Pancreatic cyst    Intractable nausea and vomiting    Intractable chronic post-traumatic headache       Past Medical History:   Diagnosis Date    Anxiety     Arthritis     right hip    Bipolar 1 disorder ([address]     Cervical stenosis of spine     Chronic back pain     Depression     Hypertension     Intractable chronic post-traumatic headache 4/21/2022    Irritable bowel syndrome     Migraines     Mucocele of lip     for surgery 7/21/21    Other hyperlipidemia 7/29/2019    PTSD (post-traumatic stress disorder)        Past Surgical History:   Procedure Laterality Date    ARTHROGRAPHY Right 1/3/2019    RIGHT HIP ARTHROGRAM AND STEROID INJECTION performed by Jennifer Perrin MD at [address] [place] [address] Right 9/10/2020    RIGHT HIP INJECTION UNDER FLUOROSCOPY performed by Jennifer Perrin MD at [address] [place] [address] Right 4/13/2021    RIGHT HIP INJECTION UNDER FLUOROSCOPY performed by Jennifer Perrin MD at [address] [place] [address] N/A 2/3/2020    DILATATION AND CURETTAGE HYSTEROSCOPY performed by Lobo Delatorre MD at [facility] 7/21/2021    EXCISION RIGHT LIP MUCOCELE performed by Riley Champion MD at [address] [place] [address]  02/20/2019    implant for IBS     STIMULATOR SURGERY      bowel    TONSILLECTOMY      TOOTH EXTRACTION      TUBAL LIGATION      UPPER GASTROINTESTINAL ENDOSCOPY N/A 3/28/2022    EGD ESOPHAGOGASTRODUODENOSCOPY ULTRASOUND performed by Matheus Gambino MD at [place] [address]. History   Problem Relation Age of Onset    GERD Mother     Parkinsonism Mother     COPD Mother     No Known Problems Father        Social History     Socioeconomic History    Marital status:      Spouse name: Not on file    Number of children: Not on file    Years of education: Not on file    Highest education level: Not on file   Occupational History    Not on file   Tobacco Use    Smoking status: Former Smoker     Packs/day: 1.50     Years: 30.00     Pack years: 45.00     Types: Cigarettes    Smokeless tobacco: Never Used    Tobacco comment: trying to quit, wears patch   Vaping Use    Vaping Use: Former   Substance and Sexual Activity    Alcohol use: Not Currently    Drug use: Not Currently     Types: Marijuana Lolis Milner)    Sexual activity: Not Currently   Other Topics Concern    Not on file   Social History Narrative    Not on file     Social Determinants of Health     Financial Resource Strain: High Risk    Difficulty of Paying Living Expenses: Hard   Food Insecurity: Food Insecurity Present    Worried About Running Out of Food in the Last Year: Never true    Kaleb of Food in the Last Year: Sometimes true   Transportation Needs:     Lack of Transportation (Medical): Not on file    Lack of Transportation (Non-Medical):  Not on file   Physical Activity: Unknown    Days of Exercise per Week: 0 days    Minutes of Exercise per Session: Not on file   Stress:     Feeling of Stress : Not on file   Social Connections:     Frequency of Communication with Friends and Family: Not on file    Frequency of Social Gatherings with Friends and Family: Not on file    Attends Roman Catholic Services: Not on file    Active Member of Clubs or Organizations: Not on file    Attends Club or Organization Meetings: Not on file    Marital Status: Not on file   Intimate Partner Violence:     Fear of Current or Ex-Partner: Not on file    Emotionally Abused: Not on file    Physically Abused: Not on file    Sexually Abused: Not on file   Housing Stability:     Unable to Pay for Housing in the Last Year: Not on file    Number of [place] in the Last Year: Not on file    Unstable Housing in the Last Year: Not on file     Review of Systems HENT: Negative. Eyes: Negative. Respiratory: Negative. Cardiovascular: Negative. Gastrointestinal: Negative. Endocrine: Negative. Genitourinary: Negative. Musculoskeletal: Negative. Skin: Negative. Allergic/Immunologic: Negative. Neurological: Positive for headaches. Hematological: Negative. Psychiatric/Behavioral: Positive for dysphoric mood. The patient is nervous/anxious. Neurologic Exam:  /70   Pulse 80   Temp 97.4 °F (36.3 °C) (Temporal)   Ht 5' 2\" (1.575 m)   Wt 109 lb (49.4 kg)   LMP 12/28/2018 (LMP Unknown)   SpO2 97%   BMI 19.94 kg/m²   General appearance: Alert, cooperative, anxious, dysthymic, tearful, well-groomed, seated in the exam room, no acute distress  HEENT: Normocephalic/atraumatic. Neck: Supple, no bruits or adventitious sounds heard  Cardiac: RRR  Respiratory: grossly clear  Extremities: No edema, erythema or cyanosis  Skin: No apparent lesions or rash  Musculoskeletal: No fasciculations or tremor  Mental Status: Alert, oriented x3  Speech/Language: Clear, fluent  Attention span/Concentration: Mildly reduced with easy distractibility. Affect/Mood: Anxious, dysthymic, tearful  Insight/Judgement: [employer] of Knowledge/Current events: Unable to accurately assess  CN II-XII:     Pupils: Equal, reactive to light, 2.5 mm     EOM's: Full without nystagmus  Visual Fields: Full to confrontation  Fundi: Miosis to light, squints to light. CN V: normal V1-V3  CN VII: No facial droop, decreased facial expression  CN VIII: Hearing grossly intact  CN IX-XII: Tongue midline  SCM/Trapezii: 5/5 power  Motor: 5/5 power in the upper and lower extremities without tremor or drift and normal motor tone. Intact fine motor function of both hands. DTR's: 1+ and symmetric in the upper and lower extremities, no ankle clonus, plantars plantars are flexor.   Sensory: Grossly intact subjectively throughout to light touch and sharp stick testing. Coordination/Gait: No gross limb dysmetria on finger-to-nose testing, no truncal or cerebellar gait ataxia. Assessment/Plan:  1. Chronic variant migraine headache pain syndrome with holocranial headache, posttraumatic migraine, chronic daily headache pattern. 2. She has tried all conventional migraine meds. Including triptans, Imitrex, Relpax, Nurtec, Ubrelvey, Emgality, Ajovy, Aimovig, Botox, is taking Cymbalta, Corgard; topiramate, divalproex-doesn't work and triptans were not effective. 3.  She wishes to try a newer migraine headache medication, I.e., atogepant 30 mg daily as needed for migraine exacerbation was submitted which will require prior insurance authorization. 4.  Follow-up in the Neurology clinic in 6 weeks otherwise. 5.  Patient information was provided from the [address] [place] [address] website. Sincerely,      Benedicto Mcgee MD  Neurology & Clinical Neurophysiology    This note was created using speech recognition transcription software. Despite proofreading, there may be several typographical errors present that may affect the meaning of the content. Please call with any questions. Note: A total time of 40 mins. was spent on the date of service in preparation for this visit, which included face-to-face patient care, completing clinical documentation, counseling and coordination of care based on clinical impression, neurologic diagnosis, review of pertinent imaging studies, test results, implementation and discussion of treatment plan, risk factor reduction and patient and/or family education.

## 2022-04-29 ENCOUNTER — TELEPHONE (OUTPATIENT)
Dept: NEUROLOGY | Age: 52
End: 2022-04-29

## 2022-05-04 ENCOUNTER — TELEPHONE (OUTPATIENT)
Dept: FAMILY MEDICINE CLINIC | Age: 52
End: 2022-05-04

## 2022-05-04 NOTE — TELEPHONE ENCOUNTER
Patient called in stating that she saw Dr. Alfreda Aly before. She said her right hip is bothering her again. Would she need another referral to him?

## 2022-05-13 ENCOUNTER — TELEPHONE (OUTPATIENT)
Dept: NEUROLOGY | Age: 52
End: 2022-05-13

## 2022-05-13 ENCOUNTER — OFFICE VISIT (OUTPATIENT)
Dept: FAMILY MEDICINE CLINIC | Age: 52
End: 2022-05-13
Payer: MEDICARE

## 2022-05-13 VITALS
HEIGHT: 62 IN | OXYGEN SATURATION: 97 % | SYSTOLIC BLOOD PRESSURE: 120 MMHG | BODY MASS INDEX: 19.69 KG/M2 | RESPIRATION RATE: 16 BRPM | WEIGHT: 107 LBS | HEART RATE: 68 BPM | DIASTOLIC BLOOD PRESSURE: 78 MMHG | TEMPERATURE: 97.5 F

## 2022-05-13 DIAGNOSIS — G43.019 MIGRAINE WITHOUT AURA, INTRACTABLE: Primary | ICD-10-CM

## 2022-05-13 DIAGNOSIS — M47.812 SPONDYLOSIS OF CERVICAL REGION WITHOUT MYELOPATHY OR RADICULOPATHY: Primary | ICD-10-CM

## 2022-05-13 DIAGNOSIS — M54.50 CHRONIC LOW BACK PAIN WITHOUT SCIATICA, UNSPECIFIED BACK PAIN LATERALITY: ICD-10-CM

## 2022-05-13 DIAGNOSIS — G89.29 CHRONIC LOW BACK PAIN WITHOUT SCIATICA, UNSPECIFIED BACK PAIN LATERALITY: ICD-10-CM

## 2022-05-13 DIAGNOSIS — R51.9 CHRONIC DAILY HEADACHE: ICD-10-CM

## 2022-05-13 DIAGNOSIS — G43.719 INTRACTABLE CHRONIC MIGRAINE WITHOUT AURA AND WITHOUT STATUS MIGRAINOSUS: Primary | ICD-10-CM

## 2022-05-13 PROCEDURE — 3017F COLORECTAL CA SCREEN DOC REV: CPT | Performed by: FAMILY MEDICINE

## 2022-05-13 PROCEDURE — 99213 OFFICE O/P EST LOW 20 MIN: CPT | Performed by: FAMILY MEDICINE

## 2022-05-13 PROCEDURE — G8420 CALC BMI NORM PARAMETERS: HCPCS | Performed by: FAMILY MEDICINE

## 2022-05-13 PROCEDURE — G8427 DOCREV CUR MEDS BY ELIG CLIN: HCPCS | Performed by: FAMILY MEDICINE

## 2022-05-13 PROCEDURE — 1036F TOBACCO NON-USER: CPT | Performed by: FAMILY MEDICINE

## 2022-05-13 ASSESSMENT — ENCOUNTER SYMPTOMS
TROUBLE SWALLOWING: 0
CHEST TIGHTNESS: 0
BLOOD IN STOOL: 0
EYE REDNESS: 0
PHOTOPHOBIA: 0
COUGH: 0
VOMITING: 0
ABDOMINAL PAIN: 0
SINUS PAIN: 0
ALLERGIC/IMMUNOLOGIC NEGATIVE: 1
EYE PAIN: 0
BACK PAIN: 1
SORE THROAT: 0
EYE DISCHARGE: 0
NAUSEA: 0
SHORTNESS OF BREATH: 0
DIARRHEA: 0

## 2022-05-13 NOTE — TELEPHONE ENCOUNTER
Pt contacted office stating she has notice a slight improvement in migraines with Qulipta 30mg, but she is still experiencing daily headaches, and would like to know if medication can be increased?

## 2022-05-13 NOTE — PROGRESS NOTES
Quilipta migraine medication dose updated to 60 mg as reported still experiencing daily headaches with 30 mg dose. If above medication change is not effective for her migraines, then discussed previously her only alternative would be to return back to her [facility] headache provider, at headache clinic.

## 2022-05-13 NOTE — PROGRESS NOTES
22  Rae Barrientos : 1970 Sex: female  Age: 46 y.o. Assessment and Plan:  Gerber was seen today for back pain and neck pain. Diagnoses and all orders for this visit:    Spondylosis of cervical region without myelopathy or radiculopathy  -     [facility] Alfonza Bamberger, [place], DO, Pain Medicine, [place]    Chronic low back pain without sciatica, unspecified back pain laterality  -     Roshni rCowe DO, Pain Medicine, [place]    Will place pain referral for [facility] pain medicine. Get their opinion on what could be done to give her some relief. Return in about 3 months (around 2022). Chief Complaint   Patient presents with    Back Pain     x 3 weeks     Neck Pain       Is here for recheck. Was seen by Denese Lesches physical medicine and diagnosed with degenerative cervical and lumbar arthritic arthritis. No specific treatment recommendations were made by them. Would like to have another opinion. Review of Systems   Constitutional: Negative for appetite change, fatigue and unexpected weight change. HENT: Negative for congestion, ear pain, hearing loss, sinus pain, sore throat and trouble swallowing. Eyes: Negative for photophobia, pain, discharge and redness. Respiratory: Negative for cough, chest tightness and shortness of breath. Cardiovascular: Negative for chest pain, palpitations and leg swelling. Gastrointestinal: Negative for abdominal pain, blood in stool, diarrhea, nausea and vomiting. Endocrine: Negative. Genitourinary: Negative for dysuria, flank pain, frequency and hematuria. Musculoskeletal: Positive for arthralgias, back pain and myalgias. Negative for joint swelling. Cervical, thoracic, lumbar spine. Skin: Negative. Allergic/Immunologic: Negative. Neurological: Negative for dizziness, seizures, syncope, weakness, light-headedness, numbness and headaches. Hematological: Negative for adenopathy. Does not bruise/bleed easily. Psychiatric/Behavioral: Negative. Current Outpatient Medications:     Atogepant 30 MG TABS, Take 60 mg by mouth daily As needed for migraine excerbation, Disp: 30 tablet, Rfl: 3    baclofen (LIORESAL) 10 MG tablet, Take 1 tablet by mouth 3 times daily, Disp: 360 tablet, Rfl: 1    DULoxetine (CYMBALTA) 60 MG extended release capsule, 90 mg daily , Disp: , Rfl:     nicotine (NICODERM CQ) 21 MG/24HR, Place 1 patch onto the skin daily, Disp: 30 patch, Rfl: 3    ondansetron (ZOFRAN-ODT) 4 MG disintegrating tablet, Take 1 tablet by mouth 3 times daily as needed for Nausea or Vomiting, Disp: 21 tablet, Rfl: 0    rosuvastatin (CRESTOR) 20 MG tablet, take 1 tablet by mouth nightly ---90DAYS REQUEST, Disp: 90 tablet, Rfl: 1    pantoprazole (PROTONIX) 40 MG tablet, Take 1 tablet by mouth every morning (before breakfast), Disp: 90 tablet, Rfl: 1    nadolol (CORGARD) 20 MG tablet, take 1 tablet by mouth once daily, Disp: 90 tablet, Rfl: 1    Docusate Calcium (STOOL SOFTENER PO), Take 100 mg by mouth three times daily , Disp: , Rfl:     diazepam (VALIUM) 10 MG tablet, Take 10 mg by mouth every 8 hours. Instructed to take am of procedure if needed, Disp: , Rfl:     folic acid (FOLVITE) 1 MG tablet, Take 1 mg by mouth daily. , Disp: , Rfl:     lamoTRIgine (LAMICTAL) 200 MG tablet, Take 200 mg by mouth every evening , Disp: , Rfl:   Allergies   Allergen Reactions    Latex Rash    Sulfa Antibiotics Rash and Hives       Past Medical History:   Diagnosis Date    Anxiety     Arthritis     right hip    Bipolar 1 disorder (HCC)     Cervical stenosis of spine     Chronic back pain     Depression     Hypertension     Intractable chronic post-traumatic headache 4/21/2022    Irritable bowel syndrome     Migraines     Mucocele of lip     for surgery 7/21/21    Other hyperlipidemia 7/29/2019    PTSD (post-traumatic stress disorder)      Past Surgical History:   Procedure Laterality Date    ARTHROGRAPHY Right 1/3/2019    RIGHT HIP ARTHROGRAM AND STEROID INJECTION performed by Steve Fragoso MD at [address] [facility] Right 9/10/2020    RIGHT HIP INJECTION UNDER FLUOROSCOPY performed by Steve Fragoso MD at [facility] Right 4/13/2021    RIGHT HIP INJECTION UNDER FLUOROSCOPY performed by Steve Fragoso MD at [address] [facility]      COLONOSCOPY      DILATION AND CURETTAGE OF UTERUS N/A 2/3/2020    DILATATION AND CURETTAGE HYSTEROSCOPY performed by Nikita Orozco MD at [facility] Right 7/21/2021    EXCISION RIGHT LIP MUCOCELE performed by Claudia Moe MD at [address] [place] [address] Rd  02/20/2019    implant for IBS     STIMULATOR SURGERY      bowel    TONSILLECTOMY      TOOTH EXTRACTION      TUBAL LIGATION      UPPER GASTROINTESTINAL ENDOSCOPY N/A 3/28/2022    EGD ESOPHAGOGASTRODUODENOSCOPY ULTRASOUND performed by Daryle Krabbe, MD at [address] [facility] [address] History   Problem Relation Age of Onset    GERD Mother     Parkinsonism Mother     COPD Mother     No Known Problems Father     Breast Cancer Maternal Aunt      Social History     Socioeconomic History    Marital status:      Spouse name: Not on file    Number of children: Not on file    Years of education: Not on file    Highest education level: Not on file   Occupational History    Not on file   Tobacco Use    Smoking status: Former Smoker     Packs/day: 1.50     Years: 30.00     Pack years: 45.00     Types: Cigarettes    Smokeless tobacco: Never Used    Tobacco comment: trying to quit, wears patch   Vaping Use    Vaping Use: Former   Substance and Sexual Activity    Alcohol use: Not Currently    Drug use: Not Currently     Types: Marijuana Elfida Burger)    Sexual activity: Not Currently   Other Topics Concern    Not on file   Social History Narrative    Not on file     Social Determinants of Health     Financial Resource Strain: High Risk    Difficulty of Paying Living Expenses: Hard   Food Insecurity: Food Insecurity Present    Worried About Running Out of Food in the Last Year: Never true    Kaleb of Food in the Last Year: Sometimes true   Transportation Needs:     Lack of Transportation (Medical): Not on file    Lack of Transportation (Non-Medical): Not on file   Physical Activity: Unknown    Days of Exercise per Week: 0 days    Minutes of Exercise per Session: Not on file   Stress:     Feeling of Stress : Not on file   Social Connections:     Frequency of Communication with Friends and Family: Not on file    Frequency of Social Gatherings with Friends and Family: Not on file    Attends Bahai Services: Not on file    Active Member of Clubs or Organizations: Not on file    Attends Club or Organization Meetings: Not on file    Marital Status: Not on file   Intimate Partner Violence:     Fear of Current or Ex-Partner: Not on file    Emotionally Abused: Not on file    Physically Abused: Not on file    Sexually Abused: Not on file   Housing Stability:     Unable to Pay for Housing in the Last Year: Not on file    Number of [place] in the Last Year: Not on file    Unstable Housing in the Last Year: Not on file       Vitals:    05/13/22 1352   BP: 120/78   Pulse: 68   Resp: 16   Temp: 97.5 °F (36.4 °C)   TempSrc: Temporal   SpO2: 97%   Weight: 107 lb (48.5 kg)   Height: 5' 2\" (1.575 m)       Physical Exam  Vitals and nursing note reviewed. Constitutional:       Appearance: She is well-developed. HENT:      Head: Atraumatic. Right Ear: External ear normal.      Left Ear: External ear normal.      Nose: Nose normal.      Mouth/Throat:      Pharynx: No oropharyngeal exudate. Eyes:      Conjunctiva/sclera: Conjunctivae normal.      Pupils: Pupils are equal, round, and reactive to light. Neck:      Thyroid: No thyromegaly. Trachea: No tracheal deviation.    Cardiovascular:      Rate and Rhythm: Normal rate and regular rhythm. Heart sounds: No murmur heard. No friction rub. No gallop. Pulmonary:      Effort: Pulmonary effort is normal. No respiratory distress. Breath sounds: Normal breath sounds. Abdominal:      General: Bowel sounds are normal.      Palpations: Abdomen is soft. Musculoskeletal:         General: No tenderness or deformity. Normal range of motion. Cervical back: Normal range of motion and neck supple. Comments: Pain, deafness, spasm, decreased range of motion cervical thoracic and lumbar spine. Lymphadenopathy:      Cervical: No cervical adenopathy. Skin:     General: Skin is warm and dry. Capillary Refill: Capillary refill takes less than 2 seconds. Findings: No rash. Neurological:      Mental Status: She is alert and oriented to person, place, and time. Sensory: No sensory deficit. Motor: No abnormal muscle tone.       Coordination: Coordination normal.      Deep Tendon Reflexes: Reflexes normal.             Seen By:  Kenny Roberto DO

## 2022-05-23 NOTE — PROGRESS NOTES
[facility] PRE-ADMISSION TESTING INSTRUCTIONS    The Preadmission Testing patient is instructed accordingly using the following criteria (check applicable):    ARRIVAL INSTRUCTIONS:  [x] Parking the day of Surgery is located in the Main Entrance lot. Upon entering the door, make an immediate right to the surgery reception desk    [x] Bring photo ID and insurance card    [] Bring in a copy of Living will or Durable Power of  papers. [x] Please be sure to arrange transportation to and from the hospital    [x] Please arrange for someone to be with you the remainder of the day due to having anesthesia      GENERAL INSTRUCTIONS:    [x] Nothing by mouth after midnight, including gum, candy, mints or water    [x] You may brush your teeth, but do not swallow any water    [x] Take medications as instructed with 1-2 oz of water    [x] Stop herbal supplements and vitamins 5 days prior to procedure    [] Follow preop dosing of blood thinners per physician instructions    [] Do not take insulin or oral diabetic medications    [] If diabetic and have low blood sugar or feel symptomatic, take 1-2oz apple juice or glucose tablets    [] Bring inhalers day of surgery    [] Bring C-PAP/ Bi-Pap day of surgery    [] Bring urine specimen day of surgery    [x] Antibacterial Soap shower or bath AM of Surgery, no lotion, powders or creams to surgical site    [] Follow bowel prep as instructed per surgeon    [x] No tobacco products within 24 hours of surgery     [] No alcohol or illegal drug use within 24 hours of surgery.     [x] Jewelry, body piercing's, eyeglasses, contact lenses and dentures are not permitted into surgery (bring cases)      [x] Please do not wear any nail polish or make up on the day of surgery    [x] If not already done, you can expect a call from registration    [x] If surgeon requests a time change you will be notified the day prior to surgery    [x] If you receive a survey after surgery we would greatly appreciate your comments    [] Parent/guardian of a minor must accompany their child and remain on the premises  the entire time they are under our care     [] Pediatric patients may bring favorite toy, blanket or comfort item with them    [] A caregiver or family member must remain with the patient during their stay if they are mentally handicapped, have dementia, disoriented or unable to use a call light or would be a safety concern if left unattended    [x] Please notify surgeon if you develop any illness between now and time of surgery (cold, cough, sore throat, fever, nausea, vomiting) or any signs of infections  including skin, wounds, and dental.    [] Other instructions    EDUCATIONAL MATERIALS PROVIDED:    [] PAT Preoperative Education Packet/Booklet     [] Medication List    [] Fluoroscopy Information Pamphlet    [] Transfusion bracelet applied with instructions    [] Joint replacement video reviewed    [] Shower with antibacterial soap and use CHG wipes provided the evening before surgery as instructed Statement Selected

## 2022-06-02 ENCOUNTER — TELEPHONE (OUTPATIENT)
Dept: FAMILY MEDICINE CLINIC | Age: 52
End: 2022-06-02

## 2022-06-02 NOTE — TELEPHONE ENCOUNTER
Patient called and stated that she called Dr. Smith Spearing office. The medication he put her on for migraines is not helping her. They said they couldn't do anything else for her and told her to go to the ER. Patient doesn't want to do that. What else can she do? She sees pain management on the 15th. She said she cant move her head to the side, down or back without extreme pain. Please advise.

## 2022-06-08 ENCOUNTER — TELEPHONE (OUTPATIENT)
Dept: FAMILY MEDICINE CLINIC | Age: 52
End: 2022-06-08

## 2022-06-08 NOTE — TELEPHONE ENCOUNTER
[facility] wants her to get an iv infusion; vyepti. They are working on insurance auth now. She wants to make sure ok to still see pain management on 06/15?  She is unsure if she will be getting this IV prior

## 2022-06-14 ENCOUNTER — TELEPHONE (OUTPATIENT)
Dept: FAMILY MEDICINE CLINIC | Age: 52
End: 2022-06-14

## 2022-06-14 NOTE — TELEPHONE ENCOUNTER
I left message to return call. I do not believe there is GYN in [place]. Closest; [facility], [place], [facility]. Most insurances do not require a referral for gynecology.

## 2022-06-14 NOTE — TELEPHONE ENCOUNTER
----- Message from Ulyssesstevie Joseph sent at 6/14/2022  2:39 PM EDT -----  Subject: Message to Provider    QUESTIONS  Information for Provider? Patient needs a referral for gynecology located   in [place], please contact patient to advise.   ---------------------------------------------------------------------------  --------------  CALL BACK INFO  What is the best way for the office to contact you? OK to leave message on   voicemail  Preferred Call Back Phone Number? [phone]  ---------------------------------------------------------------------------  --------------  SCRIPT ANSWERS  Relationship to Patient?  Self

## 2022-06-15 ENCOUNTER — OFFICE VISIT (OUTPATIENT)
Dept: PAIN MANAGEMENT | Age: 52
End: 2022-06-15
Payer: MEDICARE

## 2022-06-15 VITALS
DIASTOLIC BLOOD PRESSURE: 80 MMHG | BODY MASS INDEX: 19.14 KG/M2 | OXYGEN SATURATION: 95 % | HEART RATE: 84 BPM | TEMPERATURE: 97.2 F | HEIGHT: 62 IN | RESPIRATION RATE: 16 BRPM | SYSTOLIC BLOOD PRESSURE: 130 MMHG | WEIGHT: 104 LBS

## 2022-06-15 DIAGNOSIS — M50.30 DDD (DEGENERATIVE DISC DISEASE), CERVICAL: ICD-10-CM

## 2022-06-15 DIAGNOSIS — M47.812 CERVICAL SPONDYLOSIS: ICD-10-CM

## 2022-06-15 DIAGNOSIS — M51.36 DDD (DEGENERATIVE DISC DISEASE), LUMBAR: Primary | ICD-10-CM

## 2022-06-15 DIAGNOSIS — G89.4 CHRONIC PAIN SYNDROME: ICD-10-CM

## 2022-06-15 DIAGNOSIS — F54 PSYCHOLOGICAL FACTORS AFFECTING MEDICAL CONDITION: ICD-10-CM

## 2022-06-15 DIAGNOSIS — M54.2 CERVICALGIA: ICD-10-CM

## 2022-06-15 DIAGNOSIS — M47.817 LUMBOSACRAL SPONDYLOSIS WITHOUT MYELOPATHY: ICD-10-CM

## 2022-06-15 PROCEDURE — G8420 CALC BMI NORM PARAMETERS: HCPCS | Performed by: ANESTHESIOLOGY

## 2022-06-15 PROCEDURE — 3017F COLORECTAL CA SCREEN DOC REV: CPT | Performed by: ANESTHESIOLOGY

## 2022-06-15 PROCEDURE — 99204 OFFICE O/P NEW MOD 45 MIN: CPT | Performed by: ANESTHESIOLOGY

## 2022-06-15 PROCEDURE — 1036F TOBACCO NON-USER: CPT | Performed by: ANESTHESIOLOGY

## 2022-06-15 PROCEDURE — G8427 DOCREV CUR MEDS BY ELIG CLIN: HCPCS | Performed by: ANESTHESIOLOGY

## 2022-06-15 NOTE — PROGRESS NOTES
Patient:  DARYA Sahu 1970  Date of Service:  6/15/22      Do you currently have any of the following:    Fever: No  Headache:  No  Cough: No  Shortness of breath: No  Exposed to anyone with these symptoms: No       Patient presents with complaints of her low back and neck pain that started 2 years ago and has been getting worse. She states the pain began following a fall from a ladder. Pain is constant and is described as aching, stabbing, nagging and miserable. She rates the pain as a 10/10 on her worst day , 8/10 on her best day, and a 9/10 on average on the VAS scale. Pain does radiate to right arm, left arm and left leg. She  has numbness of the bilateral hands. Alleviating factors include: nothing. Aggravating factors include:  cold. She states that the pain does keep her from sleeping at night. She is not on NSAIDS and  is not on anticoagulation medications. Previous treatments: Right hip injection. Personal Expectations from this treatment: increase activity and decrease pain    /80   Pulse 84   Temp 97.2 °F (36.2 °C) (Infrared)   Resp 16   Ht 5' 2\" (1.575 m)   Wt 104 lb (47.2 kg)   LMP 2018 (LMP Unknown)   SpO2 95%   BMI 19.02 kg/m²     Patient's last menstrual period was 2018 (lmp unknown).

## 2022-06-15 NOTE — PROGRESS NOTES
[facility]        [address] [place] [address]  Dept: [phone]          Consult Note      Patient:  Rainelle Boast, DOB 1970    Date of Service:  06/15/22     Requesting Physician:  Holland Closs, DO    Reason for Consult:      Patient presents with complaints of neck pain and low back pain, diffuse pain    HISTORY OF PRESENT ILLNESS:      Ms. Rainelle Boast is a 46 y.o. female presented today to [facility] for evaluation of chronic neck pain and low back pain for > 5 yrs. Priro treatment at [facility]. Interventions did not help. Also has chronic diffuse pain. Neck pain and low back pain pain predominantly axial in nature. Denies significant UE / LE radicular component. Pain is constant and is described as aching and throbbing. She  does not have numbness, tingling of the upper and lower extremities . Generalized weakness/ fatigue/ depression. Anxiety +    Patient does not have new  bladder or bowel dysfunction. Alleviating factors include: rest.  Aggravating factors include: movement, bending, lifting. Nursing notes and details of the pain history reviewed. Please see intake notes for details. NOTE:  H/o Depression/ anxiety/ PTSD follows with psych. Previous treatments:   Physical Therapy : yes  HEP +    Medications: yes    Spine Surgeries: no    Interventional Pain procedures/ nerve blocks: yes,    She has not been on anticoagulation medications     H/O Smoking: YES  H/O alcohol abuse : denies  H/O Illicit drug use : denies    Employment: disability    Imaging:     MRI of C spine: 10/1/2019:  Findings:       Changes seen throughout the discs are abnormal. There are findings to   suggest moderate degenerative disc disease.       Changes seen throughout the bone marrow are abnormal. Findings are   compatible with degenerative disc disease.  There is low signal on T1   and increased signal on T2-weighted images in the C5-C6 and to lesser   extent the C7 vertebral body. The differential would include acute   trauma versus metastatic disease.       Changes within the facets are abnormal. Findings are compatible with   moderate degenerative facet disease.           At C2-3: There is no significant disc herniation, there is no   significant canal stenosis.       At C3-4: There is no significant disc herniation, there is no   significant canal stenosis.       At C4-5: There is a mild broad-based disc herniation, there is mild   canal stenosis       At C5-6: There is a mild broad-based disc herniation, there is mild   canal stenosis, more prominent to the left moderately narrowing the   left neural foramina       At C6-7: There is a mild broad-based disc herniation, there is mild   canal stenosis       At C7-T1: There is no significant disc herniation, there is no   significant canal stenosis.         Impression   Findings compatible with degenerative changes   Signal abnormality within the C5 and C6 vertebral bodies and the   superior endplate of C7. The differential would include include   metastatic disease.  Trauma is less likely     CT cervical spein: 10/4/2012:     Impression   Findings compatible with degenerative changes         Past Medical History:   Diagnosis Date    Anxiety     Arthritis     right hip    Bipolar 1 disorder ([address]     Cervical stenosis of spine     Chronic back pain     Depression     Hypertension     Intractable chronic post-traumatic headache 4/21/2022    Irritable bowel syndrome     Migraines     Mucocele of lip     for surgery 7/21/21    Other hyperlipidemia 7/29/2019    PTSD (post-traumatic stress disorder)        Past Surgical History:   Procedure Laterality Date    ARTHROGRAPHY Right 1/3/2019    RIGHT HIP ARTHROGRAM AND STEROID INJECTION performed by Margi Doan MD at [address] [place] [address] Right 9/10/2020    RIGHT HIP INJECTION UNDER FLUOROSCOPY performed by Jed Calderon MD at [facility] Right 4/13/2021    RIGHT HIP INJECTION UNDER FLUOROSCOPY performed by Jed Calderon MD at [facility] N/A 2/3/2020    DILATATION AND CURETTAGE HYSTEROSCOPY performed by Elida Yao MD at [facility] 7/21/2021    EXCISION RIGHT LIP MUCOCELE performed by Leeanne Pritchett MD at [facility]  02/20/2019    implant for IBS     STIMULATOR SURGERY      bowel    TONSILLECTOMY      TOOTH EXTRACTION      TUBAL LIGATION      UPPER GASTROINTESTINAL ENDOSCOPY N/A 3/28/2022    EGD ESOPHAGOGASTRODUODENOSCOPY ULTRASOUND performed by Sandra Luciano MD at [address]       Prior to Admission medications    Medication Sig Start Date End Date Taking? Authorizing Provider   baclofen (LIORESAL) 10 MG tablet Take 1 tablet by mouth 3 times daily 4/13/22  Yes Rebuck SHANE Persaud DO   DULoxetine (CYMBALTA) 60 MG extended release capsule 90 mg daily  3/2/22  Yes Historical Provider, MD   ondansetron (ZOFRAN-ODT) 4 MG disintegrating tablet Take 1 tablet by mouth 3 times daily as needed for Nausea or Vomiting 3/8/22  Yes FRENCH Piedra - CNP   rosuvastatin (CRESTOR) 20 MG tablet take 1 tablet by mouth nightly ---90DAYS REQUEST 3/4/22  Yes Alexis Persaud DO   pantoprazole (PROTONIX) 40 MG tablet Take 1 tablet by mouth every morning (before breakfast) 2/18/22  Yes Alexis Persaud DO   nadolol (CORGARD) 20 MG tablet take 1 tablet by mouth once daily 12/30/21  Yes Rebuck SHANE Persaud DO   Docusate Calcium (STOOL SOFTENER PO) Take 100 mg by mouth three times daily    Yes Historical Provider, MD   diazepam (VALIUM) 10 MG tablet Take 10 mg by mouth every 8 hours. Instructed to take am of procedure if needed   Yes Historical Provider, MD   folic acid (FOLVITE) 1 MG tablet Take 1 mg by mouth daily.    Yes Historical Provider, MD lamoTRIgine (LAMICTAL) 200 MG tablet Take 200 mg by mouth every evening    Yes Historical Provider, MD   Atogepant 60 MG TABS Take 60 mg by mouth daily  Patient not taking: Reported on 6/15/2022 5/13/22 5/13/23  Екатерина George MD   nicotine (Kaylanita Joanne) 21 MG/24HR Place 1 patch onto the skin daily  Patient not taking: Reported on 6/15/2022 3/12/22   Francis Coleman MD       Allergies   Allergen Reactions    Latex Rash    Sulfa Antibiotics Rash and Hives       Social History     Socioeconomic History    Marital status:      Spouse name: Not on file    Number of children: Not on file    Years of education: Not on file    Highest education level: Not on file   Occupational History    Not on file   Tobacco Use    Smoking status: Former Smoker     Packs/day: 1.50     Years: 30.00     Pack years: 45.00     Types: Cigarettes    Smokeless tobacco: Never Used    Tobacco comment: trying to quit, wears patch   Vaping Use    Vaping Use: Former   Substance and Sexual Activity    Alcohol use: Not Currently    Drug use: Not Currently     Types: Marijuana Neeta Ing)    Sexual activity: Not Currently   Other Topics Concern    Not on file   Social History Narrative    Not on file     Social Determinants of Health     Financial Resource Strain: High Risk    Difficulty of Paying Living Expenses: Hard   Food Insecurity: Food Insecurity Present    Worried About Running Out of Food in the Last Year: Never true    Kaleb of Food in the Last Year: Sometimes true   Transportation Needs:     Lack of Transportation (Medical): Not on file    Lack of Transportation (Non-Medical):  Not on file   Physical Activity: Unknown    Days of Exercise per Week: 0 days    Minutes of Exercise per Session: Not on file   Stress:     Feeling of Stress : Not on file   Social Connections:     Frequency of Communication with Friends and Family: Not on file    Frequency of Social Gatherings with Friends and Family: Not on file  Attends Islam Services: Not on file    Active Member of Clubs or Organizations: Not on file    Attends Club or Organization Meetings: Not on file    Marital Status: Not on file   Intimate Partner Violence:     Fear of Current or Ex-Partner: Not on file    Emotionally Abused: Not on file    Physically Abused: Not on file    Sexually Abused: Not on file   Housing Stability:     Unable to Pay for Housing in the Last Year: Not on file    Number of [place] in the Last Year: Not on file    Unstable Housing in the Last Year: Not on file       Family History   Problem Relation Age of Onset    GERD Mother     Parkinsonism Mother     COPD Mother     No Known Problems Father     Breast Cancer Maternal Aunt        REVIEW OF SYSTEMS:     Patient specifically denies fever/chills, chest pain, shortness of breath, new bowel or bladder complaints. All other review of systems was negative. Review of Systems - reviewed. PHYSICAL EXAMINATION:    Examined in the presence of Ms. Guzman. /80   Pulse 84   Temp 97.2 °F (36.2 °C) (Infrared)   Resp 16   Ht 5' 2\" (1.575 m)   Wt 104 lb (47.2 kg)   LMP 12/28/2018 (LMP Unknown)   SpO2 95%   BMI 19.02 kg/m²     General:      General appearance:  Pleasant and well-hydrated, in no distress and A & O x 3  Build:Underweight  Function: Rises from seated position easily    HEENT:    Head:normocephalic, atraumatic  Pupils:regular, round, equal  Sclera: icterus absent    Lungs:    Breathing:normal breathing pattern     CVS:     RRR    Abdomen:    Shape:non-distended and normal  Tenderness:none  Guarding:none    Cervical spine:    Inspection:normal  Palpation:tenderness paravertebral muscles, tenderness trapezium, left, right and positive. Range of motion:reduced flexion, extension, rotation bilaterally and is painful.   Spurling's: negative bilaterally    Thoracic spine:     Spine inspection:normal   Palpation:No tenderness over the midline and paraspinal area, bilaterally  Range of motion:normal in flexion, extension rotation bilateral and is not painful. Lumbar spine:    Spine inspection: Normal   Palpation: Tenderness paravertebral muscles Yes bilaterally  Range of motion: Decreased, flexion Decreased, Lateral bending, extension and rotation bilaterally reduced is somewhat painful. Sacroiliac joint tenderness No bilaterally  SLR : negative bilaterally    Musculoskeletal:    Trigger points +    Extremities:    Tremors:None bilaterally upper and lower  Edema:no    Neurological:    Sensory: Normal to light touch     Motor:   NO focal deficits    Dermatology:    Skin:no rashes or lesions noted    Assessment/Plan:     Diagnosis Orders   1. DDD (degenerative disc disease), lumbar     2. Chronic pain syndrome     3. Lumbosacral spondylosis without myelopathy     4. DDD (degenerative disc disease), cervical     5. Cervical spondylosis     6. Cervicalgia     7. Psychological factors affecting medical condition         46 y.o. female with H/o chronic neck pain and low back pain for many years. Prior treatment at [facility] pain center. Denies significant radicular component. Need to reviewed records form [facility]. Pertinent imaging reviewed. H/o depression/ anxiety/ PTSD- follows with psych. Has followed neurology for headache. Does not want pain meds     Recommend aqua therapy    Recommend Xray of C spine and X ray LS spein    TENS unit trial    Recommend spin center evaluation- / eval at chronic pain rehab program at [facility] for multi disciplinary approach. Prior intervention did not help her. And she does not want any interventional procedures. Can trial medical marijuana - need to consult a provider who prescribes it. No follow up made. Counseling : Patient encouraged to stay active and to continue Regular home exercise program as tolerated - stretching / strengthening.     Smoking cessation counseling : yes     Treatment plan discussed with the patient including medication and procedure side effects. Controlled Substances Monitoring:   [place]AR[place] reviewed. Cee Magallanes MD    Dear Dr. Demetrius Rosenbaum,   Thank you for referring Ms. Sherita Pate and allowing us to participate in her care. Please do not hesitate to contact me if you have any questions regarding her care.     Anay Stuart MD    CC:    Dexter Lua DO  [address]

## 2022-06-16 DIAGNOSIS — Z86.69 HISTORY OF MIGRAINE: Chronic | ICD-10-CM

## 2022-06-16 NOTE — TELEPHONE ENCOUNTER
Last Appointment:  5/13/2022  Future Appointments   Date Time Provider Woodrow Ji   7/13/2022  1:30 PM [place] DO WILL Purvis [facility]   7/20/2022  1:00 PM Berto Barrett, APRN - CNP [facility]

## 2022-06-16 NOTE — TELEPHONE ENCOUNTER
----- Message from Skylerprasadcj Umana sent at 6/16/2022 11:11 AM EDT -----  Subject: Refill Request    QUESTIONS  Name of Medication? nadolol (CORGARD) 20 MG tablet  Patient-reported dosage and instructions? 20 MG once daily   How many days do you have left? 7  Preferred Pharmacy? [address] phone number (if available)? [phone]  ---------------------------------------------------------------------------  --------------  CALL BACK INFO  What is the best way for the office to contact you? OK to leave message on   voicemail  Preferred Call Back Phone Number? [phone]  ---------------------------------------------------------------------------  --------------  SCRIPT ANSWERS  Relationship to Patient?  Self

## 2022-06-19 RX ORDER — NADOLOL 20 MG/1
TABLET ORAL
Qty: 90 TABLET | Refills: 1 | Status: SHIPPED | OUTPATIENT
Start: 2022-06-19

## 2022-06-20 ENCOUNTER — TELEPHONE (OUTPATIENT)
Dept: PAIN MANAGEMENT | Age: 52
End: 2022-06-20

## 2022-06-20 ENCOUNTER — TELEPHONE (OUTPATIENT)
Dept: FAMILY MEDICINE CLINIC | Age: 52
End: 2022-06-20

## 2022-06-20 DIAGNOSIS — M47.812 SPONDYLOSIS OF CERVICAL REGION WITHOUT MYELOPATHY OR RADICULOPATHY: Primary | ICD-10-CM

## 2022-06-20 NOTE — TELEPHONE ENCOUNTER
I called Alyssia Hughes and advised that Dr. Olesya Bower recommendations were to follow up with the chronic pain program at the [facility] or see physician locally regarding medical marijuana. [facility] verbalized understanding.

## 2022-06-20 NOTE — TELEPHONE ENCOUNTER
Called patient and reviewed x-rays. I instructed her to call for a follow up with Dr. Leland Man to review next steps.

## 2022-06-20 NOTE — TELEPHONE ENCOUNTER
----- Message from Jorge Glezey sent at 6/20/2022  9:20 AM EDT -----  Subject: Referral Request    QUESTIONS   Reason for referral request? Rheumatologist - Pt was referred to a pain   management spec. He would like her to see a Rheumatologist.   Has the physician seen you for this condition before? Yes  Select a date? 2022-05-13  Select the Provider the patient wants to be referred to, if known (PCP or   Specialist)? Danna Ding   Preferred Specialist (if applicable)? Do you already have an appointment scheduled? No  Additional Information for Provider? Please call pt to advise when   referral is ready. ---------------------------------------------------------------------------  --------------  Dawna Grass INFO  What is the best way for the office to contact you? OK to leave message on   voicemail  Preferred Call Back Phone Number? [phone]  ---------------------------------------------------------------------------  --------------  SCRIPT ANSWERS  Relationship to Patient?  Self

## 2022-06-23 ENCOUNTER — TELEPHONE (OUTPATIENT)
Dept: FAMILY MEDICINE CLINIC | Age: 52
End: 2022-06-23

## 2022-06-23 NOTE — TELEPHONE ENCOUNTER
----- Message from Colt Dodd sent at 6/23/2022 12:33 PM EDT -----  Subject: Referral Request    QUESTIONS   Reason for referral request? Patient got referred fr Rheumatology patient   called but they have a 5 month waiting period patient stated that to long   to wait patient is wanting different referal for a Rheumatologist that can   get her in sooner. Has the physician seen you for this condition before? No   Preferred Specialist (if applicable)? Do you already have an appointment scheduled? No  Additional Information for Provider?   ---------------------------------------------------------------------------  --------------  CALL BACK INFO  What is the best way for the office to contact you? OK to leave message on   voicemail  Preferred Call Back Phone Number? [phone]  ---------------------------------------------------------------------------  --------------  SCRIPT ANSWERS  Relationship to Patient?  Self

## 2022-07-13 ENCOUNTER — OFFICE VISIT (OUTPATIENT)
Dept: FAMILY MEDICINE CLINIC | Age: 52
End: 2022-07-13
Payer: MEDICARE

## 2022-07-13 VITALS
TEMPERATURE: 97.4 F | HEIGHT: 62 IN | DIASTOLIC BLOOD PRESSURE: 72 MMHG | OXYGEN SATURATION: 96 % | BODY MASS INDEX: 20.06 KG/M2 | RESPIRATION RATE: 16 BRPM | HEART RATE: 78 BPM | SYSTOLIC BLOOD PRESSURE: 100 MMHG | WEIGHT: 109 LBS

## 2022-07-13 DIAGNOSIS — F17.200 SMOKER: ICD-10-CM

## 2022-07-13 DIAGNOSIS — Z86.69 HISTORY OF MIGRAINE: ICD-10-CM

## 2022-07-13 DIAGNOSIS — Z86.69 HISTORY OF MIGRAINE: Primary | ICD-10-CM

## 2022-07-13 DIAGNOSIS — R10.13 EPIGASTRIC PAIN: ICD-10-CM

## 2022-07-13 DIAGNOSIS — L72.9 SKIN CYST: ICD-10-CM

## 2022-07-13 LAB
ALBUMIN SERPL-MCNC: 4.4 G/DL (ref 3.5–5.2)
ALP BLD-CCNC: 69 U/L (ref 35–104)
ALT SERPL-CCNC: 15 U/L (ref 0–32)
ANION GAP SERPL CALCULATED.3IONS-SCNC: 12 MMOL/L (ref 7–16)
AST SERPL-CCNC: 27 U/L (ref 0–31)
BASOPHILS ABSOLUTE: 0.03 E9/L (ref 0–0.2)
BASOPHILS RELATIVE PERCENT: 0.4 % (ref 0–2)
BILIRUB SERPL-MCNC: 0.4 MG/DL (ref 0–1.2)
BILIRUBIN URINE: NEGATIVE
BLOOD, URINE: NEGATIVE
BUN BLDV-MCNC: 16 MG/DL (ref 6–20)
CALCIUM SERPL-MCNC: 9.4 MG/DL (ref 8.6–10.2)
CHLORIDE BLD-SCNC: 105 MMOL/L (ref 98–107)
CHOLESTEROL, TOTAL: 192 MG/DL (ref 0–199)
CLARITY: CLEAR
CO2: 25 MMOL/L (ref 22–29)
COLOR: YELLOW
CREAT SERPL-MCNC: 0.6 MG/DL (ref 0.5–1)
EOSINOPHILS ABSOLUTE: 0.12 E9/L (ref 0.05–0.5)
EOSINOPHILS RELATIVE PERCENT: 1.8 % (ref 0–6)
GFR AFRICAN AMERICAN: >60
GFR NON-AFRICAN AMERICAN: >60 ML/MIN/1.73
GLUCOSE FASTING: 86 MG/DL (ref 74–99)
GLUCOSE URINE: NEGATIVE MG/DL
HCT VFR BLD CALC: 42.2 % (ref 34–48)
HDLC SERPL-MCNC: 54 MG/DL
HEMOGLOBIN: 14 G/DL (ref 11.5–15.5)
IMMATURE GRANULOCYTES #: 0.01 E9/L
IMMATURE GRANULOCYTES %: 0.1 % (ref 0–5)
KETONES, URINE: NEGATIVE MG/DL
LDL CHOLESTEROL CALCULATED: 119 MG/DL (ref 0–99)
LEUKOCYTE ESTERASE, URINE: NEGATIVE
LYMPHOCYTES ABSOLUTE: 2.53 E9/L (ref 1.5–4)
LYMPHOCYTES RELATIVE PERCENT: 37.2 % (ref 20–42)
MCH RBC QN AUTO: 31.4 PG (ref 26–35)
MCHC RBC AUTO-ENTMCNC: 33.2 % (ref 32–34.5)
MCV RBC AUTO: 94.6 FL (ref 80–99.9)
MONOCYTES ABSOLUTE: 0.35 E9/L (ref 0.1–0.95)
MONOCYTES RELATIVE PERCENT: 5.1 % (ref 2–12)
NEUTROPHILS ABSOLUTE: 3.77 E9/L (ref 1.8–7.3)
NEUTROPHILS RELATIVE PERCENT: 55.4 % (ref 43–80)
NITRITE, URINE: NEGATIVE
PDW BLD-RTO: 13.2 FL (ref 11.5–15)
PH UA: 6 (ref 5–9)
PLATELET # BLD: 236 E9/L (ref 130–450)
PMV BLD AUTO: 10.1 FL (ref 7–12)
POTASSIUM SERPL-SCNC: 4.4 MMOL/L (ref 3.5–5)
PROTEIN UA: NEGATIVE MG/DL
RBC # BLD: 4.46 E12/L (ref 3.5–5.5)
SODIUM BLD-SCNC: 142 MMOL/L (ref 132–146)
SPECIFIC GRAVITY UA: 1.02 (ref 1–1.03)
TOTAL PROTEIN: 6.8 G/DL (ref 6.4–8.3)
TRIGL SERPL-MCNC: 96 MG/DL (ref 0–149)
TSH SERPL DL<=0.05 MIU/L-ACNC: 0.91 UIU/ML (ref 0.27–4.2)
UROBILINOGEN, URINE: 0.2 E.U./DL
VLDLC SERPL CALC-MCNC: 19 MG/DL
WBC # BLD: 6.8 E9/L (ref 4.5–11.5)

## 2022-07-13 PROCEDURE — 3017F COLORECTAL CA SCREEN DOC REV: CPT | Performed by: FAMILY MEDICINE

## 2022-07-13 PROCEDURE — G8427 DOCREV CUR MEDS BY ELIG CLIN: HCPCS | Performed by: FAMILY MEDICINE

## 2022-07-13 PROCEDURE — 99213 OFFICE O/P EST LOW 20 MIN: CPT | Performed by: FAMILY MEDICINE

## 2022-07-13 PROCEDURE — 4004F PT TOBACCO SCREEN RCVD TLK: CPT | Performed by: FAMILY MEDICINE

## 2022-07-13 PROCEDURE — G8420 CALC BMI NORM PARAMETERS: HCPCS | Performed by: FAMILY MEDICINE

## 2022-07-13 PROCEDURE — 81003 URINALYSIS AUTO W/O SCOPE: CPT | Performed by: FAMILY MEDICINE

## 2022-07-13 RX ORDER — PANTOPRAZOLE SODIUM 40 MG/1
40 TABLET, DELAYED RELEASE ORAL
Qty: 90 TABLET | Refills: 1 | Status: SHIPPED | OUTPATIENT
Start: 2022-07-13

## 2022-07-13 RX ORDER — BUPROPION HYDROCHLORIDE 75 MG/1
75 TABLET ORAL 2 TIMES DAILY
Qty: 60 TABLET | Refills: 3 | Status: SHIPPED | OUTPATIENT
Start: 2022-07-13

## 2022-07-13 SDOH — ECONOMIC STABILITY: FOOD INSECURITY: WITHIN THE PAST 12 MONTHS, YOU WORRIED THAT YOUR FOOD WOULD RUN OUT BEFORE YOU GOT MONEY TO BUY MORE.: PATIENT DECLINED

## 2022-07-13 SDOH — ECONOMIC STABILITY: FOOD INSECURITY: WITHIN THE PAST 12 MONTHS, THE FOOD YOU BOUGHT JUST DIDN'T LAST AND YOU DIDN'T HAVE MONEY TO GET MORE.: PATIENT DECLINED

## 2022-07-13 ASSESSMENT — ENCOUNTER SYMPTOMS
TROUBLE SWALLOWING: 0
CHEST TIGHTNESS: 0
ABDOMINAL PAIN: 0
PHOTOPHOBIA: 0
BLOOD IN STOOL: 0
VOMITING: 0
SHORTNESS OF BREATH: 0
DIARRHEA: 0
EYE REDNESS: 0
EYE PAIN: 0
SINUS PAIN: 0
SORE THROAT: 0
COUGH: 0
EYE DISCHARGE: 0
NAUSEA: 0
ALLERGIC/IMMUNOLOGIC NEGATIVE: 1
BACK PAIN: 0

## 2022-07-13 ASSESSMENT — SOCIAL DETERMINANTS OF HEALTH (SDOH): HOW HARD IS IT FOR YOU TO PAY FOR THE VERY BASICS LIKE FOOD, HOUSING, MEDICAL CARE, AND HEATING?: PATIENT DECLINED

## 2022-07-13 NOTE — PROGRESS NOTES
22  Angela Fu : 1970 Sex: female  Age: 46 y.o. Assessment and Plan:  Lyssa Vaz was seen today for arthritis and gastroesophageal reflux. Diagnoses and all orders for this visit:    History of migraine  -     CBC with Auto Differential; Future  -     Comprehensive Metabolic Panel, Fasting; Future  -     Lipid Panel; Future  -     TSH; Future  -     Urinalysis; Future    Epigastric pain  -     pantoprazole (PROTONIX) 40 MG tablet; Take 1 tablet by mouth every morning (before breakfast)  -     CBC with Auto Differential; Future  -     Comprehensive Metabolic Panel, Fasting; Future  -     Lipid Panel; Future  -     TSH; Future  -     Urinalysis; Future    Smoker  -     buPROPion (WELLBUTRIN) 75 MG tablet; Take 1 tablet by mouth 2 times daily  -     CBC with Auto Differential; Future  -     Comprehensive Metabolic Panel, Fasting; Future  -     TSH; Future    Skin cyst  -     King Melissa MD, General Surgery, [facility]        Return in about 3 months (around 10/13/2022). Chief Complaint   Patient presents with    Arthritis    Gastroesophageal Reflux       Call withPatient here for recheck visit. Her stomach and headaches. Would like to quit smoking. Tried combination of patches and gum in the past with no success. Review of Systems   Constitutional: Negative for appetite change, fatigue and unexpected weight change. HENT: Negative for congestion, ear pain, hearing loss, sinus pain, sore throat and trouble swallowing. Eyes: Negative for photophobia, pain, discharge and redness. Respiratory: Negative for cough, chest tightness and shortness of breath. Cardiovascular: Negative for chest pain, palpitations and leg swelling. Gastrointestinal: Negative for abdominal pain, blood in stool, diarrhea, nausea and vomiting. Endocrine: Negative. Genitourinary: Negative for dysuria, flank pain, frequency and hematuria.    Musculoskeletal: Negative for arthralgias, back pain, joint swelling and myalgias. Skin: Negative. Allergic/Immunologic: Negative. Neurological: Positive for headaches. Negative for dizziness, seizures, syncope, weakness, light-headedness and numbness. Hematological: Negative for adenopathy. Does not bruise/bleed easily. Psychiatric/Behavioral: Negative. Current Outpatient Medications:     pantoprazole (PROTONIX) 40 MG tablet, Take 1 tablet by mouth every morning (before breakfast), Disp: 90 tablet, Rfl: 1    Eptinezumab-jjmr (VYEPTI IV), Infuse intravenously, Disp: , Rfl:     buPROPion (WELLBUTRIN) 75 MG tablet, Take 1 tablet by mouth 2 times daily, Disp: 60 tablet, Rfl: 3    nadolol (CORGARD) 20 MG tablet, take 1 tablet by mouth once daily, Disp: 90 tablet, Rfl: 1    baclofen (LIORESAL) 10 MG tablet, Take 1 tablet by mouth 3 times daily, Disp: 360 tablet, Rfl: 1    DULoxetine (CYMBALTA) 60 MG extended release capsule, 90 mg daily , Disp: , Rfl:     ondansetron (ZOFRAN-ODT) 4 MG disintegrating tablet, Take 1 tablet by mouth 3 times daily as needed for Nausea or Vomiting, Disp: 21 tablet, Rfl: 0    rosuvastatin (CRESTOR) 20 MG tablet, take 1 tablet by mouth nightly ---90DAYS REQUEST, Disp: 90 tablet, Rfl: 1    Docusate Calcium (STOOL SOFTENER PO), Take 100 mg by mouth three times daily , Disp: , Rfl:     diazepam (VALIUM) 10 MG tablet, Take 10 mg by mouth every 8 hours. Instructed to take am of procedure if needed, Disp: , Rfl:     folic acid (FOLVITE) 1 MG tablet, Take 1 mg by mouth daily. , Disp: , Rfl:     lamoTRIgine (LAMICTAL) 200 MG tablet, Take 200 mg by mouth every evening , Disp: , Rfl:   Allergies   Allergen Reactions    Latex Rash    Sulfa Antibiotics Rash and Hives       Past Medical History:   Diagnosis Date    Anxiety     Arthritis     right hip    Bipolar 1 disorder (HCC)     Cervical stenosis of spine     Chronic back pain     Depression     Hypertension     Intractable chronic post-traumatic headache 4/21/2022    Irritable bowel syndrome     Migraines     Mucocele of lip     for surgery 7/21/21    Other hyperlipidemia 7/29/2019    PTSD (post-traumatic stress disorder)      Past Surgical History:   Procedure Laterality Date    ARTHROGRAPHY Right 1/3/2019    RIGHT HIP ARTHROGRAM AND STEROID INJECTION performed by Halima Tripp MD at [facility] Right 9/10/2020    RIGHT HIP INJECTION UNDER FLUOROSCOPY performed by Halima Tripp MD at [facility] Right 4/13/2021    RIGHT HIP INJECTION UNDER FLUOROSCOPY performed by Halima Tripp MD at [address] [place] [address]      COLONOSCOPY      DILATION AND CURETTAGE OF UTERUS N/A 2/3/2020    DILATATION AND CURETTAGE HYSTEROSCOPY performed by Romayne Hobby, MD at [facility] 7/21/2021    EXCISION RIGHT LIP MUCOCELE performed by Isacc Fischer MD at [address] [place] [address]  02/20/2019    implant for IBS     STIMULATOR SURGERY      bowel    TONSILLECTOMY      TOOTH EXTRACTION      TUBAL LIGATION      UPPER GASTROINTESTINAL ENDOSCOPY N/A 3/28/2022    EGD ESOPHAGOGASTRODUODENOSCOPY ULTRASOUND performed by Petrona John MD at [address] [place] [address] History   Problem Relation Age of Onset    GERD Mother     Parkinsonism Mother     COPD Mother     No Known Problems Father     Breast Cancer Maternal Aunt      Social History     Socioeconomic History    Marital status:      Spouse name: Not on file    Number of children: Not on file    Years of education: Not on file    Highest education level: Not on file   Occupational History    Not on file   Tobacco Use    Smoking status: Current Every Day Smoker     Packs/day: 1.50     Years: 30.00     Pack years: 45.00     Types: Cigarettes    Smokeless tobacco: Never Used    Tobacco comment: trying to quit, wears patch   Vaping Use    Vaping Use: Former   Substance and Sexual Activity    Alcohol use: Not Currently  Drug use: Not Currently     Types: Marijuana Maurita Handsome)    Sexual activity: Not Currently   Other Topics Concern    Not on file   Social History Narrative    Not on file     Social Determinants of Health     Financial Resource Strain: Unknown    Difficulty of Paying Living Expenses: Patient refused   Food Insecurity: Unknown    Worried About Running Out of Food in the Last Year: Patient refused    [address] in the Last Year: Patient refused   Transportation Needs:     Lack of Transportation (Medical): Not on file    Lack of Transportation (Non-Medical): Not on file   Physical Activity: Unknown    Days of Exercise per Week: 0 days    Minutes of Exercise per Session: Not on file   Stress:     Feeling of Stress : Not on file   Social Connections:     Frequency of Communication with Friends and Family: Not on file    Frequency of Social Gatherings with Friends and Family: Not on file    Attends Sikhism Services: Not on file    Active Member of Clubs or Organizations: Not on file    Attends Club or Organization Meetings: Not on file    Marital Status: Not on file   Intimate Partner Violence:     Fear of Current or Ex-Partner: Not on file    Emotionally Abused: Not on file    Physically Abused: Not on file    Sexually Abused: Not on file   Housing Stability:     Unable to Pay for Housing in the Last Year: Not on file    Number of [place] in the Last Year: Not on file    Unstable Housing in the Last Year: Not on file       Vitals:    07/13/22 1331   BP: 100/72   Pulse: 78   Resp: 16   Temp: 97.4 °F (36.3 °C)   TempSrc: Temporal   SpO2: 96%   Weight: 109 lb (49.4 kg)   Height: 5' 2\" (1.575 m)       Physical Exam  Vitals and nursing note reviewed. Constitutional:       Appearance: She is well-developed. HENT:      Head: Atraumatic. Right Ear: External ear normal.      Left Ear: External ear normal.      Nose: Nose normal.      Mouth/Throat:      Pharynx: No oropharyngeal exudate. Eyes:      Conjunctiva/sclera: Conjunctivae normal.      Pupils: Pupils are equal, round, and reactive to light. Neck:      Thyroid: No thyromegaly. Trachea: No tracheal deviation. Cardiovascular:      Rate and Rhythm: Normal rate and regular rhythm. Heart sounds: No murmur heard. No friction rub. No gallop. Pulmonary:      Effort: Pulmonary effort is normal. No respiratory distress. Breath sounds: Normal breath sounds. Abdominal:      General: Bowel sounds are normal.      Palpations: Abdomen is soft. Musculoskeletal:         General: No tenderness or deformity. Normal range of motion. Cervical back: Normal range of motion and neck supple. Lymphadenopathy:      Cervical: No cervical adenopathy. Skin:     General: Skin is warm and dry. Capillary Refill: Capillary refill takes less than 2 seconds. Findings: No rash. Neurological:      Mental Status: She is alert and oriented to person, place, and time. Sensory: No sensory deficit. Motor: No abnormal muscle tone.       Coordination: Coordination normal.      Deep Tendon Reflexes: Reflexes normal.             Seen By:  Bunny Eduadro DO

## 2022-07-17 DIAGNOSIS — K21.9 GASTROESOPHAGEAL REFLUX DISEASE WITHOUT ESOPHAGITIS: ICD-10-CM

## 2022-07-18 RX ORDER — OMEPRAZOLE 40 MG/1
CAPSULE, DELAYED RELEASE ORAL
Qty: 90 CAPSULE | Refills: 1 | OUTPATIENT
Start: 2022-07-18

## 2022-07-22 ENCOUNTER — OFFICE VISIT (OUTPATIENT)
Dept: SURGERY | Age: 52
End: 2022-07-22
Payer: MEDICARE

## 2022-07-22 VITALS
RESPIRATION RATE: 18 BRPM | HEIGHT: 62 IN | OXYGEN SATURATION: 98 % | TEMPERATURE: 98.4 F | BODY MASS INDEX: 20.61 KG/M2 | WEIGHT: 112 LBS

## 2022-07-22 DIAGNOSIS — L72.3 SEBACEOUS CYST OF RIGHT AXILLA: Primary | ICD-10-CM

## 2022-07-22 PROCEDURE — 3017F COLORECTAL CA SCREEN DOC REV: CPT | Performed by: SURGERY

## 2022-07-22 PROCEDURE — G8427 DOCREV CUR MEDS BY ELIG CLIN: HCPCS | Performed by: SURGERY

## 2022-07-22 PROCEDURE — 99213 OFFICE O/P EST LOW 20 MIN: CPT | Performed by: SURGERY

## 2022-07-22 PROCEDURE — 4004F PT TOBACCO SCREEN RCVD TLK: CPT | Performed by: SURGERY

## 2022-07-22 PROCEDURE — G8420 CALC BMI NORM PARAMETERS: HCPCS | Performed by: SURGERY

## 2022-07-27 ENCOUNTER — PROCEDURE VISIT (OUTPATIENT)
Dept: SURGERY | Age: 52
End: 2022-07-27
Payer: MEDICARE

## 2022-07-27 VITALS
HEART RATE: 74 BPM | DIASTOLIC BLOOD PRESSURE: 88 MMHG | HEIGHT: 62 IN | WEIGHT: 111 LBS | OXYGEN SATURATION: 96 % | RESPIRATION RATE: 18 BRPM | SYSTOLIC BLOOD PRESSURE: 132 MMHG | TEMPERATURE: 97.3 F | BODY MASS INDEX: 20.43 KG/M2

## 2022-07-27 DIAGNOSIS — L72.3 SEBACEOUS CYST OF RIGHT AXILLA: Primary | ICD-10-CM

## 2022-07-27 PROCEDURE — 11401 EXC TR-EXT B9+MARG 0.6-1 CM: CPT | Performed by: SURGERY

## 2022-07-27 RX ORDER — LIDOCAINE HYDROCHLORIDE AND EPINEPHRINE 10; 10 MG/ML; UG/ML
20 INJECTION, SOLUTION INFILTRATION; PERINEURAL ONCE
Status: COMPLETED | OUTPATIENT
Start: 2022-07-27 | End: 2022-07-27

## 2022-07-27 RX ADMIN — LIDOCAINE HYDROCHLORIDE AND EPINEPHRINE 20 ML: 10; 10 INJECTION, SOLUTION INFILTRATION; PERINEURAL at 13:14

## 2022-07-31 NOTE — PROGRESS NOTES
[facility] Surgery Clinic Note    Assessment/Plan:      Diagnosis Orders   1. Sebaceous cyst of right axilla      We will plan for excision            Return for Procedure. Chief Complaint   Patient presents with    New Patient     Cyst right arm        PCP: Elizabeth Bull DO    HPI: Eloy Newberry is a 46 y.o. female who presents in consultation for evaluation of axillary cystic mass. It is in her right axilla. She has had it for several months. It has gotten bigger she states. There is no drainage. There are no significant skin changes. It is tender with pressure.      Past Medical History:   Diagnosis Date    Anxiety     Arthritis     right hip    Bipolar 1 disorder ([facility] [address]     Cervical stenosis of spine     Chronic back pain     Depression     Hypertension     Intractable chronic post-traumatic headache 4/21/2022    Irritable bowel syndrome     Migraines     Mucocele of lip     for surgery 7/21/21    Other hyperlipidemia 7/29/2019    PTSD (post-traumatic stress disorder)        Past Surgical History:   Procedure Laterality Date    ARTHROGRAPHY Right 1/3/2019    RIGHT HIP ARTHROGRAM AND STEROID INJECTION performed by Veena Mcdonald MD at [facility] Right 9/10/2020    RIGHT HIP INJECTION UNDER FLUOROSCOPY performed by Veena Mcdonald MD at [facility] 4/13/2021    RIGHT HIP INJECTION UNDER FLUOROSCOPY performed by Veena Mcdonald MD at [address] [place] [address] Drive AND CURETTAGE OF UTERUS N/A 2/3/2020    DILATATION AND CURETTAGE HYSTEROSCOPY performed by Brayden Delgado MD at [facility] Right 7/21/2021    EXCISION RIGHT LIP MUCOCELE performed by Yael De Los Santos MD at [address] [place] [address]  02/20/2019    implant for IBS     STIMULATOR SURGERY      bowel    TONSILLECTOMY      TOOTH EXTRACTION      TUBAL LIGATION      UPPER GASTROINTESTINAL ENDOSCOPY N/A 3/28/2022    EGD ESOPHAGOGASTRODUODENOSCOPY ULTRASOUND performed by Nathaly Leary MD at [address]       Prior to Admission medications    Medication Sig Start Date End Date Taking? Authorizing Provider   pantoprazole (PROTONIX) 40 MG tablet Take 1 tablet by mouth every morning (before breakfast) 7/13/22  Yes Alexis Persaud DO   Eptinezumab-jjmr (VYEPTI IV) Infuse intravenously   Yes Historical Provider, MD   buPROPion (WELLBUTRIN) 75 MG tablet Take 1 tablet by mouth 2 times daily 7/13/22  Yes Alexis Persaud DO   nadolol (CORGARD) 20 MG tablet take 1 tablet by mouth once daily 6/19/22  Yes Alexis Persaud DO   baclofen (LIORESAL) 10 MG tablet Take 1 tablet by mouth 3 times daily 4/13/22  Yes Alexis Persaud DO   DULoxetine (CYMBALTA) 60 MG extended release capsule 90 mg daily  3/2/22  Yes Historical Provider, MD   ondansetron (ZOFRAN-ODT) 4 MG disintegrating tablet Take 1 tablet by mouth 3 times daily as needed for Nausea or Vomiting 3/8/22  Yes FRENCH Peoples - CNP   rosuvastatin (CRESTOR) 20 MG tablet take 1 tablet by mouth nightly ---90DAYS REQUEST 3/4/22  Yes Alexis Persaud DO   Docusate Calcium (STOOL SOFTENER PO) Take 100 mg by mouth three times daily    Yes Historical Provider, MD   diazepam (VALIUM) 10 MG tablet Take 10 mg by mouth in the morning and 10 mg at noon and 10 mg in the evening. Instructed to take am of procedure if needed. Yes Historical Provider, MD   folic acid (FOLVITE) 1 MG tablet Take 1 mg by mouth daily.    Yes Historical Provider, MD   lamoTRIgine (LAMICTAL) 200 MG tablet Take 200 mg by mouth every evening    Yes Historical Provider, MD       Allergies   Allergen Reactions    Latex Rash    Sulfa Antibiotics Rash and Hives       Social History     Socioeconomic History    Marital status:      Spouse name: None    Number of children: None    Years of education: None    Highest education level: None   Tobacco Use    Smoking status: Every Day     Packs/day: 1.50     Years: 30.00 Pack years: 45.00     Types: Cigarettes    Smokeless tobacco: Never    Tobacco comments:     trying to quit, wears patch   Vaping Use    Vaping Use: Former   Substance and Sexual Activity    Alcohol use: Not Currently    Drug use: Not Currently     Types: Marijuana Velazquez Bienvenido)    Sexual activity: Not Currently     Social Determinants of Health     Financial Resource Strain: Unknown    Difficulty of Paying Living Expenses: Patient refused   Food Insecurity: Unknown    Worried About Running Out of Food in the Last Year: Patient refused    Ran Out of Food in the Last Year: Patient refused   Physical Activity: Unknown    Days of Exercise per Week: 0 days       Family History   Problem Relation Age of Onset    GERD Mother     Parkinsonism Mother     COPD Mother     No Known Problems Father     Breast Cancer Maternal Aunt        Review of Systems   All other systems reviewed and are negative. Objective:  Vitals:    07/22/22 1057   Resp: 18   Temp: 98.4 °F (36.9 °C)   TempSrc: Temporal   SpO2: 98%   Weight: 112 lb (50.8 kg)   Height: 5' 2\" (1.575 m)          Physical Exam  Chest:                   Vimal Dailey MD      NOTE: This report, in part or full,may have been transcribed using voice recognition software. Every effort was made to ensure accuracy; however, inadvertent computerized transcription errors may be present. Please excuse any transcriptional grammatical or spelling errors that may have escaped my editorial review.     CC: Patrica Young DO

## 2022-08-02 ENCOUNTER — OFFICE VISIT (OUTPATIENT)
Dept: SURGERY | Age: 52
End: 2022-08-02
Payer: MEDICARE

## 2022-08-02 VITALS
RESPIRATION RATE: 18 BRPM | HEART RATE: 77 BPM | OXYGEN SATURATION: 98 % | BODY MASS INDEX: 20.8 KG/M2 | TEMPERATURE: 97.2 F | SYSTOLIC BLOOD PRESSURE: 102 MMHG | DIASTOLIC BLOOD PRESSURE: 68 MMHG | WEIGHT: 113 LBS | HEIGHT: 62 IN

## 2022-08-02 DIAGNOSIS — Z98.890 HISTORY OF EPIDERMAL INCLUSION CYST EXCISION: Primary | ICD-10-CM

## 2022-08-02 DIAGNOSIS — Z87.2 HISTORY OF EPIDERMAL INCLUSION CYST EXCISION: Primary | ICD-10-CM

## 2022-08-02 PROCEDURE — G8420 CALC BMI NORM PARAMETERS: HCPCS | Performed by: SURGERY

## 2022-08-02 PROCEDURE — G8427 DOCREV CUR MEDS BY ELIG CLIN: HCPCS | Performed by: SURGERY

## 2022-08-02 PROCEDURE — 3017F COLORECTAL CA SCREEN DOC REV: CPT | Performed by: SURGERY

## 2022-08-02 PROCEDURE — 99212 OFFICE O/P EST SF 10 MIN: CPT | Performed by: SURGERY

## 2022-08-02 PROCEDURE — 4004F PT TOBACCO SCREEN RCVD TLK: CPT | Performed by: SURGERY

## 2022-08-11 ENCOUNTER — TELEPHONE (OUTPATIENT)
Dept: FAMILY MEDICINE CLINIC | Age: 52
End: 2022-08-11

## 2022-08-11 DIAGNOSIS — M25.551 PAIN OF RIGHT HIP: Primary | ICD-10-CM

## 2022-08-11 NOTE — TELEPHONE ENCOUNTER
----- Message from Mary Ann Damon sent at 8/11/2022 12:19 PM EDT -----  Subject: Referral Request    Reason for referral request? Patient states that she used to see Dr. Anjum Willams at your office, but she is having right hip problems again and   would like to get a referral for another ortho doctor. Patient states that   her hip has been acting up the last 3 days and it is hard for her to walk. Please advise. patient. Provider patient wants to be referred to(if known):     Provider Phone Number(if known):     Additional Information for Provider?   ---------------------------------------------------------------------------  --------------  [facility]    [phone]; OK to leave message on voicemail  ---------------------------------------------------------------------------  --------------

## 2022-08-11 NOTE — TELEPHONE ENCOUNTER
Dr Venu Trujillo if she will go to [facility], [address] if [facility]

## 2022-08-12 ENCOUNTER — OFFICE VISIT (OUTPATIENT)
Dept: SURGERY | Age: 52
End: 2022-08-12
Payer: MEDICARE

## 2022-08-12 VITALS
DIASTOLIC BLOOD PRESSURE: 78 MMHG | OXYGEN SATURATION: 98 % | SYSTOLIC BLOOD PRESSURE: 120 MMHG | WEIGHT: 112 LBS | BODY MASS INDEX: 20.61 KG/M2 | RESPIRATION RATE: 18 BRPM | HEIGHT: 62 IN | TEMPERATURE: 97.3 F | HEART RATE: 68 BPM

## 2022-08-12 DIAGNOSIS — L72.3 SEBACEOUS CYST OF RIGHT AXILLA: Primary | ICD-10-CM

## 2022-08-12 PROCEDURE — 3017F COLORECTAL CA SCREEN DOC REV: CPT | Performed by: SURGERY

## 2022-08-12 PROCEDURE — 99213 OFFICE O/P EST LOW 20 MIN: CPT | Performed by: SURGERY

## 2022-08-12 PROCEDURE — G8427 DOCREV CUR MEDS BY ELIG CLIN: HCPCS | Performed by: SURGERY

## 2022-08-12 PROCEDURE — 4004F PT TOBACCO SCREEN RCVD TLK: CPT | Performed by: SURGERY

## 2022-08-12 PROCEDURE — G8420 CALC BMI NORM PARAMETERS: HCPCS | Performed by: SURGERY

## 2022-08-12 NOTE — TELEPHONE ENCOUNTER
Patient called back. She wanted to know if she could see Dr. Ricarda Christie instead? Shes seen him before and he knows her history.

## 2022-08-13 NOTE — PROGRESS NOTES
111 [facility] Surgery Clinic Note    Assessment/Plan:     Diagnosis Orders   1. Status post excision sebaceous cyst of right axilla      Healing well. No issues. Sutures removed. Path benign. Return if symptoms worsen or fail to improve. Chief Complaint   Patient presents with    Follow-up     Excision        PCP: Neto Zepeda DO    HPI: Renea Greenberg is a 46 y.o. female here for follow-up of excision of right axillary cyst.  She is doing well. There is some irritation discomfort due to the sutures. She has no drainage. There is no redness. She has no fevers. Pathology was benign and consistent with cyst.    Review of Systems   All other systems reviewed and are negative. The remainder of the past medical, past surgical, family, and psychosocial history, as well as medication and allergy review, were completed and are as documented elsewhere in the chart. Objective:  Vitals:    08/12/22 0858   BP: 120/78   Pulse: 68   Resp: 18   Temp: 97.3 °F (36.3 °C)   TempSrc: Temporal   SpO2: 98%   Weight: 112 lb (50.8 kg)   Height: 5' 2\" (1.575 m)          Physical Exam  Constitutional:       General: She is not in acute distress. Appearance: She is not diaphoretic. Cardiovascular:      Rate and Rhythm: Normal rate. Pulmonary:      Effort: Pulmonary effort is normal. No respiratory distress. Abdominal:      General: There is no distension. Palpations: Abdomen is soft. Tenderness: There is no abdominal tenderness. There is no guarding or rebound. Skin:     Comments: Right axillary incision clean dry and intact. There is no evidence of infection. Sutures removed today. Jamaal Vaughan MD      NOTE: This report, in part or full, may have been transcribed using voice recognition software. Every effort was made to ensure accuracy; however, inadvertent computerized transcription errors may be present.  Please excuse any transcriptional grammatical or spelling errors that may have escaped my editorial review.       CC: Ed Messer, DO

## 2022-08-13 NOTE — PROGRESS NOTES
Office Procedure:    Diagnosis: Cyst    Location: Right axilla    Surgeon: Janie Hair MD    Specimen: Cyst, 1 cm    Procedure:  After informed consent, the area was prepped in sterile fashion. 1% lidocaine with epinephrine was infiltrated circumferentially around the lesion. Elliptical incision made and carried down through subcutaneous tissue where the lesion was dissected out. Once under the lesion, it was amputated and sent for specimen. The wound was irrigated and wound closed with 3-0 Nylon. Bacitracin and bandage applied. Procedure was tolerated well.       Janie Hair MD

## 2022-08-14 NOTE — PROGRESS NOTES
[facility] Surgery Clinic Note    Assessment/Plan:     Diagnosis Orders   1. Status post epidermal inclusion cyst excision      Area is healing well. No issues. Some reactive erythema           Return in about 1 week (around 8/9/2022). Chief Complaint   Patient presents with    Other     Recheck right axillary         PCP: Aysha Garcia DO    HPI: Kaushik García is a 46 y.o. female here for wound check. She was worried her incision is infected from epidermoid cyst removal last week. She has no drainage. There is just some tenderness at the incision she states. There is some slight redness. She has no fevers. Review of Systems   All other systems reviewed and are negative. The remainder of the past medical, past surgical, family, and psychosocial history, as well as medication and allergy review, were completed and are as documented elsewhere in the chart. Objective:  Vitals:    08/02/22 1522   BP: 102/68   Pulse: 77   Resp: 18   Temp: 97.2 °F (36.2 °C)   TempSrc: Temporal   SpO2: 98%   Weight: 113 lb (51.3 kg)   Height: 5' 2\" (1.575 m)          Physical Exam  Constitutional:       General: She is not in acute distress. Appearance: She is not diaphoretic. Cardiovascular:      Rate and Rhythm: Normal rate. Pulmonary:      Effort: Pulmonary effort is normal. No respiratory distress. Abdominal:      General: There is no distension. Palpations: Abdomen is soft. Tenderness: There is no abdominal tenderness. There is no guarding or rebound. Skin:     Comments: Right axillary incision is clean dry and intact. There is some reactive erythema but no evidence of cellulitis or abscess. Sutures are intact. Nasrin Ramírez MD      NOTE: This report, in part or full, may have been transcribed using voice recognition software. Every effort was made to ensure accuracy; however, inadvertent computerized transcription errors may be present.  Please excuse any transcriptional grammatical or spelling errors that may have escaped my editorial review.       CC: Katy Leyva, DO

## 2022-08-27 DIAGNOSIS — E78.2 MIXED HYPERLIPIDEMIA: ICD-10-CM

## 2022-08-30 RX ORDER — ROSUVASTATIN CALCIUM 20 MG/1
TABLET, COATED ORAL
Qty: 90 TABLET | Refills: 1 | Status: SHIPPED | OUTPATIENT
Start: 2022-08-30

## 2022-09-26 ENCOUNTER — OFFICE VISIT (OUTPATIENT)
Dept: ORTHOPEDIC SURGERY | Age: 52
End: 2022-09-26
Payer: MEDICARE

## 2022-09-26 VITALS — HEIGHT: 62 IN | WEIGHT: 110 LBS | BODY MASS INDEX: 20.24 KG/M2

## 2022-09-26 DIAGNOSIS — M25.551 RIGHT HIP PAIN: Primary | ICD-10-CM

## 2022-09-26 PROCEDURE — 3017F COLORECTAL CA SCREEN DOC REV: CPT | Performed by: ORTHOPAEDIC SURGERY

## 2022-09-26 PROCEDURE — 99213 OFFICE O/P EST LOW 20 MIN: CPT | Performed by: ORTHOPAEDIC SURGERY

## 2022-09-26 PROCEDURE — 4004F PT TOBACCO SCREEN RCVD TLK: CPT | Performed by: ORTHOPAEDIC SURGERY

## 2022-09-26 PROCEDURE — G8420 CALC BMI NORM PARAMETERS: HCPCS | Performed by: ORTHOPAEDIC SURGERY

## 2022-09-26 PROCEDURE — G8427 DOCREV CUR MEDS BY ELIG CLIN: HCPCS | Performed by: ORTHOPAEDIC SURGERY

## 2022-09-26 NOTE — PROGRESS NOTES
New Hip Patient     Referring Provider:   DO Sha Cleveland 84,  [address] [facility] [address]:   Chief Complaint   Patient presents with    Follow Up After Procedure     Right Hip OA. . injection under fluro 4/6/21. Selvin Sanchez provided good relief    Hip Pain     Pt states her R Hip is feeling decent. . the more active she is the more pain she experiences. . intermittent pain in relation to her activity levels. . L Hip is starting to hurt now as well, more so than the right        HPI:    Rocco Wright is a 46y.o. year old who presents for follow-up regarding her right hip. She has had injections of the right hip in the past which provided benefit for her. She currently complains of right hip pain with more recent onset of left hip pain. Pain is primarily in the lateral thigh regions but occasionally involves the groin. This is worsened with activities and can be as severe as 7/10. She does not take medications on a daily basis for pain. She has history of lumbar spine degenerative changes and is going to be seeing a rheumatologist for further treatment of her lumbar spine.        PAST MEDICAL HISTORY  Past Medical History:   Diagnosis Date    Anxiety     Arthritis     right hip    Bipolar 1 disorder ([facility] [address]     Cervical stenosis of spine     Chronic back pain     Depression     Hypertension     Intractable chronic post-traumatic headache 4/21/2022    Irritable bowel syndrome     Migraines     Mucocele of lip     for surgery 7/21/21    Other hyperlipidemia 7/29/2019    PTSD (post-traumatic stress disorder)        PAST SURGICAL HISTORY  Past Surgical History:   Procedure Laterality Date    ARTHROGRAPHY Right 1/3/2019    RIGHT HIP ARTHROGRAM AND STEROID INJECTION performed by Johnny Phipps MD at [facility] Right 9/10/2020    RIGHT HIP INJECTION UNDER FLUOROSCOPY performed by Johnny Phipps MD at [facility] Right 4/13/2021    RIGHT HIP INJECTION UNDER FLUOROSCOPY performed by Indigo Cote MD at [address] [place] [address] Drive AND CURETTAGE OF UTERUS N/A 2/3/2020    DILATATION AND CURETTAGE HYSTEROSCOPY performed by Asad Edwards MD at [facility] Right 7/21/2021    EXCISION RIGHT LIP MUCOCELE performed by Gaye Murphy MD at [address] [place] [address]  02/20/2019    implant for IBS     STIMULATOR SURGERY      bowel    TONSILLECTOMY      TOOTH EXTRACTION      TUBAL LIGATION      UPPER GASTROINTESTINAL ENDOSCOPY N/A 3/28/2022    EGD ESOPHAGOGASTRODUODENOSCOPY ULTRASOUND performed by Luis Tena MD at [address] [place] [address]   Family History   Problem Relation Age of Onset    GERD Mother     Parkinsonism Mother     COPD Mother     No Known Problems Father     Breast Cancer Maternal Aunt        SOCIAL HISTORY  Social History     Occupational History    Not on file   Tobacco Use    Smoking status: Every Day     Packs/day: 1.50     Years: 30.00     Pack years: 45.00     Types: Cigarettes    Smokeless tobacco: Never    Tobacco comments:     trying to quit, wears patch   Vaping Use    Vaping Use: Former   Substance and Sexual Activity    Alcohol use: Not Currently    Drug use: Not Currently     Types: Marijuana Velazquez Kugel)    Sexual activity: Not Currently       CURRENT MEDICATIONS     Current Outpatient Medications:     rosuvastatin (CRESTOR) 20 MG tablet, take 1 tablet by mouth nightly, Disp: 90 tablet, Rfl: 1    pantoprazole (PROTONIX) 40 MG tablet, Take 1 tablet by mouth every morning (before breakfast), Disp: 90 tablet, Rfl: 1    Eptinezumab-jjmr (VYEPTI IV), Infuse intravenously, Disp: , Rfl:     buPROPion (WELLBUTRIN) 75 MG tablet, Take 1 tablet by mouth 2 times daily, Disp: 60 tablet, Rfl: 3    nadolol (CORGARD) 20 MG tablet, take 1 tablet by mouth once daily, Disp: 90 tablet, Rfl: 1    baclofen (LIORESAL) 10 MG tablet, Take 1 tablet by mouth 3 times daily, Disp: 360 tablet, Rfl: 1    DULoxetine (CYMBALTA) 60 MG extended release capsule, 90 mg daily , Disp: , Rfl:     ondansetron (ZOFRAN-ODT) 4 MG disintegrating tablet, Take 1 tablet by mouth 3 times daily as needed for Nausea or Vomiting, Disp: 21 tablet, Rfl: 0    Docusate Calcium (STOOL SOFTENER PO), Take 100 mg by mouth three times daily , Disp: , Rfl:     diazepam (VALIUM) 10 MG tablet, Take 10 mg by mouth in the morning and 10 mg at noon and 10 mg in the evening. Instructed to take am of procedure if needed. , Disp: , Rfl:     folic acid (FOLVITE) 1 MG tablet, Take 1 mg by mouth daily. , Disp: , Rfl:     lamoTRIgine (LAMICTAL) 200 MG tablet, Take 200 mg by mouth every evening , Disp: , Rfl:     ALLERGIES  Allergies   Allergen Reactions    Latex Rash    Sulfa Antibiotics Rash and Hives       Controlled Substances Monitoring:      REVIEW OF SYSTEMS:     Constitutional:  Negative for weight loss, fevers, chills, fatigue  Cardiovascular: Negative for chest pain, palpitations  Pulmonary: Negative for shortness of breath, labored breathing, cough  GI: negative for abdominal pain, nausea, vomitting   MSK: per HPI  Skin: negative for rash, open wounds    All other systems reviewed and are negative       PHYSICAL EXAM     Vitals:    09/26/22 1419   Weight: 110 lb (49.9 kg)   Height: 5' 2\" (1.575 m)       Height: 5' 2\" (1.575 m)  Weight: [unfilled]  BMI:  Body mass index is 20.12 kg/m². General: The patient is alert and oriented x 3, appears to be stated age and in no distress. HEENT: head is normocephalic, atraumatic. EOMI. Neck: supple, trachea midline, no thyromegaly   Cardiovascular: peripheral pulses palpable.   Normal Capillary refill   Respiratory: breathing unlabored, chest expansion symmetric   Skin: no rash, no open wounds, no erythema  Psych: normal affect; mood stable  Neurologic: gait normal, sensation grossly intact in extremities  MSK:     Hip:  Examination of the right hip demonstrates good motion with internal and external rotation in both extended and flexed positions. No significant discomfort on exam  Minimal tenderness to palpation over the abductor tendon just proximal to the greater trochanter, no tenderness posterior to the greater trochanter  CURT and FADIR without pain  Leg raise causes pain which radiates down past the knee  Sensation preserved to the foot  Motor function +5/5 to tibialis anterior, gastrocsoleus complex, EHL, FHL  Dorsalis pedis and posterior tibial pulses present     IMAGING:    XR: AP pelvis, AP and Lateral of the involved hip demonstrates degenerative changes about the bilateral hips involving both the femoral head and the acetabulum, specifically the lateral aspect. Joint space is narrowed bilaterally. Spinal stimulator present in the region of the sacrum. ASSESSMENT  Bilateral hip osteoarthritis    PLAN  Physical exam findings were reviewed with the patient in clinic today. She has had good relief in the past from intra-articular injection to the right hip. She is not interested in any total hip arthroplasty procedures at this time. We discussed referral for bilateral hip injections for symptomatic relief. Patient was agreeable to proceed. She will follow-up in the orthopedic clinic as needed for further intervention. Pj Mathew DO, PGY 3     Staff Addendum    I have seen and evaluated the patient and agree with the assessment and plan as documented by the orthopaedic surgery resident. I have performed the key components of the history and physical examination and concur with the findings and plan, and have made changes where appropriate/necessary. Agree with above. She has bilateral hip pain secondary to mild to moderate arthritis which is only minimally changed from previous films. She has had good relief from injections in the past.  She would like to try these again.   We will refer her to Dr. Laurence Tripp for possible ultrasound-guided injections into the hip joints bilateral.  She is in agreement with this plan      Annabelle Barcenas MD  [address]

## 2022-09-26 NOTE — PROGRESS NOTES
Follow Up Visit     Graham Sewell returns today for follow up visit regarding Right Hip Osteoarthritis. Treatment thus far has included {prev rx:783514}. She reports symptoms are {Improved/diminished/unchanged:21433}.        REVIEW OF SYSTEMS:     Constitutional:  Negative for weight loss, fevers, chills, fatigue  Cardiovascular: Negative for chest pain, palpitations  Pulmonary: Negative for shortness of breath, labored breathing, cough  GI: negative for abdominal pain, nausea, vomitting   MSK: per HPI  Skin: negative for rash, open wounds    All other systems reviewed and are negative       Physical Exam:     No data recorded    General: Alert and oriented x3, no acute distress  Cardiovascular/pulmonary: No labored breathing, peripheral perfusion intact  Musculoskeletal:    ***    Controlled Substances Monitoring:      Imaging:  ***      Assessment: ***      Plan:   ***    Cindy Murillo MD  Orthopaedic Surgery   9/26/22  2:11 PM

## 2022-09-28 ENCOUNTER — TELEPHONE (OUTPATIENT)
Dept: ORTHOPEDIC SURGERY | Age: 52
End: 2022-09-28

## 2022-09-28 NOTE — TELEPHONE ENCOUNTER
Pt called into the office today to notify us that she is having bilateral groin pain intermittently. . she states when she was asked at her OV she denied this, but wanted to let us know she actually does experience groin pain. She is set to receive hip CSI from Asim nuno. Documenting pt call for review.

## 2022-09-29 ENCOUNTER — OFFICE VISIT (OUTPATIENT)
Dept: ORTHOPEDIC SURGERY | Age: 52
End: 2022-09-29
Payer: MEDICARE

## 2022-09-29 VITALS — WEIGHT: 110 LBS | BODY MASS INDEX: 20.24 KG/M2 | HEIGHT: 62 IN

## 2022-09-29 DIAGNOSIS — M25.552 LEFT HIP PAIN: Primary | ICD-10-CM

## 2022-09-29 DIAGNOSIS — M25.551 RIGHT HIP PAIN: ICD-10-CM

## 2022-09-29 PROCEDURE — 20611 DRAIN/INJ JOINT/BURSA W/US: CPT | Performed by: FAMILY MEDICINE

## 2022-09-29 PROCEDURE — 99999 PR OFFICE/OUTPT VISIT,PROCEDURE ONLY: CPT | Performed by: FAMILY MEDICINE

## 2022-09-29 RX ORDER — TRIAMCINOLONE ACETONIDE 40 MG/ML
40 INJECTION, SUSPENSION INTRA-ARTICULAR; INTRAMUSCULAR ONCE
Status: COMPLETED | OUTPATIENT
Start: 2022-09-29 | End: 2022-09-29

## 2022-09-29 RX ORDER — LIDOCAINE HYDROCHLORIDE 10 MG/ML
5 INJECTION, SOLUTION INFILTRATION; PERINEURAL ONCE
Status: COMPLETED | OUTPATIENT
Start: 2022-09-29 | End: 2022-09-29

## 2022-09-29 RX ADMIN — TRIAMCINOLONE ACETONIDE 40 MG: 40 INJECTION, SUSPENSION INTRA-ARTICULAR; INTRAMUSCULAR at 17:22

## 2022-09-29 RX ADMIN — LIDOCAINE HYDROCHLORIDE 5 ML: 10 INJECTION, SOLUTION INFILTRATION; PERINEURAL at 17:20

## 2022-09-29 RX ADMIN — TRIAMCINOLONE ACETONIDE 40 MG: 40 INJECTION, SUSPENSION INTRA-ARTICULAR; INTRAMUSCULAR at 17:21

## 2022-09-29 NOTE — PROGRESS NOTES
PROCEDURE NOTE:    DIAGNOSIS      RIGHT hip pain. LEFT hip pain. PROCEDURE     Ultrasound-guided RIGHT femoroacetabular?(hip) joint corticosteroid injection. Ultrasound-guided LEFT femoroacetabular?(hip) joint corticosteroid injection. PROCEDURAL PAUSE     Procedural pause conducted to verify: ?correct patient identity, procedure to be performed, and as applicable, correct side and site, correct patient position, and availability of implants, special equipment, or special requirements. PROCEDURE DETAILS     The procedure was carried out under sterile technique. Patient Position: ? Supine. Localization Process: ? The RIGHT hip joint was evaluated under ultrasound prior to starting the procedure. ? The neurovascular bundle (femoral nerve, artery, vein) was identified under ultrasound prior to starting the procedure. ? The skin was prepped with Betadine and Alcohol. Approach: ? In-plane. Local Anesthesia: Under live ultrasound guidance, local anesthesia was obtained with vapocoolant cold spray and 2 cc of 1% lidocaine using a 25-gauge 2-inch needle advanced from an in-plane approach to the RIGHT hip joint capsule at the femoral head-neck junction. ?     Injection/Aspiration: ?A 22-gauge 3-1/2-inch needle was advanced from an in-plane approach into the RIGHT hip joint. ?Os was contacted at the femoral head-neck junction. ? After visualization of the needle tip in the target area and negative aspiration for blood, a mixture of 3 cc of 1% lidocaine and 1 cc of Kenalog (40 mg/cc) was injected into the hip joint with excellent sonographic flow. Images of procedure were permanently recorded. Localization Process: ? The LEFT hip joint was evaluated under ultrasound prior to starting the procedure. ? The neurovascular bundle (femoral nerve, artery, vein) was identified under ultrasound prior to starting the procedure. ? The skin was prepped with Betadine and Alcohol. Approach: ? In-plane.      Local Anesthesia: Under live ultrasound guidance, local anesthesia was obtained with vapocoolant cold spray and 2 cc of 1% lidocaine using a 25-gauge 2-inch needle advanced from an in-plane approach to the LEFT hip joint capsule at the femoral head-neck junction. ?     Injection/Aspiration: ?A 22-gauge 3-1/2-inch needle was advanced from an in-plane approach into the LEFT hip joint. ?Os was contacted at the femoral head-neck junction. ? After visualization of the needle tip in the target area and negative aspiration for blood, a mixture of 3 cc of 1% lidocaine and 1 cc of Kenalog (40 mg/cc) was injected into the hip joint with excellent sonographic flow. Images of procedure were permanently recorded. Postprocedure Care: ? The patient will avoid soaking the hip under water for two days and avoid heavy exertion with the hip. ?The patient will contact me with any problems related to the injection. PATIENT EDUCATION     Ready to learn, no apparent learning barriers were identified; learning preferences include listening. ? Explained diagnosis and treatment plan; patient expressed understanding of the content. INFORMED CONSENT     Following denial of allergy and review of potential side effects and complications including but not necessarily limited to infection, allergic reaction, local tissue breakdown, systemic effects of corticosteroids, elevation of blood glucose, injury to soft tissue and/or nerves, and seizure, the patient indicated their understanding and agreed to proceed. ? Discussed the risks, benefits, alternatives, and the necessity of other members of the healthcare team participating in the procedure. ?All questions answered and consent given. All questions and concerns were addressed and consent was verbalized by the patient. FOLLOW UP    Follow up in 6-8 weeks.     Electronically signed by Mesha Zaldivar MD on 9/29/2022 at 2:59 PM

## 2022-11-04 DIAGNOSIS — F17.200 SMOKER: ICD-10-CM

## 2022-11-07 RX ORDER — BUPROPION HYDROCHLORIDE 75 MG/1
TABLET ORAL
Qty: 60 TABLET | Refills: 3 | Status: SHIPPED
Start: 2022-11-07 | End: 2022-11-08

## 2022-11-08 ENCOUNTER — OFFICE VISIT (OUTPATIENT)
Dept: FAMILY MEDICINE CLINIC | Age: 52
End: 2022-11-08
Payer: MEDICARE

## 2022-11-08 ENCOUNTER — HOSPITAL ENCOUNTER (EMERGENCY)
Age: 52
Discharge: HOME | End: 2022-11-08
Attending: EMERGENCY MEDICINE
Payer: MEDICARE

## 2022-11-08 ENCOUNTER — APPOINTMENT (OUTPATIENT)
Dept: GENERAL RADIOLOGY | Age: 52
End: 2022-11-08
Payer: MEDICARE

## 2022-11-08 ENCOUNTER — APPOINTMENT (OUTPATIENT)
Dept: CT IMAGING | Age: 52
End: 2022-11-08
Payer: MEDICARE

## 2022-11-08 VITALS
HEART RATE: 66 BPM | SYSTOLIC BLOOD PRESSURE: 118 MMHG | HEIGHT: 62 IN | OXYGEN SATURATION: 96 % | RESPIRATION RATE: 18 BRPM | BODY MASS INDEX: 20.61 KG/M2 | DIASTOLIC BLOOD PRESSURE: 74 MMHG | WEIGHT: 112 LBS | TEMPERATURE: 97.3 F

## 2022-11-08 VITALS
BODY MASS INDEX: 20.24 KG/M2 | DIASTOLIC BLOOD PRESSURE: 70 MMHG | SYSTOLIC BLOOD PRESSURE: 128 MMHG | OXYGEN SATURATION: 100 % | HEART RATE: 69 BPM | TEMPERATURE: 98.5 F | HEIGHT: 62 IN | RESPIRATION RATE: 16 BRPM | WEIGHT: 110 LBS

## 2022-11-08 DIAGNOSIS — R11.0 NAUSEA: ICD-10-CM

## 2022-11-08 DIAGNOSIS — R93.5 ABNORMAL CT OF THE ABDOMEN: ICD-10-CM

## 2022-11-08 DIAGNOSIS — R10.10 UPPER ABDOMINAL PAIN: Primary | ICD-10-CM

## 2022-11-08 DIAGNOSIS — K59.00 CONSTIPATION, UNSPECIFIED CONSTIPATION TYPE: ICD-10-CM

## 2022-11-08 DIAGNOSIS — K59.00 CONSTIPATION, UNSPECIFIED CONSTIPATION TYPE: Primary | ICD-10-CM

## 2022-11-08 LAB
ALBUMIN SERPL-MCNC: 4.6 G/DL (ref 3.5–5.2)
ALP BLD-CCNC: 67 U/L (ref 35–104)
ALT SERPL-CCNC: 11 U/L (ref 0–32)
AMORPHOUS: ABNORMAL
ANION GAP SERPL CALCULATED.3IONS-SCNC: 8 MMOL/L (ref 7–16)
AST SERPL-CCNC: 18 U/L (ref 0–31)
BACTERIA: ABNORMAL /HPF
BASOPHILS ABSOLUTE: 0.04 E9/L (ref 0–0.2)
BASOPHILS RELATIVE PERCENT: 0.5 % (ref 0–2)
BILIRUB SERPL-MCNC: 0.4 MG/DL (ref 0–1.2)
BILIRUBIN URINE: NEGATIVE
BLOOD, URINE: ABNORMAL
BUN BLDV-MCNC: 14 MG/DL (ref 6–20)
CALCIUM SERPL-MCNC: 9.7 MG/DL (ref 8.6–10.2)
CHLORIDE BLD-SCNC: 104 MMOL/L (ref 98–107)
CLARITY: ABNORMAL
CO2: 28 MMOL/L (ref 22–29)
COLOR: YELLOW
CREAT SERPL-MCNC: 0.7 MG/DL (ref 0.5–1)
EOSINOPHILS ABSOLUTE: 0.08 E9/L (ref 0.05–0.5)
EOSINOPHILS RELATIVE PERCENT: 1 % (ref 0–6)
EPITHELIAL CELLS, UA: ABNORMAL /HPF
GFR SERPL CREATININE-BSD FRML MDRD: >60 ML/MIN/1.73
GLUCOSE BLD-MCNC: 105 MG/DL (ref 74–99)
GLUCOSE URINE: NEGATIVE MG/DL
HCT VFR BLD CALC: 47.1 % (ref 34–48)
HEMOGLOBIN: 15.9 G/DL (ref 11.5–15.5)
IMMATURE GRANULOCYTES #: 0.01 E9/L
IMMATURE GRANULOCYTES %: 0.1 % (ref 0–5)
KETONES, URINE: NEGATIVE MG/DL
LACTIC ACID: 1.2 MMOL/L (ref 0.5–2.2)
LEUKOCYTE ESTERASE, URINE: NEGATIVE
LIPASE: 26 U/L (ref 13–60)
LYMPHOCYTES ABSOLUTE: 2.58 E9/L (ref 1.5–4)
LYMPHOCYTES RELATIVE PERCENT: 33.7 % (ref 20–42)
MCH RBC QN AUTO: 31.5 PG (ref 26–35)
MCHC RBC AUTO-ENTMCNC: 33.8 % (ref 32–34.5)
MCV RBC AUTO: 93.3 FL (ref 80–99.9)
MONOCYTES ABSOLUTE: 0.38 E9/L (ref 0.1–0.95)
MONOCYTES RELATIVE PERCENT: 5 % (ref 2–12)
NEUTROPHILS ABSOLUTE: 4.56 E9/L (ref 1.8–7.3)
NEUTROPHILS RELATIVE PERCENT: 59.7 % (ref 43–80)
NITRITE, URINE: NEGATIVE
PDW BLD-RTO: 13.2 FL (ref 11.5–15)
PH UA: 6 (ref 5–9)
PLATELET # BLD: 242 E9/L (ref 130–450)
PMV BLD AUTO: 9.2 FL (ref 7–12)
POTASSIUM REFLEX MAGNESIUM: 3.9 MMOL/L (ref 3.5–5)
PROTEIN UA: NEGATIVE MG/DL
RBC # BLD: 5.05 E12/L (ref 3.5–5.5)
RBC UA: ABNORMAL /HPF (ref 0–2)
SODIUM BLD-SCNC: 140 MMOL/L (ref 132–146)
SPECIFIC GRAVITY UA: 1.02 (ref 1–1.03)
TOTAL PROTEIN: 7.2 G/DL (ref 6.4–8.3)
TROPONIN, HIGH SENSITIVITY: 7 NG/L (ref 0–9)
UROBILINOGEN, URINE: 0.2 E.U./DL
WBC # BLD: 7.7 E9/L (ref 4.5–11.5)
WBC UA: ABNORMAL /HPF (ref 0–5)

## 2022-11-08 PROCEDURE — 80053 COMPREHEN METABOLIC PANEL: CPT

## 2022-11-08 PROCEDURE — 3017F COLORECTAL CA SCREEN DOC REV: CPT | Performed by: PHYSICIAN ASSISTANT

## 2022-11-08 PROCEDURE — 74177 CT ABD & PELVIS W/CONTRAST: CPT

## 2022-11-08 PROCEDURE — 84484 ASSAY OF TROPONIN QUANT: CPT

## 2022-11-08 PROCEDURE — 99285 EMERGENCY DEPT VISIT HI MDM: CPT

## 2022-11-08 PROCEDURE — G8427 DOCREV CUR MEDS BY ELIG CLIN: HCPCS | Performed by: PHYSICIAN ASSISTANT

## 2022-11-08 PROCEDURE — 85025 COMPLETE CBC W/AUTO DIFF WBC: CPT

## 2022-11-08 PROCEDURE — 6360000002 HC RX W HCPCS: Performed by: STUDENT IN AN ORGANIZED HEALTH CARE EDUCATION/TRAINING PROGRAM

## 2022-11-08 PROCEDURE — 93005 ELECTROCARDIOGRAM TRACING: CPT | Performed by: STUDENT IN AN ORGANIZED HEALTH CARE EDUCATION/TRAINING PROGRAM

## 2022-11-08 PROCEDURE — 99214 OFFICE O/P EST MOD 30 MIN: CPT | Performed by: PHYSICIAN ASSISTANT

## 2022-11-08 PROCEDURE — 71045 X-RAY EXAM CHEST 1 VIEW: CPT

## 2022-11-08 PROCEDURE — 2580000003 HC RX 258: Performed by: STUDENT IN AN ORGANIZED HEALTH CARE EDUCATION/TRAINING PROGRAM

## 2022-11-08 PROCEDURE — G8420 CALC BMI NORM PARAMETERS: HCPCS | Performed by: PHYSICIAN ASSISTANT

## 2022-11-08 PROCEDURE — 83605 ASSAY OF LACTIC ACID: CPT

## 2022-11-08 PROCEDURE — 81001 URINALYSIS AUTO W/SCOPE: CPT

## 2022-11-08 PROCEDURE — 6360000004 HC RX CONTRAST MEDICATION: Performed by: RADIOLOGY

## 2022-11-08 PROCEDURE — 83690 ASSAY OF LIPASE: CPT

## 2022-11-08 PROCEDURE — G8484 FLU IMMUNIZE NO ADMIN: HCPCS | Performed by: PHYSICIAN ASSISTANT

## 2022-11-08 PROCEDURE — 4004F PT TOBACCO SCREEN RCVD TLK: CPT | Performed by: PHYSICIAN ASSISTANT

## 2022-11-08 PROCEDURE — 96375 TX/PRO/DX INJ NEW DRUG ADDON: CPT

## 2022-11-08 PROCEDURE — 36415 COLL VENOUS BLD VENIPUNCTURE: CPT

## 2022-11-08 PROCEDURE — 87088 URINE BACTERIA CULTURE: CPT

## 2022-11-08 PROCEDURE — 96374 THER/PROPH/DIAG INJ IV PUSH: CPT

## 2022-11-08 RX ORDER — FENTANYL CITRATE 50 UG/ML
25 INJECTION, SOLUTION INTRAMUSCULAR; INTRAVENOUS ONCE
Status: COMPLETED | OUTPATIENT
Start: 2022-11-08 | End: 2022-11-08

## 2022-11-08 RX ORDER — ONDANSETRON 4 MG/1
4 TABLET, ORALLY DISINTEGRATING ORAL EVERY 8 HOURS PRN
Qty: 10 TABLET | Refills: 0 | Status: SHIPPED | OUTPATIENT
Start: 2022-11-08

## 2022-11-08 RX ORDER — ONDANSETRON 2 MG/ML
4 INJECTION INTRAMUSCULAR; INTRAVENOUS ONCE
Status: COMPLETED | OUTPATIENT
Start: 2022-11-08 | End: 2022-11-08

## 2022-11-08 RX ORDER — SENNA PLUS 8.6 MG/1
1 TABLET ORAL 2 TIMES DAILY
Qty: 28 TABLET | Refills: 0 | Status: SHIPPED | OUTPATIENT
Start: 2022-11-08 | End: 2022-11-22

## 2022-11-08 RX ORDER — DOCUSATE SODIUM 100 MG/1
100 CAPSULE, LIQUID FILLED ORAL 2 TIMES DAILY
Qty: 28 CAPSULE | Refills: 0 | Status: SHIPPED | OUTPATIENT
Start: 2022-11-08 | End: 2022-11-22

## 2022-11-08 RX ORDER — 0.9 % SODIUM CHLORIDE 0.9 %
1000 INTRAVENOUS SOLUTION INTRAVENOUS ONCE
Status: COMPLETED | OUTPATIENT
Start: 2022-11-08 | End: 2022-11-08

## 2022-11-08 RX ADMIN — IOPAMIDOL 18 ML: 755 INJECTION, SOLUTION INTRAVENOUS at 15:55

## 2022-11-08 RX ADMIN — SODIUM CHLORIDE 1000 ML: 9 INJECTION, SOLUTION INTRAVENOUS at 13:23

## 2022-11-08 RX ADMIN — ONDANSETRON 4 MG: 2 INJECTION INTRAMUSCULAR; INTRAVENOUS at 13:23

## 2022-11-08 RX ADMIN — IOPAMIDOL 50 ML: 755 INJECTION, SOLUTION INTRAVENOUS at 15:50

## 2022-11-08 RX ADMIN — FENTANYL CITRATE 25 MCG: 0.05 INJECTION, SOLUTION INTRAMUSCULAR; INTRAVENOUS at 13:53

## 2022-11-08 ASSESSMENT — PAIN SCALES - GENERAL
PAINLEVEL_OUTOF10: 7
PAINLEVEL_OUTOF10: 9
PAINLEVEL_OUTOF10: 9
PAINLEVEL_OUTOF10: 7

## 2022-11-08 ASSESSMENT — ENCOUNTER SYMPTOMS
VOICE CHANGE: 0
CONSTIPATION: 1
SINUS PAIN: 0
VOMITING: 0
ABDOMINAL PAIN: 1
RHINORRHEA: 0
NAUSEA: 1
SHORTNESS OF BREATH: 0
COUGH: 0
TROUBLE SWALLOWING: 0
PHOTOPHOBIA: 0
DIARRHEA: 0

## 2022-11-08 ASSESSMENT — PAIN DESCRIPTION - LOCATION
LOCATION: ABDOMEN
LOCATION: ABDOMEN;CHEST
LOCATION: ABDOMEN
LOCATION: ABDOMEN

## 2022-11-08 ASSESSMENT — PAIN DESCRIPTION - DESCRIPTORS
DESCRIPTORS: PRESSURE
DESCRIPTORS: SHARP
DESCRIPTORS: SHARP
DESCRIPTORS: ACHING;CRAMPING

## 2022-11-08 ASSESSMENT — PAIN DESCRIPTION - PAIN TYPE
TYPE: ACUTE PAIN
TYPE: ACUTE PAIN

## 2022-11-08 ASSESSMENT — PAIN - FUNCTIONAL ASSESSMENT
PAIN_FUNCTIONAL_ASSESSMENT: 0-10
PAIN_FUNCTIONAL_ASSESSMENT: 0-10

## 2022-11-08 ASSESSMENT — LIFESTYLE VARIABLES
HOW MANY STANDARD DRINKS CONTAINING ALCOHOL DO YOU HAVE ON A TYPICAL DAY: PATIENT DOES NOT DRINK
HOW OFTEN DO YOU HAVE A DRINK CONTAINING ALCOHOL: NEVER
HOW MANY STANDARD DRINKS CONTAINING ALCOHOL DO YOU HAVE ON A TYPICAL DAY: PATIENT DOES NOT DRINK
HOW OFTEN DO YOU HAVE A DRINK CONTAINING ALCOHOL: NEVER

## 2022-11-08 ASSESSMENT — PAIN DESCRIPTION - ORIENTATION
ORIENTATION: UPPER
ORIENTATION: RIGHT
ORIENTATION: UPPER

## 2022-11-08 ASSESSMENT — PAIN DESCRIPTION - FREQUENCY: FREQUENCY: CONTINUOUS

## 2022-11-08 NOTE — PROGRESS NOTES
Chief Complaint       Constipation      History of Present Illness   Source of history provided by:  patient. Nedra Casarez is a 46 y.o. old female presenting to the walk in clinic for evaluation of epigastric abdominal pain for the past 5 days. Patient states that her last bowel movement was 5 days ago. Her pain has been progressive. She has attempted taking MiraLAX and using stool softeners at home without relief. Reports associated nausea but denies any vomiting. Denies any fever, chills, loss of taste/smell, CP, SOB, dysuria, hematuria, change in stool color/consistency, melena, coffee-ground emesis, hematemesis, HA, sore throat, rash, or lethargy. Patient is postmenopausal.    ROS    Unless otherwise stated in this report or unable to obtain because of the patient's clinical or mental status as evidenced by the medical record, this patients's positive and negative responses for Review of Systems, constitutional, psych, eyes, ENT, cardiovascular, respiratory, gastrointestinal, neurological, genitourinary, musculoskeletal, integument systems and systems related to the presenting problem are either stated in the preceding or were not pertinent or were negative for the symptoms and/or complaints related to the medical problem. Past Medical History:  has a past medical history of Anxiety, Arthritis, Bipolar 1 disorder ([address] Cervical stenosis of spine, Chronic back pain, Depression, Hypertension, Intractable chronic post-traumatic headache, Irritable bowel syndrome, Migraines, Mucocele of lip, Other hyperlipidemia, and PTSD (post-traumatic stress disorder). Past Surgical History:  has a past surgical history that includes Tonsillectomy; Tooth Extraction; Tubal ligation; Colonoscopy; arthrography (Right, 1/3/2019); Small intestine surgery (02/20/2019); Dilation and curettage of uterus (N/A, 2/3/2020); arthrography (Right, 9/10/2020); arthrography (Right, 4/13/2021);  Mouth surgery (Right, otherwise noted. Assessment / Plan     Impression(s):  Gerber was seen today for constipation. Diagnoses and all orders for this visit:    Upper abdominal pain    Constipation, unspecified constipation type    Disposition:  Disposition: Discharge to ER. Pt advised that she needs a comprehensive GI workup including STAT labs and imaging that is above the scope of our walk in clinic. Pt advised to go straight to the ED for further evaluation and management. Pt agreed with this care plan and agreed to go immediately. She is with a friend who agrees to drive her now by private vehicle. Pt left our office in stable condition. Further disposition to follow. All questions answered. Kevin Valdovinos PA-C    **This report was transcribed using voice recognition software. Every effort was made to ensure accuracy; however, inadvertent computerized transcription errors may be present.

## 2022-11-08 NOTE — ED NOTES
Patient ambulatory to the bathroom to self administer a fleets enema as per ordered.   Pt has been previously up to the bathroom voiding and attempting to move her bowels without any success and reports she doesn't have the urge     Hafsa Greco, BUFFY  11/08/22 [id number]

## 2022-11-08 NOTE — DISCHARGE INSTRUCTIONS
Impression:    1. No acute inflammatory process in the abdomen or pelvis. 2.  Stable heterogeneous appearance of the right hepatic lobe which could   reflect hepatic steatosis     3. Unchanged cystic lesions within the pancreas, consider MRI pancreas with   contrast for further characterization. Side branch IPMN considered likely. 4.  Moderate stool in the left colon and transverse colon suggestive of fecal   retention/constipation. 5.  Dilatation of left parametrial veins and left ovarian vein, consider   female pelvic congestion syndrome.

## 2022-11-08 NOTE — ED PROVIDER NOTES
HPI   Patient is a 80-year-old female past medical history of hypertension, irritable bowel syndrome, hyperlipidemia and tobacco use presenting to the emergency department due to concerns for constipation. Patient states that she has not had a bowel movement in over 5 days. Patient was apparently seen at [address] [place] [address] earlier and was advised to come to the ED for further evaluation. She is endorsing abdominal pain, generalized weakness, nausea chills and intermittent chest pain as well. Patient localizes her abdominal pain to her epigastric region. It is aching, constant and radiates downward. Nothing in particular makes the pain better or worse. Pain is moderate in severity. Patient denying similar pain in the past.  She states that she has tried laxatives and enemas at home without improvement in her constipation. Patient is denying any major abdominal surgeries in the past.  Her chest pain is vague and intermittent. It is mild when present and there are no remitting or exacerbating factors. She has had no recent sick contacts or abnormal food ingestions. Patient denies any other symptoms including fever, headache, lightheadedness, syncope, cough, congestion, sore throat, urinary symptoms diarrhea. Her presenting symptoms are moderate with no remitting or exacerbating factors. She endorses gradual onset of symptoms with persistence. Review of Systems   Constitutional:  Positive for chills. Negative for fever. HENT:  Negative for congestion, rhinorrhea, sinus pain, trouble swallowing and voice change. Eyes:  Negative for photophobia and visual disturbance. Respiratory:  Negative for cough and shortness of breath. Cardiovascular:  Positive for chest pain. Negative for palpitations. Gastrointestinal:  Positive for abdominal pain, constipation and nausea. Negative for diarrhea and vomiting. Endocrine: Negative for polydipsia, polyphagia and polyuria.    Genitourinary:  Negative for dysuria, flank pain, frequency, hematuria and urgency. Musculoskeletal:  Negative for arthralgias, myalgias and neck pain. Skin:  Negative for rash and wound. Neurological:  Negative for dizziness, tremors, numbness and headaches. Psychiatric/Behavioral:  Negative for behavioral problems and confusion. Physical Exam  Constitutional:       General: She is not in acute distress. Appearance: Normal appearance. She is not ill-appearing. HENT:      Head: Normocephalic and atraumatic. Right Ear: External ear normal.      Left Ear: External ear normal.      Nose: Nose normal. No congestion or rhinorrhea. Mouth/Throat:      Mouth: Mucous membranes are moist.      Pharynx: Oropharynx is clear. Eyes:      Conjunctiva/sclera: Conjunctivae normal.      Pupils: Pupils are equal, round, and reactive to light. Cardiovascular:      Rate and Rhythm: Normal rate and regular rhythm. Pulses: Normal pulses. Heart sounds: Normal heart sounds. Pulmonary:      Effort: Pulmonary effort is normal. No respiratory distress. Breath sounds: Normal breath sounds. No wheezing or rales. Abdominal:      General: Abdomen is flat. There is distension. Palpations: Abdomen is soft. There is no mass. Tenderness: There is abdominal tenderness. There is no guarding or rebound. Musculoskeletal:         General: No swelling. Cervical back: Normal range of motion and neck supple. Right lower leg: No edema. Left lower leg: No edema. Skin:     General: Skin is warm and dry. Capillary Refill: Capillary refill takes less than 2 seconds. Coloration: Skin is not pale. Findings: No bruising. Neurological:      General: No focal deficit present. Mental Status: She is alert and oriented to person, place, and time. Cranial Nerves: No cranial nerve deficit. Motor: No weakness.       Coordination: Coordination normal.   Psychiatric:         Mood and Affect: Mood normal.         Behavior: Behavior normal.        Procedures   EKG: This EKG is signed and interpreted by me. Rate: 68  Rhythm: Sinus  Interpretation: Sinus rhythm, normal axis, NE is 160, QRS is 74, QTc is 433, nonspecific T changes no evidence of ST elevation appears generally stable compared to prior  Comparison: stable as compared to patient's most recent EKG      MDM     ED Course as of 11/08/22 1753   Tue Nov 08, 2022   1344 EKG: This EKG is signed and interpreted by me. Normal sinus rhythm with ventricular rate of 68 bpm.  NE interval normal.  QTc not prolonged. No evidence of acute ST elevation. No changes compared to previous EKG. [PP]      ED Course User Index  [PP] Fletcher Delay, DO       --------------------------------------------- PAST HISTORY ---------------------------------------------  Past Medical History:  has a past medical history of Anxiety, Arthritis, Bipolar 1 disorder ([address] Cervical stenosis of spine, Chronic back pain, Depression, Hypertension, Intractable chronic post-traumatic headache, Irritable bowel syndrome, Migraines, Mucocele of lip, Other hyperlipidemia, and PTSD (post-traumatic stress disorder). Past Surgical History:  has a past surgical history that includes Tonsillectomy; Tooth Extraction; Tubal ligation; Colonoscopy; arthrography (Right, 1/3/2019); Small intestine surgery (02/20/2019); Dilation and curettage of uterus (N/A, 2/3/2020); arthrography (Right, 9/10/2020); arthrography (Right, 4/13/2021); Mouth surgery (Right, 7/21/2021); Stimulator Surgery; Upper gastrointestinal endoscopy (N/A, 3/28/2022); Breast surgery; and Breast biopsy. Social History:  reports that she has been smoking cigarettes. She has a 45.00 pack-year smoking history. She has never used smokeless tobacco. She reports that she does not currently use alcohol. She reports that she does not currently use drugs after having used the following drugs: Marijuana Sarthak Isidro).     Family History: family history includes Breast Cancer in her maternal aunt; COPD in her mother; GERD in her mother; No Known Problems in her father; Parkinsonism in her mother. The patients home medications have been reviewed.     Allergies: Latex and Sulfa antibiotics    -------------------------------------------------- RESULTS -------------------------------------------------  Labs:  Results for orders placed or performed during the hospital encounter of 11/08/22   CBC with Auto Differential   Result Value Ref Range    WBC 7.7 4.5 - 11.5 E9/L    RBC 5.05 3.50 - 5.50 E12/L    Hemoglobin 15.9 (H) 11.5 - 15.5 g/dL    Hematocrit 47.1 34.0 - 48.0 %    MCV 93.3 80.0 - 99.9 fL    MCH 31.5 26.0 - 35.0 pg    MCHC 33.8 32.0 - 34.5 %    RDW 13.2 11.5 - 15.0 fL    Platelets 759 548 - 277 E9/L    MPV 9.2 7.0 - 12.0 fL    Neutrophils % 59.7 43.0 - 80.0 %    Immature Granulocytes % 0.1 0.0 - 5.0 %    Lymphocytes % 33.7 20.0 - 42.0 %    Monocytes % 5.0 2.0 - 12.0 %    Eosinophils % 1.0 0.0 - 6.0 %    Basophils % 0.5 0.0 - 2.0 %    Neutrophils Absolute 4.56 1.80 - 7.30 E9/L    Immature Granulocytes # 0.01 E9/L    Lymphocytes Absolute 2.58 1.50 - 4.00 E9/L    Monocytes Absolute 0.38 0.10 - 0.95 E9/L    Eosinophils Absolute 0.08 0.05 - 0.50 E9/L    Basophils Absolute 0.04 0.00 - 0.20 E9/L   Comprehensive Metabolic Panel w/ Reflex to MG   Result Value Ref Range    Sodium 140 132 - 146 mmol/L    Potassium reflex Magnesium 3.9 3.5 - 5.0 mmol/L    Chloride 104 98 - 107 mmol/L    CO2 28 22 - 29 mmol/L    Anion Gap 8 7 - 16 mmol/L    Glucose 105 (H) 74 - 99 mg/dL    BUN 14 6 - 20 mg/dL    Creatinine 0.7 0.5 - 1.0 mg/dL    Est, Glom Filt Rate >60 >=60 mL/min/1.73    Calcium 9.7 8.6 - 10.2 mg/dL    Total Protein 7.2 6.4 - 8.3 g/dL    Albumin 4.6 3.5 - 5.2 g/dL    Total Bilirubin 0.4 0.0 - 1.2 mg/dL    Alkaline Phosphatase 67 35 - 104 U/L    ALT 11 0 - 32 U/L    AST 18 0 - 31 U/L   Troponin   Result Value Ref Range    Troponin, High Sensitivity 7 0 - 9 ng/L   Lipase   Result Value Ref Range    Lipase 26 13 - 60 U/L   Lactic Acid   Result Value Ref Range    Lactic Acid 1.2 0.5 - 2.2 mmol/L   Urinalysis with Microscopic   Result Value Ref Range    Color, UA Yellow Straw/Yellow    Clarity, UA SL CLOUDY Clear    Glucose, Ur Negative Negative mg/dL    Bilirubin Urine Negative Negative    Ketones, Urine Negative Negative mg/dL    Specific Gravity, UA 1.025 1.005 - 1.030    Blood, Urine TRACE-INTACT Negative    pH, UA 6.0 5.0 - 9.0    Protein, UA Negative Negative mg/dL    Urobilinogen, Urine 0.2 <2.0 E.U./dL    Nitrite, Urine Negative Negative    Leukocyte Esterase, Urine Negative Negative    WBC, UA 1-3 0 - 5 /HPF    RBC, UA 2-5 0 - 2 /HPF    Epithelial Cells, UA FEW /HPF    Bacteria, UA MANY (A) None Seen /HPF    Amorphous, UA FEW    EKG 12 Lead   Result Value Ref Range    Ventricular Rate 68 BPM    Atrial Rate 68 BPM    P-R Interval 160 ms    QRS Duration 74 ms    Q-T Interval 408 ms    QTc Calculation (Bazett) 433 ms    P Axis 72 degrees    R Axis 82 degrees    T Axis 40 degrees       Radiology:  CT ABDOMEN PELVIS W IV CONTRAST Additional Contrast? Oral   Final Result   1. No acute inflammatory process in the abdomen or pelvis. 2.  Stable heterogeneous appearance of the right hepatic lobe which could   reflect hepatic steatosis      3. Unchanged cystic lesions within the pancreas, consider MRI pancreas with   contrast for further characterization. Side branch IPMN considered likely. 4.  Moderate stool in the left colon and transverse colon suggestive of fecal   retention/constipation. 5.  Dilatation of left parametrial veins and left ovarian vein, consider   female pelvic congestion syndrome. XR CHEST PORTABLE   Final Result   No evidence of acute cardiopulmonary disease is seen.              ------------------------- NURSING NOTES AND VITALS REVIEWED ---------------------------  Date / Time Roomed:  11/8/2022  1:00 PM  ED Bed Assignment:  10/10    The nursing notes within the ED encounter and vital signs as below have been reviewed. /70   Pulse 69   Temp 98.5 °F (36.9 °C) (Oral)   Resp 16   Ht 5' 2\" (1.575 m)   Wt 110 lb (49.9 kg)   LMP 12/28/2018 (LMP Unknown)   SpO2 100%   BMI 20.12 kg/m²   Oxygen Saturation Interpretation: Normal      ------------------------------------------ PROGRESS NOTES ------------------------------------------  I have spoken with the patient and discussed todays results, in addition to providing specific details for the plan of care and counseling regarding the diagnosis and prognosis. Their questions are answered at this time and they are agreeable with the plan. I discussed at length with them reasons for immediate return here for re evaluation. They will followup with primary care by calling their office tomorrow. --------------------------------- ADDITIONAL PROVIDER NOTES ---------------------------------  At this time the patient is without objective evidence of an acute process requiring hospitalization or inpatient management. They have remained hemodynamically stable throughout their entire ED visit and are stable for discharge with outpatient follow-up. The plan has been discussed in detail and they are aware of the specific conditions for emergent return, as well as the importance of follow-up. New Prescriptions    DOCUSATE SODIUM (COLACE) 100 MG CAPSULE    Take 1 capsule by mouth 2 times daily for 14 days    ONDANSETRON (ZOFRAN ODT) 4 MG DISINTEGRATING TABLET    Take 1 tablet by mouth every 8 hours as needed for Nausea or Vomiting May substitute for covered product    SENNA (SENOKOT) 8.6 MG TABLET    Take 1 tablet by mouth 2 times daily for 14 days       Diagnosis:  1. Constipation, unspecified constipation type    2. Nausea    3.  Abnormal CT of the abdomen        Disposition:  Patient's disposition: Discharge to home  Patient's condition is stable. ATTENDING PROVIDER ATTESTATION:     Tana Griffin presented to the emergency department for evaluation of Constipation (Patient was seen at the [facility] , they told her to come here, she states no bm in 5 days, c/o weakness and nausea and abdominal pain states she has used enemas, laxatives and nothing is helping )    I have reviewed and discussed the case, including pertinent history (medical, surgical, family and social) and exam findings with the Resident and the Nurse assigned to Tana Griffin. I have personally performed and/or participated in the history, exam, medical decision making, and procedures and agree with all pertinent clinical information. I have reviewed my findings and recommendations with Tana Griffin and members of family present at the time of disposition. I have personally reviewed all laboratory radiology results from today's visit. I have personally reviewed and agree with resident interpretation of EKG for all EKGs from today's visit. MDM:     I, Dr. Chad Samuels am the primary provider of record    Patient presents with constipation nausea, nausea better controlled, she did tolerated oral contrast, she was given fleets enema soapsuds enema not available at this facility. I did discuss with her the findings of her CT scan as well as the importance of follow-up with both her PCP and recommended follow-up with hepatobiliary surgery. Discussed bowel regiment will have return for worsening signs or symptoms. My findings/plan: The primary encounter diagnosis was Constipation, unspecified constipation type. Diagnoses of Nausea and Abnormal CT of the abdomen were also pertinent to this visit.   New Prescriptions    DOCUSATE SODIUM (COLACE) 100 MG CAPSULE    Take 1 capsule by mouth 2 times daily for 14 days    ONDANSETRON (ZOFRAN ODT) 4 MG DISINTEGRATING TABLET    Take 1 tablet by mouth every 8 hours as needed for Nausea or Vomiting May substitute for covered product    SENNA (SENOKOT) 8.6 MG TABLET    Take 1 tablet by mouth 2 times daily for 14 days     Marylene John, DO Marylene John, DO  11/08/22 1729

## 2022-11-09 ENCOUNTER — TELEPHONE (OUTPATIENT)
Dept: FAMILY MEDICINE CLINIC | Age: 52
End: 2022-11-09

## 2022-11-09 LAB
EKG ATRIAL RATE: 68 BPM
EKG P AXIS: 72 DEGREES
EKG P-R INTERVAL: 160 MS
EKG Q-T INTERVAL: 408 MS
EKG QRS DURATION: 74 MS
EKG QTC CALCULATION (BAZETT): 433 MS
EKG R AXIS: 82 DEGREES
EKG T AXIS: 40 DEGREES
EKG VENTRICULAR RATE: 68 BPM

## 2022-11-09 PROCEDURE — 93010 ELECTROCARDIOGRAM REPORT: CPT | Performed by: INTERNAL MEDICINE

## 2022-11-09 NOTE — TELEPHONE ENCOUNTER
Siddharth Divers calling about the ER visit she had yesterday after being seen [place] [facility]. She was told that she has some lesions on the liver and to see Dr Jewel Graves. Does she need a referral from you? Do you need to see her for a follow up?

## 2022-11-09 NOTE — TELEPHONE ENCOUNTER
I scheduled her for a ER follow up next Friday because it was the first available for ER follow up. She said she is in a lot of pain and having trouble eating and not having regular bowel movements.

## 2022-11-10 LAB — URINE CULTURE, ROUTINE: NORMAL

## 2022-11-10 NOTE — TELEPHONE ENCOUNTER
Patient was notified. She did end up having a small BM this morning. The pain is still there but she feels a little better.

## 2022-11-18 ENCOUNTER — OFFICE VISIT (OUTPATIENT)
Dept: FAMILY MEDICINE CLINIC | Age: 52
End: 2022-11-18
Payer: MEDICARE

## 2022-11-18 VITALS
SYSTOLIC BLOOD PRESSURE: 112 MMHG | TEMPERATURE: 97.4 F | OXYGEN SATURATION: 97 % | HEART RATE: 82 BPM | DIASTOLIC BLOOD PRESSURE: 78 MMHG | BODY MASS INDEX: 20.61 KG/M2 | HEIGHT: 62 IN | RESPIRATION RATE: 16 BRPM | WEIGHT: 112 LBS

## 2022-11-18 DIAGNOSIS — M47.812 SPONDYLOSIS OF CERVICAL REGION WITHOUT MYELOPATHY OR RADICULOPATHY: ICD-10-CM

## 2022-11-18 DIAGNOSIS — K59.00 CONSTIPATION, UNSPECIFIED CONSTIPATION TYPE: Primary | ICD-10-CM

## 2022-11-18 PROCEDURE — 4004F PT TOBACCO SCREEN RCVD TLK: CPT | Performed by: FAMILY MEDICINE

## 2022-11-18 PROCEDURE — G8427 DOCREV CUR MEDS BY ELIG CLIN: HCPCS | Performed by: FAMILY MEDICINE

## 2022-11-18 PROCEDURE — 99213 OFFICE O/P EST LOW 20 MIN: CPT | Performed by: FAMILY MEDICINE

## 2022-11-18 PROCEDURE — G8420 CALC BMI NORM PARAMETERS: HCPCS | Performed by: FAMILY MEDICINE

## 2022-11-18 PROCEDURE — G8484 FLU IMMUNIZE NO ADMIN: HCPCS | Performed by: FAMILY MEDICINE

## 2022-11-18 PROCEDURE — 3017F COLORECTAL CA SCREEN DOC REV: CPT | Performed by: FAMILY MEDICINE

## 2022-11-18 RX ORDER — BACLOFEN 10 MG/1
10 TABLET ORAL 3 TIMES DAILY
Qty: 360 TABLET | Refills: 1 | Status: SHIPPED | OUTPATIENT
Start: 2022-11-18

## 2022-11-18 ASSESSMENT — ENCOUNTER SYMPTOMS
EYE PAIN: 0
ABDOMINAL PAIN: 1
SINUS PAIN: 0
SHORTNESS OF BREATH: 0
DIARRHEA: 1
BLOOD IN STOOL: 0
EYE DISCHARGE: 0
SORE THROAT: 0
COUGH: 0
NAUSEA: 1
TROUBLE SWALLOWING: 0
CONSTIPATION: 1
BACK PAIN: 0
VOMITING: 0
ALLERGIC/IMMUNOLOGIC NEGATIVE: 1
PHOTOPHOBIA: 0
EYE REDNESS: 0
CHEST TIGHTNESS: 0

## 2022-11-18 NOTE — PROGRESS NOTES
22  Patricia Cisneros : 1970 Sex: female  Age: 46 y.o. Assessment and Plan:  Sherie Aguila was seen today for follow-up. Diagnoses and all orders for this visit:    Constipation, unspecified constipation type  -     Roshni Clark, Dalila Pope, CNP, Gastroenterology, [facility]    Spondylosis of cervical region without myelopathy or radiculopathy      No follow-ups on file. Chief Complaint   Patient presents with    Follow-up       Patient returns for post emergency room visit. Seen in [facility] for constipation and abdominal pain and sent to the emergency room for further evaluation. Ears they found her pancreatic cyst with some been checked in the past mild fatty changes of her liver, stool in colon. She was prescribed laxatives and now has diarrhea. Concerned about the findings in her liver. Review of Systems   Constitutional:  Positive for fatigue. Negative for appetite change and unexpected weight change. HENT:  Negative for congestion, ear pain, hearing loss, sinus pain, sore throat and trouble swallowing. Eyes:  Negative for photophobia, pain, discharge and redness. Respiratory:  Negative for cough, chest tightness and shortness of breath. Cardiovascular:  Negative for chest pain, palpitations and leg swelling. Gastrointestinal:  Positive for abdominal pain, constipation, diarrhea and nausea. Negative for blood in stool and vomiting. Endocrine: Negative. Genitourinary:  Negative for dysuria, flank pain, frequency and hematuria. Musculoskeletal:  Positive for myalgias. Negative for arthralgias, back pain and joint swelling. Skin: Negative. Allergic/Immunologic: Negative. Neurological:  Negative for dizziness, seizures, syncope, weakness, light-headedness, numbness and headaches. Hematological:  Negative for adenopathy. Does not bruise/bleed easily. Psychiatric/Behavioral: Negative.          Current Outpatient Medications:     ondansetron (ZOFRAN ODT) 4 MG disintegrating tablet, Take 1 tablet by mouth every 8 hours as needed for Nausea or Vomiting May substitute for covered product, Disp: 10 tablet, Rfl: 0    docusate sodium (COLACE) 100 MG capsule, Take 1 capsule by mouth 2 times daily for 14 days, Disp: 28 capsule, Rfl: 0    senna (SENOKOT) 8.6 MG tablet, Take 1 tablet by mouth 2 times daily for 14 days, Disp: 28 tablet, Rfl: 0    rosuvastatin (CRESTOR) 20 MG tablet, take 1 tablet by mouth nightly, Disp: 90 tablet, Rfl: 1    pantoprazole (PROTONIX) 40 MG tablet, Take 1 tablet by mouth every morning (before breakfast), Disp: 90 tablet, Rfl: 1    Eptinezumab-jjmr (VYEPTI IV), Infuse intravenously, Disp: , Rfl:     nadolol (CORGARD) 20 MG tablet, take 1 tablet by mouth once daily, Disp: 90 tablet, Rfl: 1    baclofen (LIORESAL) 10 MG tablet, Take 1 tablet by mouth 3 times daily, Disp: 360 tablet, Rfl: 1    DULoxetine (CYMBALTA) 60 MG extended release capsule, 90 mg daily , Disp: , Rfl:     ondansetron (ZOFRAN-ODT) 4 MG disintegrating tablet, Take 1 tablet by mouth 3 times daily as needed for Nausea or Vomiting, Disp: 21 tablet, Rfl: 0    Docusate Calcium (STOOL SOFTENER PO), Take 100 mg by mouth three times daily , Disp: , Rfl:     diazepam (VALIUM) 10 MG tablet, Take 10 mg by mouth in the morning and 10 mg at noon and 10 mg in the evening. Instructed to take am of procedure if needed. , Disp: , Rfl:     folic acid (FOLVITE) 1 MG tablet, Take 1 mg by mouth daily. , Disp: , Rfl:     lamoTRIgine (LAMICTAL) 200 MG tablet, Take 200 mg by mouth every evening , Disp: , Rfl:   Allergies   Allergen Reactions    Latex Rash    Sulfa Antibiotics Rash and Hives       Past Medical History:   Diagnosis Date    Anxiety     Arthritis     right hip    Bipolar 1 disorder ([facility])     Cervical stenosis of spine     Chronic back pain     Depression     Hypertension     Intractable chronic post-traumatic headache 4/21/2022    Irritable bowel syndrome     Migraines     Mucocele of lip     for Social History Narrative    Not on file     Social Determinants of Health     Financial Resource Strain: Unknown    Difficulty of Paying Living Expenses: Patient refused   Food Insecurity: Unknown    Worried About Running Out of Food in the Last Year: Patient refused    Ran Out of Food in the Last Year: Patient refused   Transportation Needs: Not on file   Physical Activity: Unknown    Days of Exercise per Week: 0 days    Minutes of Exercise per Session: Not on file   Stress: Not on file   Social Connections: Not on file   Intimate Partner Violence: Not on file   Housing Stability: Not on file       Vitals:    11/18/22 1037   BP: 112/78   Pulse: 82   Resp: 16   Temp: 97.4 °F (36.3 °C)   TempSrc: Temporal   SpO2: 97%   Weight: 112 lb (50.8 kg)   Height: 5' 2\" (1.575 m)       Physical Exam  Vitals and nursing note reviewed. Constitutional:       Appearance: She is well-developed. HENT:      Head: Atraumatic. Right Ear: External ear normal.      Left Ear: External ear normal.      Nose: Nose normal.      Mouth/Throat:      Pharynx: No oropharyngeal exudate. Eyes:      Conjunctiva/sclera: Conjunctivae normal.      Pupils: Pupils are equal, round, and reactive to light. Neck:      Thyroid: No thyromegaly. Trachea: No tracheal deviation. Cardiovascular:      Rate and Rhythm: Normal rate and regular rhythm. Heart sounds: No murmur heard. No friction rub. No gallop. Pulmonary:      Effort: Pulmonary effort is normal. No respiratory distress. Breath sounds: Normal breath sounds. Abdominal:      General: Bowel sounds are normal. There is no distension. Palpations: Abdomen is soft. There is no mass. Tenderness: There is abdominal tenderness. There is no right CVA tenderness, left CVA tenderness, guarding or rebound. Musculoskeletal:         General: No tenderness or deformity. Normal range of motion. Cervical back: Normal range of motion and neck supple.       Comments: Pain, spasm, stiffness, decreased range of motion cervical, lumbar spine. Lymphadenopathy:      Cervical: No cervical adenopathy. Skin:     General: Skin is warm and dry. Capillary Refill: Capillary refill takes less than 2 seconds. Findings: No rash. Neurological:      Mental Status: She is alert and oriented to person, place, and time. Sensory: No sensory deficit. Motor: No abnormal muscle tone.       Coordination: Coordination normal.      Deep Tendon Reflexes: Reflexes normal.           Seen By:  Artemio Lamar DO

## 2022-11-22 ENCOUNTER — INITIAL CONSULT (OUTPATIENT)
Dept: GASTROENTEROLOGY | Age: 52
End: 2022-11-22
Payer: MEDICARE

## 2022-11-22 VITALS
HEIGHT: 62 IN | RESPIRATION RATE: 18 BRPM | BODY MASS INDEX: 20.98 KG/M2 | OXYGEN SATURATION: 93 % | HEART RATE: 86 BPM | TEMPERATURE: 97.3 F | SYSTOLIC BLOOD PRESSURE: 132 MMHG | DIASTOLIC BLOOD PRESSURE: 76 MMHG | WEIGHT: 114 LBS

## 2022-11-22 DIAGNOSIS — K86.2 PANCREATIC CYST: ICD-10-CM

## 2022-11-22 DIAGNOSIS — K76.0 HEPATIC STEATOSIS: ICD-10-CM

## 2022-11-22 DIAGNOSIS — K59.00 CONSTIPATION, UNSPECIFIED CONSTIPATION TYPE: Primary | ICD-10-CM

## 2022-11-22 PROCEDURE — 3017F COLORECTAL CA SCREEN DOC REV: CPT | Performed by: NURSE PRACTITIONER

## 2022-11-22 PROCEDURE — G8484 FLU IMMUNIZE NO ADMIN: HCPCS | Performed by: NURSE PRACTITIONER

## 2022-11-22 PROCEDURE — G8427 DOCREV CUR MEDS BY ELIG CLIN: HCPCS | Performed by: NURSE PRACTITIONER

## 2022-11-22 PROCEDURE — G8420 CALC BMI NORM PARAMETERS: HCPCS | Performed by: NURSE PRACTITIONER

## 2022-11-22 PROCEDURE — 99202 OFFICE O/P NEW SF 15 MIN: CPT | Performed by: NURSE PRACTITIONER

## 2022-11-22 PROCEDURE — 4004F PT TOBACCO SCREEN RCVD TLK: CPT | Performed by: NURSE PRACTITIONER

## 2022-11-22 RX ORDER — POLYETHYLENE GLYCOL 3350, SODIUM CHLORIDE, POTASSIUM CHLORIDE, SODIUM BICARBONATE, AND SODIUM SULFATE 240; 5.84; 2.98; 6.72; 22.72 G/4L; G/4L; G/4L; G/4L; G/4L
4000 POWDER, FOR SOLUTION ORAL ONCE
Qty: 1 EACH | Refills: 0 | Status: SHIPPED | OUTPATIENT
Start: 2022-11-22 | End: 2022-11-22

## 2022-11-22 NOTE — PROGRESS NOTES
Loi Carballo (:  1970) is a 46 y.o. female, here for evaluation of the following chief complaint(s):  New Patient (Ref by Dr. Tayler Adame for constipation)      SUBJECTIVE/OBJECTIVE:  HPI:    Courtney Taylor is a very pleasant 46year old female that presents today with complaints of constipation. Symptoms started in July where she was having small stools daily  Then progressed to having a BM to every other day   Eventually, the patient was not having a BM for about 6 days  There is upper abdominal pain that radiates laterally  There is nausea  No fevers or BRBPR  Denies an urge to have a BM    On 22, patient presented to the ER after going 5 days without a BM. CT scan with contrast showed   1. No acute inflammatory process in the abdomen or pelvis. 2.  Stable heterogeneous appearance of the right hepatic lobe which could   reflect hepatic steatosis       3. Unchanged cystic lesions within the pancreas, consider MRI pancreas with   contrast for further characterization. Side branch IPMN considered likely. 4.  Moderate stool in the left colon and transverse colon suggestive of fecal   retention/constipation. 5.  Dilatation of left parametrial veins and left ovarian vein, consider   female pelvic congestion syndrome. Patient was given a laxative and stool softener from the hospital that caused diarrhea  Tried a RS but states she could not get it in  Last BM was about a week ago    Patient has a stimulator placed in 2019 for constipation   Does not feel it is working  Would like it removed but cannot go back to the surgeon that placed it due to insurance reasons  Last colonoscopy noted in the computer was in 2018. Liver enzymes are normal.  BMI is 20  Denies alcohol intake    Reviewing EMR,  patient had EUS in March with Dr. Alma Rosa Walter for the pancreatic cyst  Results note an IPMN     ROS:  General: Patient denies n/v/f/c or weight loss.   HEENT: Patient denies persistent postnasal drip, scleral icterus, drooling, persistent bleeding from nose/mouth. Resp: Patient denies SOB, wheezing, productive cough. Cards: Patient denies CP, palpitations, significant edema  GI: As above. Derm: Patient denies jaundice/rashes. Musc: Patient denies diffuse/irregular joint swelling or myalgias.       Objective   Wt Readings from Last 3 Encounters:   11/22/22 114 lb (51.7 kg)   11/18/22 112 lb (50.8 kg)   11/08/22 110 lb (49.9 kg)     Temp Readings from Last 3 Encounters:   11/22/22 97.3 °F (36.3 °C) (Infrared)   11/18/22 97.4 °F (36.3 °C) (Temporal)   11/08/22 98.5 °F (36.9 °C) (Oral)     BP Readings from Last 3 Encounters:   11/22/22 132/76   11/18/22 112/78   11/08/22 128/70     Pulse Readings from Last 3 Encounters:   11/22/22 86   11/18/22 82   11/08/22 69        Physical Exam    Past Medical History:   Diagnosis Date    Anxiety     Arthritis     right hip    Bipolar 1 disorder (HCC)     Cervical stenosis of spine     Chronic back pain     Constipation     Depression     Hypertension     Intractable chronic post-traumatic headache 04/21/2022    Irritable bowel syndrome     Migraines     Mucocele of lip     for surgery 7/21/21    Other hyperlipidemia 07/29/2019    PTSD (post-traumatic stress disorder)       Past Surgical History:   Procedure Laterality Date    ARTHROGRAPHY Right 1/3/2019    RIGHT HIP ARTHROGRAM AND STEROID INJECTION performed by Alexis Shelton MD at [facility] Right 9/10/2020    RIGHT HIP INJECTION UNDER FLUOROSCOPY performed by Alexis Shelton MD at [facility] Right 4/13/2021    RIGHT HIP INJECTION UNDER FLUOROSCOPY performed by Alexis Shelton MD at [place] [address] N/A 2/3/2020    DILATATION AND CURETTAGE HYSTEROSCOPY performed by Natividad Thao MD at [facility] Right 7/21/2021    EXCISION RIGHT LIP MUCOCELE performed by Fernie Macias MD at [address] [facility]  02/20/2019    implant for IBS     STIMULATOR SURGERY      bowel    TONSILLECTOMY      TOOTH EXTRACTION      TUBAL LIGATION      UPPER GASTROINTESTINAL ENDOSCOPY N/A 3/28/2022    EGD ESOPHAGOGASTRODUODENOSCOPY ULTRASOUND performed by Spike Salcido MD at [address] History   Problem Relation Age of Onset    GERD Mother     Parkinsonism Mother     COPD Mother     No Known Problems Father     Breast Cancer Maternal Aunt         Lab Results   Component Value Date    WBC 7.7 11/08/2022    HGB 15.9 (H) 11/08/2022    HCT 47.1 11/08/2022    MCV 93.3 11/08/2022     11/08/2022      Lab Results   Component Value Date     11/08/2022    K 3.9 11/08/2022     11/08/2022    CO2 28 11/08/2022    BUN 14 11/08/2022    CREATININE 0.7 11/08/2022    GLUCOSE 105 (H) 11/08/2022    CALCIUM 9.7 11/08/2022    PROT 7.2 11/08/2022    LABALBU 4.6 11/08/2022    BILITOT 0.4 11/08/2022    ALKPHOS 67 11/08/2022    AST 18 11/08/2022    ALT 11 11/08/2022    LABGLOM >60 11/08/2022    GFRAA >60 07/13/2022                       ASSESSMENT/PLAN:    1. Constipation, unspecified constipation type    -colytely prep ordered with instructions to clean out the colon  -once the colon is cleaned out, will have patient start on Linzess 145 mcg daily. May also use senokot as needed also.   -a colonoscopy is advised due to the chronic constipation. Patient is agreeable to proceed  -Patient is interested in getting the stimulator removed. Informed patient that she should return to the surgeon that placed the stimulator. Unfortunately, she tells me she cannot go back because the physician does not take her insurance. The patient can discuss this with a new surgeon. It is at their discretion on whether to remove the stimulator or not. 2. Pancreatic cyst  -     MRI ABDOMEN W WO CONTRAST MRCP; Future    -EUS shows the cyst is an IPMN. No further testing needed at this time.       3.

## 2022-11-22 NOTE — PATIENT INSTRUCTIONS
On a day when you are home, drink the colon prep all day  Once you are cleaned out, start on stool softeners, 2 at bedtime and senokot twice a day  Will try to get Aquilino Hemaryy approved

## 2022-11-25 ENCOUNTER — TELEPHONE (OUTPATIENT)
Dept: FAMILY MEDICINE CLINIC | Age: 52
End: 2022-11-25

## 2022-11-25 DIAGNOSIS — R32 URINARY INCONTINENCE, UNSPECIFIED TYPE: Primary | ICD-10-CM

## 2022-11-25 NOTE — TELEPHONE ENCOUNTER
Arleene Cinnamon calling about the neuro stimulator that Dr Sherri Tong had place in 2019. Malissa Venegas suggested that she have it removed. She is willing to do this, and called Dr Sherri Tong, who is no longer in her network. She is asking if you can refer her to someone else? hospitals suggested a doctor with the last name of Jonnyrachel? ?.

## 2022-11-28 RX ORDER — SENNA PLUS 8.6 MG/1
1 TABLET ORAL 2 TIMES DAILY
Qty: 60 TABLET | Refills: 11 | Status: SHIPPED | OUTPATIENT
Start: 2022-11-28 | End: 2023-11-28

## 2022-11-28 RX ORDER — DOCUSATE SODIUM 100 MG/1
100 CAPSULE, LIQUID FILLED ORAL 2 TIMES DAILY PRN
Qty: 60 CAPSULE | Refills: 2 | Status: SHIPPED | OUTPATIENT
Start: 2022-11-28

## 2022-11-28 NOTE — TELEPHONE ENCOUNTER
I spoke to [place] and she said [facility] is in her network. There is a doctor there that can do the procedure for her.  So she is asking you to put in the referral.

## 2022-11-30 ENCOUNTER — TELEPHONE (OUTPATIENT)
Dept: GASTROENTEROLOGY | Age: 52
End: 2022-11-30

## 2022-11-30 NOTE — TELEPHONE ENCOUNTER
Patient called the office. She is concerned with the hepatic steatosis. States she is worried about having liver cancer. Reviewed CT scan with patient today and explained hepatic steatosis. Patient requests further testing. Will proceed with ultrasound elastography. Patient will follow up with Dr. Jose Antonio Hidalgo afterwards. Please schedule ultrasound elastography at ' [facility]. Will need a follow up with Dr. Jose Antonio Hidalgo.

## 2022-12-01 DIAGNOSIS — K59.00 CONSTIPATION, UNSPECIFIED CONSTIPATION TYPE: Primary | ICD-10-CM

## 2022-12-01 DIAGNOSIS — R11.0 NAUSEA: ICD-10-CM

## 2022-12-01 DIAGNOSIS — R10.9 ACUTE ABDOMINAL PAIN: ICD-10-CM

## 2022-12-09 ENCOUNTER — OFFICE VISIT (OUTPATIENT)
Dept: SURGERY | Age: 52
End: 2022-12-09

## 2022-12-09 ENCOUNTER — TELEPHONE (OUTPATIENT)
Dept: SURGERY | Age: 52
End: 2022-12-09

## 2022-12-09 VITALS
HEIGHT: 62 IN | DIASTOLIC BLOOD PRESSURE: 73 MMHG | BODY MASS INDEX: 20.43 KG/M2 | SYSTOLIC BLOOD PRESSURE: 99 MMHG | HEART RATE: 77 BPM | RESPIRATION RATE: 16 BRPM | WEIGHT: 111 LBS

## 2022-12-09 DIAGNOSIS — K59.00 CONSTIPATION, UNSPECIFIED CONSTIPATION TYPE: ICD-10-CM

## 2022-12-09 DIAGNOSIS — K86.2 PANCREATIC CYST: Primary | ICD-10-CM

## 2022-12-09 RX ORDER — POLYETHYLENE GLYCOL 3350, SODIUM CHLORIDE, SODIUM BICARBONATE, POTASSIUM CHLORIDE 420; 11.2; 5.72; 1.48 G/4L; G/4L; G/4L; G/4L
4000 POWDER, FOR SOLUTION ORAL ONCE
Qty: 4000 ML | Refills: 0 | Status: SHIPPED | OUTPATIENT
Start: 2022-12-09 | End: 2022-12-09

## 2022-12-09 RX ORDER — SODIUM CHLORIDE, SODIUM LACTATE, POTASSIUM CHLORIDE, CALCIUM CHLORIDE 600; 310; 30; 20 MG/100ML; MG/100ML; MG/100ML; MG/100ML
INJECTION, SOLUTION INTRAVENOUS CONTINUOUS
OUTPATIENT
Start: 2022-12-09

## 2022-12-09 RX ORDER — SODIUM CHLORIDE 0.9 % (FLUSH) 0.9 %
5-40 SYRINGE (ML) INJECTION PRN
OUTPATIENT
Start: 2022-12-09

## 2022-12-09 RX ORDER — SODIUM CHLORIDE 0.9 % (FLUSH) 0.9 %
5-40 SYRINGE (ML) INJECTION EVERY 12 HOURS SCHEDULED
OUTPATIENT
Start: 2022-12-09

## 2022-12-09 RX ORDER — SODIUM CHLORIDE 9 MG/ML
25 INJECTION, SOLUTION INTRAVENOUS PRN
OUTPATIENT
Start: 2022-12-09

## 2022-12-09 ASSESSMENT — ENCOUNTER SYMPTOMS
BLOOD IN STOOL: 0
CHEST TIGHTNESS: 0
TROUBLE SWALLOWING: 0
DIARRHEA: 0
CHOKING: 0
CONSTIPATION: 1
COLOR CHANGE: 0
ABDOMINAL DISTENTION: 1
COUGH: 0
BACK PAIN: 1
VOMITING: 1
STRIDOR: 0
SHORTNESS OF BREATH: 0
NAUSEA: 1
WHEEZING: 0
APNEA: 0
ABDOMINAL PAIN: 1
RECTAL PAIN: 1

## 2022-12-09 NOTE — TELEPHONE ENCOUNTER
Call from patient stating she forgot to tell you or any other physician that she has severe pain above her navel with eating and drinking x 2-3 weeks.     Electronically signed by Shimon Avelar MA on 12/9/2022 at 3:19 PM

## 2022-12-09 NOTE — PATIENT INSTRUCTIONS
Patient Information and Instructions for Colonoscopy         Definition of Colonoscopy   A colonoscopy is the visual exam of the rectum and colon (large intestine). The exam is done with a tool called a colonoscope. The colonoscope is a flexible tube with a tiny camera on the end. This instrument allows the doctor to view the inside of your rectum and colon. Sigmoidoscopy is a shorter scope that views only the last one third of the colon. Reasons for Colonoscopy   It is used to examine, diagnose, and treat problems in your large intestine. The procedure is most often done for the following reasons: To determine the cause of abdominal pain, rectal bleeding, or a change in bowel habits   To detect and treat colon cancer or colon polyps   To obtain tissue samples for testing   To stop intestinal bleeding   Monitor response to treatment if you have inflammatory bowel disease     Possible Complications   Complications are rare, but no procedure is completely free of risk. If you are planning to have a colonoscopy, your doctor will review a list of possible complications, which may include:   Bleeding   Reaction to the sedation causing drop in your blood pressure or problems breathing  Perforation or puncture of the bowel     Factors that may increase the risk of complications include:   Pre-existing heart or kidney condition   Treatment with certain medicines, including aspirin and other drugs with anticoagulant or blood-thinning properties   Prior abdominal surgery or radiation treatments   Active colitis , diverticulitis , or other acute bowel disease   Previous treatment with radiation therapy     Be sure to discuss these risks with your doctor before the procedure.      What to Expect   Prior to Procedure   Your doctor will likely do the following:   Physical exam   Health history   Review of medicines   Test your stool for hidden blood (called \"occult blood\")     Your colon must be completely clean before the procedure. Any stool left in the intestine will block the view. This preparation may start several days before the procedure. Follow your doctor's instructions. Leading up to your procedure:   Talk to your doctor about your medicines. You may be asked to stop taking some medicines up to one week before the procedure, like:   Anti-inflammatory drugs (e.g., aspirin )   Blood thinners like clopidogrel (Plavix) or warfarin (Coumadin)   Iron supplements or vitamins containing iron   The day or days before your procedure, go on a clear liquid diet (clear broth, clear juice, clear jello) with no red coloring  Do not eat or drink anything after midnight. Wear comfortable clothing. If you have diabetes, ask your doctor if you need to adjust your diabetes medicine on the day prior to your procedure and the day of your procedure. Arrange for a ride home after the procedure. Anesthesia   You will receive intravenous sedation medicine for the procedure so you will not feel anything during the procedure. Description of the Procedure   You will lie on your left side with knees bent and drawn up toward your chest. The colonoscope will be slowly inserted through the rectum and into the bowel. The colonoscope will inject air into the colon. A small attached video camera will allow the doctor to view the colon's lining on a screen. The doctor will continue guiding the tool through the bowel and assess the lining. A tissue sample or polyps may be removed during the procedure. How Long Will It Take? Usually it takes about 30 to 45 minutes     Will It Hurt? Most people do not feel anything during the procedure and will not remember the procedure. After the procedure, gas pains and cramping are common. These pains should go away with the passing of gas. Post-procedure Care   If any tissue was removed: It will be sent to a lab to be examined. It may take 1-2 weeks for results.  The doctor will usually give an initial report after the scope is removed. Other tests may be recommended. A small amount of bleeding may occur during the first few days after the procedure. When you return home after the procedure, be sure to follow your doctor's instructions, which may include:   Resume medicines as instructed by your doctor. Resume normal diet, unless directed otherwise by your doctor. The sedative will make you drowsy. Avoid driving, operating machinery, or making important decisions for the rest of the day. Rest for the remainder of the day. After arriving home, contact your doctor if any of the following occurs:   Bleeding from your rectum, notify your doctor if you pass a teaspoonful of blood or more. Black, tarry stools   Severe abdominal pain   Hard, swollen abdomen   Signs of infection, including fever or chills   Inability to pass gas or stool   Coughing, shortness of breath, chest pain, severe nausea or vomiting     In case of an emergency, CALL 911 . RAFAEL COLONOSCOPY PREPARATION    Instructions for Clear liquid diet  Definition  A clear liquid diet consists of clear liquids, such as water, broth and plain gelatin, that are easily digested and leave no undigested residue in your intestinal tract. Your doctor may prescribe a clear liquid diet before certain medical procedures or if you have certain digestive problems. Because a clear liquid diet can't provide you with adequate calories and nutrients, it shouldn't be continued for more than a few days. Purpose  A clear liquid diet is often used before tests, procedures or surgeries that require no food in your stomach or intestines, such as before colonoscopy. It may also be recommended as a short-term diet if you have certain digestive problems, such as nausea, vomiting or diarrhea, or after certain types of surgery.      Diet details  A clear liquid diet helps maintain adequate hydration, provides some important electrolytes, such as sodium and potassium, and gives some energy at a time when a full diet isn't possible or recommended. The following foods are allowed in a clear liquid diet:   Plain water   Fruit juices without pulp, such as apple juice, white grape juice. Strained lemonade   Clear, fat-free broth (bouillon or consomme)   Clear sodas    Plain gelatin (Not RED or PURPLE)  Honey   Ice pops without bits of fruit or fruit pulp   Tea or coffee without milk or cream  ** NOTHING RED OR PURPLE    Any foods not on the above list should be avoided. Also, for certain tests, such as colon exams, your doctor may ask you to avoid liquids or gelatin with red coloring. A typical menu on the clear liquid diet may look like this:   Breakfast:  1 glass fruit juice  1 cup coffee or tea (without dairy products)  1 cup broth  1 bowl gelatin     Snack:  1 glass fruit juice  1 bowl gelatin     Lunch:  1 glass fruit juice  1 glass water  1 cup broth  1 bowl gelatin     Snack:  1 ice pop (without fruit pulp)  1 cup coffee or tea (without dairy products) or a soft drink     Dinner:  1 cup juice or water  1 cup broth  1 bowl gelatin  1 cup coffee or tea     Purchase this over the counter:  1. GATORADE/Clear liquid (56 ounces)   Pick these up at the pharmacy:  2. DULCOLAX 5 mg tablets (four tablets)  3. MIRALAX BOTTLE 238 grams (over the counter only)    The DAY BEFORE your colonoscopy:   Drink only clear liquids. (Absolutely no solid food)    COLONOSCOPY PREP:  3 PM: Take 2 DULCOLAX tablets    5 PM: Mix the entire bottle of MIRALAX into the 56 ounces of GATORADE. (Put half the bottle in each 28 ounce bottle). Shake the solution until fully dissolved. Drink an 8 ounce glass every 30 minutes until the solution is gone. 7 PM: Take the last 2 DULCOLAX tablets. **DO NOT EAT OR DRINK ANYTHING AFTER MIDNIGHT**          The DAY OF your colonoscopy: You may take any necessary medications with a sip of water.   Bring along someone to take you home.    REMEMBER: The preparation is very important. An adequate clean out allows for the best evaluation of your entire colon. During the prep, using baby wipes may ease some of your discomfort. You should NOT plan on working or driving the rest of the day due to sedation given at the procedure    Any Questions or concerns contact Nancy Chapman [phone].

## 2022-12-09 NOTE — PROGRESS NOTES
[facility] Clinic Note        Chief Complaint   Patient presents with    New Patient    Constipation       PCP: Selvin Gerard DO    HPI: Mackenzie Tyson is a 46 y.o. female who presents in consultation for chronic constipation. She has had issues with this for years. She has a spinal cord stimulator which she said she had placed in [place] for her IBS in 2018 but states this has not worked and she has shut this off. She was seen in the GI office and was given Linzess for the constipation but this has not worked. She feels full all the time and has chronic pain. She last had a colonoscopy in 2018 but is unsure of who did this and what the result was. She has  no family history of colon cancer or IBD. She has a history of branch duct IPMN which she had an EUS in 2018 and this year. She states she was to follow up with Dr. Sergey Ortega but \" no one can find out who he was\" so she gave up looking for him despite seeing him when she was admitted in the hospital earlier this year. She has hepatic steatosis as well and is scheduled for an US elastography per GI . She has not had any abdominal surgeries. Her CT scans shows significant stool burden.      Past Medical History:   Diagnosis Date    Anxiety     Arthritis     right hip    Bipolar 1 disorder ([address]     Cervical stenosis of spine     Chronic back pain     Constipation     Depression     Hypertension     Intractable chronic post-traumatic headache 04/21/2022    Irritable bowel syndrome     Migraines     Mucocele of lip     for surgery 7/21/21    Other hyperlipidemia 07/29/2019    PTSD (post-traumatic stress disorder)        Past Surgical History:   Procedure Laterality Date    ARTHROGRAPHY Right 1/3/2019    RIGHT HIP ARTHROGRAM AND STEROID INJECTION performed by Deni Escalante MD at [facility] Right 9/10/2020    RIGHT HIP INJECTION UNDER FLUOROSCOPY performed by Deni Escalante MD at [facility] Right 4/13/2021    RIGHT HIP INJECTION UNDER FLUOROSCOPY performed by Deonna Kelly MD at [address] [place] [address] Drive AND CURETTAGE OF UTERUS N/A 2/3/2020    DILATATION AND CURETTAGE HYSTEROSCOPY performed by Tasia Peoples MD at [facility] Right 7/21/2021    EXCISION RIGHT LIP MUCOCELE performed by Aracelis De La Paz MD at [address]  02/20/2019    implant for IBS     STIMULATOR SURGERY      bowel    TONSILLECTOMY      TOOTH EXTRACTION      TUBAL LIGATION      UPPER GASTROINTESTINAL ENDOSCOPY N/A 3/28/2022    EGD ESOPHAGOGASTRODUODENOSCOPY ULTRASOUND performed by Maria G Smith MD at [address]       Prior to Admission medications    Medication Sig Start Date End Date Taking?  Authorizing Provider   polyethylene glycol-electrolytes (NULYTELY) 420 g solution Take 4,000 mLs by mouth once for 1 dose 12/9/22 12/9/22 Yes Alexandra Zhang,    linaclotide [facility]) 145 MCG capsule Take 1 capsule by mouth every morning (before breakfast) 11/22/22  Yes FRENCH Noriega CNP   baclofen (LIORESAL) 10 MG tablet Take 1 tablet by mouth 3 times daily 11/18/22  Yes Alexis Persaud DO   ondansetron (ZOFRAN ODT) 4 MG disintegrating tablet Take 1 tablet by mouth every 8 hours as needed for Nausea or Vomiting May substitute for covered product 11/8/22  Yes Jese Balderas,    rosuvastatin (CRESTOR) 20 MG tablet take 1 tablet by mouth nightly 8/30/22  Yes Alexis Persaud DO   pantoprazole (PROTONIX) 40 MG tablet Take 1 tablet by mouth every morning (before breakfast) 7/13/22  Yes Alexis Persaud,    Eptinezumab-jjmr (VYEPTI IV) Infuse intravenously   Yes Historical Provider, MD   nadolol (CORGARD) 20 MG tablet take 1 tablet by mouth once daily 6/19/22  Yes Alexis Persaud DO   DULoxetine (CYMBALTA) 60 MG extended release capsule 90 mg daily  3/2/22  Yes Historical Provider, MD   diazepam (VALIUM) 10 MG tablet Take 10 mg by mouth in the morning and 10 mg at noon and 10 mg in the evening. Instructed to take am of procedure if needed. Yes Historical Provider, MD   folic acid (FOLVITE) 1 MG tablet Take 1 mg by mouth daily. Yes Historical Provider, MD   lamoTRIgine (LAMICTAL) 200 MG tablet Take 200 mg by mouth every evening    Yes Historical Provider, MD   docusate sodium (STOOL SOFTENER) 100 MG capsule Take 1 capsule by mouth 2 times daily as needed for Constipation  Patient not taking: Reported on 12/9/2022 11/28/22   FRENCH Vasquez CNP   senna (SENOKOT) 8.6 MG tablet Take 1 tablet by mouth 2 times daily  Patient not taking: Reported on 12/9/2022 11/28/22 11/28/23  FRENCH Vasquez CNP       Allergies   Allergen Reactions    Latex Rash    Sulfa Antibiotics Rash and Hives       Social History     Socioeconomic History    Marital status:      Spouse name: None    Number of children: None    Years of education: None    Highest education level: None   Tobacco Use    Smoking status: Every Day     Packs/day: 1.00     Years: 30.00     Pack years: 30.00     Types: Cigarettes    Smokeless tobacco: Never    Tobacco comments:     trying to quit, wears patch   Vaping Use    Vaping Use: Former   Substance and Sexual Activity    Alcohol use: Not Currently    Drug use: Not Currently     Types: Marijuana Meenu Pleas)    Sexual activity: Not Currently     Social Determinants of Health     Financial Resource Strain: Unknown    Difficulty of Paying Living Expenses: Patient refused   Food Insecurity: Unknown    Worried About Running Out of Food in the Last Year: Patient refused    Ran Out of Food in the Last Year: Patient refused   Physical Activity: Unknown    Days of Exercise per Week: 0 days       Family History   Problem Relation Age of Onset    GERD Mother     Parkinsonism Mother     COPD Mother     No Known Problems Father     Breast Cancer Maternal Aunt        Review of Systems   Constitutional:  Positive for activity change, appetite change and fatigue. Negative for chills, diaphoresis, fever and unexpected weight change. HENT:  Negative for trouble swallowing. Respiratory:  Negative for apnea, cough, choking, chest tightness, shortness of breath, wheezing and stridor. Cardiovascular:  Negative for chest pain. Gastrointestinal:  Positive for abdominal distention, abdominal pain, constipation, nausea, rectal pain and vomiting. Negative for blood in stool and diarrhea. Musculoskeletal:  Positive for arthralgias, back pain, gait problem, joint swelling and myalgias. Skin:  Negative for color change, pallor and wound. Neurological:  Positive for light-headedness and headaches. Negative for dizziness. Psychiatric/Behavioral:  Negative for agitation and confusion. Objective:  Vitals:    12/09/22 0906   BP: 99/73   Pulse: 77   Resp: 16   Weight: 111 lb (50.3 kg)   Height: 5' 2\" (1.575 m)          Physical Exam  Constitutional:       General: She is not in acute distress. Appearance: Normal appearance. She is normal weight. She is not ill-appearing, toxic-appearing or diaphoretic. HENT:      Head: Normocephalic and atraumatic. Mouth/Throat:      Mouth: Mucous membranes are moist.      Pharynx: Oropharynx is clear. Eyes:      General: No scleral icterus. Conjunctiva/sclera: Conjunctivae normal.      Pupils: Pupils are equal, round, and reactive to light. Cardiovascular:      Rate and Rhythm: Normal rate and regular rhythm. Pulses: Normal pulses. Pulmonary:      Effort: Pulmonary effort is normal. No respiratory distress. Abdominal:      General: Abdomen is flat. There is distension. Palpations: Abdomen is soft. There is no mass. Tenderness: There is abdominal tenderness. There is no guarding or rebound. Hernia: No hernia is present. Musculoskeletal:         General: Normal range of motion. Skin:     General: Skin is warm and dry. Capillary Refill: Capillary refill takes less than 2 seconds. Coloration: Skin is not jaundiced or pale. Neurological:      General: No focal deficit present. Mental Status: She is alert and oriented to person, place, and time. Psychiatric:         Mood and Affect: Mood normal.         Behavior: Behavior normal.                   Assessment/Plan:      Diagnosis Orders   1. Pancreatic cyst  [facility] - Kristofer Antunez MD, [facility], [place]      2. Constipation, unspecified constipation type          - She has significant constipation and we will plan for a colonoscopy at this time. We will prescribed Golytely to hopefully help stimulate some bowel function and then prep with Miralax. She has a known branch duct IPMN and we will send referral with Dr. Parker Watts for follow up and surveillance     Edvin Pedroza, DO    No follow-ups on file.       CC: Joy Duarte, DO

## 2022-12-12 DIAGNOSIS — Z86.69 HISTORY OF MIGRAINE: Chronic | ICD-10-CM

## 2022-12-12 RX ORDER — NADOLOL 20 MG/1
TABLET ORAL
Qty: 90 TABLET | Refills: 1 | Status: SHIPPED | OUTPATIENT
Start: 2022-12-12

## 2022-12-13 ENCOUNTER — TELEPHONE (OUTPATIENT)
Dept: SURGERY | Age: 52
End: 2022-12-13

## 2022-12-13 DIAGNOSIS — Z86.69 HISTORY OF MIGRAINE: Chronic | ICD-10-CM

## 2022-12-13 DIAGNOSIS — K59.00 CONSTIPATION, UNSPECIFIED CONSTIPATION TYPE: Primary | ICD-10-CM

## 2022-12-13 RX ORDER — NADOLOL 20 MG/1
TABLET ORAL
Qty: 90 TABLET | Refills: 1 | OUTPATIENT
Start: 2022-12-13

## 2022-12-13 NOTE — TELEPHONE ENCOUNTER
Prior Authorization Form:      DEMOGRAPHICS:                     Patient Name:  Diamond Choi  Patient :  1970            Insurance:  Payor: Sherryle Brunswick / Plan: Symptom.ly / Product Type: *No Product type* /   Insurance ID Number:    Payer/Plan Subscr  Sex Relation Sub. Ins. ID Effective Group Num   1.  [id number] [place] A 1970 Female Self [id number]                                   [address] [address]         DIAGNOSIS & PROCEDURE:                       Procedure/Operation: Colonoscopy, diagnostic           CPT Code: 12155    Diagnosis:  Chronic constipation    ICD10 Code: K59.09    Location:  [facility]    Surgeon:  Dr Winston Streeter INFORMATION:                          Date: 2023    Time: 11:30am              Anesthesia:  MAC/TIVA                                                       Status:  Outpatient        Special Comments:  N/A       Electronically signed by Valerie Orosco MA on 2022 at 9:33 AM

## 2022-12-13 NOTE — TELEPHONE ENCOUNTER
Scheduled patient for Colonoscopy on 1/4/2023 @ 11:30am at [facility]. Patient needs to report at the front entrance 1 hour before the procedure, NPO after the midnight the night before the procedure. No ASA products for 5 days. Patient verbalized understanding. Instruction letter mailed. Encouraged to call our office if any questions.     Electronically signed by Kailash Hernandez MA on 12/13/2022 at 11:01 AM

## 2022-12-22 ENCOUNTER — TELEPHONE (OUTPATIENT)
Dept: FAMILY MEDICINE CLINIC | Age: 52
End: 2022-12-22

## 2022-12-22 DIAGNOSIS — Z01.818 PRE-OP TESTING: Primary | ICD-10-CM

## 2022-12-22 NOTE — TELEPHONE ENCOUNTER
Patient would like you to order labs to check for Hep B, Hep C an diabetes. I attempted to schedule an in office visit but [facility] declined. States that you are aware of her \"pooping\" problems and have referred her to a specialists. She did research online an\d found that Hep C, Hep B and diabetes can all cause bowel issues, constipation, and pain. She will not have transportation after 12/29/22 and would like to have testing done prior to the 29th.      Please advise

## 2022-12-23 DIAGNOSIS — Z01.818 PRE-OP TESTING: ICD-10-CM

## 2022-12-23 LAB — HBA1C MFR BLD: 5.6 % (ref 4–5.6)

## 2022-12-26 LAB
HBV SURFACE AB TITR SER: NORMAL {TITER}
HEPATITIS C ANTIBODY INTERPRETATION: NORMAL

## 2022-12-28 ENCOUNTER — HOSPITAL ENCOUNTER (EMERGENCY)
Age: 52
Discharge: LEFT BEFORE DISCHARGE | End: 2022-12-28
Payer: MEDICARE

## 2022-12-28 ENCOUNTER — TELEPHONE (OUTPATIENT)
Dept: SURGERY | Age: 52
End: 2022-12-28

## 2022-12-28 VITALS
WEIGHT: 110 LBS | HEIGHT: 62 IN | BODY MASS INDEX: 20.24 KG/M2 | DIASTOLIC BLOOD PRESSURE: 46 MMHG | TEMPERATURE: 97.7 F | HEART RATE: 93 BPM | RESPIRATION RATE: 15 BRPM | OXYGEN SATURATION: 99 % | SYSTOLIC BLOOD PRESSURE: 108 MMHG

## 2022-12-28 DIAGNOSIS — R10.13 EPIGASTRIC PAIN: ICD-10-CM

## 2022-12-28 PROCEDURE — 99281 EMR DPT VST MAYX REQ PHY/QHP: CPT

## 2022-12-28 RX ORDER — PANTOPRAZOLE SODIUM 40 MG/1
TABLET, DELAYED RELEASE ORAL
Qty: 90 TABLET | Refills: 1 | Status: SHIPPED | OUTPATIENT
Start: 2022-12-28

## 2022-12-28 ASSESSMENT — PAIN SCALES - GENERAL: PAINLEVEL_OUTOF10: 10

## 2022-12-28 NOTE — ED NOTES
Department of Emergency Medicine  FIRST PROVIDER TRIAGE NOTE             Independent MLP           12/28/22  6:26 PM EST    Date of Encounter: 12/28/22   MRN: [id number]      HPI: Adriano Breaux is a 46 y.o. female who presents to the ED for Bloated (No BM for 7 days) and Nausea  Patient states that she has not had a bowel movement in the past 7 days, and is now having worsening abdominal pain. She states that she contacted her surgeon who supposed to have colonoscopy on the fourth and he wanted her to come to the emergency department to rule out a bowel obstruction. ROS: Negative for cp or sob. PE: Gen Appearance/Constitutional: alert  CV: regular rate  Pulm: CTA bilat     Initial Plan of Care: All treatment areas with department are currently occupied. Plan to order/Initiate the following while awaiting opening in ED: labs, EKG, and imaging studies.   Initiate Treatment-Testing, Proceed toTreatment Area When Bed Available for ED Attending/MLP to Continue Care    Electronically signed by FRENCH Costa CNP   DD: 12/28/22       FRENCH Costa CNP  12/28/22 5499

## 2022-12-28 NOTE — TELEPHONE ENCOUNTER
Patient advised of Dr Simon Clifford recommendation. Patient verbalized understanding but does not want to go to ER; states the pain comes right back. Advised her again that this is Dr's recommendation.     Electronically signed by Landon Fonseca MA on 12/28/2022 at 4:10 PM

## 2022-12-28 NOTE — TELEPHONE ENCOUNTER
Call from patient asking to move colonoscopy on 1/4/23 up. She c/o abdominal pain, not being able to eat and having no bowel movements. Patient ate eggs and hash browns 12/25/22 and toast this AM. Small amount of diarrhea three times since 12/21/22. I explained that there are no available slots to move up and that Dr Trung Jamison is in surgery but I will send message to him. Discussed importance of good prep and advised patient if she is in that much pain she should be evaluated in ED. Patient verbalized understanding.     Electronically signed by Candy Swan MA on 12/28/2022 at 1:36 PM

## 2022-12-28 NOTE — TELEPHONE ENCOUNTER
Last Appointment:  11/18/2022  Future Appointments   Date Time Provider Woodrow Ji   1/3/2023  9:00 AM Sandra Richards III, MD COL SURG [facility]   1/11/2023  2:00 PM Haley Westfall MD [address]   4/26/2023  4:00 PM Juan Esquivel, DO [facility]

## 2022-12-29 ENCOUNTER — TELEPHONE (OUTPATIENT)
Dept: SURGERY | Age: 52
End: 2022-12-29

## 2022-12-29 RX ORDER — BUPROPION HYDROCHLORIDE 75 MG/1
TABLET ORAL
COMMUNITY
Start: 2022-12-03

## 2022-12-29 RX ORDER — POLYETHYLENE GLYCOL-3350 AND ELECTROLYTES WITH FLAVOR PACK 240; 5.84; 2.98; 6.72; 22.72 G/278.26G; G/278.26G; G/278.26G; G/278.26G; G/278.26G
POWDER, FOR SOLUTION ORAL
COMMUNITY
Start: 2022-11-22 | End: 2022-12-30 | Stop reason: ALTCHOICE

## 2022-12-29 RX ORDER — TRAZODONE HYDROCHLORIDE 50 MG/1
TABLET ORAL
COMMUNITY
Start: 2022-12-15

## 2022-12-29 NOTE — TELEPHONE ENCOUNTER
Patient instructed to call by Meghan Carolina RN in PAT. Patient left ED last night before treatment completed; stating wait was too long and she had procedure today @ [facility]. RN recommending 2-day prep for colonoscopy on 1/4/22 based on patient's constipation issues. Please advise.     Electronically signed by Ozzy Earl MA on 12/29/2022 at 11:22 AM

## 2022-12-29 NOTE — PROGRESS NOTES
[facility] PRE-ADMISSION TESTING INSTRUCTIONS    The Preadmission Testing patient is instructed accordingly using the following criteria (check applicable):    ARRIVAL INSTRUCTIONS:  [x] Parking the day of Surgery is located in the Main Entrance lot. Upon entering the door, make an immediate right to the surgery reception desk    [x] Bring photo ID and insurance card    [] Bring in a copy of Living will or Durable Power of  papers. [x] Please be sure to arrange transportation to and from the hospital    [x] Please arrange for someone to be with you the remainder of the day due to having anesthesia      GENERAL INSTRUCTIONS:    [x] Nothing by mouth after midnight, including gum, candy, mints or water    [x] You may brush your teeth, but do not swallow any water    [x] Take medications as instructed with 1-2 oz of water    [] Stop herbal supplements and vitamins 5 days prior to procedure    [x] Follow preop dosing of blood thinners per physician instructions    [] Do not take insulin or oral diabetic medications    [] If diabetic and have low blood sugar or feel symptomatic, take 1-2oz apple juice or glucose tablets    [] Bring inhalers day of surgery    [] Bring C-PAP/ Bi-Pap day of surgery    [] Bring urine specimen day of surgery    [x] Antibacterial Soap shower or bath AM of Surgery, no lotion, powders or creams to surgical site    [x] Follow bowel prep as instructed per surgeon    [x] No tobacco products within 24 hours of surgery     [x] No alcohol or illegal drug use within 24 hours of surgery.     [x] Jewelry, body piercing's, eyeglasses, contact lenses and dentures are not permitted into surgery (bring cases)      [] Please do not wear any nail polish or make up on the day of surgery    [] If not already done, you can expect a call from registration    [x] If surgeon requests a time change you will be notified the day prior to surgery    [] If you receive a survey after surgery we would greatly appreciate your comments    [x] Parent/guardian of a minor must accompany their child and remain on the premises  the entire time they are under our care     [x] Pediatric patients may bring favorite toy, blanket or comfort item with them    [] A caregiver or family member must remain with the patient during their stay if they are mentally handicapped, have dementia, disoriented or unable to use a call light or would be a safety concern if left unattended    [x] Please notify surgeon if you develop any illness between now and time of surgery (cold, cough, sore throat, fever, nausea, vomiting) or any signs of infections  including skin, wounds, and dental.    [x] Other instructions    EDUCATIONAL MATERIALS PROVIDED:    [] PAT Preoperative Education Packet/Booklet     [] Medication List    [] Fluoroscopy Information Pamphlet    [] Transfusion bracelet applied with instructions    [] Joint replacement video reviewed    [] Shower with antibacterial soap and use CHG wipes provided the evening before surgery as instructed

## 2022-12-30 ENCOUNTER — HOSPITAL ENCOUNTER (EMERGENCY)
Age: 52
Discharge: HOME | End: 2022-12-30
Attending: STUDENT IN AN ORGANIZED HEALTH CARE EDUCATION/TRAINING PROGRAM
Payer: MEDICARE

## 2022-12-30 ENCOUNTER — APPOINTMENT (OUTPATIENT)
Dept: CT IMAGING | Age: 52
End: 2022-12-30
Payer: MEDICARE

## 2022-12-30 VITALS
HEART RATE: 71 BPM | SYSTOLIC BLOOD PRESSURE: 110 MMHG | HEIGHT: 62 IN | BODY MASS INDEX: 20.24 KG/M2 | DIASTOLIC BLOOD PRESSURE: 46 MMHG | WEIGHT: 110 LBS | TEMPERATURE: 98.4 F | OXYGEN SATURATION: 95 % | RESPIRATION RATE: 16 BRPM

## 2022-12-30 DIAGNOSIS — R10.84 GENERALIZED ABDOMINAL PAIN: Primary | ICD-10-CM

## 2022-12-30 LAB
ALBUMIN SERPL-MCNC: 4.3 G/DL (ref 3.5–5.2)
ALP BLD-CCNC: 59 U/L (ref 35–104)
ALT SERPL-CCNC: 7 U/L (ref 0–32)
AMORPHOUS: ABNORMAL
ANION GAP SERPL CALCULATED.3IONS-SCNC: 8 MMOL/L (ref 7–16)
AST SERPL-CCNC: 14 U/L (ref 0–31)
BACTERIA: ABNORMAL /HPF
BASOPHILS ABSOLUTE: 0.03 E9/L (ref 0–0.2)
BASOPHILS RELATIVE PERCENT: 0.4 % (ref 0–2)
BILIRUB SERPL-MCNC: 0.2 MG/DL (ref 0–1.2)
BILIRUBIN URINE: NEGATIVE
BLOOD, URINE: NEGATIVE
BUN BLDV-MCNC: 10 MG/DL (ref 6–20)
CALCIUM SERPL-MCNC: 9.4 MG/DL (ref 8.6–10.2)
CHLORIDE BLD-SCNC: 106 MMOL/L (ref 98–107)
CLARITY: ABNORMAL
CO2: 27 MMOL/L (ref 22–29)
COLOR: YELLOW
CREAT SERPL-MCNC: 0.6 MG/DL (ref 0.5–1)
EOSINOPHILS ABSOLUTE: 0.09 E9/L (ref 0.05–0.5)
EOSINOPHILS RELATIVE PERCENT: 1.3 % (ref 0–6)
GFR SERPL CREATININE-BSD FRML MDRD: >60 ML/MIN/1.73
GLUCOSE BLD-MCNC: 108 MG/DL (ref 74–99)
GLUCOSE URINE: NEGATIVE MG/DL
HCT VFR BLD CALC: 42.6 % (ref 34–48)
HEMOGLOBIN: 14.2 G/DL (ref 11.5–15.5)
IMMATURE GRANULOCYTES #: 0.01 E9/L
IMMATURE GRANULOCYTES %: 0.1 % (ref 0–5)
KETONES, URINE: NEGATIVE MG/DL
LACTIC ACID: 1.3 MMOL/L (ref 0.5–2.2)
LEUKOCYTE ESTERASE, URINE: NEGATIVE
LIPASE: 34 U/L (ref 13–60)
LYMPHOCYTES ABSOLUTE: 2.09 E9/L (ref 1.5–4)
LYMPHOCYTES RELATIVE PERCENT: 29.1 % (ref 20–42)
MCH RBC QN AUTO: 31.1 PG (ref 26–35)
MCHC RBC AUTO-ENTMCNC: 33.3 % (ref 32–34.5)
MCV RBC AUTO: 93.2 FL (ref 80–99.9)
MONOCYTES ABSOLUTE: 0.47 E9/L (ref 0.1–0.95)
MONOCYTES RELATIVE PERCENT: 6.5 % (ref 2–12)
NEUTROPHILS ABSOLUTE: 4.49 E9/L (ref 1.8–7.3)
NEUTROPHILS RELATIVE PERCENT: 62.6 % (ref 43–80)
NITRITE, URINE: NEGATIVE
PDW BLD-RTO: 13 FL (ref 11.5–15)
PH UA: 7 (ref 5–9)
PLATELET # BLD: 218 E9/L (ref 130–450)
PMV BLD AUTO: 9.3 FL (ref 7–12)
POTASSIUM REFLEX MAGNESIUM: 4.1 MMOL/L (ref 3.5–5)
PROTEIN UA: NEGATIVE MG/DL
RBC # BLD: 4.57 E12/L (ref 3.5–5.5)
RBC UA: ABNORMAL /HPF (ref 0–2)
SODIUM BLD-SCNC: 141 MMOL/L (ref 132–146)
SPECIFIC GRAVITY UA: 1.01 (ref 1–1.03)
TOTAL PROTEIN: 6.8 G/DL (ref 6.4–8.3)
TROPONIN, HIGH SENSITIVITY: <6 NG/L (ref 0–9)
UROBILINOGEN, URINE: 0.2 E.U./DL
WBC # BLD: 7.2 E9/L (ref 4.5–11.5)
WBC UA: ABNORMAL /HPF (ref 0–5)

## 2022-12-30 PROCEDURE — 84484 ASSAY OF TROPONIN QUANT: CPT

## 2022-12-30 PROCEDURE — 6360000004 HC RX CONTRAST MEDICATION: Performed by: RADIOLOGY

## 2022-12-30 PROCEDURE — 81001 URINALYSIS AUTO W/SCOPE: CPT

## 2022-12-30 PROCEDURE — 2580000003 HC RX 258: Performed by: STUDENT IN AN ORGANIZED HEALTH CARE EDUCATION/TRAINING PROGRAM

## 2022-12-30 PROCEDURE — 85025 COMPLETE CBC W/AUTO DIFF WBC: CPT

## 2022-12-30 PROCEDURE — 83690 ASSAY OF LIPASE: CPT

## 2022-12-30 PROCEDURE — 83605 ASSAY OF LACTIC ACID: CPT

## 2022-12-30 PROCEDURE — 93005 ELECTROCARDIOGRAM TRACING: CPT | Performed by: STUDENT IN AN ORGANIZED HEALTH CARE EDUCATION/TRAINING PROGRAM

## 2022-12-30 PROCEDURE — 87088 URINE BACTERIA CULTURE: CPT

## 2022-12-30 PROCEDURE — 80053 COMPREHEN METABOLIC PANEL: CPT

## 2022-12-30 PROCEDURE — 74177 CT ABD & PELVIS W/CONTRAST: CPT

## 2022-12-30 PROCEDURE — 6370000000 HC RX 637 (ALT 250 FOR IP): Performed by: STUDENT IN AN ORGANIZED HEALTH CARE EDUCATION/TRAINING PROGRAM

## 2022-12-30 PROCEDURE — 99285 EMERGENCY DEPT VISIT HI MDM: CPT

## 2022-12-30 RX ORDER — 0.9 % SODIUM CHLORIDE 0.9 %
1000 INTRAVENOUS SOLUTION INTRAVENOUS ONCE
Status: COMPLETED | OUTPATIENT
Start: 2022-12-30 | End: 2022-12-30

## 2022-12-30 RX ORDER — POLYETHYLENE GLYCOL 3350 17 G/17G
POWDER, FOR SOLUTION ORAL
Qty: 238 G | Refills: 0 | Status: SHIPPED | OUTPATIENT
Start: 2022-12-30 | End: 2023-01-13

## 2022-12-30 RX ADMIN — IOPAMIDOL 75 ML: 755 INJECTION, SOLUTION INTRAVENOUS at 17:01

## 2022-12-30 RX ADMIN — MAGNESIUM HYDROXIDE 30 ML: 400 SUSPENSION ORAL at 22:00

## 2022-12-30 RX ADMIN — SODIUM CHLORIDE 1000 ML: 9 INJECTION, SOLUTION INTRAVENOUS at 17:25

## 2022-12-30 ASSESSMENT — PAIN - FUNCTIONAL ASSESSMENT: PAIN_FUNCTIONAL_ASSESSMENT: 0-10

## 2022-12-30 ASSESSMENT — PAIN SCALES - GENERAL: PAINLEVEL_OUTOF10: 10

## 2022-12-30 NOTE — ED PROVIDER NOTES
Department of Emergency Medicine   ED  Provider Note  Admit Date/RoomTime: 12/30/2022  3:31 PM  ED Room: 04/04          History of Present Illness:  12/30/22, Time: 4:01 PM EST  Chief Complaint   Patient presents with    Abdominal Pain     Constipation, has not had bm for 10 days. Supposed to have colonoscopy on 4th. Nasima Ramirez is a 46 y.o. female presenting to the ED for abodminal pain and constipation, beginning ten days ago. The complaint has been persistent, moderate in severity, and worsened by nothing. The patient is a 51-year-old female who presents emerged part complaint abdominal pain constipation. Patient symptoms have been gradual onset over the past 10 days, persistent, moderate severity, nothing makes it better or worse. She is postop a colonoscopy done on January 4. She was seen here multiple times for her constipation and actually is also going to [facility] emergency department. She states that she has tried Colace and senna at home with minimal relief. She states she also has had multiple enemas and still feels constipated. She is not on any pain medications. She states that she does not have a history of issues with constipation in the past but over the past several weeks she has had increased constipation. There was evidence of a mass on her pancreas and she was instructed to follow-up with hepatobiliary specialist, but she has not yet seen him. That was approximate month ago. She states that she has followed up with , who is going to scope her in about a week. Denies any nausea, vomiting, diarrhea, lightheadedness, dizziness, syncope, chest pain, shortness of breath, recent abdominal surgery, recent hospitalization, fever, chills, recent travel, or other acute symptoms or concerns.      Review of Systems:   A complete review of systems was performed and pertinent positives and negatives are stated within HPI, all other systems reviewed and are negative.        --------------------------------------------- PAST HISTORY ---------------------------------------------  Past Medical History:  has a past medical history of Anxiety, Arthritis, [facility] Cervical stenosis of spine, Chronic back pain, Constipation, Depression, Hypertension, Intractable chronic post-traumatic headache, Irritable bowel syndrome, Migraines, Mucocele of lip, Other hyperlipidemia, and PTSD (post-traumatic stress disorder). Past Surgical History:  has a past surgical history that includes Tonsillectomy; Tooth Extraction; Tubal ligation; Colonoscopy; arthrography (Right, 1/3/2019); Small intestine surgery (02/20/2019); Dilation and curettage of uterus (N/A, 2/3/2020); arthrography (Right, 9/10/2020); arthrography (Right, 4/13/2021); Mouth surgery (Right, 7/21/2021); Stimulator Surgery; Upper gastrointestinal endoscopy (N/A, 3/28/2022); Breast surgery; and Breast biopsy. Social History:  reports that she has been smoking cigarettes. She has a 30.00 pack-year smoking history. She has never used smokeless tobacco. She reports that she does not currently use alcohol. She reports that she does not currently use drugs after having used the following drugs: Marijuana [place]). Family History: family history includes Breast Cancer in her maternal aunt; COPD in her mother; GERD in her mother; No Known Problems in her father; Parkinsonism in her mother. . Unless otherwise noted, family history is non contributory    The patients home medications have been reviewed.     Allergies: Latex and Sulfa antibiotics    I have reviewed the past medical history, past surgical history, social history, and family history    ---------------------------------------------------PHYSICAL EXAM--------------------------------------    Constitutional/General: Alert and oriented x3  Head: Normocephalic and atraumatic  Eyes:  EOMI, sclera non icteric  ENT: Oropharynx clear, handling secretions, no trismus, no asymmetry of the posterior oropharynx or uvular edema  Neck: Supple, full ROM, no stridor, no meningeal signs  Respiratory: Lungs clear to auscultation bilaterally, no wheezes, rales, or rhonchi. Not in respiratory distress  Cardiovascular:  Regular rate. Regular rhythm. No murmurs, no gallops, no rubs. 2+ distal pulses. Equal extremity pulses. Gastrointestinal:  Abdomen Soft, Non tender, Non distended. No rebound, guarding, or rigidity. No pulsatile masses. Musculoskeletal: Moves all extremities x 4. Warm and well perfused, no clubbing, no cyanosis, no edema. Capillary refill <3 seconds  Skin: skin warm and dry. No rashes. Neurologic: GCS 15, no focal deficits, symmetric strength 5/5 in the upper and lower extremities bilaterally  Psychiatric: Normal Affect    -------------------------------------------------- RESULTS -------------------------------------------------  Results are listed below.      LABS: (Lab results interpreted by me)  Results for orders placed or performed during the hospital encounter of 12/30/22   Culture, Urine    Specimen: Urine, clean catch   Result Value Ref Range    Urine Culture, Routine Growth not present, incubation continues    CBC with Auto Differential   Result Value Ref Range    WBC 7.2 4.5 - 11.5 E9/L    RBC 4.57 3.50 - 5.50 E12/L    Hemoglobin 14.2 11.5 - 15.5 g/dL    Hematocrit 42.6 34.0 - 48.0 %    MCV 93.2 80.0 - 99.9 fL    MCH 31.1 26.0 - 35.0 pg    MCHC 33.3 32.0 - 34.5 %    RDW 13.0 11.5 - 15.0 fL    Platelets 879 269 - 274 E9/L    MPV 9.3 7.0 - 12.0 fL    Neutrophils % 62.6 43.0 - 80.0 %    Immature Granulocytes % 0.1 0.0 - 5.0 %    Lymphocytes % 29.1 20.0 - 42.0 %    Monocytes % 6.5 2.0 - 12.0 %    Eosinophils % 1.3 0.0 - 6.0 %    Basophils % 0.4 0.0 - 2.0 %    Neutrophils Absolute 4.49 1.80 - 7.30 E9/L    Immature Granulocytes # 0.01 E9/L    Lymphocytes Absolute 2.09 1.50 - 4.00 E9/L    Monocytes Absolute 0.47 0.10 - 0.95 E9/L    Eosinophils Absolute 0.09 0.05 - 0.50 E9/L    Basophils Absolute 0.03 0.00 - 0.20 E9/L   Comprehensive Metabolic Panel w/ Reflex to MG   Result Value Ref Range    Sodium 141 132 - 146 mmol/L    Potassium reflex Magnesium 4.1 3.5 - 5.0 mmol/L    Chloride 106 98 - 107 mmol/L    CO2 27 22 - 29 mmol/L    Anion Gap 8 7 - 16 mmol/L    Glucose 108 (H) 74 - 99 mg/dL    BUN 10 6 - 20 mg/dL    Creatinine 0.6 0.5 - 1.0 mg/dL    Est, Glom Filt Rate >60 >=60 mL/min/1.73    Calcium 9.4 8.6 - 10.2 mg/dL    Total Protein 6.8 6.4 - 8.3 g/dL    Albumin 4.3 3.5 - 5.2 g/dL    Total Bilirubin 0.2 0.0 - 1.2 mg/dL    Alkaline Phosphatase 59 35 - 104 U/L    ALT 7 0 - 32 U/L    AST 14 0 - 31 U/L   Lactic Acid   Result Value Ref Range    Lactic Acid 1.3 0.5 - 2.2 mmol/L   Urinalysis   Result Value Ref Range    Color, UA Yellow Straw/Yellow    Clarity, UA CLOUDY (A) Clear    Glucose, Ur Negative Negative mg/dL    Bilirubin Urine Negative Negative    Ketones, Urine Negative Negative mg/dL    Specific Gravity, UA 1.015 1.005 - 1.030    Blood, Urine Negative Negative    pH, UA 7.0 5.0 - 9.0    Protein, UA Negative Negative mg/dL    Urobilinogen, Urine 0.2 <2.0 E.U./dL    Nitrite, Urine Negative Negative    Leukocyte Esterase, Urine Negative Negative   Microscopic Urinalysis   Result Value Ref Range    WBC, UA 0-1 0 - 5 /HPF    RBC, UA 0-1 0 - 2 /HPF    Bacteria, UA MANY (A) None Seen /HPF    Amorphous, UA MANY    Lipase   Result Value Ref Range    Lipase 34 13 - 60 U/L   Troponin   Result Value Ref Range    Troponin, High Sensitivity <6 0 - 9 ng/L   EKG 12 Lead   Result Value Ref Range    Ventricular Rate 83 BPM    Atrial Rate 83 BPM    P-R Interval 154 ms    QRS Duration 78 ms    Q-T Interval 372 ms    QTc Calculation (Bazett) 437 ms    P Axis 64 degrees    R Axis 75 degrees    T Axis 12 degrees   ,       RADIOLOGY:      Radiologist interpretation  CT ABDOMEN PELVIS W IV CONTRAST Additional Contrast? None   Final Result   Diffuse colonic fecal retention with significant stool burden. Prominent periuterine venous structures left greater than right. Intra and expect biliary duct dilatation with no common bile duct calculi. The gallbladder is normal.  Normal appearing pancreas. ------------------------- NURSING NOTES AND VITALS REVIEWED ---------------------------   The nursing notes within the ED encounter and vital signs as below have been reviewed by myself  BP (!) 110/46   Pulse 71   Temp 98.4 °F (36.9 °C) (Oral)   Resp 16   Ht 5' 2\" (1.575 m)   Wt 110 lb (49.9 kg)   LMP 12/28/2018 (LMP Unknown)   SpO2 95%   BMI 20.12 kg/m²     Oxygen Saturation Interpretation: Normal    The patients available past medical records and past encounters were reviewed. ------------------------------ ED COURSE/MEDICAL DECISION MAKING----------------------  Medications   0.9 % sodium chloride bolus (0 mLs IntraVENous Stopped 12/30/22 1741)   iopamidol (ISOVUE-370) 76 % injection 75 mL (75 mLs IntraVENous Given 12/30/22 1701)   magnesium hydroxide (MILK OF MAGNESIA) 400 MG/5ML suspension 30 mL (30 mLs Oral Given 12/30/22 2200)           I, Dr. Parvez Berger, am the primary provider of record    Medical Decision Making:   The patient is a 55-year-old female presents emergency department complaining abdominal pain. She is hemodynamically stable, nontoxic, no acute distress. Abdomen is nonperitoneal.  Labs are reassuring. CT scan did show evidence of constipation and fecal retention. Patient refused disimpaction. She was given soapsuds enema and had a small bowel movement while in the emergency department. She was discharged home with milk of magnesium and recommended close follow-up with general surgery. Strict return precautions were given and she was discharged home in stable condition. Oxygen Saturation Interpretation: 95 % on RA. Re-Evaluations:  ED Course as of 12/31/22 1225   Fri Dec 30, 2022   1624 EKG:   This EKG is signed and interpreted by me.    Rate: 83  Rhythm: Sinus  Interpretation: Rhythm, left atrial argument, normal axis, no acute ST elevations or depressions vitals are within normal limits, QTC is 437  Comparison: no previous EKG available    [KG]   2040 Reevaluated the patient. She is resting comfortably and in no acute distress. She was given a soapsuds enema and had a small bowel movement here. Did offer fecal disimpaction but she states that she would rather just go home and follow-up as an outpatient. [KG]      ED Course User Index  [KG] Rafael Berger DO             This patient's ED course included: a personal history and physicial examination, re-evaluation prior to disposition, multiple bedside re-evaluations, IV medications, cardiac monitoring, continuous pulse oximetry, and complex medical decision making and emergency management    This patient has remained hemodynamically stable during their ED course. Counseling: The emergency provider has spoken with the patient and discussed todays results, in addition to providing specific details for the plan of care and counseling regarding the diagnosis and prognosis. Questions are answered at this time and they are agreeable with the plan.       --------------------------------- IMPRESSION AND DISPOSITION ---------------------------------    IMPRESSION  1. Generalized abdominal pain        DISPOSITION  Disposition: Discharge to home  Patient condition is stable        NOTE: This report was transcribed using voice recognition software.  Every effort was made to ensure accuracy; however, inadvertent computerized transcription errors may be present       Rafael Berger DO  12/31/22 1781

## 2022-12-30 NOTE — ED TRIAGE NOTES
FIRST PROVIDER CONTACT ASSESSMENT NOTE       Department of Emergency Medicine                 First Provider Note            22  1:13 PM EST    Date of Encounter: No admission date for patient encounter. Patient Name: Sammy Carolina  : 1970  MRN: [id number]    Chief Complaint: Abdominal Pain (Constipation, has not had bm for 10 days. Supposed to have colonoscopy on 4th. )      History of Present Illness:   Sammy Carolina is a 46 y.o. female who presents to the ED for patient states she has not moved her bowels in 10 days. She has tried numerous over-the-counter meds without relief. She does have some history of chronic constipation and is getting worked up for cysts in her pancreas and liver. Pain getting worse. Focused Physical Exam:  VS:    ED Triage Vitals   BP Temp Temp src Pulse Resp SpO2 Height Weight   -- -- -- -- -- -- -- --        Physical Ex: Constitutional: Alert and non-toxic. Medical History:  has a past medical history of Anxiety, Arthritis, Bipolar 1 disorder ([address] Cervical stenosis of spine, Chronic back pain, Constipation, Depression, Hypertension, Intractable chronic post-traumatic headache, Irritable bowel syndrome, Migraines, Mucocele of lip, Other hyperlipidemia, and PTSD (post-traumatic stress disorder). Surgical History:  has a past surgical history that includes Tonsillectomy; Tooth Extraction; Tubal ligation; Colonoscopy; arthrography (Right, 1/3/2019); Small intestine surgery (2019); Dilation and curettage of uterus (N/A, 2/3/2020); arthrography (Right, 9/10/2020); arthrography (Right, 2021); Mouth surgery (Right, 2021); Stimulator Surgery; Upper gastrointestinal endoscopy (N/A, 3/28/2022); Breast surgery; and Breast biopsy. Social History:  reports that she has been smoking cigarettes. She has a 30.00 pack-year smoking history. She has never used smokeless tobacco. She reports that she does not currently use alcohol.  She reports that she does not currently use drugs after having used the following drugs: Marijuana [place]). Family History: family history includes Breast Cancer in her maternal aunt; COPD in her mother; GERD in her mother; No Known Problems in her father; Parkinsonism in her mother.     Allergies: Latex and Sulfa antibiotics     Initial Plan of Care: Initiate Treatment-Testing, Proceed toTreatment Area When Bed Available for ED Attending/MLP to Continue Care      ---END OF FIRST PROVIDER CONTACT ASSESSMENT NOTE---  Electronically signed by Catrina Habermann, PA-C   DD: 12/30/22

## 2022-12-31 LAB
EKG ATRIAL RATE: 83 BPM
EKG P AXIS: 64 DEGREES
EKG P-R INTERVAL: 154 MS
EKG Q-T INTERVAL: 372 MS
EKG QRS DURATION: 78 MS
EKG QTC CALCULATION (BAZETT): 437 MS
EKG R AXIS: 75 DEGREES
EKG T AXIS: 12 DEGREES
EKG VENTRICULAR RATE: 83 BPM

## 2022-12-31 PROCEDURE — 93010 ELECTROCARDIOGRAM REPORT: CPT | Performed by: INTERNAL MEDICINE

## 2022-12-31 NOTE — ED NOTES
Pt given SSE, reviewed instructions and pt repeated back.  Left pt in bathroom with SSE  awaiting for results       Melanie Amador RN  12/30/22 5740

## 2023-01-01 LAB — URINE CULTURE, ROUTINE: NORMAL

## 2023-01-03 ENCOUNTER — OFFICE VISIT (OUTPATIENT)
Dept: SURGERY | Age: 53
End: 2023-01-03
Payer: MEDICARE

## 2023-01-03 VITALS
RESPIRATION RATE: 16 BRPM | OXYGEN SATURATION: 95 % | WEIGHT: 110 LBS | SYSTOLIC BLOOD PRESSURE: 110 MMHG | BODY MASS INDEX: 20.24 KG/M2 | HEIGHT: 62 IN | HEART RATE: 71 BPM | DIASTOLIC BLOOD PRESSURE: 46 MMHG | TEMPERATURE: 97.1 F

## 2023-01-03 DIAGNOSIS — D49.0 IPMN (INTRADUCTAL PAPILLARY MUCINOUS NEOPLASM): Primary | ICD-10-CM

## 2023-01-03 DIAGNOSIS — R16.0 LIVER MASS: ICD-10-CM

## 2023-01-03 PROCEDURE — 99214 OFFICE O/P EST MOD 30 MIN: CPT | Performed by: TRANSPLANT SURGERY

## 2023-01-03 PROCEDURE — 3017F COLORECTAL CA SCREEN DOC REV: CPT | Performed by: TRANSPLANT SURGERY

## 2023-01-03 PROCEDURE — 4004F PT TOBACCO SCREEN RCVD TLK: CPT | Performed by: TRANSPLANT SURGERY

## 2023-01-03 PROCEDURE — G8427 DOCREV CUR MEDS BY ELIG CLIN: HCPCS | Performed by: TRANSPLANT SURGERY

## 2023-01-03 PROCEDURE — G8484 FLU IMMUNIZE NO ADMIN: HCPCS | Performed by: TRANSPLANT SURGERY

## 2023-01-03 PROCEDURE — G8420 CALC BMI NORM PARAMETERS: HCPCS | Performed by: TRANSPLANT SURGERY

## 2023-01-03 NOTE — PROGRESS NOTES
Hepatobiliary and Pancreatic Surgery Attending History and Physical    Patient's Name/Date of Birth: Frankie Certain /1970 (42 y.o.)    Date: January 3, 2023     CC: abdominal pain    HPI:  Patient is a very pleasant 46year old female who I saw in March for abdominal pain. She failed to follow up with me, however I did recommend performing an EUS of her pancreas to evaluate the pancreatic cyst and also to see if she had features of chronic pancreatitis. She states that she has abdominal pain that is in her epigastrium that also is radiating to her back. She also has a lot of bloating within 15 minutes after eating which is accompanied by pain. However, when she moves her bowels the pain was better. She is scheduled for a colonoscopy with Tatiana Shahid. She tried linzess but it wasn't working. She does smokes 1PPD.       Past Medical History:   Diagnosis Date    Anxiety     Arthritis     right hip    Bipolar 1 disorder ([address]     Cervical stenosis of spine     Chronic back pain     Constipation     Depression     Hypertension     Intractable chronic post-traumatic headache 04/21/2022    Irritable bowel syndrome     Migraines     Mucocele of lip     for surgery 7/21/21    Other hyperlipidemia 07/29/2019    PTSD (post-traumatic stress disorder)        Past Surgical History:   Procedure Laterality Date    ARTHROGRAPHY Right 1/3/2019    RIGHT HIP ARTHROGRAM AND STEROID INJECTION performed by Madonna Cabrera MD at [facility] Right 9/10/2020    RIGHT HIP INJECTION UNDER FLUOROSCOPY performed by Madonna Cabrera MD at [facility] Right 4/13/2021    RIGHT HIP INJECTION UNDER FLUOROSCOPY performed by Madonna Cabrera MD at [address] [place] [address] Drive AND CURETTAGE OF UTERUS N/A 2/3/2020    DILATATION AND CURETTAGE HYSTEROSCOPY performed by Jennifer Tello MD at [facility] Right 7/21/2021    EXCISION RIGHT LIP MUCOCELE performed by Hayes Ha MD at [address] [place]  02/20/2019    implant for IBS     STIMULATOR SURGERY      bowel    TONSILLECTOMY      TOOTH EXTRACTION      TUBAL LIGATION      UPPER GASTROINTESTINAL ENDOSCOPY N/A 3/28/2022    EGD ESOPHAGOGASTRODUODENOSCOPY ULTRASOUND performed by Fritz Winters MD at [address]       Current Outpatient Medications   Medication Sig Dispense Refill    polyethylene glycol (MIRALAX) 17 GM/SCOOP powder Take 17 g by mouth daily for 14 days. Dispense QS, and no refills 238 g 0    traZODone (DESYREL) 50 MG tablet take 1 tablet by mouth at bedtime      buPROPion (WELLBUTRIN) 75 MG tablet take 1 tablet by mouth twice a day      pantoprazole (PROTONIX) 40 MG tablet take 1 tablet by mouth EVERY MORNING BEFORE BREAKFAST 90 tablet 1    nadolol (CORGARD) 20 MG tablet take 1 tablet by mouth once daily 90 tablet 1    docusate sodium (STOOL SOFTENER) 100 MG capsule Take 1 capsule by mouth 2 times daily as needed for Constipation 60 capsule 2    senna (SENOKOT) 8.6 MG tablet Take 1 tablet by mouth 2 times daily 60 tablet 11    linaclotide (LINZESS) 145 MCG capsule Take 1 capsule by mouth every morning (before breakfast) 30 capsule 3    baclofen (LIORESAL) 10 MG tablet Take 1 tablet by mouth 3 times daily 360 tablet 1    ondansetron (ZOFRAN ODT) 4 MG disintegrating tablet Take 1 tablet by mouth every 8 hours as needed for Nausea or Vomiting May substitute for covered product 10 tablet 0    rosuvastatin (CRESTOR) 20 MG tablet take 1 tablet by mouth nightly 90 tablet 1    Eptinezumab-jjmr (VYEPTI IV) Infuse intravenously every 3 months      DULoxetine (CYMBALTA) 60 MG extended release capsule 60 mg 2 times daily      diazepam (VALIUM) 10 MG tablet Take 10 mg by mouth in the morning and 10 mg at noon and 10 mg in the evening. Instructed to take am of procedure if needed. folic acid (FOLVITE) 1 MG tablet Take 1 mg by mouth daily.       lamoTRIgine (LAMICTAL) 200 MG tablet Take 200 mg by mouth every evening        No current facility-administered medications for this visit. Allergies   Allergen Reactions    Latex Rash    Sulfa Antibiotics Rash and Hives       Family History   Problem Relation Age of Onset    GERD Mother     Parkinsonism Mother     COPD Mother     No Known Problems Father     Breast Cancer Maternal Aunt        Social History     Socioeconomic History    Marital status:      Spouse name: Not on file    Number of children: Not on file    Years of education: Not on file    Highest education level: Not on file   Occupational History    Not on file   Tobacco Use    Smoking status: Every Day     Packs/day: 1.00     Years: 30.00     Pack years: 30.00     Types: Cigarettes    Smokeless tobacco: Never    Tobacco comments:     trying to quit, wears patch   Vaping Use    Vaping Use: Former   Substance and Sexual Activity    Alcohol use: Not Currently    Drug use: Not Currently     Types: Marijuana Marcus Tiwari     Comment: STOPPED 2021    Sexual activity: Not Currently   Other Topics Concern    Not on file   Social History Narrative    Not on file     Social Determinants of Health     Financial Resource Strain: Unknown    Difficulty of Paying Living Expenses: Patient refused   Food Insecurity: Unknown    Worried About Running Out of Food in the Last Year: Patient refused    Ran Out of Food in the Last Year: Patient refused   Transportation Needs: Not on file   Physical Activity: Unknown    Days of Exercise per Week: 0 days    Minutes of Exercise per Session: Not on file   Stress: Not on file   Social Connections: Not on file   Intimate Partner Violence: Not on file   Housing Stability: Not on file       ROS:   Review of Systems   Constitutional:  Negative for chills, diaphoresis and fever. HENT:  Negative for congestion, ear discharge, ear pain, hearing loss, nosebleeds and tinnitus. Eyes:  Negative for photophobia, pain and discharge.    Respiratory:  Negative for shortness of breath. Cardiovascular:  Negative for palpitations and leg swelling. Gastrointestinal:  Positive for abdominal pain and constipation. Negative for blood in stool, diarrhea, nausea and vomiting. Endocrine: Negative for polydipsia. Genitourinary:  Negative for frequency, hematuria and urgency. Musculoskeletal:  Negative for back pain and neck pain. Skin:  Negative for rash. Allergic/Immunologic: Negative for environmental allergies. Neurological:  Negative for tremors and seizures. Psychiatric/Behavioral:  Negative for hallucinations and suicidal ideas. The patient is not nervous/anxious. Physical Exam:  BP (!) 110/46 (Site: Left Upper Arm, Position: Sitting, Cuff Size: Medium Adult)   Pulse 71   Temp 97.1 °F (36.2 °C)   Resp 16   Ht 5' 2\" (1.575 m)   Wt 110 lb (49.9 kg)   LMP 12/28/2018 (LMP Unknown)   SpO2 95%   BMI 20.12 kg/m²       PSYCH: mood and affect normal, alert and oriented x 3: No apparent distress, comfortable  EYES: Sclera white, pupils equal round and reactive to light  ENMT:  Hearing normal, trachea midline, ears externally intact  LYMPH: no obvious lympadenopathy in neck. RESP: Respiratory effort was normal with no retractions or use of accessory muscles. 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 Cystic disease  - I reviewed their images prior to our office visit and we also reviewed them together today. - her liver has had a similar appearance over the past 6 months. - will plan for a liver biopsy  - will also plan for an EUS with Dr. Brisa Diehl to evaluate for worrisome features - it has been stable for 6 months    30 Minutes of which greater than 50% was spent counseling or coordinating her care.     Thank you Dr. Francesca Rasheed for the consultation allowing me to take part in Ms. Pate' care.      Elida Jack M.D.  1/3/2023  9:35 AM

## 2023-01-04 ENCOUNTER — ANESTHESIA (OUTPATIENT)
Dept: ENDOSCOPY | Age: 53
End: 2023-01-04
Payer: MEDICARE

## 2023-01-04 ENCOUNTER — HOSPITAL ENCOUNTER (OUTPATIENT)
Age: 53
Setting detail: OUTPATIENT SURGERY
Discharge: HOME | End: 2023-01-04
Attending: STUDENT IN AN ORGANIZED HEALTH CARE EDUCATION/TRAINING PROGRAM | Admitting: STUDENT IN AN ORGANIZED HEALTH CARE EDUCATION/TRAINING PROGRAM
Payer: MEDICARE

## 2023-01-04 ENCOUNTER — ANESTHESIA EVENT (OUTPATIENT)
Dept: ENDOSCOPY | Age: 53
End: 2023-01-04
Payer: MEDICARE

## 2023-01-04 VITALS
SYSTOLIC BLOOD PRESSURE: 110 MMHG | HEIGHT: 62 IN | OXYGEN SATURATION: 100 % | HEART RATE: 66 BPM | DIASTOLIC BLOOD PRESSURE: 72 MMHG | TEMPERATURE: 97.8 F | RESPIRATION RATE: 20 BRPM | WEIGHT: 110 LBS | BODY MASS INDEX: 20.24 KG/M2

## 2023-01-04 DIAGNOSIS — K59.09 CHRONIC CONSTIPATION: ICD-10-CM

## 2023-01-04 PROCEDURE — 45380 COLONOSCOPY AND BIOPSY: CPT | Performed by: STUDENT IN AN ORGANIZED HEALTH CARE EDUCATION/TRAINING PROGRAM

## 2023-01-04 PROCEDURE — 3700000001 HC ADD 15 MINUTES (ANESTHESIA): Performed by: STUDENT IN AN ORGANIZED HEALTH CARE EDUCATION/TRAINING PROGRAM

## 2023-01-04 PROCEDURE — 6360000002 HC RX W HCPCS: Performed by: NURSE ANESTHETIST, CERTIFIED REGISTERED

## 2023-01-04 PROCEDURE — 88305 TISSUE EXAM BY PATHOLOGIST: CPT

## 2023-01-04 PROCEDURE — 3609010300 HC COLONOSCOPY W/BIOPSY SINGLE/MULTIPLE: Performed by: STUDENT IN AN ORGANIZED HEALTH CARE EDUCATION/TRAINING PROGRAM

## 2023-01-04 PROCEDURE — 3700000000 HC ANESTHESIA ATTENDED CARE: Performed by: STUDENT IN AN ORGANIZED HEALTH CARE EDUCATION/TRAINING PROGRAM

## 2023-01-04 PROCEDURE — 2580000003 HC RX 258: Performed by: STUDENT IN AN ORGANIZED HEALTH CARE EDUCATION/TRAINING PROGRAM

## 2023-01-04 PROCEDURE — 7100000011 HC PHASE II RECOVERY - ADDTL 15 MIN: Performed by: STUDENT IN AN ORGANIZED HEALTH CARE EDUCATION/TRAINING PROGRAM

## 2023-01-04 PROCEDURE — 7100000010 HC PHASE II RECOVERY - FIRST 15 MIN: Performed by: STUDENT IN AN ORGANIZED HEALTH CARE EDUCATION/TRAINING PROGRAM

## 2023-01-04 PROCEDURE — 2709999900 HC NON-CHARGEABLE SUPPLY: Performed by: STUDENT IN AN ORGANIZED HEALTH CARE EDUCATION/TRAINING PROGRAM

## 2023-01-04 RX ORDER — SODIUM CHLORIDE 0.9 % (FLUSH) 0.9 %
5-40 SYRINGE (ML) INJECTION EVERY 12 HOURS SCHEDULED
Status: DISCONTINUED | OUTPATIENT
Start: 2023-01-04 | End: 2023-01-04 | Stop reason: HOSPADM

## 2023-01-04 RX ORDER — SODIUM CHLORIDE 0.9 % (FLUSH) 0.9 %
5-40 SYRINGE (ML) INJECTION PRN
Status: DISCONTINUED | OUTPATIENT
Start: 2023-01-04 | End: 2023-01-04 | Stop reason: HOSPADM

## 2023-01-04 RX ORDER — SODIUM CHLORIDE, SODIUM LACTATE, POTASSIUM CHLORIDE, CALCIUM CHLORIDE 600; 310; 30; 20 MG/100ML; MG/100ML; MG/100ML; MG/100ML
INJECTION, SOLUTION INTRAVENOUS CONTINUOUS
Status: DISCONTINUED | OUTPATIENT
Start: 2023-01-04 | End: 2023-01-04 | Stop reason: HOSPADM

## 2023-01-04 RX ORDER — SODIUM CHLORIDE 9 MG/ML
25 INJECTION, SOLUTION INTRAVENOUS PRN
Status: DISCONTINUED | OUTPATIENT
Start: 2023-01-04 | End: 2023-01-04 | Stop reason: HOSPADM

## 2023-01-04 RX ORDER — PROPOFOL 10 MG/ML
INJECTION, EMULSION INTRAVENOUS PRN
Status: DISCONTINUED | OUTPATIENT
Start: 2023-01-04 | End: 2023-01-04 | Stop reason: SDUPTHER

## 2023-01-04 RX ADMIN — SODIUM CHLORIDE, POTASSIUM CHLORIDE, SODIUM LACTATE AND CALCIUM CHLORIDE: 600; 310; 30; 20 INJECTION, SOLUTION INTRAVENOUS at 09:26

## 2023-01-04 RX ADMIN — PROPOFOL 250 MG: 10 INJECTION, EMULSION INTRAVENOUS at 09:47

## 2023-01-04 ASSESSMENT — PAIN - FUNCTIONAL ASSESSMENT: PAIN_FUNCTIONAL_ASSESSMENT: 0-10

## 2023-01-04 ASSESSMENT — LIFESTYLE VARIABLES: SMOKING_STATUS: 1

## 2023-01-04 ASSESSMENT — PAIN SCALES - GENERAL: PAINLEVEL_OUTOF10: 0

## 2023-01-04 NOTE — H&P (VIEW-ONLY)
[facility] Note               Chief Complaint   Patient presents with    New Patient    Constipation         PCP: Gabby Thompson DO     HPI: Vitaliy Noriega is a 46 y.o. female who presents in consultation for chronic constipation. She has had issues with this for years. She has a spinal cord stimulator which she said she had placed in [place] for her IBS in 2018 but states this has not worked and she has shut this off. She was seen in the GI office and was given Linzess for the constipation but this has not worked. She feels full all the time and has chronic pain. She last had a colonoscopy in 2018 but is unsure of who did this and what the result was. She has  no family history of colon cancer or IBD. She has a history of branch duct IPMN which she had an EUS in 2018 and this year. She states she was to follow up with Dr. Swathi Juarez but \" no one can find out who he was\" so she gave up looking for him despite seeing him when she was admitted in the hospital earlier this year. She has hepatic steatosis as well and is scheduled for an US elastography per GI . She has not had any abdominal surgeries. Her CT scans shows significant stool burden.       Past Medical History        Past Medical History:   Diagnosis Date    Anxiety      Arthritis       right hip    Bipolar 1 disorder ([address]      Cervical stenosis of spine      Chronic back pain      Constipation      Depression      Hypertension      Intractable chronic post-traumatic headache 04/21/2022    Irritable bowel syndrome      Migraines      Mucocele of lip       for surgery 7/21/21    Other hyperlipidemia 07/29/2019    PTSD (post-traumatic stress disorder)              Past Surgical History         Past Surgical History:   Procedure Laterality Date    ARTHROGRAPHY Right 1/3/2019     RIGHT HIP ARTHROGRAM AND STEROID INJECTION performed by Roxane Claude, MD at [facility] Right 9/10/2020     RIGHT HIP INJECTION UNDER FLUOROSCOPY performed by Ronna Kessler MD at [facility] Right 4/13/2021     RIGHT HIP INJECTION UNDER FLUOROSCOPY performed by Ronna Kessler MD at [address] AND CURETTAGE OF UTERUS N/A 2/3/2020     DILATATION AND CURETTAGE HYSTEROSCOPY [facility] 7/21/2021     EXCISION RIGHT LIP MUCOCELE performed by Gabbie Walker MD at [address] [place] [address]   02/20/2019     implant for IBS     STIMULATOR SURGERY         bowel    TONSILLECTOMY        TOOTH EXTRACTION        TUBAL LIGATION        UPPER GASTROINTESTINAL ENDOSCOPY N/A 3/28/2022     EGD ESOPHAGOGASTRODUODENOSCOPY ULTRASOUND performed by Ebony Alejandro MD at [place] [id number] Medications           Prior to Admission medications    Medication Sig Start Date End Date Taking?  Authorizing Provider   polyethylene glycol-electrolytes (NULYTELY) 420 g solution Take 4,000 mLs by mouth once for 1 dose 12/9/22 12/9/22 Yes Merrycrystal Madison,    linaclotide [facility]) 145 MCG capsule Take 1 capsule by mouth every morning (before breakfast) 11/22/22   Yes FRENCH Johnston - CNP   baclofen (LIORESAL) 10 MG tablet Take 1 tablet by mouth 3 times daily 11/18/22   Yes Sandy CRYSTAL Persaud,    ondansetron (ZOFRAN ODT) 4 MG disintegrating tablet Take 1 tablet by mouth every 8 hours as needed for Nausea or Vomiting May substitute for covered product 11/8/22   Yes Halima Lugo,    rosuvastatin (CRESTOR) 20 MG tablet take 1 tablet by mouth nightly 8/30/22   Yes Sandy CRYSTAL Persaud, DO   pantoprazole (PROTONIX) 40 MG tablet Take 1 tablet by mouth every morning (before breakfast) 7/13/22   Yes Sandy CRYSTAL Persaud, DO   Eptinezumab-jjmr (VYEPTI IV) Infuse intravenously     Yes Historical Provider, MD   nadolol (CORGARD) 20 MG tablet take 1 tablet by mouth once daily 6/19/22   Yes Sandy CRYSTAL Persaud, DO   DULoxetine (CYMBALTA) 60 MG extended release capsule 90 mg daily  3/2/22   Yes Historical Provider, MD   diazepam (VALIUM) 10 MG tablet Take 10 mg by mouth in the morning and 10 mg at noon and 10 mg in the evening. Instructed to take am of procedure if needed. Yes Historical Provider, MD   folic acid (FOLVITE) 1 MG tablet Take 1 mg by mouth daily.      Yes Historical Provider, MD   lamoTRIgine (LAMICTAL) 200 MG tablet Take 200 mg by mouth every evening      Yes Historical Provider, MD   docusate sodium (STOOL SOFTENER) 100 MG capsule Take 1 capsule by mouth 2 times daily as needed for Constipation  Patient not taking: Reported on 12/9/2022 11/28/22     FRENCH Nur CNP   senna (SENOKOT) 8.6 MG tablet Take 1 tablet by mouth 2 times daily  Patient not taking: Reported on 12/9/2022 11/28/22 11/28/23   FRENCH Nur CNP                 Allergies   Allergen Reactions    Latex Rash    Sulfa Antibiotics Rash and Hives         Social History   Social History            Socioeconomic History    Marital status:        Spouse name: None    Number of children: None    Years of education: None    Highest education level: None   Tobacco Use    Smoking status: Every Day       Packs/day: 1.00       Years: 30.00       Pack years: 30.00       Types: Cigarettes    Smokeless tobacco: Never    Tobacco comments:       trying to quit, wears patch   Vaping Use    Vaping Use: Former   Substance and Sexual Activity    Alcohol use: Not Currently    Drug use: Not Currently       Types: Marijuana Maurita Handsome)    Sexual activity: Not Currently      Social Determinants of Health          Financial Resource Strain: Unknown    Difficulty of Paying Living Expenses: Patient refused   Food Insecurity: Unknown    Worried About Running Out of Food in the Last Year: Patient refused    Ran Out of Food in the Last Year: Patient refused   Physical Activity: Unknown    Days of Exercise per Week: 0 days            Family History         Family History   Problem Relation Age of Onset    GERD Mother      Parkinsonism Mother      COPD Mother      No Known Problems Father      Breast Cancer Maternal Aunt              Review of Systems   Constitutional:  Positive for activity change, appetite change and fatigue. Negative for chills, diaphoresis, fever and unexpected weight change. HENT:  Negative for trouble swallowing. Respiratory:  Negative for apnea, cough, choking, chest tightness, shortness of breath, wheezing and stridor. Cardiovascular:  Negative for chest pain. Gastrointestinal:  Positive for abdominal distention, abdominal pain, constipation, nausea, rectal pain and vomiting. Negative for blood in stool and diarrhea. Musculoskeletal:  Positive for arthralgias, back pain, gait problem, joint swelling and myalgias. Skin:  Negative for color change, pallor and wound. Neurological:  Positive for light-headedness and headaches. Negative for dizziness. Psychiatric/Behavioral:  Negative for agitation and confusion. Objective:  Vitals       Vitals:     12/09/22 0906   BP: 99/73   Pulse: 77   Resp: 16   Weight: 111 lb (50.3 kg)   Height: 5' 2\" (1.575 m)            Physical Exam  Constitutional:       General: She is not in acute distress. Appearance: Normal appearance. She is normal weight. She is not ill-appearing, toxic-appearing or diaphoretic. HENT:      Head: Normocephalic and atraumatic. Mouth/Throat:      Mouth: Mucous membranes are moist.      Pharynx: Oropharynx is clear. Eyes:      General: No scleral icterus. Conjunctiva/sclera: Conjunctivae normal.      Pupils: Pupils are equal, round, and reactive to light. Cardiovascular:      Rate and Rhythm: Normal rate and regular rhythm. Pulses: Normal pulses. Pulmonary:      Effort: Pulmonary effort is normal. No respiratory distress. Abdominal:      General: Abdomen is flat. There is distension. Palpations: Abdomen is soft. There is no mass.       Tenderness: There is abdominal tenderness. There is no guarding or rebound. Hernia: No hernia is present. Musculoskeletal:         General: Normal range of motion. Skin:     General: Skin is warm and dry. Capillary Refill: Capillary refill takes less than 2 seconds. Coloration: Skin is not jaundiced or pale. Neurological:      General: No focal deficit present. Mental Status: She is alert and oriented to person, place, and time. Psychiatric:         Mood and Affect: Mood normal.         Behavior: Behavior normal.                           Assessment/Plan:        Diagnosis Orders   1. Pancreatic cyst  Roshni Taylor MD, [facility], [place]       2. Constipation, unspecified constipation type             - She has significant constipation and we will plan for a colonoscopy at this time. We will prescribed Golytely to hopefully help stimulate some bowel function and then prep with Miralax.  She has a known branch duct IPMN and we will send referral with Dr. Carey Marcus for follow up and surveillance      Viola Alcala DO

## 2023-01-04 NOTE — OP NOTE
Operative Note      Patient: Rossana Yu  YOB: 1970  MRN: [id number]    Date of Procedure: 1/4/2023    Pre-Op Diagnosis: Chronic constipation [K59.09]    Post-Op Diagnosis: Same       Procedure(s):  COLONOSCOPY WITH BIOPSY    Surgeon(s):  Jessi De Guzman DO    Assistant:   * No surgical staff found *    Anesthesia: Monitor Anesthesia Care    Estimated Blood Loss (mL): Minimal    Complications: None    Specimens:   ID Type Source Tests Collected by Time Destination   A : Rectal Polyp Bx Tissue Colon SURGICAL PATHOLOGY Jessi De Guzman DO 1/4/2023 1005        Implants:  * No implants in log *      Drains: * No LDAs found *    Findings: tortuous twisted colon, small rectal polyp    Detailed Description of Procedure:       PROCEDURE: The patient was given IV conscious sedation per anesthesia. The patient was given supplemental oxygen by nasal cannula. I performed a digital rectal exam and no masses were palpated. The colonoscope was inserted per rectum and advanced under direct vision to the cecum without difficulty. The prep was good so exam was adequate. FINDINGS:  Cecum/Ascending colon: appendiceal orifice noted, iliocecal valve visualized, normal    Transverse colon: normal    Descending/Sigmoid colon: normal    Rectum/Anus: examined in normal and retroflexed positions and was abnormal: small rectal polyp taken with cold forceps    The colon was decompressed and the scope was removed. The withdraw time was approximately 15 minutes. The patient tolerated the procedure well.      ASSESSMENT/PLAN:   Rectal polyp- awaiting pathology  Chronic constipation- start Movantik  Colorectal Cancer Screening - recommend repeat colonoscopy in 10 years          Electronically signed by Jessi De Guzman DO on 1/4/2023 at 10:21 AM

## 2023-01-04 NOTE — H&P
[facility] Note               Chief Complaint   Patient presents with    New Patient    Constipation         PCP: Rosemary Gallahger DO     HPI: Katharina Wilson is a 46 y.o. female who presents in consultation for chronic constipation. She has had issues with this for years. She has a spinal cord stimulator which she said she had placed in [place] for her IBS in 2018 but states this has not worked and she has shut this off. She was seen in the GI office and was given Linzess for the constipation but this has not worked. She feels full all the time and has chronic pain. She last had a colonoscopy in 2018 but is unsure of who did this and what the result was. She has  no family history of colon cancer or IBD. She has a history of branch duct IPMN which she had an EUS in 2018 and this year. She states she was to follow up with Dr. Alis Bella but \" no one can find out who he was\" so she gave up looking for him despite seeing him when she was admitted in the hospital earlier this year. She has hepatic steatosis as well and is scheduled for an US elastography per GI . She has not had any abdominal surgeries. Her CT scans shows significant stool burden.       Past Medical History        Past Medical History:   Diagnosis Date    Anxiety      Arthritis       right hip    Bipolar 1 disorder ([address]      Cervical stenosis of spine      Chronic back pain      Constipation      Depression      Hypertension      Intractable chronic post-traumatic headache 04/21/2022    Irritable bowel syndrome      Migraines      Mucocele of lip       for surgery 7/21/21    Other hyperlipidemia 07/29/2019    PTSD (post-traumatic stress disorder)              Past Surgical History         Past Surgical History:   Procedure Laterality Date    ARTHROGRAPHY Right 1/3/2019     RIGHT HIP ARTHROGRAM AND STEROID INJECTION performed by Diogenes Le MD at [facility] Right 9/10/2020     RIGHT HIP INJECTION UNDER FLUOROSCOPY performed by Demetris Reed MD at [facility] Right 4/13/2021     RIGHT HIP INJECTION UNDER FLUOROSCOPY performed by Demetris Reed MD at [address] AND CURETTAGE OF UTERUS N/A 2/3/2020     DILATATION AND CURETTAGE HYSTEROSCOPY performed by Aaliyah Alex MD at [facility] Right 7/21/2021     EXCISION RIGHT LIP MUCOCELE performed by Kan Mahan MD at [address] [place] [address]   02/20/2019     implant for IBS     STIMULATOR SURGERY         bowel    TONSILLECTOMY        TOOTH EXTRACTION        TUBAL LIGATION        UPPER GASTROINTESTINAL ENDOSCOPY N/A 3/28/2022     EGD ESOPHAGOGASTRODUODENOSCOPY ULTRASOUND performed by Michelet Yeager MD at [place] [address] Medications           Prior to Admission medications    Medication Sig Start Date End Date Taking?  Authorizing Provider   polyethylene glycol-electrolytes (NULYTELY) 420 g solution Take 4,000 mLs by mouth once for 1 dose 12/9/22 12/9/22 Yes Duane Jones DO   linaclotide [facility]) 145 MCG capsule Take 1 capsule by mouth every morning (before breakfast) 11/22/22   Yes FRENCH Washington CNP   baclofen (LIORESAL) 10 MG tablet Take 1 tablet by mouth 3 times daily 11/18/22   Yes Williston SHANE Persaud DO   ondansetron (ZOFRAN ODT) 4 MG disintegrating tablet Take 1 tablet by mouth every 8 hours as needed for Nausea or Vomiting May substitute for covered product 11/8/22   Yes Marylene John, DO   rosuvastatin (CRESTOR) 20 MG tablet take 1 tablet by mouth nightly 8/30/22   Yes Williston SHANE Persaud, DO   pantoprazole (PROTONIX) 40 MG tablet Take 1 tablet by mouth every morning (before breakfast) 7/13/22   Yes Williston SHANE Persaud, DO   Eptinezumab-jjmr (VYEPTI IV) Infuse intravenously     Yes Historical Provider, MD   nadolol (CORGARD) 20 MG tablet take 1 tablet by mouth once daily 6/19/22   Yes Williston SHANE Persaud, DO   DULoxetine (CYMBALTA) 60 MG extended release capsule 90 mg daily  3/2/22   Yes Historical Provider, MD   diazepam (VALIUM) 10 MG tablet Take 10 mg by mouth in the morning and 10 mg at noon and 10 mg in the evening. Instructed to take am of procedure if needed. Yes Historical Provider, MD   folic acid (FOLVITE) 1 MG tablet Take 1 mg by mouth daily.      Yes Historical Provider, MD   lamoTRIgine (LAMICTAL) 200 MG tablet Take 200 mg by mouth every evening      Yes Historical Provider, MD   docusate sodium (STOOL SOFTENER) 100 MG capsule Take 1 capsule by mouth 2 times daily as needed for Constipation  Patient not taking: Reported on 12/9/2022 11/28/22     FRENCH Caba CNP   senna (SENOKOT) 8.6 MG tablet Take 1 tablet by mouth 2 times daily  Patient not taking: Reported on 12/9/2022 11/28/22 11/28/23   FRENCH Caba CNP                 Allergies   Allergen Reactions    Latex Rash    Sulfa Antibiotics Rash and Hives         Social History   Social History            Socioeconomic History    Marital status:        Spouse name: None    Number of children: None    Years of education: None    Highest education level: None   Tobacco Use    Smoking status: Every Day       Packs/day: 1.00       Years: 30.00       Pack years: 30.00       Types: Cigarettes    Smokeless tobacco: Never    Tobacco comments:       trying to quit, wears patch   Vaping Use    Vaping Use: Former   Substance and Sexual Activity    Alcohol use: Not Currently    Drug use: Not Currently       Types: Marijuana Nan Rick)    Sexual activity: Not Currently      Social Determinants of Health          Financial Resource Strain: Unknown    Difficulty of Paying Living Expenses: Patient refused   Food Insecurity: Unknown    Worried About Running Out of Food in the Last Year: Patient refused    Ran Out of Food in the Last Year: Patient refused   Physical Activity: Unknown    Days of Exercise per Week: 0 days            Family History         Family History   Problem Relation Age of Onset    GERD Mother      Parkinsonism Mother      COPD Mother      No Known Problems Father      Breast Cancer Maternal Aunt              Review of Systems   Constitutional:  Positive for activity change, appetite change and fatigue. Negative for chills, diaphoresis, fever and unexpected weight change. HENT:  Negative for trouble swallowing. Respiratory:  Negative for apnea, cough, choking, chest tightness, shortness of breath, wheezing and stridor. Cardiovascular:  Negative for chest pain. Gastrointestinal:  Positive for abdominal distention, abdominal pain, constipation, nausea, rectal pain and vomiting. Negative for blood in stool and diarrhea. Musculoskeletal:  Positive for arthralgias, back pain, gait problem, joint swelling and myalgias. Skin:  Negative for color change, pallor and wound. Neurological:  Positive for light-headedness and headaches. Negative for dizziness. Psychiatric/Behavioral:  Negative for agitation and confusion. Objective:  Vitals       Vitals:     12/09/22 0906   BP: 99/73   Pulse: 77   Resp: 16   Weight: 111 lb (50.3 kg)   Height: 5' 2\" (1.575 m)            Physical Exam  Constitutional:       General: She is not in acute distress. Appearance: Normal appearance. She is normal weight. She is not ill-appearing, toxic-appearing or diaphoretic. HENT:      Head: Normocephalic and atraumatic. Mouth/Throat:      Mouth: Mucous membranes are moist.      Pharynx: Oropharynx is clear. Eyes:      General: No scleral icterus. Conjunctiva/sclera: Conjunctivae normal.      Pupils: Pupils are equal, round, and reactive to light. Cardiovascular:      Rate and Rhythm: Normal rate and regular rhythm. Pulses: Normal pulses. Pulmonary:      Effort: Pulmonary effort is normal. No respiratory distress. Abdominal:      General: Abdomen is flat. There is distension. Palpations: Abdomen is soft. There is no mass.       Tenderness: There is abdominal tenderness. There is no guarding or rebound. Hernia: No hernia is present. Musculoskeletal:         General: Normal range of motion. Skin:     General: Skin is warm and dry. Capillary Refill: Capillary refill takes less than 2 seconds. Coloration: Skin is not jaundiced or pale. Neurological:      General: No focal deficit present. Mental Status: She is alert and oriented to person, place, and time. Psychiatric:         Mood and Affect: Mood normal.         Behavior: Behavior normal.                           Assessment/Plan:        Diagnosis Orders   1. Pancreatic cyst  Roshni Rodriguez MD, [facility], [place]       2. Constipation, unspecified constipation type             - She has significant constipation and we will plan for a colonoscopy at this time. We will prescribed Golytely to hopefully help stimulate some bowel function and then prep with Miralax.  She has a known branch duct IPMN and we will send referral with Dr. Claudia Lugo for follow up and surveillance      Ariella Ash DO

## 2023-01-04 NOTE — ANESTHESIA POSTPROCEDURE EVALUATION
Department of Anesthesiology  Postprocedure Note    Patient: Triston Almonte  MRN: [id number]  YOB: 1970  Date of evaluation: 1/4/2023      Procedure Summary     Date: 01/04/23 Room / Location: [facility] / [facility]    Anesthesia Start: 6708 Anesthesia Stop: 1010    Procedure: COLONOSCOPY WITH BIOPSY Diagnosis:       Chronic constipation      (Chronic constipation [K59.09])    Surgeons: Devan Etienne DO Responsible Provider: Dionte Villalobos MD    Anesthesia Type: MAC ASA Status: 3          Anesthesia Type: MAC    Vito Phase I: Vito Score: 10    Vito Phase II:        Anesthesia Post Evaluation    Patient location during evaluation: PACU  Patient participation: complete - patient participated  Level of consciousness: awake  Airway patency: patent  Nausea & Vomiting: no nausea and no vomiting  Complications: no  Cardiovascular status: hemodynamically stable  Respiratory status: acceptable  Hydration status: euvolemic

## 2023-01-04 NOTE — DISCHARGE INSTRUCTIONS
Colonoscopy: What to Expect at [address] [place] [address]  After a colonoscopy, you'll stay at the clinic until you wake up. Then you can go home. But you'll need to arrange for a ride. Your doctor will tell you when you can eat and do your other usual activities. Your doctor will talk to you about when you'll need your next colonoscopy. Your doctor can help you decide how often you need to be checked. This will depend on the results of your test and your risk for colorectal cancer. After the test, you may be bloated or have gas pains. You may need to pass gas. If a biopsy was done or a polyp was removed, you may have streaks of blood in your stool (feces) for a few days. Problems such as heavy rectal bleeding may not occur until several weeks after the test. This isn't common. But it can happen after polyps are removed. This care sheet gives you a general idea about how long it will take for you to recover. But each person recovers at a different pace. Follow the steps below to get better as quickly as possible. How can you care for yourself at home? Activity    Rest when you feel tired. You can do your normal activities when it feels okay to do so. Diet    Follow your doctor's directions for eating. Unless your doctor has told you not to, drink plenty of fluids. This helps to replace the fluids that were lost during the colon prep. Do not drink alcohol. Medicines    Your doctor will tell you if and when you can restart your medicines. You will also be given instructions about taking any new medicines. If you stopped taking aspirin or some other blood thinner, your doctor will tell you when to start taking it again. If polyps were removed or a biopsy was done during the test, your doctor may tell you not to take aspirin or other anti-inflammatory medicines for a few days. These include ibuprofen (Advil, Motrin) and naproxen (Aleve).    Other instructions    For your safety, do not drive or operate machinery until the medicine wears off and you can think clearly. Your doctor may tell you not to drive or operate machinery until the day after your test.     Do not sign legal documents or make major decisions until the medicine wears off and you can think clearly. The anesthesia can make it hard for you to fully understand what you are agreeing to. Follow-up care is a key part of your treatment and safety. Be sure to make and go to all appointments, and call your doctor if you are having problems. It's also a good idea to know your test results and keep a list of the medicines you take. When should you call for help? Call 911 anytime you think you may need emergency care. For example, call if:    You passed out (lost consciousness). You pass maroon or bloody stools. You have trouble breathing. Call your doctor now or seek immediate medical care if:    You have pain that does not get better after you take pain medicine. You are sick to your stomach or cannot drink fluids. You have new or worse belly pain. You have blood in your stools. You have a fever. You cannot pass stools or gas. Watch closely for changes in your health, and be sure to contact your doctor if you have any problems. Where can you learn more? Go to http://www.woods.com/ and enter E264 to learn more about \"Colonoscopy: What to Expect at Home. \"  Current as of: May 4, 2022               Content Version: 13.5  © 2006-2022 [employer]wise[employer]. Care instructions adapted under license by [facility] ([facility]). If you have questions about a medical condition or this instruction, always ask your healthcare professional. [address] any warranty or liability for your use of this information.

## 2023-01-05 ENCOUNTER — TELEPHONE (OUTPATIENT)
Dept: HEMATOLOGY | Age: 53
End: 2023-01-05

## 2023-01-05 ASSESSMENT — ENCOUNTER SYMPTOMS
PHOTOPHOBIA: 0
ABDOMINAL PAIN: 1
CONSTIPATION: 1
BACK PAIN: 0
SHORTNESS OF BREATH: 0
BLOOD IN STOOL: 0
EYE DISCHARGE: 0
EYE PAIN: 0
VOMITING: 0
DIARRHEA: 0
NAUSEA: 0

## 2023-01-05 NOTE — TELEPHONE ENCOUNTER
Called IR scheduling and left a message to contact our office for IR orders that were put in. I also contacted the patient to let her know IR will reach out to her to schedule and that once we see she is scheduled with dr Vince Alvarenga for her EUS we will follow with her after.  The patient confirmed all of the above

## 2023-01-10 DIAGNOSIS — R16.0 LIVER MASS: Primary | ICD-10-CM

## 2023-01-11 ENCOUNTER — OFFICE VISIT (OUTPATIENT)
Dept: GASTROENTEROLOGY | Age: 53
End: 2023-01-11
Payer: MEDICARE

## 2023-01-11 ENCOUNTER — TELEPHONE (OUTPATIENT)
Dept: SURGERY | Age: 53
End: 2023-01-11

## 2023-01-11 VITALS
DIASTOLIC BLOOD PRESSURE: 75 MMHG | OXYGEN SATURATION: 97 % | HEIGHT: 62 IN | WEIGHT: 112 LBS | TEMPERATURE: 98 F | RESPIRATION RATE: 18 BRPM | SYSTOLIC BLOOD PRESSURE: 115 MMHG | HEART RATE: 79 BPM | BODY MASS INDEX: 20.61 KG/M2

## 2023-01-11 DIAGNOSIS — Z12.11 COLON CANCER SCREENING: ICD-10-CM

## 2023-01-11 DIAGNOSIS — K59.00 CONSTIPATION, UNSPECIFIED CONSTIPATION TYPE: ICD-10-CM

## 2023-01-11 DIAGNOSIS — K86.2 PANCREATIC CYST: Primary | ICD-10-CM

## 2023-01-11 DIAGNOSIS — K83.8 COMMON BILE DUCT DILATION: ICD-10-CM

## 2023-01-11 PROCEDURE — G8420 CALC BMI NORM PARAMETERS: HCPCS | Performed by: STUDENT IN AN ORGANIZED HEALTH CARE EDUCATION/TRAINING PROGRAM

## 2023-01-11 PROCEDURE — G8484 FLU IMMUNIZE NO ADMIN: HCPCS | Performed by: STUDENT IN AN ORGANIZED HEALTH CARE EDUCATION/TRAINING PROGRAM

## 2023-01-11 PROCEDURE — 4004F PT TOBACCO SCREEN RCVD TLK: CPT | Performed by: STUDENT IN AN ORGANIZED HEALTH CARE EDUCATION/TRAINING PROGRAM

## 2023-01-11 PROCEDURE — 3017F COLORECTAL CA SCREEN DOC REV: CPT | Performed by: STUDENT IN AN ORGANIZED HEALTH CARE EDUCATION/TRAINING PROGRAM

## 2023-01-11 PROCEDURE — G8427 DOCREV CUR MEDS BY ELIG CLIN: HCPCS | Performed by: STUDENT IN AN ORGANIZED HEALTH CARE EDUCATION/TRAINING PROGRAM

## 2023-01-11 PROCEDURE — 99214 OFFICE O/P EST MOD 30 MIN: CPT | Performed by: STUDENT IN AN ORGANIZED HEALTH CARE EDUCATION/TRAINING PROGRAM

## 2023-01-11 NOTE — PROGRESS NOTES
[facility]  Gastroenterology, Hepatology &  Advanced Endoscopy     Progress Note    SUBJECTIVE:      Ms. Kate Daniels is a 52y/F w/ history of pancreatic cysts and constipation who presents to clinic in follow-up. She had an EUS in March 2022 which showed normal ducts and benign appearing pancreatic cysts consistent with IPMNs. She had recent surveillance imaging with CT A/P which now shows biliary dilation. Associated LFTs are normal and denies any episodes of jaundice. She had a recent colonoscopy with removal of rectal polyp consistent with hyperplastic polyp. She also reports constipation and has been currently started on Movantik. OBJECTIVE      Physical    VITALS:  /75 (Site: Left Upper Arm, Position: Sitting, Cuff Size: Medium Adult)   Pulse 79   Temp 98 °F (36.7 °C) (Infrared)   Resp 18   Ht 5' 2\" (1.575 m)   Wt 112 lb (50.8 kg)   LMP 12/28/2018 (LMP Unknown)   SpO2 97%   BMI 20.49 kg/m²   Physical Exam:  General: Overall well-appearing, NAD  HEENT: PERRLA, EOMI, Anicteric sclera, MMM, no rhinorrhea  Cards: RRR, no LE edema  Resp: Breathing comfortably on room air, good air movement, no use of accessory muscles, no audible wheezing  Abdomen: soft, NT, ND. Extremities: Moves all extremities, no effusions or bruising. Skin: No rashes or jaundice  Neuro: A&O x 3, CN grossly intact, non-focal exam       ASSESSMENT AND PLAN      52y/F w/ history of pancreatic cysts now w/ biliary dilation on CT. She also has a history of constipation. PLAN:  Pancreatic Cysts:  - I will schedule the patient for EUS given recent changes on imaging with biliary dilation.  - Reassuring features of lack of jaundice or abnormal LFTs discussed with patient. 2. Constipation:  - Current bowel regimen of Senna, Miralax, and Movantik. - Will monitor symptoms and adjust accordingly. 3. Colon Cancer Screening:  - Recent colonoscopy with hyperplastic polyp only. - Surveillance in 10 years.     I will see the patient in clinic following her EUS. Thank you for including us in the care of this patient. Please do not hesitate to contact us with any additional questions or concerns.     Montez Jack MD  Gastroenterology/Hepatology  Advanced Endoscopy                    EUS

## 2023-01-11 NOTE — TELEPHONE ENCOUNTER
Patient call c/o bloating, abdomen hard after eating and hard stool. She is drinking water but having to manually remove stool on occasion. As directed she is taking no stool softeners or laxative with Movantik. Asking for recommendations. Also asking if pre and/or pro biotics are an option and if she can take Movantik at bed time instead of AM.    FYI-to have liver biopsy 1/24/23.     Electronically signed by Concepción Montaño MA on 1/11/2023 at 8:28 AM

## 2023-01-11 NOTE — TELEPHONE ENCOUNTER
Patient advised of Dr Emre Martinez recommendations and voiced understanding.     Electronically signed by Burt Vitale MA on 1/11/2023 at 8:59 AM

## 2023-01-12 ENCOUNTER — PREP FOR PROCEDURE (OUTPATIENT)
Dept: GASTROENTEROLOGY | Age: 53
End: 2023-01-12

## 2023-01-12 ENCOUNTER — TELEPHONE (OUTPATIENT)
Dept: GASTROENTEROLOGY | Age: 53
End: 2023-01-12

## 2023-01-12 RX ORDER — LACTULOSE 10 G/15ML
20 SOLUTION ORAL DAILY PRN
Qty: 473 ML | Refills: 11 | Status: SHIPPED
Start: 2023-01-12 | End: 2023-02-23

## 2023-01-12 NOTE — TELEPHONE ENCOUNTER
Prior Authorization Form:      DEMOGRAPHICS:                     Patient Name:  Maribel Rodney  Patient :  1970            Insurance:  Payor: Shayna Arora / Plan: [place] / Product Type: *No Product type* /   Insurance ID Number:    Payer/Plan Subscr  Sex Relation Sub. Ins. ID Effective Group Num   1.  [id number] A 1970 Female Self [id number]                                   [address] [address]         DIAGNOSIS & PROCEDURE:                       Procedure/Operation: EUS           CPT Code: 86462    Diagnosis:  Pancreatic cyst    ICD10 Code: K86.2    [facility]  [facility]    Surgeon:  Dr. Ann Rick     SCHEDULING INFORMATION:                          Date: 2023    Time: 1300              Anesthesia:  MAC/TIVA                                                       Status:  Outpatient            Electronically signed by Mohsen Mccullough MA on 2023 at 10:35 AM

## 2023-01-13 ENCOUNTER — TELEPHONE (OUTPATIENT)
Dept: GASTROENTEROLOGY | Age: 53
End: 2023-01-13

## 2023-01-13 NOTE — TELEPHONE ENCOUNTER
Patient called    States she is taking the medications that were recommended and is now having diarrhea    Explained to patient that her CT scan showed diffuse stool retention. Advised that she continue with Dr. Candace Hammond recommendations til her bowels are cleaned out.   Patient will call on Monday with an update

## 2023-01-24 ENCOUNTER — HOSPITAL ENCOUNTER (OUTPATIENT)
Dept: CT IMAGING | Age: 53
Discharge: HOME | End: 2023-01-26
Payer: MEDICARE

## 2023-01-24 VITALS
DIASTOLIC BLOOD PRESSURE: 61 MMHG | BODY MASS INDEX: 20.24 KG/M2 | OXYGEN SATURATION: 97 % | WEIGHT: 110 LBS | HEART RATE: 75 BPM | HEIGHT: 62 IN | SYSTOLIC BLOOD PRESSURE: 124 MMHG | RESPIRATION RATE: 16 BRPM | TEMPERATURE: 97.9 F

## 2023-01-24 DIAGNOSIS — R16.0 LIVER MASS: ICD-10-CM

## 2023-01-24 LAB
ANION GAP SERPL CALCULATED.3IONS-SCNC: 8 MMOL/L (ref 7–16)
BASOPHILS ABSOLUTE: 0.04 E9/L (ref 0–0.2)
BASOPHILS RELATIVE PERCENT: 0.6 % (ref 0–2)
BUN BLDV-MCNC: 13 MG/DL (ref 6–20)
CALCIUM SERPL-MCNC: 9.6 MG/DL (ref 8.6–10.2)
CHLORIDE BLD-SCNC: 107 MMOL/L (ref 98–107)
CO2: 27 MMOL/L (ref 22–29)
CREAT SERPL-MCNC: 0.6 MG/DL (ref 0.5–1)
EOSINOPHILS ABSOLUTE: 0.11 E9/L (ref 0.05–0.5)
EOSINOPHILS RELATIVE PERCENT: 1.8 % (ref 0–6)
GFR SERPL CREATININE-BSD FRML MDRD: >60 ML/MIN/1.73
GLUCOSE BLD-MCNC: 94 MG/DL (ref 74–99)
HCT VFR BLD CALC: 43.9 % (ref 34–48)
HEMOGLOBIN: 14.8 G/DL (ref 11.5–15.5)
IMMATURE GRANULOCYTES #: 0.02 E9/L
IMMATURE GRANULOCYTES %: 0.3 % (ref 0–5)
INR BLD: 1.1
LYMPHOCYTES ABSOLUTE: 2.37 E9/L (ref 1.5–4)
LYMPHOCYTES RELATIVE PERCENT: 37.9 % (ref 20–42)
MCH RBC QN AUTO: 31.2 PG (ref 26–35)
MCHC RBC AUTO-ENTMCNC: 33.7 % (ref 32–34.5)
MCV RBC AUTO: 92.6 FL (ref 80–99.9)
MONOCYTES ABSOLUTE: 0.42 E9/L (ref 0.1–0.95)
MONOCYTES RELATIVE PERCENT: 6.7 % (ref 2–12)
NEUTROPHILS ABSOLUTE: 3.3 E9/L (ref 1.8–7.3)
NEUTROPHILS RELATIVE PERCENT: 52.7 % (ref 43–80)
PDW BLD-RTO: 12.8 FL (ref 11.5–15)
PLATELET # BLD: 222 E9/L (ref 130–450)
PMV BLD AUTO: 9.6 FL (ref 7–12)
POTASSIUM SERPL-SCNC: 4.2 MMOL/L (ref 3.5–5)
PROTHROMBIN TIME: 12 SEC (ref 9.3–12.4)
RBC # BLD: 4.74 E12/L (ref 3.5–5.5)
SODIUM BLD-SCNC: 142 MMOL/L (ref 132–146)
WBC # BLD: 6.3 E9/L (ref 4.5–11.5)

## 2023-01-24 PROCEDURE — 36415 COLL VENOUS BLD VENIPUNCTURE: CPT

## 2023-01-24 PROCEDURE — 7100000011 HC PHASE II RECOVERY - ADDTL 15 MIN

## 2023-01-24 PROCEDURE — 7100000010 HC PHASE II RECOVERY - FIRST 15 MIN

## 2023-01-24 PROCEDURE — 80048 BASIC METABOLIC PNL TOTAL CA: CPT

## 2023-01-24 PROCEDURE — 88307 TISSUE EXAM BY PATHOLOGIST: CPT

## 2023-01-24 PROCEDURE — 6360000002 HC RX W HCPCS: Performed by: RADIOLOGY

## 2023-01-24 PROCEDURE — 2709999900 CT NEEDLE BIOPSY LIVER PERCUTANEOUS

## 2023-01-24 PROCEDURE — 85025 COMPLETE CBC W/AUTO DIFF WBC: CPT

## 2023-01-24 PROCEDURE — 77012 CT SCAN FOR NEEDLE BIOPSY: CPT

## 2023-01-24 PROCEDURE — 6360000004 HC RX CONTRAST MEDICATION: Performed by: RADIOLOGY

## 2023-01-24 PROCEDURE — 85610 PROTHROMBIN TIME: CPT

## 2023-01-24 PROCEDURE — 2500000003 HC RX 250 WO HCPCS: Performed by: RADIOLOGY

## 2023-01-24 RX ORDER — MIDAZOLAM HYDROCHLORIDE 5 MG/ML
INJECTION INTRAMUSCULAR; INTRAVENOUS
Status: COMPLETED | OUTPATIENT
Start: 2023-01-24 | End: 2023-01-24

## 2023-01-24 RX ORDER — FENTANYL CITRATE 50 UG/ML
INJECTION, SOLUTION INTRAMUSCULAR; INTRAVENOUS
Status: COMPLETED | OUTPATIENT
Start: 2023-01-24 | End: 2023-01-24

## 2023-01-24 RX ORDER — FENTANYL CITRATE 50 UG/ML
50 INJECTION, SOLUTION INTRAMUSCULAR; INTRAVENOUS ONCE
Status: COMPLETED | OUTPATIENT
Start: 2023-01-24 | End: 2023-01-24

## 2023-01-24 RX ORDER — KETOROLAC TROMETHAMINE 30 MG/ML
30 INJECTION, SOLUTION INTRAMUSCULAR; INTRAVENOUS ONCE
Status: COMPLETED | OUTPATIENT
Start: 2023-01-24 | End: 2023-01-24

## 2023-01-24 RX ADMIN — KETOROLAC TROMETHAMINE 30 MG: 30 INJECTION, SOLUTION INTRAMUSCULAR; INTRAVENOUS at 12:02

## 2023-01-24 RX ADMIN — Medication 1 KIT: at 11:41

## 2023-01-24 RX ADMIN — MIDAZOLAM HYDROCHLORIDE 1 MG: 5 INJECTION INTRAMUSCULAR; INTRAVENOUS at 11:32

## 2023-01-24 RX ADMIN — FENTANYL CITRATE 50 MCG: 50 INJECTION, SOLUTION INTRAMUSCULAR; INTRAVENOUS at 12:03

## 2023-01-24 RX ADMIN — FENTANYL CITRATE 50 MCG: 50 INJECTION, SOLUTION INTRAMUSCULAR; INTRAVENOUS at 11:32

## 2023-01-24 RX ADMIN — IOPAMIDOL 1 ML: 755 INJECTION, SOLUTION INTRAVENOUS at 11:38

## 2023-01-24 ASSESSMENT — PAIN SCALES - GENERAL
PAINLEVEL_OUTOF10: 5
PAINLEVEL_OUTOF10: 9

## 2023-01-24 ASSESSMENT — PAIN DESCRIPTION - LOCATION
LOCATION: ABDOMEN

## 2023-01-24 ASSESSMENT — PAIN - FUNCTIONAL ASSESSMENT: PAIN_FUNCTIONAL_ASSESSMENT: NONE - DENIES PAIN

## 2023-01-24 NOTE — POST SEDATION
POST SEDATION NOTE:  Time: 10:11 AM    Cardiopulmonary: Vitals Signs Stable: yes    Level of Consciousness: alert    Reversal Agent Used: no    Complications: none    Follow-up/Observations: none    Pain Score: 1    Desiree Hansen MD

## 2023-01-24 NOTE — PROCEDURES
1200 Patient returned from procedure. Dressing checked, clean, dry, and intact. Patient stable. No s/s of complications noted. Patient complaining of 9/10 pain. Assessed site, soft, tender, no signs of bleeding. Notified Dr. Kody Chapman II and refer to [facility] for orders. Vitals will be checked q 15min, see flow sheets. 1215 Dressing checked, clean, dry, and intact. Patient stable. No s/s of complications noted. VS stable. Pt states her pain is improving after the medication. 1230 Dressing checked, clean, dry, and intact. Patient stable. No s/s of complications noted. VS stable. [address] checked, clean, dry, and intact. Patient stable. No s/s of complications noted. VS stable. 1300 Dressing checked, clean, dry, and intact. Patient stable. No s/s of complications noted. VS stable. Patient eating and drinking well with no s/s of complications noted or reported. 1325 Patient discharged, site was checked with every set of vitals. Site clean dry and intact. Discharge papers reviewed with patient, questions answered, discharge paper signed. Patient taken to door via ambulation. Patient in stable condition, no s/s of complications noted or reported.

## 2023-01-24 NOTE — PROCEDURES
Patient arrived via ambulation with friend to Radiology department for liver bx. Allergies, home medications, H&P and fasting instructions reviewed with patient. Vital signs taken. 20G IV placed, blood obtained, IV flushed and prn adapter attached. Blood sample sent to lab for ordered tests. Procedural instructions given, questions answered, understanding expressed and consent signed. Patient given fluoroscopy education, no questions at this time.

## 2023-01-24 NOTE — PRE SEDATION
Shannon Spears II, MD  1/24/2023  10:11 AM        PRE-SEDATION PHYSICIAN ASSESSMENT:      1. HISTORY & PHYSICAL EXAMINATION:  Comments: none    Vitals:    01/24/23 0934   BP: (!) 117/57   Pulse: 60   Resp: 16   Temp: 97.9 °F (36.6 °C)   SpO2: 96%       Allergies: Latex and Sulfa antibiotics    2. Heart and Lungs immediately prior to procedure demonstrate no contraindications to proceed      Chief Complaint: <principal problem not specified>    Drug: unknown  Tobacco: unknown    3. PAST ANESTHESIA EXPERIENCE:  unknown. 4. AIRWAY/TEETH/HEAD & NECK(Mallampati Classification):  II (soft palate, uvula, fauces visible)    5: NORMAL RANGE OF MOTION OF NECK: no    6. PATIENT WILL LIKELY TOLERATE PLAN OF MODERATE SEDATION    7. ASA 2.     Óscar Briceño MD

## 2023-01-24 NOTE — BRIEF OP NOTE
Brief Postoperative Note    Heather Ricketts  YOB: 1970  [id number]    Pre-operative Diagnosis: liver mass plan bx    Post-operative Diagnosis: Same    Procedure: biopsy    Estimated Blood Loss: < 10 cc    Surgeon: Deena BRIGGS     Complications: none    Specimens obtained: Yes, biopsy of mass    Findings: biopsy of a mass      Deena Lea II, MD   1/24/2023 10:08 AM

## 2023-01-24 NOTE — INTERVAL H&P NOTE
H&P Update    Patient's History and Physical  was reviewed. The patient appears likely to able to tolerate the procedure. Risk and benefits discussed including ultimate complications, possibly death and consent obtained. Patient and/family had the opportunity to ask questions. All questions were answered and patient/family wishes to proceed.      Robin Blankenship, II

## 2023-01-27 ENCOUNTER — OFFICE VISIT (OUTPATIENT)
Dept: SURGERY | Age: 53
End: 2023-01-27
Payer: MEDICARE

## 2023-01-27 VITALS — WEIGHT: 112 LBS | BODY MASS INDEX: 20.61 KG/M2 | HEIGHT: 62 IN | RESPIRATION RATE: 18 BRPM

## 2023-01-27 DIAGNOSIS — K59.00 CONSTIPATION, UNSPECIFIED CONSTIPATION TYPE: Primary | ICD-10-CM

## 2023-01-27 PROCEDURE — 99213 OFFICE O/P EST LOW 20 MIN: CPT | Performed by: STUDENT IN AN ORGANIZED HEALTH CARE EDUCATION/TRAINING PROGRAM

## 2023-01-27 PROCEDURE — G8427 DOCREV CUR MEDS BY ELIG CLIN: HCPCS | Performed by: STUDENT IN AN ORGANIZED HEALTH CARE EDUCATION/TRAINING PROGRAM

## 2023-01-27 PROCEDURE — G8484 FLU IMMUNIZE NO ADMIN: HCPCS | Performed by: STUDENT IN AN ORGANIZED HEALTH CARE EDUCATION/TRAINING PROGRAM

## 2023-01-27 PROCEDURE — 3017F COLORECTAL CA SCREEN DOC REV: CPT | Performed by: STUDENT IN AN ORGANIZED HEALTH CARE EDUCATION/TRAINING PROGRAM

## 2023-01-27 PROCEDURE — G8420 CALC BMI NORM PARAMETERS: HCPCS | Performed by: STUDENT IN AN ORGANIZED HEALTH CARE EDUCATION/TRAINING PROGRAM

## 2023-01-27 PROCEDURE — 4004F PT TOBACCO SCREEN RCVD TLK: CPT | Performed by: STUDENT IN AN ORGANIZED HEALTH CARE EDUCATION/TRAINING PROGRAM

## 2023-01-27 ASSESSMENT — ENCOUNTER SYMPTOMS
VOMITING: 0
SHORTNESS OF BREATH: 0
BLOOD IN STOOL: 0
COLOR CHANGE: 0
APNEA: 0
DIARRHEA: 0
NAUSEA: 0
TROUBLE SWALLOWING: 0
ABDOMINAL DISTENTION: 1
WHEEZING: 0
CHEST TIGHTNESS: 0
COUGH: 0
CHOKING: 0
ANAL BLEEDING: 0
ABDOMINAL PAIN: 1
STRIDOR: 0
CONSTIPATION: 0

## 2023-01-27 NOTE — PROGRESS NOTES
111 [facility] Surgery Clinic Note      Chief Complaint   Patient presents with    Results     Follow up for colonoscopy     Abdominal Pain     Lower abdominal pain        PCP: Benton Jernigan DO    HPI: Juan J Sheppard is a 46 y.o. female who is here post colonoscopy. She continues to have bloating issues but states she is having regular BMs. She states her pain worsens 10 mins after she eats but denies reflux or heartburn issues. She is not having urgency. Her path on her colonoscopy was a hyperplastic polyp. She had a liver biopsy for her liver mass. Review of Systems   Constitutional:  Negative for activity change, appetite change, chills, diaphoresis, fatigue, fever and unexpected weight change. HENT:  Negative for trouble swallowing. Respiratory:  Negative for apnea, cough, choking, chest tightness, shortness of breath, wheezing and stridor. Cardiovascular:  Negative for chest pain. Gastrointestinal:  Positive for abdominal distention and abdominal pain. Negative for anal bleeding, blood in stool, constipation, diarrhea, nausea and vomiting. Musculoskeletal:  Negative for arthralgias and myalgias. Skin:  Negative for color change, pallor and wound. Neurological:  Negative for dizziness and light-headedness. Psychiatric/Behavioral:  Negative for agitation and confusion. The remainder of the past medical, past surgical, family, and psychosocial history, as well as medication and allergy review, were completed and are as documented elsewhere in the chart. Objective:  Vitals:    01/27/23 0957   Resp: 18   Weight: 112 lb (50.8 kg)   Height: 5' 2\" (1.575 m)          Physical Exam  Constitutional:       General: She is not in acute distress. Appearance: Normal appearance. She is normal weight. She is not ill-appearing, toxic-appearing or diaphoretic. HENT:      Head: Normocephalic and atraumatic.       Mouth/Throat:      Mouth: Mucous membranes are moist.      Pharynx: Oropharynx is clear. Eyes:      General: No scleral icterus. Conjunctiva/sclera: Conjunctivae normal.      Pupils: Pupils are equal, round, and reactive to light. Cardiovascular:      Rate and Rhythm: Normal rate and regular rhythm. Pulses: Normal pulses. Pulmonary:      Effort: Pulmonary effort is normal. No respiratory distress. Abdominal:      General: Abdomen is flat. There is distension. Palpations: Abdomen is soft. There is no mass. Tenderness: There is no abdominal tenderness. There is no guarding or rebound. Hernia: No hernia is present. Comments: Slight distension    Musculoskeletal:         General: Normal range of motion. Skin:     General: Skin is warm and dry. Capillary Refill: Capillary refill takes less than 2 seconds. Coloration: Skin is not jaundiced or pale. Neurological:      General: No focal deficit present. Mental Status: She is alert and oriented to person, place, and time. Psychiatric:         Mood and Affect: Mood normal.         Behavior: Behavior normal.               Assessment/Plan:     Diagnosis Orders   1. Constipation, unspecified constipation type            - we will stop her senna and have her take miralax once a day and her movatik. Her CTs have been normal and he colonoscopy was unrevealing. We will see her back in two weeks. No follow-ups on file.     Satinder Medrano DO      CC: Santos Teague DO

## 2023-01-31 ENCOUNTER — TELEPHONE (OUTPATIENT)
Dept: HEMATOLOGY | Age: 53
End: 2023-01-31

## 2023-01-31 NOTE — TELEPHONE ENCOUNTER
I spoke with the patient today. She was scheduled to come into the [place] office for her biopsy results. I offered the patient to come to our [facility] office for follow up or come back to [place] [facility], [facility] to [facility] until the next time in [facility]. So I offered the patient a phone call follow up, she was happy with this. I explained to the patient that Dr Lucila Gong will call her after our office on Thursday 02/02/2023. I explained to her that our office was a little full on Thursday so he may not get to her at exactly 3:45 pm but to hold tight that he would call as soon as he could.  The patient verbalized understanding and thanked me for all my help  Electronically signed by Meenu Diego MA on 1/31/2023 at 9:15 AM

## 2023-02-02 ENCOUNTER — SCHEDULED TELEPHONE ENCOUNTER (OUTPATIENT)
Dept: HEMATOLOGY | Age: 53
End: 2023-02-02

## 2023-02-02 ENCOUNTER — TELEPHONE (OUTPATIENT)
Dept: SURGERY | Age: 53
End: 2023-02-02

## 2023-02-02 DIAGNOSIS — R10.11 RIGHT UPPER QUADRANT PAIN: Primary | ICD-10-CM

## 2023-02-02 PROCEDURE — 99024 POSTOP FOLLOW-UP VISIT: CPT | Performed by: TRANSPLANT SURGERY

## 2023-02-02 NOTE — PROGRESS NOTES
[facility] Surgery      Discussed her liver biopsy results. We discussed that it was benign. She continues to have abdominal pain and bloating. She did have her coloscopy with Dr. Mandeep Encinas. The pain occurs with eating and is worse after she eats.       Will order a HIDA    Electronically signed by Brittany Mcgregor MD on 2/2/2023 at 6:17 PM

## 2023-02-02 NOTE — TELEPHONE ENCOUNTER
Spoke with patient who states she was not taking Miralax as instructed at last office visit. Patient did restart Miralax yesterday and had a normal bowel movement. Advised patient to continue Movantik and Miralax daily. If she becomes constipated again increase Miralax to twice daily per Dr Ponciano Ganser. Patient verbalized understanding.     Electronically signed by Ishaan Samuels MA on 2/2/2023 at 5:01 PM

## 2023-02-02 NOTE — TELEPHONE ENCOUNTER
Call from patient stating she is taking only Movantik per Dr Ponciano Ganser. Patient c/o constipation (small, hard stool) and asking what she can do.     Electronically signed by Ishaan Samuels MA on 2/2/2023 at 1:40 PM

## 2023-02-02 NOTE — TELEPHONE ENCOUNTER
Received a call from the patient who is returning the call from [facility]. The patient can be reached at [phone]. Forwarded message to [facility] MA.   Electronically signed by Conchita Rees on 2/2/2023 at 4:27 PM

## 2023-02-03 ENCOUNTER — TELEPHONE (OUTPATIENT)
Dept: HEMATOLOGY | Age: 53
End: 2023-02-03

## 2023-02-03 ENCOUNTER — TELEPHONE (OUTPATIENT)
Dept: SURGERY | Age: 53
End: 2023-02-03

## 2023-02-03 NOTE — TELEPHONE ENCOUNTER
I called the patients insurance company [facility] medicare complete and the automated system stated that cpt code 15953 does not require a prior auth. The patient is scheduled for her HIDA on 02/20/2023. ARRIVE 10:15 AM  SCAN 10:45 AM  NPO starting at midnight  Pt was also made aware NO water or NO meds the morning of her scan.   While on the phone we made the patient her follow up appt which is scheduled for 03/14/2023 in [place] at 10:30 am.   Electronically signed by Rishi Ortiz MA on 2/3/2023 at 10:09 AM

## 2023-02-03 NOTE — TELEPHONE ENCOUNTER
Patient advised per Dr Abdoul Neville that she can take Movantik with coffee in AM. Patient verbalized understanding.     Electronically signed by Tatiana Kwan MA on 2/3/2023 at 1:19 PM

## 2023-02-03 NOTE — TELEPHONE ENCOUNTER
Patient call asking if ok to take Movantik with coffee in the AM. If not, can she take it in the evening instead?     Electronically signed by Candy Swan MA on 2/3/2023 at 1:09 PM

## 2023-02-10 ENCOUNTER — OFFICE VISIT (OUTPATIENT)
Dept: SURGERY | Age: 53
End: 2023-02-10
Payer: MEDICARE

## 2023-02-10 ENCOUNTER — TELEPHONE (OUTPATIENT)
Dept: SURGERY | Age: 53
End: 2023-02-10

## 2023-02-10 VITALS
WEIGHT: 115.4 LBS | HEART RATE: 89 BPM | BODY MASS INDEX: 21.23 KG/M2 | OXYGEN SATURATION: 96 % | DIASTOLIC BLOOD PRESSURE: 77 MMHG | RESPIRATION RATE: 18 BRPM | HEIGHT: 62 IN | SYSTOLIC BLOOD PRESSURE: 114 MMHG | TEMPERATURE: 98.4 F

## 2023-02-10 DIAGNOSIS — R10.84 GENERALIZED ABDOMINAL PAIN: Primary | ICD-10-CM

## 2023-02-10 PROCEDURE — G8427 DOCREV CUR MEDS BY ELIG CLIN: HCPCS | Performed by: STUDENT IN AN ORGANIZED HEALTH CARE EDUCATION/TRAINING PROGRAM

## 2023-02-10 PROCEDURE — 99213 OFFICE O/P EST LOW 20 MIN: CPT | Performed by: STUDENT IN AN ORGANIZED HEALTH CARE EDUCATION/TRAINING PROGRAM

## 2023-02-10 PROCEDURE — G8420 CALC BMI NORM PARAMETERS: HCPCS | Performed by: STUDENT IN AN ORGANIZED HEALTH CARE EDUCATION/TRAINING PROGRAM

## 2023-02-10 PROCEDURE — 4004F PT TOBACCO SCREEN RCVD TLK: CPT | Performed by: STUDENT IN AN ORGANIZED HEALTH CARE EDUCATION/TRAINING PROGRAM

## 2023-02-10 PROCEDURE — G8484 FLU IMMUNIZE NO ADMIN: HCPCS | Performed by: STUDENT IN AN ORGANIZED HEALTH CARE EDUCATION/TRAINING PROGRAM

## 2023-02-10 PROCEDURE — 3017F COLORECTAL CA SCREEN DOC REV: CPT | Performed by: STUDENT IN AN ORGANIZED HEALTH CARE EDUCATION/TRAINING PROGRAM

## 2023-02-10 ASSESSMENT — ENCOUNTER SYMPTOMS
BLOOD IN STOOL: 0
APNEA: 0
NAUSEA: 0
DIARRHEA: 0
ANAL BLEEDING: 0
CHEST TIGHTNESS: 0
STRIDOR: 0
ABDOMINAL DISTENTION: 1
CHOKING: 0
SHORTNESS OF BREATH: 0
CONSTIPATION: 0
VOMITING: 0
COUGH: 0
ABDOMINAL PAIN: 1
COLOR CHANGE: 0
TROUBLE SWALLOWING: 0
WHEEZING: 0

## 2023-02-10 NOTE — TELEPHONE ENCOUNTER
Scheduled patient for US abdomen complte on 3/3/23 @ 10:45am at [facility]. NPO 8 hours before the procedure. Patient verbalized understanding. Encouraged to call our office if any questions.     Electronically signed by Arnulfo Hogan MA on 2/10/2023 at 1:53 PM

## 2023-02-10 NOTE — PROGRESS NOTES
111 [facility] Surgery Clinic Note      Chief Complaint   Patient presents with    2 Week Follow-Up       PCP: Santiago Rogers DO    HPI: Lani Okeefe is a 46 y.o. female who is here for her bloating and abdominal pain. She is having regular BMs but continues to have significant pain and distention. She states nothing makes it better and any tie she eats or drinks she gets the bloating. She denies nausea and vomiting. She discussed over the phone with Dr. Carolin Stanford who ordered a HIDA. We reviewed her CT again today which did not show any abnormality. Review of Systems   Constitutional:  Positive for appetite change and unexpected weight change. Negative for activity change, chills, diaphoresis, fatigue and fever. HENT:  Negative for trouble swallowing. Respiratory:  Negative for apnea, cough, choking, chest tightness, shortness of breath, wheezing and stridor. Cardiovascular:  Negative for chest pain. Gastrointestinal:  Positive for abdominal distention and abdominal pain. Negative for anal bleeding, blood in stool, constipation, diarrhea, nausea and vomiting. Musculoskeletal:  Negative for arthralgias and myalgias. Skin:  Negative for color change, pallor and wound. Neurological:  Negative for dizziness and light-headedness. Psychiatric/Behavioral:  Negative for agitation and confusion. The remainder of the past medical, past surgical, family, and psychosocial history, as well as medication and allergy review, were completed and are as documented elsewhere in the chart. Objective:  Vitals:    02/10/23 0912   BP: 114/77   Pulse: 89   Resp: 18   Temp: 98.4 °F (36.9 °C)   TempSrc: Infrared   SpO2: 96%   Weight: 115 lb 6.4 oz (52.3 kg)   Height: 5' 2\" (1.575 m)          Physical Exam  Constitutional:       General: She is not in acute distress. Appearance: Normal appearance. She is normal weight. She is not ill-appearing, toxic-appearing or diaphoretic.    HENT:      Head: Normocephalic and atraumatic. Mouth/Throat:      Mouth: Mucous membranes are moist.      Pharynx: Oropharynx is clear. Eyes:      General: No scleral icterus. Conjunctiva/sclera: Conjunctivae normal.      Pupils: Pupils are equal, round, and reactive to light. Cardiovascular:      Rate and Rhythm: Normal rate and regular rhythm. Pulses: Normal pulses. Pulmonary:      Effort: Pulmonary effort is normal. No respiratory distress. Abdominal:      General: Abdomen is flat. There is distension. Palpations: Abdomen is soft. There is no mass. Tenderness: There is abdominal tenderness. There is no guarding or rebound. Hernia: No hernia is present. Musculoskeletal:         General: Normal range of motion. Skin:     General: Skin is warm and dry. Capillary Refill: Capillary refill takes less than 2 seconds. Coloration: Skin is not jaundiced or pale. Neurological:      General: No focal deficit present. Mental Status: She is alert and oriented to person, place, and time. Psychiatric:         Mood and Affect: Mood normal.         Behavior: Behavior normal.               Assessment/Plan:     Diagnosis Orders   1. Generalized abdominal pain  US ABDOMEN COMPLETE          - So far her workup has been negative for any significant pathology to cause her pain and bloating. I agree with getting a HIDA scan. I will also get an abdominal ultrasound to evaluate for possible MALS. No follow-ups on file.     Clement Negrete DO      CC: Anil Sanchez DO

## 2023-02-20 ENCOUNTER — TELEPHONE (OUTPATIENT)
Dept: SURGERY | Age: 53
End: 2023-02-20

## 2023-02-20 ENCOUNTER — HOSPITAL ENCOUNTER (OUTPATIENT)
Dept: NUCLEAR MEDICINE | Age: 53
Discharge: HOME | End: 2023-02-20
Payer: MEDICARE

## 2023-02-20 VITALS — WEIGHT: 115 LBS | BODY MASS INDEX: 21.03 KG/M2

## 2023-02-20 DIAGNOSIS — R10.11 RIGHT UPPER QUADRANT PAIN: ICD-10-CM

## 2023-02-20 PROCEDURE — 2580000003 HC RX 258: Performed by: RADIOLOGY

## 2023-02-20 PROCEDURE — 6360000002 HC RX W HCPCS: Performed by: RADIOLOGY

## 2023-02-20 PROCEDURE — A9537 TC99M MEBROFENIN: HCPCS | Performed by: RADIOLOGY

## 2023-02-20 PROCEDURE — 3430000000 HC RX DIAGNOSTIC RADIOPHARMACEUTICAL: Performed by: RADIOLOGY

## 2023-02-20 PROCEDURE — 78227 HEPATOBIL SYST IMAGE W/DRUG: CPT

## 2023-02-20 RX ADMIN — SODIUM CHLORIDE 1.04 MCG: 9 INJECTION, SOLUTION INTRAVENOUS at 11:57

## 2023-02-20 RX ADMIN — Medication 6 MILLICURIE: at 10:45

## 2023-02-20 NOTE — TELEPHONE ENCOUNTER
Patient call c/o pencil wide stool (1-2 inches long) as well as increased gas and cramping x 4 days. Patient is taking Movantik QAM and Miralax BID with very little result. Asking what to do.     Electronically signed by Mago Zepeda MA on 2/20/2023 at 2:32 PM

## 2023-02-21 NOTE — TELEPHONE ENCOUNTER
MA left voicemail for patient that per Dr Charley Charles he Would start Senna.  Would also have her reach out to Dr. Wyatt Blind office for suggestions as well           Electronically signed by Valerie Orosco MA on 2/21/2023 at 9:51 AM

## 2023-02-23 NOTE — PROGRESS NOTES
[address] PRE-ADMISSION TESTING   ENDOSCOPY/ COLONSCOPY INSTRUCTIONS  PAT- Phone Number: [phone]    ENDOSCOPY/ COLONSCOPY INSTRUCTIONS:    [] Bowel Prep instructions reviewed. [] Colonoscopy- The day prior: No solid foods. Clear liquids only. [x] Nothing by mouth after midnight. Including no gum, candy, mints, or water. [x] You may brush your teeth, gargle, but do NOT swallow water. [x] Do not wear makeup, lotions, powders, deodorant. [] Urine Pregnancy test will be preformed the day of surgery. A specimen sample may be brought from home. [x] Arrange transportation with a responsible adult  to and from the hospital. If you do not have a responsible adult  to transport you, you will need to make arrangements with a medical transportation company. Arrange for someone to be with you for the remainder of the day and for 24 hours after your procedure due to having had anesthesia. -Who will be your  for transportation? Lalita Mcrae  -Who will be staying with you for 24 hrs after your procedure? Lalita Mcrae    PARKING INSTRUCTIONS:     [x] ARRIVAL DATE & TIME: 3/2/23 @ 11:45am  [x] Enter into the [employer] [facility] [employer]. Two people may accompany you. Masks are not required but are recommended. [x] Parking Lot \"I\" is where you will park. It is located on the corner of [address] and [address]. The entrance is on [address]. Upon entering the parking lot, a voucher ticket will print. EDUCATION INSTRUCTIONS:    [x] Bring a complete list of your medications, please write the last time you took the medicine, give this list to the nurse in Pre-Op. [x] Take only the following medications the morning of surgery with 1-2 ounces of water: Valium, Cymbalta, Nadolol and Protonix. [x] Stop all herbal supplements and vitamins 5 days before surgery. Stop NSAIDS 7 days before surgery. [] DO NOT take any diabetic medicine the morning of surgery.   Follow instructions for insulin the day before surgery. [] If you are diabetic and your blood sugar is low or you feel symptomatic, you may drink 1-2 ounces of apple juice or take a glucose tablet.            -The morning of your procedure, you may call the pre-op area if you have concerns about your blood sugar [phone]. [x] Use your inhalers the morning of surgery. Bring your emergency inhaler with you day of surgery. [x] Follow physician instructions regarding any blood thinners you may be taking. WHAT TO EXPECT:    [x] The day of your procedure you will be greeted and checked in by the [employer].  In addition, you will be registered in the [facility] by a Patient Access Representative. Please bring your photo ID and insurance card. A nurse will greet you in accordance to the time you are needed in the pre-op area to prepare you for surgery. Please do not be discouraged if you are not greeted in the order you arrive as there are many variables that are involved in patient preparation. Your patience is greatly appreciated as you wait for your nurse. Please bring in items such as: books, magazines, newspapers, electronics, or any other items  to occupy your time in the waiting area. [x]  Delays may occur. Staff will make a sincere effort to keep you informed of delays. If any delays occur with your procedure, we apologize ahead of time for your inconvenience as we recognize the value of your time.

## 2023-03-02 ENCOUNTER — ANESTHESIA EVENT (OUTPATIENT)
Dept: ENDOSCOPY | Age: 53
End: 2023-03-02
Payer: MEDICARE

## 2023-03-02 ENCOUNTER — HOSPITAL ENCOUNTER (OUTPATIENT)
Age: 53
Setting detail: OUTPATIENT SURGERY
Discharge: HOME | End: 2023-03-02
Attending: STUDENT IN AN ORGANIZED HEALTH CARE EDUCATION/TRAINING PROGRAM | Admitting: STUDENT IN AN ORGANIZED HEALTH CARE EDUCATION/TRAINING PROGRAM
Payer: MEDICARE

## 2023-03-02 ENCOUNTER — ANESTHESIA (OUTPATIENT)
Dept: ENDOSCOPY | Age: 53
End: 2023-03-02
Payer: MEDICARE

## 2023-03-02 VITALS
RESPIRATION RATE: 18 BRPM | OXYGEN SATURATION: 98 % | HEIGHT: 62 IN | HEART RATE: 75 BPM | SYSTOLIC BLOOD PRESSURE: 91 MMHG | TEMPERATURE: 98.3 F | DIASTOLIC BLOOD PRESSURE: 44 MMHG | WEIGHT: 112 LBS | BODY MASS INDEX: 20.61 KG/M2

## 2023-03-02 DIAGNOSIS — K86.2 PANCREATIC CYST: ICD-10-CM

## 2023-03-02 PROCEDURE — 3609020800 HC EGD W/EUS FNA: Performed by: STUDENT IN AN ORGANIZED HEALTH CARE EDUCATION/TRAINING PROGRAM

## 2023-03-02 PROCEDURE — C1753 CATH, INTRAVAS ULTRASOUND: HCPCS | Performed by: STUDENT IN AN ORGANIZED HEALTH CARE EDUCATION/TRAINING PROGRAM

## 2023-03-02 PROCEDURE — 2709999900 HC NON-CHARGEABLE SUPPLY: Performed by: STUDENT IN AN ORGANIZED HEALTH CARE EDUCATION/TRAINING PROGRAM

## 2023-03-02 PROCEDURE — 7100000010 HC PHASE II RECOVERY - FIRST 15 MIN: Performed by: STUDENT IN AN ORGANIZED HEALTH CARE EDUCATION/TRAINING PROGRAM

## 2023-03-02 PROCEDURE — 2720000010 HC SURG SUPPLY STERILE: Performed by: STUDENT IN AN ORGANIZED HEALTH CARE EDUCATION/TRAINING PROGRAM

## 2023-03-02 PROCEDURE — 3700000001 HC ADD 15 MINUTES (ANESTHESIA): Performed by: STUDENT IN AN ORGANIZED HEALTH CARE EDUCATION/TRAINING PROGRAM

## 2023-03-02 PROCEDURE — 6360000002 HC RX W HCPCS: Performed by: NURSE ANESTHETIST, CERTIFIED REGISTERED

## 2023-03-02 PROCEDURE — 6360000002 HC RX W HCPCS: Performed by: STUDENT IN AN ORGANIZED HEALTH CARE EDUCATION/TRAINING PROGRAM

## 2023-03-02 PROCEDURE — 3700000000 HC ANESTHESIA ATTENDED CARE: Performed by: STUDENT IN AN ORGANIZED HEALTH CARE EDUCATION/TRAINING PROGRAM

## 2023-03-02 PROCEDURE — 2580000003 HC RX 258: Performed by: NURSE ANESTHETIST, CERTIFIED REGISTERED

## 2023-03-02 PROCEDURE — 88112 CYTOPATH CELL ENHANCE TECH: CPT

## 2023-03-02 PROCEDURE — 88305 TISSUE EXAM BY PATHOLOGIST: CPT

## 2023-03-02 PROCEDURE — 7100000011 HC PHASE II RECOVERY - ADDTL 15 MIN: Performed by: STUDENT IN AN ORGANIZED HEALTH CARE EDUCATION/TRAINING PROGRAM

## 2023-03-02 PROCEDURE — 3609013500 HC EGD REMOVAL TUMOR POLYP/OTHER LESION SNARE TECH: Performed by: STUDENT IN AN ORGANIZED HEALTH CARE EDUCATION/TRAINING PROGRAM

## 2023-03-02 RX ORDER — SODIUM CHLORIDE 0.9 % (FLUSH) 0.9 %
5-40 SYRINGE (ML) INJECTION PRN
Status: DISCONTINUED | OUTPATIENT
Start: 2023-03-02 | End: 2023-03-02 | Stop reason: HOSPADM

## 2023-03-02 RX ORDER — SODIUM CHLORIDE 9 MG/ML
INJECTION, SOLUTION INTRAVENOUS CONTINUOUS PRN
Status: DISCONTINUED | OUTPATIENT
Start: 2023-03-02 | End: 2023-03-02 | Stop reason: SDUPTHER

## 2023-03-02 RX ORDER — SODIUM CHLORIDE 9 MG/ML
INJECTION, SOLUTION INTRAVENOUS PRN
Status: DISCONTINUED | OUTPATIENT
Start: 2023-03-02 | End: 2023-03-02 | Stop reason: HOSPADM

## 2023-03-02 RX ORDER — PROPOFOL 10 MG/ML
INJECTION, EMULSION INTRAVENOUS PRN
Status: DISCONTINUED | OUTPATIENT
Start: 2023-03-02 | End: 2023-03-02 | Stop reason: SDUPTHER

## 2023-03-02 RX ORDER — ONDANSETRON 4 MG/1
4 TABLET, ORALLY DISINTEGRATING ORAL EVERY 8 HOURS PRN
Status: DISCONTINUED | OUTPATIENT
Start: 2023-03-02 | End: 2023-03-02 | Stop reason: HOSPADM

## 2023-03-02 RX ORDER — CIPROFLOXACIN 500 MG/1
500 TABLET, FILM COATED ORAL 2 TIMES DAILY
Qty: 5 TABLET | Refills: 0 | Status: SHIPPED | OUTPATIENT
Start: 2023-03-02 | End: 2023-03-05

## 2023-03-02 RX ORDER — CIPROFLOXACIN 2 MG/ML
400 INJECTION, SOLUTION INTRAVENOUS ONCE
Status: COMPLETED | OUTPATIENT
Start: 2023-03-02 | End: 2023-03-02

## 2023-03-02 RX ORDER — ONDANSETRON 2 MG/ML
4 INJECTION INTRAMUSCULAR; INTRAVENOUS EVERY 6 HOURS PRN
Status: DISCONTINUED | OUTPATIENT
Start: 2023-03-02 | End: 2023-03-02 | Stop reason: HOSPADM

## 2023-03-02 RX ORDER — SODIUM CHLORIDE 9 MG/ML
25 INJECTION, SOLUTION INTRAVENOUS PRN
Status: DISCONTINUED | OUTPATIENT
Start: 2023-03-02 | End: 2023-03-02 | Stop reason: HOSPADM

## 2023-03-02 RX ORDER — SODIUM CHLORIDE 0.9 % (FLUSH) 0.9 %
5-40 SYRINGE (ML) INJECTION EVERY 12 HOURS SCHEDULED
Status: DISCONTINUED | OUTPATIENT
Start: 2023-03-02 | End: 2023-03-02 | Stop reason: HOSPADM

## 2023-03-02 RX ADMIN — CIPROFLOXACIN 400 MG: 2 INJECTION, SOLUTION INTRAVENOUS at 15:21

## 2023-03-02 RX ADMIN — PROPOFOL 240 MG: 10 INJECTION, EMULSION INTRAVENOUS at 13:05

## 2023-03-02 RX ADMIN — SODIUM CHLORIDE: 9 INJECTION, SOLUTION INTRAVENOUS at 11:59

## 2023-03-02 ASSESSMENT — PAIN - FUNCTIONAL ASSESSMENT
PAIN_FUNCTIONAL_ASSESSMENT: ACTIVITIES ARE NOT PREVENTED
PAIN_FUNCTIONAL_ASSESSMENT: 0-10

## 2023-03-02 ASSESSMENT — PAIN DESCRIPTION - DESCRIPTORS: DESCRIPTORS: ACHING;DISCOMFORT;THROBBING

## 2023-03-02 NOTE — ANESTHESIA POSTPROCEDURE EVALUATION
Department of Anesthesiology  Postprocedure Note    Patient: Lani Okeefe  MRN: [id number]  YOB: 1970  Date of evaluation: 3/2/2023      Procedure Summary     Date: 03/02/23 Room / Location: [facility]    Anesthesia Start: 8577 Anesthesia Stop: 1350    Procedures:       EGD W/EUS FNA      EGD POLYP SNARE Diagnosis:       Pancreatic cyst      (Pancreatic cyst [K86.2])    Surgeons: Yulissa Ding MD Responsible Provider: Staci Boykin MD    Anesthesia Type: MAC ASA Status: 3          Anesthesia Type: No value filed.     Vito Phase I: Vito Score: 10    Vito Phase II: Vito Score: 10      Anesthesia Post Evaluation    Patient location during evaluation: PACU  Patient participation: complete - patient participated  Level of consciousness: awake  Pain score: 3  Airway patency: patent  Nausea & Vomiting: no nausea and no vomiting  Complications: no  Cardiovascular status: blood pressure returned to baseline  Respiratory status: acceptable  Hydration status: euvolemic

## 2023-03-02 NOTE — ANESTHESIA PRE PROCEDURE
Department of Anesthesiology  Preprocedure Note       Name:  Claudia Luong   Age:  46 y.o.  :  1970                                          MRN:  [id number]         Date:  3/2/2023      Surgeon: Carolee Zayas):  Sunny Phipps MD    Procedure: Procedure(s):  EGD ESOPHAGOGASTRODUODENOSCOPY ULTRASOUND    Medications prior to admission:   Prior to Admission medications    Medication Sig Start Date End Date Taking? Authorizing Provider   Polyethylene Glycol 3350 (MIRALAX PO) Take by mouth 2 times daily    Historical Provider, MD   naloxegol (MOVANTIK) 12.5 MG TABS tablet Take 1 tablet by mouth every morning (before breakfast) 23   Arturo Kia, DO   pantoprazole (PROTONIX) 40 MG tablet take 1 tablet by mouth EVERY MORNING BEFORE BREAKFAST 22   [place] SHANE Persaud DO   nadolol (CORGARD) 20 MG tablet take 1 tablet by mouth once daily 22   Iva Buck, DO   senna (SENOKOT) 8.6 MG tablet Take 1 tablet by mouth 2 times daily 22  Perlita Chambers APRN - CNP   baclofen (LIORESAL) 10 MG tablet Take 1 tablet by mouth 3 times daily 22   Iva Buck, DO   ondansetron (ZOFRAN ODT) 4 MG disintegrating tablet Take 1 tablet by mouth every 8 hours as needed for Nausea or Vomiting May substitute for covered product 22   Corazon Libby, DO   rosuvastatin (CRESTOR) 20 MG tablet take 1 tablet by mouth nightly 22   [place] SHANE Persaud DO   Eptinezumab-jjmr (VYEPTI IV) Infuse intravenously every 3 months    Historical Provider, MD   DULoxetine (CYMBALTA) 60 MG extended release capsule 60 mg 2 times daily 3/2/22   Historical Provider, MD   diazepam (VALIUM) 10 MG tablet Take 10 mg by mouth in the morning and 10 mg at noon and 10 mg in the evening. Instructed to take am of procedure if needed. Historical Provider, MD   folic acid (FOLVITE) 1 MG tablet Take 1 mg by mouth daily.     Historical Provider, MD   lamoTRIgine (LAMICTAL) 200 MG tablet Take 200 mg by mouth every evening Historical Provider, MD       Current medications:    Current Facility-Administered Medications   Medication Dose Route Frequency Provider Last Rate Last Admin    sodium chloride flush 0.9 % injection 5-40 mL  5-40 mL IntraVENous 2 times per day Derek Albright MD        sodium chloride flush 0.9 % injection 5-40 mL  5-40 mL IntraVENous PRN Derek Albright MD        0.9 % sodium chloride infusion  25 mL IntraVENous PRN Derek Albright MD           Allergies:     Allergies   Allergen Reactions    Latex Rash    Sulfa Antibiotics Rash and Hives       Problem List:    Patient Active Problem List   Diagnosis Code    Right hip pain M25.551    Anxiety F41.9    Recurrent major depression in partial remission (HCC) F33.41    New daily persistent headache G44.52    Tobacco use disorder F17.200    Mixed hyperlipidemia E78.2    History of migraine Z86.69    Frequency of micturition R35.0    Right hip joint effusion M25.451    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    Chronic low back pain without sciatica M54.50, G89.29    Spondylosis of cervical region without myelopathy or radiculopathy M47.812    Intractable abdominal pain R10.9    Pancreatic cyst K86.2    Intractable nausea and vomiting R11.2    Intractable chronic post-traumatic headache G44.321    Constipation K59.00       Past Medical History:        Diagnosis Date    Anxiety     Arthritis     right hip    Bipolar 1 disorder (HCC)     Cervical stenosis of spine     Chronic back pain     Constipation     Depression     Hypertension     Intractable chronic post-traumatic headache 04/21/2022    Irritable bowel syndrome     Migraines     Mucocele of lip     for surgery 7/21/21    Other hyperlipidemia 07/29/2019    PTSD (post-traumatic stress disorder)        Past Surgical History:        Procedure Laterality Date    ARTHROGRAPHY Right 1/3/2019    RIGHT HIP ARTHROGRAM AND STEROID INJECTION performed by Quang Felton MD at [address] [place] [address] 9/10/2020    RIGHT HIP INJECTION UNDER FLUOROSCOPY performed by Lucita Manuel MD at [address] Right 4/13/2021    RIGHT HIP INJECTION UNDER FLUOROSCOPY performed by Lucita Manuel MD at [facility] [address] COLONOSCOPY      COLONOSCOPY N/A 1/4/2023    COLONOSCOPY WITH BIOPSY performed by Janna Martinez DO at [facility] ENDOSCOPY    CT NEEDLE BIOPSY LIVER PERCUTANEOUS  1/24/2023    CT NEEDLE BIOPSY LIVER PERCUTANEOUS 1/24/2023 Sondra Angulo MD [facility]    DILATION AND CURETTAGE OF UTERUS N/A 2/3/2020    DILATATION AND CURETTAGE HYSTEROSCOPY performed by Carey Grijalva MD at [facility] 7/21/2021    EXCISION RIGHT LIP MUCOCELE performed by Zhao Gonzalez MD at [address] [place] [address]  02/20/2019    implant for IBS     STIMULATOR SURGERY      bowel    TONSILLECTOMY      TOOTH EXTRACTION      TUBAL LIGATION      UPPER GASTROINTESTINAL ENDOSCOPY N/A 3/28/2022    EGD ESOPHAGOGASTRODUODENOSCOPY ULTRASOUND performed by Jessica Duran MD at [facility] History:    Social History     Tobacco Use    Smoking status: Every Day     Packs/day: 1.00     Years: 30.00     Pack years: 30.00     Types: Cigarettes    Smokeless tobacco: Never    Tobacco comments:     trying to quit, wears patch   Substance Use Topics    Alcohol use: Not Currently                                Ready to quit: Not Answered  Counseling given: Not Answered  Tobacco comments: trying to quit, wears patch      Vital Signs (Current):   Vitals:    02/23/23 1502 03/02/23 1202   BP:  (!) 115/56   Pulse:  72   Resp:  18   Temp:  36.1 °C (97 °F)   TempSrc:  Temporal   SpO2:  95%   Weight: 112 lb (50.8 kg) 112 lb (50.8 kg)   Height: 5' 2\" (1.575 m) 5' 2\" (1.575 m)                                              BP Readings from Last 3 Encounters:   03/02/23 (!) 115/56   02/10/23 114/77   01/24/23 124/61       NPO Status: Time of last liquid consumption: 2100                        Time of last solid consumption: 1900                        Date of last liquid consumption: 03/01/23                        Date of last solid food consumption: 03/01/23    BMI:   Wt Readings from Last 3 Encounters:   03/02/23 112 lb (50.8 kg)   02/20/23 115 lb (52.2 kg)   02/10/23 115 lb 6.4 oz (52.3 kg)     Body mass index is 20.49 kg/m². CBC:   Lab Results   Component Value Date/Time    WBC 6.3 01/24/2023 09:13 AM    RBC 4.74 01/24/2023 09:13 AM    HGB 14.8 01/24/2023 09:13 AM    HCT 43.9 01/24/2023 09:13 AM    MCV 92.6 01/24/2023 09:13 AM    RDW 12.8 01/24/2023 09:13 AM     01/24/2023 09:13 AM       CMP:   Lab Results   Component Value Date/Time     01/24/2023 09:13 AM    K 4.2 01/24/2023 09:13 AM    K 4.1 12/30/2022 02:39 PM     01/24/2023 09:13 AM    CO2 27 01/24/2023 09:13 AM    BUN 13 01/24/2023 09:13 AM    CREATININE 0.6 01/24/2023 09:13 AM    GFRAA >60 07/13/2022 02:02 PM    LABGLOM >60 01/24/2023 09:13 AM    GLUCOSE 94 01/24/2023 09:13 AM    PROT 6.8 12/30/2022 02:39 PM    CALCIUM 9.6 01/24/2023 09:13 AM    BILITOT 0.2 12/30/2022 02:39 PM    ALKPHOS 59 12/30/2022 02:39 PM    AST 14 12/30/2022 02:39 PM    ALT 7 12/30/2022 02:39 PM       POC Tests: No results for input(s): POCGLU, POCNA, POCK, POCCL, POCBUN, POCHEMO, POCHCT in the last 72 hours.     Coags:   Lab Results   Component Value Date/Time    PROTIME 12.0 01/24/2023 09:13 AM    INR 1.1 01/24/2023 09:13 AM       HCG (If Applicable):   Lab Results   Component Value Date    PREGTESTUR neg 11/29/2021        ABGs: No results found for: PHART, PO2ART, WQI9XRH, GIN3QHA, BEART, W2BHLNTX     Type & Screen (If Applicable):  No results found for: LABABO, LABRH    Drug/Infectious Status (If Applicable):  No results found for: HIV, HEPCAB    COVID-19 Screening (If Applicable):   Lab Results   Component Value Date/Time    COVID19 Not Detected 11/29/2021 12:28 PM           Anesthesia Evaluation  Patient summary reviewed no history of anesthetic complications:   Airway: Mallampati: II  TM distance: >3 FB   Neck ROM: full  Mouth opening: > = 3 FB   Dental:          Pulmonary:                              Cardiovascular:    (+) hypertension:,          Beta Blocker:  Not on Beta Blocker         Neuro/Psych:   (+) neuromuscular disease:, headaches:, psychiatric history: stable with treatment             ROS comment: Ceervical radiculopathy GI/Hepatic/Renal:            ROS comment: Abdominal pain. Endo/Other: Negative Endo/Other ROS                    Abdominal:             Vascular: Other Findings:           Anesthesia Plan      MAC     ASA 3       Induction: intravenous. Anesthetic plan and risks discussed with patient. FRENCH Faulkner - CRNA   3/2/2023      Pt seen, examined, chart reviewed, plan discussed.   Reshma Nichols MD  3/2/2023  3:01 PM

## 2023-03-02 NOTE — PROGRESS NOTES
Patient admitted to [facility] & placed on appropriate monitors. Cart low, locked with siderails up. Call light within reach.

## 2023-03-02 NOTE — OP NOTE
Operative Note      Patient: Noreen Pittman  YOB: 1970  MRN: [id number]    Date of Procedure: 3/2/2023    Pre-Op Diagnosis: Pancreatic cyst [K86.2]    Post-Op Diagnosis: {MH OR HNWQ:[id number]}       Procedure(s):  EGD W/EUS FNA  EGD POLYP SNARE    Surgeon(s):  Tracy Zambrano MD    Assistant:   * No surgical staff found *    Anesthesia: Monitor Anesthesia Care    Estimated Blood Loss (mL): {NUMBERS; ZOL:09773}    Complications: {Symptoms; Intra-op complications:21124}    Specimens:   ID Type Source Tests Collected by Time Destination   A : Pancreatic cyst fluid- cytology  Body Fluid Fluid CYTOLOGY, NON-GYN Tracy Zambrano MD 3/2/2023 1339    B : duodenal bulb nodules x2 biopsy-pathology Gastric Gastric SURGICAL PATHOLOGY Tracy Zambrano MD 3/2/2023 1319        Implants:  * No implants in log *      Drains: * No LDAs found *    Findings:     EGD:  Normal esophagus. Normal Z line. Normal stomach. Two inflamed nodules in duodenal bulb resected with cold snare. EUS:  Normal celiac. Normal pancreas. Normal PD in body. Complex cystic lesion in tail with direct ductal communication. FNA performed for cytology. Normal duct and pancreas in head. Normal CBD. Normal ampulla. Normal liver. Normal GB.     Detailed Description of Procedure:   ***    Electronically signed by Tracy Zambrano MD on 3/2/2023 at 2:17 PM

## 2023-03-02 NOTE — PROGRESS NOTES
Dr. Madison Patterson updated patient. He wants patient to have IV ATB prior to discharge home. Patient ambulated to void without difficulty.

## 2023-03-02 NOTE — DISCHARGE INSTRUCTIONS
Recommendations:  -The patient will be observed post procedure until all discharge criteria are met. -Patient has a contact number available for emergencies. The signs and symptoms of potential delayed complications were discussed with the patient.    -Return to normal activities tomorrow. -Written discharge instructions were provided to the patient.    -Await cytology results.  -Clinic appointment scheduled 3/15/23.

## 2023-03-02 NOTE — H&P
[facility]   Gastroenterology, Hepatology, &  Advanced Endoscopy    Consult       ASSESSMENT AND PLAN:    52y/F w/ history of pancreatic cystic lesion with imaging now showing associated biliary dilation. PLAN:  EUS today        HISTORY OF PRESENT ILLNESS:      Ms. Samson Lenz is a 52y/F w/ history of pancreatic cysts and constipation who presents to clinic in follow-up. She had an EUS in March 2022 which showed normal ducts and benign appearing pancreatic cysts consistent with IPMNs. She had recent surveillance imaging with CT A/P which now shows biliary dilation. Associated LFTs are normal and denies any episodes of jaundice. She had a recent colonoscopy with removal of rectal polyp consistent with hyperplastic polyp. She also reports constipation and has been currently started on Movantik.      Past Medical History:        Diagnosis Date    Anxiety     Arthritis     right hip    Bipolar 1 disorder ([facility] [address]     Cervical stenosis of spine     Chronic back pain     Constipation     Depression     Hypertension     Intractable chronic post-traumatic headache 04/21/2022    Irritable bowel syndrome     Migraines     Mucocele of lip     for surgery 7/21/21    Other hyperlipidemia 07/29/2019    PTSD (post-traumatic stress disorder)      Past Surgical History:        Procedure Laterality Date    ARTHROGRAPHY Right 1/3/2019    RIGHT HIP ARTHROGRAM AND STEROID INJECTION performed by Author Mariluz MD at [facility] Right 9/10/2020    RIGHT HIP INJECTION UNDER FLUOROSCOPY performed by Author Mariluz MD at [facility] Right 4/13/2021    RIGHT HIP INJECTION UNDER FLUOROSCOPY performed by Author Mariluz MD at [address] [place] [address]      COLONOSCOPY      COLONOSCOPY N/A 1/4/2023    COLONOSCOPY WITH BIOPSY performed by Tari Main DO at [facility] ENDOSCOPY    CT NEEDLE BIOPSY LIVER PERCUTANEOUS  1/24/2023    CT NEEDLE BIOPSY LIVER PERCUTANEOUS 1/24/2023 Jewel Acosta Casey Braxton MD [facility]    DILATION AND CURETTAGE OF UTERUS N/A 2/3/2020    DILATATION AND CURETTAGE HYSTEROSCOPY performed by Xenia Berry MD at [facility] Right 7/21/2021    EXCISION RIGHT LIP MUCOCELE performed by Viola Avila MD at [address] [place] [address]  02/20/2019    implant for IBS     STIMULATOR SURGERY      bowel    TONSILLECTOMY      TOOTH EXTRACTION      TUBAL LIGATION      UPPER GASTROINTESTINAL ENDOSCOPY N/A 3/28/2022    EGD ESOPHAGOGASTRODUODENOSCOPY ULTRASOUND performed by Simone Merrill MD at [place] [address] History:    TOBACCO:   reports that she has been smoking cigarettes. She has a 30.00 pack-year smoking history. She has never used smokeless tobacco.  ETOH:   reports that she does not currently use alcohol. DRUGS:   reports that she does not currently use drugs after having used the following drugs: Marijuana Joesph Diaz). Family History:       Problem Relation Age of Onset    GERD Mother     Parkinsonism Mother     COPD Mother     No Known Problems Father     Breast Cancer Maternal Aunt        No current facility-administered medications for this encounter.     Current Outpatient Medications:     Polyethylene Glycol 3350 (MIRALAX PO), Take by mouth 2 times daily, Disp: , Rfl:     naloxegol (MOVANTIK) 12.5 MG TABS tablet, Take 1 tablet by mouth every morning (before breakfast), Disp: 30 tablet, Rfl: 3    pantoprazole (PROTONIX) 40 MG tablet, take 1 tablet by mouth EVERY MORNING BEFORE BREAKFAST, Disp: 90 tablet, Rfl: 1    nadolol (CORGARD) 20 MG tablet, take 1 tablet by mouth once daily, Disp: 90 tablet, Rfl: 1    senna (SENOKOT) 8.6 MG tablet, Take 1 tablet by mouth 2 times daily, Disp: 60 tablet, Rfl: 11    baclofen (LIORESAL) 10 MG tablet, Take 1 tablet by mouth 3 times daily, Disp: 360 tablet, Rfl: 1    ondansetron (ZOFRAN ODT) 4 MG disintegrating tablet, Take 1 tablet by mouth every 8 hours as needed for Nausea or Vomiting May substitute for covered product, Disp: 10 tablet, Rfl: 0    rosuvastatin (CRESTOR) 20 MG tablet, take 1 tablet by mouth nightly, Disp: 90 tablet, Rfl: 1    Eptinezumab-jjmr (VYEPTI IV), Infuse intravenously every 3 months, Disp: , Rfl:     DULoxetine (CYMBALTA) 60 MG extended release capsule, 60 mg 2 times daily, Disp: , Rfl:     diazepam (VALIUM) 10 MG tablet, Take 10 mg by mouth in the morning and 10 mg at noon and 10 mg in the evening. Instructed to take am of procedure if needed. , Disp: , Rfl:     folic acid (FOLVITE) 1 MG tablet, Take 1 mg by mouth daily. , Disp: , Rfl:     lamoTRIgine (LAMICTAL) 200 MG tablet, Take 200 mg by mouth every evening , Disp: , Rfl:      Allergies:  Latex and Sulfa antibiotics    ROS:  General: Patient denies n/v/f/c or weight loss. HEENT: Patient denies persistent postnasal drip, scleral icterus, drooling, persistent bleeding from nose/mouth. Resp: Patient denies SOB, wheezing, productive cough. Cards: Patient denies CP, palpitations, significant edema  GI: As above. Derm: Patient denies jaundice/rashes. Musc: Patient denies diffuse/irregular joint swelling or myalgias. PHYSICAL EXAM:  Ht 5' 2\" (1.575 m)   Wt 112 lb (50.8 kg)   LMP 12/28/2018 (LMP Unknown)   BMI 20.49 kg/m²   Physical Exam:  General: Overall well-appearing, NAD  HEENT: PERRLA, EOMI, Anicteric sclera, MMM, no rhinorrhea  Cards: RRR, no LE edema  Resp: Breathing comfortably on room air, good air movement, no use of accessory muscles, no audible wheezing  Abdomen: soft, NT, ND. Extremities: Moves all extremities, no effusions or bruising.   Skin: No rashes or jaundice  Neuro: A&O x 3, CN grossly intact, non-focal exam               Electronically signed by Aime Olsen MD on 3/2/2023 at 5:42 AM

## 2023-03-03 ENCOUNTER — HOSPITAL ENCOUNTER (OUTPATIENT)
Dept: ULTRASOUND IMAGING | Age: 53
End: 2023-03-03
Payer: MEDICARE

## 2023-03-03 DIAGNOSIS — R10.84 GENERALIZED ABDOMINAL PAIN: ICD-10-CM

## 2023-03-03 PROCEDURE — 76705 ECHO EXAM OF ABDOMEN: CPT

## 2023-03-03 PROCEDURE — 93975 VASCULAR STUDY: CPT

## 2023-03-04 DIAGNOSIS — F17.200 SMOKER: ICD-10-CM

## 2023-03-05 DIAGNOSIS — E78.2 MIXED HYPERLIPIDEMIA: ICD-10-CM

## 2023-03-06 RX ORDER — BUPROPION HYDROCHLORIDE 75 MG/1
TABLET ORAL
Qty: 60 TABLET | Refills: 3 | OUTPATIENT
Start: 2023-03-06

## 2023-03-06 RX ORDER — ROSUVASTATIN CALCIUM 20 MG/1
TABLET, COATED ORAL
Qty: 90 TABLET | Refills: 1 | Status: SHIPPED | OUTPATIENT
Start: 2023-03-06

## 2023-03-06 NOTE — TELEPHONE ENCOUNTER
Last Appointment:  11/18/2022  Future Appointments   Date Time Provider Woodrow Ji   3/14/2023 10:30 AM [facility], MD COL SURG [facility]   3/15/2023  2:00 PM Latasha Lora MD [facility]

## 2023-03-07 ENCOUNTER — TELEPHONE (OUTPATIENT)
Dept: SURGERY | Age: 53
End: 2023-03-07

## 2023-03-07 NOTE — TELEPHONE ENCOUNTER
Voicemail left for patient with the following message from Dr Ashia Ridley. Encouraged patient to call with any questions. Electronically signed by Christie Su MA on 3/7/2023 at 10:03 AM      ----- Message from Deb Cardenas DO sent at 3/6/2023 11:43 AM EST -----  Let her know her US results are negative.  Would recommend a follow up with GI    ----- Message -----  From: Jaswant Valle Incoming Radiant Results From [email or web address]  Sent: 3/5/2023   5:22 PM EST  To: Deb Cardenas DO

## 2023-03-08 ENCOUNTER — TELEPHONE (OUTPATIENT)
Dept: GASTROENTEROLOGY | Age: 53
End: 2023-03-08

## 2023-03-08 NOTE — TELEPHONE ENCOUNTER
Patient called stating that since the colonoscopy, she has had small and thin BM's. Cannot take Miralax because it is causing vomiting. Not having an urge to have a BM. Suggested Linzess but patient states it does not work for the constipation. Requesting a call back from Dr. Nichole Salas. He mentioned a medication after the colonscopy that he was going to prescribe.     Notified Dr. Marnie Huerta MA

## 2023-03-14 ENCOUNTER — OFFICE VISIT (OUTPATIENT)
Dept: SURGERY | Age: 53
End: 2023-03-14
Payer: MEDICARE

## 2023-03-14 VITALS
HEIGHT: 62 IN | TEMPERATURE: 98.3 F | DIASTOLIC BLOOD PRESSURE: 52 MMHG | HEART RATE: 75 BPM | OXYGEN SATURATION: 98 % | WEIGHT: 114 LBS | BODY MASS INDEX: 20.98 KG/M2 | RESPIRATION RATE: 18 BRPM | SYSTOLIC BLOOD PRESSURE: 100 MMHG

## 2023-03-14 DIAGNOSIS — D49.0 IPMN (INTRADUCTAL PAPILLARY MUCINOUS NEOPLASM): Primary | ICD-10-CM

## 2023-03-14 PROCEDURE — 99213 OFFICE O/P EST LOW 20 MIN: CPT | Performed by: TRANSPLANT SURGERY

## 2023-03-14 NOTE — PROGRESS NOTES
Hepatobiliary and Pancreatic Surgery Attending History and Physical    Patient's Name/Date of Birth: Sunday Veliz /1970 (62 y.o.)    Date: March 14, 2023     CC: abdominal pain    HPI:  Patient is a very pleasant 46year old female who I saw in March for abdominal pain. She had an US of her pancreas to evaluate the pancreatic cyst and also to see if she had features of chronic pancreatitis. She also had a colonoscopy with Dr. Elena Mcclain. She does smoke 1PPD. She had a HIDA which showed an EF of 76%. She also had a liver biopsy which was normal.  She underwent an EGD and EUS which showed duodenitis and a pancreatic tail cyst.  There was only cystic debris. Physical Exam:  BP (!) 100/52 (Site: Left Upper Arm, Position: Sitting, Cuff Size: Medium Adult)   Pulse 75   Temp 98.3 °F (36.8 °C)   Resp 18   Ht 5' 2\" (1.575 m)   Wt 114 lb (51.7 kg)   LMP 12/28/2018 (LMP Unknown)   SpO2 98%   BMI 20.85 kg/m²       PSYCH: mood and affect normal, alert and oriented x 3: No apparent distress, comfortable  EYES: Sclera white, pupils equal round and reactive to light  ENMT:  Hearing normal, trachea midline, ears externally intact  LYMPH: no obvious lympadenopathy in neck. RESP: Respiratory effort was normal with no retractions or use of accessory muscles. CV:  No pedal edema  GI/ Abdomen: Soft, nondistended, nontender, no guarding, no peritoneal signs  MSK: no clubbing/ no cyanosis/ gaitnormal       Assessment/Plan:  13mm pancreatic body BD-IPMN  - I reviewed their images prior to our office visit and we also reviewed them together today. -  MRI in 6 months    20 Minutes of which greater than 50% was spent counseling or coordinating her care. Thank you Dr. Elena Mcclain for the consultation allowing me to take part in Ms. Pate' care.      Hyacinth Shelton M.D.  3/14/2023  4:36 PM

## 2023-03-15 ENCOUNTER — OFFICE VISIT (OUTPATIENT)
Dept: GASTROENTEROLOGY | Age: 53
End: 2023-03-15
Payer: MEDICARE

## 2023-03-15 VITALS
DIASTOLIC BLOOD PRESSURE: 66 MMHG | SYSTOLIC BLOOD PRESSURE: 122 MMHG | TEMPERATURE: 98 F | HEART RATE: 70 BPM | BODY MASS INDEX: 20.8 KG/M2 | OXYGEN SATURATION: 96 % | RESPIRATION RATE: 18 BRPM | HEIGHT: 62 IN | WEIGHT: 113 LBS

## 2023-03-15 DIAGNOSIS — K86.2 PANCREATIC CYST: Primary | ICD-10-CM

## 2023-03-15 DIAGNOSIS — K58.1 IRRITABLE BOWEL SYNDROME WITH CONSTIPATION: ICD-10-CM

## 2023-03-15 PROCEDURE — 99213 OFFICE O/P EST LOW 20 MIN: CPT | Performed by: STUDENT IN AN ORGANIZED HEALTH CARE EDUCATION/TRAINING PROGRAM

## 2023-03-15 RX ORDER — PLECANATIDE 3 MG/1
3 TABLET ORAL DAILY
Qty: 30 TABLET | Refills: 11 | Status: SHIPPED | OUTPATIENT
Start: 2023-03-15

## 2023-03-15 RX ORDER — POLYETHYLENE GLYCOL 3350, SODIUM CHLORIDE, SODIUM BICARBONATE, POTASSIUM CHLORIDE 420; 11.2; 5.72; 1.48 G/4L; G/4L; G/4L; G/4L
4000 POWDER, FOR SOLUTION ORAL ONCE
Qty: 4000 ML | Refills: 11 | Status: SHIPPED | OUTPATIENT
Start: 2023-03-15 | End: 2023-03-15

## 2023-04-23 SDOH — ECONOMIC STABILITY: FOOD INSECURITY: WITHIN THE PAST 12 MONTHS, YOU WORRIED THAT YOUR FOOD WOULD RUN OUT BEFORE YOU GOT MONEY TO BUY MORE.: NEVER TRUE

## 2023-04-23 SDOH — ECONOMIC STABILITY: FOOD INSECURITY: WITHIN THE PAST 12 MONTHS, THE FOOD YOU BOUGHT JUST DIDN'T LAST AND YOU DIDN'T HAVE MONEY TO GET MORE.: NEVER TRUE

## 2023-04-23 SDOH — ECONOMIC STABILITY: INCOME INSECURITY: HOW HARD IS IT FOR YOU TO PAY FOR THE VERY BASICS LIKE FOOD, HOUSING, MEDICAL CARE, AND HEATING?: NOT HARD AT ALL

## 2023-04-23 SDOH — ECONOMIC STABILITY: HOUSING INSECURITY
IN THE LAST 12 MONTHS, WAS THERE A TIME WHEN YOU DID NOT HAVE A STEADY PLACE TO SLEEP OR SLEPT IN A SHELTER (INCLUDING NOW)?: NO

## 2023-04-23 SDOH — ECONOMIC STABILITY: TRANSPORTATION INSECURITY
IN THE PAST 12 MONTHS, HAS LACK OF TRANSPORTATION KEPT YOU FROM MEETINGS, WORK, OR FROM GETTING THINGS NEEDED FOR DAILY LIVING?: NO

## 2023-04-23 SDOH — HEALTH STABILITY: PHYSICAL HEALTH: ON AVERAGE, HOW MANY DAYS PER WEEK DO YOU ENGAGE IN MODERATE TO STRENUOUS EXERCISE (LIKE A BRISK WALK)?: 0 DAYS

## 2023-04-23 SDOH — HEALTH STABILITY: PHYSICAL HEALTH: ON AVERAGE, HOW MANY MINUTES DO YOU ENGAGE IN EXERCISE AT THIS LEVEL?: 0 MIN

## 2023-04-23 ASSESSMENT — PATIENT HEALTH QUESTIONNAIRE - PHQ9
SUM OF ALL RESPONSES TO PHQ9 QUESTIONS 1 & 2: 2
2. FEELING DOWN, DEPRESSED OR HOPELESS: 1
SUM OF ALL RESPONSES TO PHQ QUESTIONS 1-9: 2
1. LITTLE INTEREST OR PLEASURE IN DOING THINGS: 1
SUM OF ALL RESPONSES TO PHQ QUESTIONS 1-9: 2

## 2023-04-23 ASSESSMENT — LIFESTYLE VARIABLES
HOW MANY STANDARD DRINKS CONTAINING ALCOHOL DO YOU HAVE ON A TYPICAL DAY: 0
HOW OFTEN DO YOU HAVE A DRINK CONTAINING ALCOHOL: NEVER
HOW MANY STANDARD DRINKS CONTAINING ALCOHOL DO YOU HAVE ON A TYPICAL DAY: PATIENT DOES NOT DRINK
HOW OFTEN DO YOU HAVE A DRINK CONTAINING ALCOHOL: 1
HOW OFTEN DO YOU HAVE SIX OR MORE DRINKS ON ONE OCCASION: 1

## 2023-04-24 ENCOUNTER — OFFICE VISIT (OUTPATIENT)
Dept: FAMILY MEDICINE CLINIC | Age: 53
End: 2023-04-24
Payer: MEDICARE

## 2023-04-24 VITALS
HEIGHT: 62 IN | HEART RATE: 70 BPM | BODY MASS INDEX: 20.98 KG/M2 | TEMPERATURE: 97.9 F | SYSTOLIC BLOOD PRESSURE: 100 MMHG | RESPIRATION RATE: 16 BRPM | DIASTOLIC BLOOD PRESSURE: 72 MMHG | WEIGHT: 114 LBS | OXYGEN SATURATION: 96 %

## 2023-04-24 VITALS
HEART RATE: 70 BPM | SYSTOLIC BLOOD PRESSURE: 100 MMHG | BODY MASS INDEX: 20.98 KG/M2 | HEIGHT: 62 IN | WEIGHT: 114 LBS | OXYGEN SATURATION: 96 % | TEMPERATURE: 97.9 F | DIASTOLIC BLOOD PRESSURE: 72 MMHG | RESPIRATION RATE: 16 BRPM

## 2023-04-24 DIAGNOSIS — R10.13 EPIGASTRIC PAIN: ICD-10-CM

## 2023-04-24 DIAGNOSIS — Z86.69 HISTORY OF MIGRAINE: Chronic | ICD-10-CM

## 2023-04-24 DIAGNOSIS — M81.8 OTHER OSTEOPOROSIS WITHOUT CURRENT PATHOLOGICAL FRACTURE: Primary | ICD-10-CM

## 2023-04-24 DIAGNOSIS — M47.812 SPONDYLOSIS OF CERVICAL REGION WITHOUT MYELOPATHY OR RADICULOPATHY: ICD-10-CM

## 2023-04-24 DIAGNOSIS — F33.41 RECURRENT MAJOR DEPRESSION IN PARTIAL REMISSION (HCC): ICD-10-CM

## 2023-04-24 DIAGNOSIS — Z00.00 MEDICARE ANNUAL WELLNESS VISIT, SUBSEQUENT: Primary | ICD-10-CM

## 2023-04-24 PROCEDURE — 99214 OFFICE O/P EST MOD 30 MIN: CPT | Performed by: FAMILY MEDICINE

## 2023-04-24 PROCEDURE — G0439 PPPS, SUBSEQ VISIT: HCPCS | Performed by: FAMILY MEDICINE

## 2023-04-24 RX ORDER — TRIAMCINOLONE ACETONIDE 1 MG/G
CREAM TOPICAL 2 TIMES DAILY
COMMUNITY

## 2023-04-24 RX ORDER — PANTOPRAZOLE SODIUM 40 MG/1
40 TABLET, DELAYED RELEASE ORAL
Qty: 90 TABLET | Refills: 1 | Status: SHIPPED | OUTPATIENT
Start: 2023-04-24

## 2023-04-24 RX ORDER — BACLOFEN 10 MG/1
10 TABLET ORAL 3 TIMES DAILY
Qty: 360 TABLET | Refills: 1 | Status: SHIPPED | OUTPATIENT
Start: 2023-04-24

## 2023-04-24 RX ORDER — NADOLOL 20 MG/1
TABLET ORAL
Qty: 90 TABLET | Refills: 1 | Status: SHIPPED | OUTPATIENT
Start: 2023-04-24

## 2023-04-24 ASSESSMENT — ENCOUNTER SYMPTOMS
COUGH: 0
SHORTNESS OF BREATH: 0
TROUBLE SWALLOWING: 0
BACK PAIN: 0
EYE REDNESS: 0
ALLERGIC/IMMUNOLOGIC NEGATIVE: 1
EYE PAIN: 0
PHOTOPHOBIA: 0
SORE THROAT: 0
VOMITING: 0
DIARRHEA: 1
ABDOMINAL PAIN: 1
BLOOD IN STOOL: 0
CONSTIPATION: 1
SINUS PAIN: 0
NAUSEA: 1
CHEST TIGHTNESS: 0
EYE DISCHARGE: 0

## 2023-04-24 NOTE — PROGRESS NOTES
23  Tana Griffin : 1970 Sex: female  Age: 48 y.o. Assessment and Plan:  Mustapha Antoine was seen today for gastroesophageal reflux. Diagnoses and all orders for this visit:    Other osteoporosis without current pathological fracture  -     DEXA BONE DENSITY 2 SITES; Future    Spondylosis of cervical region without myelopathy or radiculopathy  -     baclofen (LIORESAL) 10 MG tablet; Take 1 tablet by mouth 3 times daily    Epigastric pain  -     pantoprazole (PROTONIX) 40 MG tablet; Take 1 tablet by mouth every morning (before breakfast)  -     TISSUE TRANSGLUTAMINASE, IGA; Future  -     GLIADIN ANTIBODIES, SERUM; Future    History of migraine  -     nadolol (CORGARD) 20 MG tablet; take 1 tablet by mouth once daily    [facility]    We will obtain inflammatory markers for celiac disease. Also with GI her further treatment. She is being weaned from her Lamictal which can cause osteoporosis. Check a DEXA scan for this. Recheck thyroid and lipids in 3 months. Return in about 3 months (around 2023). Chief Complaint   Patient presents with    Gastroesophageal Reflux     Wants tested for celiac       Continues with epigastric abdominal discomfort. Still intermittent diarrhea alternating with constipation. She is seeing GI for these complaints. Review of Systems   Constitutional:  Negative for appetite change, fatigue and unexpected weight change. HENT:  Negative for congestion, ear pain, hearing loss, sinus pain, sore throat and trouble swallowing. Eyes:  Negative for photophobia, pain, discharge and redness. Respiratory:  Negative for cough, chest tightness and shortness of breath. Cardiovascular:  Negative for chest pain, palpitations and leg swelling. Gastrointestinal:  Positive for abdominal pain, constipation, diarrhea and nausea. Negative for blood in stool and vomiting. Endocrine: Negative.     Genitourinary:  Negative

## 2023-04-24 NOTE — PROGRESS NOTES
Medicare Annual Wellness Visit    González Meier is here for Medicare AWV    Assessment & Plan   Medicare annual wellness visit, subsequent      Recommendations for Preventive Services Due: see orders and patient instructions/AVS.  Recommended screening schedule for the next 5-10 years is provided to the patient in written form: see Patient Instructions/AVS.     Return for Medicare Annual Wellness Visit in 1 year. Subjective       Patient's complete Health Risk Assessment and screening values have been reviewed and are found in Flowsheets. The following problems were reviewed today and where indicated follow up appointments were made and/or referrals ordered. Positive Risk Factor Screenings with Interventions:       Cognitive: Words recalled: 3 Words Recalled   Clock Drawing Test (CDT): (!) Abnormal   Total Score: 3   Total Score Interpretation: Normal Mini-Cog      Interventions:  She is to follow-up with her psychiatrist for these complaints. Weight and Activity:  Physical Activity: Inactive    Days of Exercise per Week: 0 days    Minutes of Exercise per Session: 0 min     On average, how many days per week do you engage in moderate to strenuous exercise (like a brisk walk)?: 0 days  Have you lost any weight without trying in the past 3 months?: No  Body mass index is 20.85 kg/m².       Inactivity Interventions:  Patient declined any further interventions or treatment           Advanced Directives:  Do you have a Living Will?: (!) No    Intervention:  has NO advanced directive - not interested in additional information        Tobacco Use:  Tobacco Use: High Risk    Smoking Tobacco Use: Every Day    Smokeless Tobacco Use: Never    Passive Exposure: Not on file     E-cigarette/Vaping       Questions Responses    E-cigarette/Vaping Use Former User    Start Date     Passive Exposure     Quit Date     Counseling Given     Comments           Interventions:  Patient declined any further intervention

## 2023-04-26 DIAGNOSIS — Z12.31 ENCOUNTER FOR SCREENING MAMMOGRAM FOR MALIGNANT NEOPLASM OF BREAST: Primary | ICD-10-CM

## 2023-04-28 LAB
GLIADIN IGA SER IA-ACNC: 0.9 U/ML
GLIADIN IGG SER IA-ACNC: <0.4 U/ML
TTG IGA SER IA-ACNC: 0.4 U/ML

## 2023-05-01 ENCOUNTER — TELEPHONE (OUTPATIENT)
Dept: FAMILY MEDICINE CLINIC | Age: 53
End: 2023-05-01

## 2023-05-01 DIAGNOSIS — R10.10 UPPER ABDOMINAL PAIN: Primary | ICD-10-CM

## 2023-05-01 NOTE — TELEPHONE ENCOUNTER
----- Message from Lauren Stinson DO sent at 5/1/2023  7:48 AM EDT -----  Osteopenia, vitamin D plus calcium.

## 2023-05-01 NOTE — TELEPHONE ENCOUNTER
Patient is requesting a referral to another Gastro. She does not want to see Dr Jayme Alfonso again. She left a message at his office last Monday with questions about a medication that he prescribed causing N&V. States that his office is not returning her calls and that this is not the first time it has happened. Patient does not have a preference for who you refer to. She would like a call back once the referral has been placed.

## 2023-05-02 ENCOUNTER — TELEPHONE (OUTPATIENT)
Dept: FAMILY MEDICINE CLINIC | Age: 53
End: 2023-05-02

## 2023-05-02 NOTE — TELEPHONE ENCOUNTER
Left a message to update patient, referral placed for Ryann Late. Printed to give to referral department.

## 2023-05-08 ENCOUNTER — TELEPHONE (OUTPATIENT)
Dept: FAMILY MEDICINE CLINIC | Age: 53
End: 2023-05-08

## 2023-05-16 RX ORDER — LAMOTRIGINE 200 MG/1
TABLET ORAL
Qty: 30 TABLET | OUTPATIENT
Start: 2023-05-16

## 2023-05-26 ENCOUNTER — TELEPHONE (OUTPATIENT)
Dept: FAMILY MEDICINE CLINIC | Age: 53
End: 2023-05-26

## 2023-05-26 ENCOUNTER — OFFICE VISIT (OUTPATIENT)
Dept: FAMILY MEDICINE CLINIC | Age: 53
End: 2023-05-26

## 2023-05-26 VITALS
WEIGHT: 118 LBS | SYSTOLIC BLOOD PRESSURE: 112 MMHG | TEMPERATURE: 98.2 F | RESPIRATION RATE: 16 BRPM | OXYGEN SATURATION: 99 % | HEART RATE: 88 BPM | BODY MASS INDEX: 21.71 KG/M2 | HEIGHT: 62 IN | DIASTOLIC BLOOD PRESSURE: 72 MMHG

## 2023-05-26 DIAGNOSIS — M54.2 CERVICALGIA: Primary | ICD-10-CM

## 2023-05-26 DIAGNOSIS — M54.50 ACUTE BILATERAL LOW BACK PAIN WITHOUT SCIATICA: ICD-10-CM
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
## 2023-05-26 RX ORDER — CYCLOBENZAPRINE HCL 10 MG
10 TABLET ORAL 3 TIMES DAILY PRN
Qty: 15 TABLET | Refills: 0 | Status: SHIPPED | OUTPATIENT
Start: 2023-05-26 | End: 2023-06-05

## 2023-05-26 RX ORDER — NAPROXEN 500 MG/1
500 TABLET ORAL 2 TIMES DAILY WITH MEALS
Qty: 20 TABLET | Refills: 0 | Status: SHIPPED
Start: 2023-05-26 | End: 2023-05-26 | Stop reason: ALTCHOICE

## 2023-05-26 RX ORDER — METHYLPREDNISOLONE 4 MG/1
TABLET ORAL
Qty: 1 KIT | Refills: 0 | Status: SHIPPED | OUTPATIENT
Start: 2023-05-26 | End: 2023-06-02

## 2023-05-26 RX ORDER — KETOROLAC TROMETHAMINE 30 MG/ML
30 INJECTION, SOLUTION INTRAMUSCULAR; INTRAVENOUS ONCE
Status: COMPLETED | OUTPATIENT
Start: 2023-05-26 | End: 2023-05-26

## 2023-05-26 RX ADMIN — KETOROLAC TROMETHAMINE 30 MG: 30 INJECTION, SOLUTION INTRAMUSCULAR; INTRAVENOUS at 16:57

## 2023-05-26 NOTE — PROGRESS NOTES
Chief Complaint   Neck Pain and Back Pain      History of Present Illness   Source of history provided by: patient. Bright Melo is a 48 y.o. old female presenting to the walk in clinic for evaluation of neck and low back pain which has been present for the past 2 days. Patient states that she accidentally fell out of her recliner at home. Patient believes that she hit the back of her head during the fall but she denies any loss of consciousness during the episode. She reports feeling slightly foggy after the injury but denies any ongoing headache, nausea, vomiting, dizziness, or lethargy. Patient is not on any anticoagulants. Pt denies any bowel/bladder incontinence, radiation into the arms, abdominal pain, hematuria, N/V/D, fever, chills, recent illness, dysuria, or lethargy. ROS    Unless otherwise stated in this report or unable to obtain because of the patient's clinical or mental status as evidenced by the medical record, this patients's positive and negative responses for Review of Systems, constitutional, psych, eyes, ENT, cardiovascular, respiratory, gastrointestinal, neurological, genitourinary, musculoskeletal, integument systems and systems related to the presenting problem are either stated in the preceding or were not pertinent or were negative for the symptoms and/or complaints related to the medical problem. Past Medical History:  has a past medical history of Anxiety, Arthritis, Bipolar 1 disorder ([address] Cervical stenosis of spine, Chronic back pain, Constipation, Depression, Hypertension, Intractable chronic post-traumatic headache, Irritable bowel syndrome, Migraines, Mucocele of lip, Other hyperlipidemia, and PTSD (post-traumatic stress disorder). Past Surgical History:  has a past surgical history that includes Tonsillectomy; Tooth Extraction; Tubal ligation; Colonoscopy; arthrography (Right, 1/3/2019); Small intestine surgery (02/20/2019);  Dilation and curettage of

## 2023-05-26 NOTE — TELEPHONE ENCOUNTER
Patient called in wanting to schedule an appt with Dr. Stevan Rhodes. Patient stated she was having neck and back pain and does not know what happened. Appointment scheduled. Did advise patient that she could come through [facility] or go to ED if pain increases. Patient stated she has an appt today but may come through [facility]. Patient wanted to keep scheduled appt with Dr. Stevan Rhodes at this time.

## 2023-05-30 ENCOUNTER — TELEPHONE (OUTPATIENT)
Dept: FAMILY MEDICINE CLINIC | Age: 53
End: 2023-05-30

## 2023-06-02 ENCOUNTER — OFFICE VISIT (OUTPATIENT)
Dept: FAMILY MEDICINE CLINIC | Age: 53
End: 2023-06-02

## 2023-06-02 VITALS
TEMPERATURE: 97.4 F | DIASTOLIC BLOOD PRESSURE: 80 MMHG | HEIGHT: 62 IN | SYSTOLIC BLOOD PRESSURE: 120 MMHG | WEIGHT: 115 LBS | HEART RATE: 67 BPM | BODY MASS INDEX: 21.16 KG/M2 | RESPIRATION RATE: 17 BRPM | OXYGEN SATURATION: 98 %

## 2023-06-02 DIAGNOSIS — M54.2 CERVICALGIA: Primary | ICD-10-CM

## 2023-06-02 DIAGNOSIS — M54.50 ACUTE BILATERAL LOW BACK PAIN WITHOUT SCIATICA: ICD-10-CM

## 2023-06-02 ASSESSMENT — ENCOUNTER SYMPTOMS
SORE THROAT: 0
EYE DISCHARGE: 0
TROUBLE SWALLOWING: 0
COUGH: 0
EYE REDNESS: 0
VOMITING: 0
SHORTNESS OF BREATH: 0
SINUS PAIN: 0
CHEST TIGHTNESS: 0
PHOTOPHOBIA: 0
EYE PAIN: 0
ALLERGIC/IMMUNOLOGIC NEGATIVE: 1
ABDOMINAL PAIN: 0
NAUSEA: 0
DIARRHEA: 0
BLOOD IN STOOL: 0
BACK PAIN: 1

## 2023-06-02 NOTE — PROGRESS NOTES
(Non-Medical): No   Physical Activity: Inactive    Days of Exercise per Week: 0 days    Minutes of Exercise per Session: 0 min   Stress: Not on file   Social Connections: Not on file   Intimate Partner Violence: Not on file   Housing Stability: Unknown    Unable to Pay for Housing in the Last Year: Not on file    Number of Places Lived in the Last Year: Not on file    Unstable Housing in the Last Year: No       Vitals:    06/02/23 1313   BP: 120/80   Pulse: 67   Resp: 17   Temp: 97.4 °F (36.3 °C)   TempSrc: Temporal   SpO2: 98%   Weight: 115 lb (52.2 kg)   Height: 5' 2\" (1.575 m)       Physical Exam  Vitals and nursing note reviewed. Constitutional:       Appearance: She is well-developed. HENT:      Head: Atraumatic. Right Ear: External ear normal.      Left Ear: External ear normal.      Nose: Nose normal.      Mouth/Throat:      Pharynx: No oropharyngeal exudate. Eyes:      Conjunctiva/sclera: Conjunctivae normal.      Pupils: Pupils are equal, round, and reactive to light. Neck:      Thyroid: No thyromegaly. Trachea: No tracheal deviation. Cardiovascular:      Rate and Rhythm: Normal rate and regular rhythm. Heart sounds: No murmur heard. No friction rub. No gallop. Pulmonary:      Effort: Pulmonary effort is normal. No respiratory distress. Breath sounds: Normal breath sounds. Abdominal:      General: Bowel sounds are normal.      Palpations: Abdomen is soft. Musculoskeletal:         General: No tenderness or deformity. Normal range of motion. Cervical back: Normal range of motion and neck supple. Comments: Pain, spasm, stiffness, decreased range of motion, cervical, lumbar spine. There is no radicular pain. Lymphadenopathy:      Cervical: No cervical adenopathy. Skin:     General: Skin is warm and dry. Capillary Refill: Capillary refill takes less than 2 seconds. Findings: No rash.    Neurological:      Mental Status: She is alert and oriented to

## 2023-07-03 NOTE — PROGRESS NOTES
[facility] [employer] PRE-ADMISSION TESTING INSTRUCTIONS    The Preadmission Testing patient is instructed accordingly using the following criteria (check applicable):    ARRIVAL INSTRUCTIONS:  [x] Parking the day of Surgery is located in the Main Entrance lot. Upon entering the door, make an immediate right to the surgery reception desk    [x] Bring photo ID and insurance card    [] Bring in a copy of Living will or Durable Power of  papers. [x] Please be sure to arrange for responsible adult to provide transportation to and from the hospital    [x] Please arrange for responsible adult to be with you for the 24 hour period post procedure due to having anesthesia    [x] If you awake am of surgery not feeling well or have temperature >100 please call [phone]    GENERAL INSTRUCTIONS:    [x] Nothing by mouth after midnight, including gum, candy, mints or water    [x] You may brush your teeth, but do not swallow any water    [x] Take medications as instructed with 1-2 oz of water    [x] Stop herbal supplements and vitamins 5 days prior to procedure    [] Follow preop dosing of blood thinners per physician instructions    [] Take 1/2 dose of evening insulin, but no insulin after midnight    [] No oral diabetic medications after midnight    [] If diabetic and have low blood sugar or feel symptomatic, take 1-2oz apple juice only    [] Bring inhalers day of surgery    [] Bring C-PAP/ Bi-Pap day of surgery    [] Bring urine specimen day of surgery    [x] Shower or bath with soap, lather and rinse well, AM of Surgery, no lotion, powders or creams to surgical site    [] Follow bowel prep as instructed per surgeon    [x] No tobacco products within 24 hours of surgery     [x] No alcohol or illegal drug use within 24 hours of surgery.     [x] Jewelry, body piercing's, eyeglasses, contact lenses and dentures are not permitted into surgery (bring cases)      [x] Please do not wear any nail polish,

## 2023-07-05 ENCOUNTER — PREP FOR PROCEDURE (OUTPATIENT)
Dept: UROLOGY | Age: 53
End: 2023-07-05

## 2023-07-05 RX ORDER — SODIUM CHLORIDE 9 MG/ML
INJECTION, SOLUTION INTRAVENOUS PRN
Status: CANCELLED | OUTPATIENT
Start: 2023-07-05

## 2023-07-05 RX ORDER — SODIUM CHLORIDE 0.9 % (FLUSH) 0.9 %
5-40 SYRINGE (ML) INJECTION EVERY 12 HOURS SCHEDULED
Status: CANCELLED | OUTPATIENT
Start: 2023-07-05

## 2023-07-05 RX ORDER — SODIUM CHLORIDE 9 MG/ML
INJECTION, SOLUTION INTRAVENOUS CONTINUOUS
Status: CANCELLED | OUTPATIENT
Start: 2023-07-05

## 2023-07-05 RX ORDER — SODIUM CHLORIDE 0.9 % (FLUSH) 0.9 %
5-40 SYRINGE (ML) INJECTION PRN
Status: CANCELLED | OUTPATIENT
Start: 2023-07-05

## 2023-07-06 ENCOUNTER — ANESTHESIA EVENT (OUTPATIENT)
Dept: OPERATING ROOM | Age: 53
End: 2023-07-06
Payer: MEDICARE

## 2023-07-07 ENCOUNTER — ANESTHESIA (OUTPATIENT)
Dept: OPERATING ROOM | Age: 53
End: 2023-07-07
Payer: MEDICARE

## 2023-07-07 ENCOUNTER — HOSPITAL ENCOUNTER (OUTPATIENT)
Age: 53
Setting detail: OUTPATIENT SURGERY
Discharge: HOME | End: 2023-07-07
Attending: UROLOGY | Admitting: UROLOGY
Payer: MEDICARE

## 2023-07-07 ENCOUNTER — APPOINTMENT (OUTPATIENT)
Dept: GENERAL RADIOLOGY | Age: 53
End: 2023-07-07
Attending: UROLOGY
Payer: MEDICARE

## 2023-07-07 VITALS
TEMPERATURE: 96.8 F | WEIGHT: 114 LBS | HEIGHT: 62 IN | HEART RATE: 66 BPM | OXYGEN SATURATION: 96 % | DIASTOLIC BLOOD PRESSURE: 71 MMHG | SYSTOLIC BLOOD PRESSURE: 102 MMHG | BODY MASS INDEX: 20.98 KG/M2 | RESPIRATION RATE: 16 BRPM

## 2023-07-07 DIAGNOSIS — N39.46 MIXED INCONTINENCE URGE AND STRESS (MALE)(FEMALE): ICD-10-CM

## 2023-07-07 DIAGNOSIS — G89.18 POST-OP PAIN: Primary | ICD-10-CM

## 2023-07-07 PROCEDURE — 6360000002 HC RX W HCPCS: Performed by: NURSE PRACTITIONER

## 2023-07-07 PROCEDURE — 88300 SURGICAL PATH GROSS: CPT

## 2023-07-07 PROCEDURE — C1897 LEAD, NEUROSTIM TEST KIT: HCPCS | Performed by: UROLOGY

## 2023-07-07 PROCEDURE — 6360000002 HC RX W HCPCS: Performed by: NURSE ANESTHETIST, CERTIFIED REGISTERED

## 2023-07-07 PROCEDURE — 3600000013 HC SURGERY LEVEL 3 ADDTL 15MIN: Performed by: UROLOGY

## 2023-07-07 PROCEDURE — C1778 LEAD, NEUROSTIMULATOR: HCPCS | Performed by: UROLOGY

## 2023-07-07 PROCEDURE — 7100000011 HC PHASE II RECOVERY - ADDTL 15 MIN: Performed by: UROLOGY

## 2023-07-07 PROCEDURE — 2580000003 HC RX 258: Performed by: NURSE PRACTITIONER

## 2023-07-07 PROCEDURE — C1787 PATIENT PROGR, NEUROSTIM: HCPCS | Performed by: UROLOGY

## 2023-07-07 PROCEDURE — 3600000003 HC SURGERY LEVEL 3 BASE: Performed by: UROLOGY

## 2023-07-07 PROCEDURE — 6370000000 HC RX 637 (ALT 250 FOR IP)

## 2023-07-07 PROCEDURE — 3700000000 HC ANESTHESIA ATTENDED CARE: Performed by: UROLOGY

## 2023-07-07 PROCEDURE — 2580000003 HC RX 258: Performed by: NURSE ANESTHETIST, CERTIFIED REGISTERED

## 2023-07-07 PROCEDURE — 6360000002 HC RX W HCPCS: Performed by: UROLOGY

## 2023-07-07 PROCEDURE — 7100000010 HC PHASE II RECOVERY - FIRST 15 MIN: Performed by: UROLOGY

## 2023-07-07 PROCEDURE — 3700000001 HC ADD 15 MINUTES (ANESTHESIA): Performed by: UROLOGY

## 2023-07-07 PROCEDURE — C1767 GENERATOR, NEURO NON-RECHARG: HCPCS | Performed by: UROLOGY

## 2023-07-07 PROCEDURE — 2709999900 HC NON-CHARGEABLE SUPPLY: Performed by: UROLOGY

## 2023-07-07 PROCEDURE — 2500000003 HC RX 250 WO HCPCS: Performed by: NURSE ANESTHETIST, CERTIFIED REGISTERED

## 2023-07-07 DEVICE — LEAD NERVE STIM 4.32 MM INTERSTIM SURESCAN: Type: IMPLANTABLE DEVICE | Site: SACRUM | Status: FUNCTIONAL

## 2023-07-07 DEVICE — NEUROSTIMULATOR EXT SM LTWT SGL BTTN H2O RESIST WIRELESS: Type: IMPLANTABLE DEVICE | Site: SACRUM | Status: FUNCTIONAL

## 2023-07-07 DEVICE — NEUROSTIMULATOR INTERSTIM X RECHRGE FREE TORQ WRNCH PROD: Type: IMPLANTABLE DEVICE | Site: SACRUM | Status: FUNCTIONAL

## 2023-07-07 RX ORDER — SODIUM CHLORIDE 0.9 % (FLUSH) 0.9 %
5-40 SYRINGE (ML) INJECTION PRN
Status: DISCONTINUED | OUTPATIENT
Start: 2023-07-07 | End: 2023-07-07 | Stop reason: HOSPADM

## 2023-07-07 RX ORDER — DEXAMETHASONE SODIUM PHOSPHATE 4 MG/ML
INJECTION, SOLUTION INTRA-ARTICULAR; INTRALESIONAL; INTRAMUSCULAR; INTRAVENOUS; SOFT TISSUE PRN
Status: DISCONTINUED | OUTPATIENT
Start: 2023-07-07 | End: 2023-07-07 | Stop reason: SDUPTHER

## 2023-07-07 RX ORDER — BUPIVACAINE HYDROCHLORIDE 5 MG/ML
INJECTION, SOLUTION EPIDURAL; INTRACAUDAL PRN
Status: DISCONTINUED | OUTPATIENT
Start: 2023-07-07 | End: 2023-07-07 | Stop reason: ALTCHOICE

## 2023-07-07 RX ORDER — CEPHALEXIN 500 MG/1
500 CAPSULE ORAL 2 TIMES DAILY
Qty: 10 CAPSULE | Refills: 0 | Status: SHIPPED | OUTPATIENT
Start: 2023-07-07 | End: 2023-07-12

## 2023-07-07 RX ORDER — OXYCODONE HYDROCHLORIDE AND ACETAMINOPHEN 5; 325 MG/1; MG/1
TABLET ORAL
Status: COMPLETED
Start: 2023-07-07 | End: 2023-07-07

## 2023-07-07 RX ORDER — LIDOCAINE HYDROCHLORIDE 10 MG/ML
INJECTION, SOLUTION EPIDURAL; INFILTRATION; INTRACAUDAL; PERINEURAL PRN
Status: DISCONTINUED | OUTPATIENT
Start: 2023-07-07 | End: 2023-07-07 | Stop reason: SDUPTHER

## 2023-07-07 RX ORDER — SODIUM CHLORIDE 9 MG/ML
INJECTION, SOLUTION INTRAVENOUS CONTINUOUS PRN
Status: DISCONTINUED | OUTPATIENT
Start: 2023-07-07 | End: 2023-07-07 | Stop reason: SDUPTHER

## 2023-07-07 RX ORDER — SODIUM CHLORIDE 9 MG/ML
INJECTION, SOLUTION INTRAVENOUS PRN
Status: DISCONTINUED | OUTPATIENT
Start: 2023-07-07 | End: 2023-07-07 | Stop reason: HOSPADM

## 2023-07-07 RX ORDER — PROPOFOL 10 MG/ML
INJECTION, EMULSION INTRAVENOUS CONTINUOUS PRN
Status: DISCONTINUED | OUTPATIENT
Start: 2023-07-07 | End: 2023-07-07 | Stop reason: SDUPTHER

## 2023-07-07 RX ORDER — SODIUM CHLORIDE 9 MG/ML
INJECTION, SOLUTION INTRAVENOUS CONTINUOUS
Status: DISCONTINUED | OUTPATIENT
Start: 2023-07-07 | End: 2023-07-07 | Stop reason: HOSPADM

## 2023-07-07 RX ORDER — OXYCODONE HYDROCHLORIDE AND ACETAMINOPHEN 5; 325 MG/1; MG/1
1 TABLET ORAL ONCE
Status: COMPLETED | OUTPATIENT
Start: 2023-07-07 | End: 2023-07-07

## 2023-07-07 RX ORDER — FENTANYL CITRATE 50 UG/ML
INJECTION, SOLUTION INTRAMUSCULAR; INTRAVENOUS PRN
Status: DISCONTINUED | OUTPATIENT
Start: 2023-07-07 | End: 2023-07-07 | Stop reason: SDUPTHER

## 2023-07-07 RX ORDER — OXYCODONE HYDROCHLORIDE AND ACETAMINOPHEN 5; 325 MG/1; MG/1
1 TABLET ORAL EVERY 4 HOURS PRN
Qty: 10 TABLET | Refills: 0 | Status: SHIPPED | OUTPATIENT
Start: 2023-07-07 | End: 2023-07-14

## 2023-07-07 RX ORDER — SODIUM CHLORIDE 0.9 % (FLUSH) 0.9 %
5-40 SYRINGE (ML) INJECTION EVERY 12 HOURS SCHEDULED
Status: DISCONTINUED | OUTPATIENT
Start: 2023-07-07 | End: 2023-07-07 | Stop reason: HOSPADM

## 2023-07-07 RX ORDER — ONDANSETRON 2 MG/ML
INJECTION INTRAMUSCULAR; INTRAVENOUS PRN
Status: DISCONTINUED | OUTPATIENT
Start: 2023-07-07 | End: 2023-07-07 | Stop reason: SDUPTHER

## 2023-07-07 RX ORDER — MIDAZOLAM HYDROCHLORIDE 1 MG/ML
INJECTION INTRAMUSCULAR; INTRAVENOUS PRN
Status: DISCONTINUED | OUTPATIENT
Start: 2023-07-07 | End: 2023-07-07 | Stop reason: SDUPTHER

## 2023-07-07 RX ADMIN — MIDAZOLAM 1 MG: 1 INJECTION INTRAMUSCULAR; INTRAVENOUS at 08:58

## 2023-07-07 RX ADMIN — OXYCODONE HYDROCHLORIDE AND ACETAMINOPHEN 1 TABLET: 5; 325 TABLET ORAL at 10:22

## 2023-07-07 RX ADMIN — FENTANYL CITRATE 50 MCG: 50 INJECTION, SOLUTION INTRAMUSCULAR; INTRAVENOUS at 09:00

## 2023-07-07 RX ADMIN — PROPOFOL 60 MCG/KG/MIN: 10 INJECTION, EMULSION INTRAVENOUS at 08:55

## 2023-07-07 RX ADMIN — MIDAZOLAM 1 MG: 1 INJECTION INTRAMUSCULAR; INTRAVENOUS at 08:52

## 2023-07-07 RX ADMIN — DEXAMETHASONE SODIUM PHOSPHATE 8 MG: 4 INJECTION, SOLUTION INTRAMUSCULAR; INTRAVENOUS at 08:55

## 2023-07-07 RX ADMIN — WATER 2000 MG: 1 INJECTION INTRAMUSCULAR; INTRAVENOUS; SUBCUTANEOUS at 08:55

## 2023-07-07 RX ADMIN — ONDANSETRON 4 MG: 2 INJECTION INTRAMUSCULAR; INTRAVENOUS at 08:55

## 2023-07-07 RX ADMIN — FENTANYL CITRATE 50 MCG: 50 INJECTION, SOLUTION INTRAMUSCULAR; INTRAVENOUS at 08:52

## 2023-07-07 RX ADMIN — SODIUM CHLORIDE: 9 INJECTION, SOLUTION INTRAVENOUS at 08:52

## 2023-07-07 RX ADMIN — LIDOCAINE HYDROCHLORIDE 20 MG: 10 INJECTION, SOLUTION EPIDURAL; INFILTRATION; INTRACAUDAL; PERINEURAL at 08:55

## 2023-07-07 ASSESSMENT — PAIN - FUNCTIONAL ASSESSMENT: PAIN_FUNCTIONAL_ASSESSMENT: 0-10

## 2023-07-07 ASSESSMENT — PAIN DESCRIPTION - DESCRIPTORS: DESCRIPTORS: ACHING;BURNING

## 2023-07-07 ASSESSMENT — PAIN DESCRIPTION - LOCATION: LOCATION: BACK

## 2023-07-07 ASSESSMENT — PAIN DESCRIPTION - ORIENTATION: ORIENTATION: RIGHT

## 2023-07-07 ASSESSMENT — LIFESTYLE VARIABLES: SMOKING_STATUS: 1

## 2023-07-07 ASSESSMENT — PAIN SCALES - GENERAL: PAINLEVEL_OUTOF10: 6

## 2023-07-07 NOTE — H&P
organomegaly, no peritoneal signs  Extremities:  No clubbing, cyanosis, or edema  Skin:  Warm and dry, no open lesions or rashes  Neuro:  Cranial nerves 2-12 intact, no focal deficits  Rectal: deferred  Genitalia:  Lazaro no    Labs:   No results for input(s): WBC, RBC, HGB, HCT, MCV, MCH, MCHC, RDW, PLT, MPV in the last 72 hours. No results for input(s): CREATININE in the last 72 hours.     Images:      Assessment: Radha Pate 48 y.o. female     FI  Mixed incontinence  Urge related incontinence     Plan:    See the outpatient H&P  All options were discussed  The patient family is present  Progress to the OR for explant and implant of interstim   The risks, benefits, and alternatives were discussed  NPO  DVT prophylaxis  Pre-op antibiotics     Abhay Denise Fuenteso, DO [facility]  Urology

## 2023-07-07 NOTE — BRIEF OP NOTE
Brief Postoperative Note      Patient: Garrett Tanner  YOB: 1970  MRN: [id number]    Date of Procedure: 7/7/2023    Pre-Op Diagnosis Codes:     * Mixed incontinence urge and stress (male)(female) [N39.46]    Post-Op Diagnosis: Same       Procedure(s):  EXPLANT INTERSTIM PERCUTANEOUS IMPLANT OF NEUROSTIM ELECTRODES INCISION AND SUBCUTANEOUS PLACEMENT OF PERIPHERAL NEUROSTIM PULSE  ++LATEX ALLERGY++    Surgeon(s):  Prosper Weiss DO    Assistant:  * No surgical staff found *    Anesthesia: Monitor Anesthesia Care    Estimated Blood Loss (mL): Minimal    Complications: None    Specimens:   * No specimens in log *    Implants:  * No implants in log *      Drains: * No LDAs found *    Findings: see operative report       Electronically signed by Mary Weiss DO on 7/7/2023 at 8:57 AM

## 2023-07-07 NOTE — DISCHARGE INSTRUCTIONS
Follow up Dr. Leeann Weiss in 1-4 week, [phone]     When you are discharged, it is okay to climb stairs, take a shower, and no heavy lifting greater than 5-10 pounds. You will need to call the office to make an appointment to see Dr. Weiss/Colleen/Gomez in 10-14 days. Pain medications will be written as well as antibiotics. Hold on Driving until cleared by the physician. Any questions or concerns arise call the office, [phone].

## 2023-07-07 NOTE — ANESTHESIA POSTPROCEDURE EVALUATION
Department of Anesthesiology  Postprocedure Note    Patient: Idalmis Peterson  MRN: [id number]  YOB: 1970  Date of evaluation: 7/7/2023      Procedure Summary     Date: 07/07/23 Room / Location: [facility] OR 05 / [facility]    Anesthesia Start: 5120 Anesthesia Stop: 2424    Procedure: EXPLANT INTERSTIM PERCUTANEOUS IMPLANT OF NEUROSTIM ELECTRODES INCISION AND SUBCUTANEOUS PLACEMENT OF PERIPHERAL NEUROSTIM PULSE  ++LATEX ALLERGY++ Diagnosis:       Mixed incontinence urge and stress (male)(female)      (Mixed incontinence urge and stress (male)(female) [N39.46])    Surgeons: Chastity Weiss DO Responsible Provider: Bethany Whitaker MD    Anesthesia Type: MAC ASA Status: 3          Anesthesia Type: MAC    Vito Phase I: Vito Score: 10    Vito Phase II:        Anesthesia Post Evaluation    Patient location during evaluation: PACU  Patient participation: complete - patient participated  Level of consciousness: awake and alert  Pain score: 0  Airway patency: patent  Nausea & Vomiting: no vomiting and no nausea  Complications: no  Cardiovascular status: hemodynamically stable  Respiratory status: acceptable  Hydration status: stable

## 2023-07-08 NOTE — OP NOTE
[address]                  [address] [facility], [address] [place] [address]                                OPERATIVE REPORT    PATIENT NAME: Georgette David                   :        1970  MED REC NO:   [id number]                            ROOM:  ACCOUNT NO:   [de-identified]                           ADMIT DATE: 2023  PROVIDER:     Mary Weiss DO    DATE OF PROCEDURE:  2023    PREOPERATIVE DIAGNOSES:  1. Mixed urinary incontinence. 2.  Urinary urge-related incontinence. 3.  Fecal incontinence. 4.  History of InterStim with a nonfunctioning battery. POSTOPERATIVE DIAGNOSES:  1. Mixed urinary incontinence. 2.  Urinary urge-related incontinence. 3.  Fecal incontinence. 4.  History of InterStim with a nonfunctioning battery, but also note  the patient has had retained distal component of a lead from a previous  surgeon, Dr. Elizabet Gonzalez. PROCEDURES PERFORMED:  The patient had:  1. Explantation of lead intact in entirety. 2.  Also with explant of InterStim II.  3.  Implant of a new percutaneous lead. 4. InterStim X implant. STORY ON THIS PATIENT:  This is a pleasant 51-year-old  female  that came to me as a second opinion. She had seen Dr. Elizabet Gonzalez. Reportedly, she has had two InterStims. In the office, she had talked  about how these were implanted for urge-related incontinence, but in  today's discussion, she stated it was more for fecal incontinence. She  had had a previous one, which was on the left side and had been  explanted. This new one had been placed in 2018. The battery is found  to be nonfunctional, so we have talked to her about explantation of the  unit intact and also the new implant of the InterStim X. The risks, the  benefits, and the alternatives of the proposed surgical procedure were  extensively explained to her.   She understood these risks, the benefits,  and the alternatives and elected to
additional location...

## 2023-07-17 ENCOUNTER — TELEPHONE (OUTPATIENT)
Dept: ADMINISTRATIVE | Age: 53
End: 2023-07-17

## 2023-07-17 ENCOUNTER — OFFICE VISIT (OUTPATIENT)
Dept: FAMILY MEDICINE CLINIC | Age: 53
End: 2023-07-17
Payer: MEDICARE

## 2023-07-17 VITALS
WEIGHT: 117.4 LBS | OXYGEN SATURATION: 98 % | DIASTOLIC BLOOD PRESSURE: 74 MMHG | BODY MASS INDEX: 21.6 KG/M2 | TEMPERATURE: 98.6 F | HEIGHT: 62 IN | HEART RATE: 77 BPM | SYSTOLIC BLOOD PRESSURE: 108 MMHG

## 2023-07-17 DIAGNOSIS — K21.9 GASTROESOPHAGEAL REFLUX DISEASE WITHOUT ESOPHAGITIS: ICD-10-CM

## 2023-07-17 DIAGNOSIS — J44.9 CHRONIC OBSTRUCTIVE PULMONARY DISEASE, UNSPECIFIED COPD TYPE (HCC): ICD-10-CM

## 2023-07-17 DIAGNOSIS — R07.9 CHEST PAIN, UNSPECIFIED TYPE: Primary | ICD-10-CM

## 2023-07-17 DIAGNOSIS — E78.2 MIXED HYPERLIPIDEMIA: ICD-10-CM

## 2023-07-17 PROCEDURE — 99214 OFFICE O/P EST MOD 30 MIN: CPT | Performed by: FAMILY MEDICINE

## 2023-07-17 PROCEDURE — 93000 ELECTROCARDIOGRAM COMPLETE: CPT | Performed by: FAMILY MEDICINE

## 2023-07-17 RX ORDER — ALBUTEROL SULFATE 90 UG/1
2 AEROSOL, METERED RESPIRATORY (INHALATION) 4 TIMES DAILY PRN
Qty: 18 G | Refills: 0 | Status: SHIPPED | OUTPATIENT
Start: 2023-07-17

## 2023-07-17 RX ORDER — ROSUVASTATIN CALCIUM 20 MG/1
20 TABLET, COATED ORAL NIGHTLY
Qty: 90 TABLET | Refills: 1 | Status: SHIPPED | OUTPATIENT
Start: 2023-07-17

## 2023-07-17 RX ORDER — FOLIC ACID 0.8 MG
1 TABLET ORAL DAILY
COMMUNITY

## 2023-07-17 RX ORDER — DOCUSATE SODIUM 100 MG/1
100 CAPSULE, LIQUID FILLED ORAL NIGHTLY
COMMUNITY

## 2023-07-17 ASSESSMENT — ENCOUNTER SYMPTOMS
DIARRHEA: 0
EYE PAIN: 0
SINUS PAIN: 0
BLOOD IN STOOL: 0
PHOTOPHOBIA: 0
EYE DISCHARGE: 0
NAUSEA: 0
SORE THROAT: 0
EYE REDNESS: 0
COUGH: 0
CHEST TIGHTNESS: 0
BACK PAIN: 0
ABDOMINAL PAIN: 1
ALLERGIC/IMMUNOLOGIC NEGATIVE: 1
TROUBLE SWALLOWING: 0
SHORTNESS OF BREATH: 0
VOMITING: 0

## 2023-07-17 NOTE — TELEPHONE ENCOUNTER
Rafiq Friday with Dr. Radha Hayes office calling to schedule NP with Cardiology. Patient Appointment Form:      PCP: Dr. Stan Apodaca  Referring: Dr. Stan Apodaca    Has the Patient:    Seen a Cardiologist? no    Had a heart catheterization? no    Had heart surgery? no    Had a stress test or nuclear stress test? no    Had an echocardiogram? no    Had a vascular ultrasound? no    Had a 24/48 heart monitor or extended cardiac event monitor? no    Had recent blood work in the last 6 months?      Had a pacemaker/ICD/ILR implant? no    Seen an Electrophysiologist? no        Will send records via: No prior Cardiology hx or recs - PCP recs in Epic       Date & time of appointment:  7/20/23 @ 1:15 with Dr. Cheyanne Graham

## 2023-07-17 NOTE — PROGRESS NOTES
vomiting. Endocrine: Negative. Genitourinary:  Negative for dysuria, flank pain, frequency and hematuria. Musculoskeletal:  Negative for arthralgias, back pain, joint swelling and myalgias. Skin: Negative. Allergic/Immunologic: Negative. Neurological:  Negative for dizziness, seizures, syncope, weakness, light-headedness, numbness and headaches. Hematological:  Negative for adenopathy. Does not bruise/bleed easily. Psychiatric/Behavioral: Negative. Current Outpatient Medications:     rosuvastatin (CRESTOR) 20 MG tablet, Take 1 tablet by mouth nightly, Disp: 90 tablet, Rfl: 1    docusate sodium (COLACE) 100 MG capsule, Take 1 capsule by mouth at bedtime, Disp: , Rfl:     Magnesium 500 MG CAPS, Take 1 capsule by mouth daily, Disp: , Rfl:     albuterol sulfate HFA (VENTOLIN HFA) 108 (90 Base) MCG/ACT inhaler, Inhale 2 puffs into the lungs 4 times daily as needed for Wheezing, Disp: 18 g, Rfl: 0    Calcium Carb-Cholecalciferol (CALCIUM 600 + D PO), Take by mouth, Disp: , Rfl:     triamcinolone (KENALOG) 0.1 % cream, Apply topically 2 times daily Apply topically 2 times daily. , Disp: , Rfl:     baclofen (LIORESAL) 10 MG tablet, Take 1 tablet by mouth 3 times daily, Disp: 360 tablet, Rfl: 1    pantoprazole (PROTONIX) 40 MG tablet, Take 1 tablet by mouth every morning (before breakfast), Disp: 90 tablet, Rfl: 1    nadolol (CORGARD) 20 MG tablet, take 1 tablet by mouth once daily, Disp: 90 tablet, Rfl: 1    Polyethylene Glycol 3350 (MIRALAX PO), Take by mouth 2 times daily, Disp: , Rfl:     senna (SENOKOT) 8.6 MG tablet, Take 1 tablet by mouth 2 times daily, Disp: 60 tablet, Rfl: 11    ondansetron (ZOFRAN ODT) 4 MG disintegrating tablet, Take 1 tablet by mouth every 8 hours as needed for Nausea or Vomiting May substitute for covered product, Disp: 10 tablet, Rfl: 0    Eptinezumab-jjmr (VYEPTI IV), Infuse intravenously every 3 months, Disp: , Rfl:     DULoxetine (CYMBALTA) 60 MG extended release

## 2023-07-31 ENCOUNTER — OFFICE VISIT (OUTPATIENT)
Dept: FAMILY MEDICINE CLINIC | Age: 53
End: 2023-07-31
Payer: MEDICARE

## 2023-07-31 ENCOUNTER — TELEPHONE (OUTPATIENT)
Dept: FAMILY MEDICINE CLINIC | Age: 53
End: 2023-07-31

## 2023-07-31 VITALS
TEMPERATURE: 97.4 F | RESPIRATION RATE: 17 BRPM | DIASTOLIC BLOOD PRESSURE: 70 MMHG | HEART RATE: 74 BPM | HEIGHT: 62 IN | BODY MASS INDEX: 21.71 KG/M2 | WEIGHT: 118 LBS | SYSTOLIC BLOOD PRESSURE: 100 MMHG | OXYGEN SATURATION: 97 %

## 2023-07-31 DIAGNOSIS — K59.00 CONSTIPATION, UNSPECIFIED CONSTIPATION TYPE: ICD-10-CM

## 2023-07-31 DIAGNOSIS — Z78.0 MENOPAUSE: Primary | ICD-10-CM

## 2023-07-31 DIAGNOSIS — R53.83 FATIGUE, UNSPECIFIED TYPE: ICD-10-CM

## 2023-07-31 DIAGNOSIS — R07.9 CHEST PAIN, UNSPECIFIED TYPE: ICD-10-CM

## 2023-07-31 DIAGNOSIS — K21.9 GASTROESOPHAGEAL REFLUX DISEASE WITHOUT ESOPHAGITIS: Primary | ICD-10-CM

## 2023-07-31 LAB
ABSOLUTE IMMATURE GRANULOCYTE: <0.03 K/UL (ref 0–0.58)
ANION GAP SERPL CALCULATED.3IONS-SCNC: 14 MMOL/L (ref 7–16)
BASOPHILS ABSOLUTE: 0.03 K/UL (ref 0–0.2)
BASOPHILS RELATIVE PERCENT: 0 % (ref 0–2)
BUN BLDV-MCNC: 13 MG/DL (ref 6–20)
CALCIUM SERPL-MCNC: 9.3 MG/DL (ref 8.6–10.2)
CHLORIDE BLD-SCNC: 104 MMOL/L (ref 98–107)
CO2: 25 MMOL/L (ref 22–29)
CREAT SERPL-MCNC: 0.6 MG/DL (ref 0.5–1)
EOSINOPHILS ABSOLUTE: 0.13 K/UL (ref 0.05–0.5)
EOSINOPHILS RELATIVE PERCENT: 2 % (ref 0–6)
GFR SERPL CREATININE-BSD FRML MDRD: >60 ML/MIN/1.73M2
GLUCOSE BLD-MCNC: 83 MG/DL (ref 74–99)
HCT VFR BLD CALC: 43.4 % (ref 34–48)
HEMOGLOBIN: 14.1 G/DL (ref 11.5–15.5)
IMMATURE GRANULOCYTES: 0 % (ref 0–5)
LYMPHOCYTES ABSOLUTE: 2.29 K/UL (ref 1.5–4)
LYMPHOCYTES RELATIVE PERCENT: 32 % (ref 20–42)
MAGNESIUM: 2 MG/DL (ref 1.6–2.6)
MCH RBC QN AUTO: 31.1 PG (ref 26–35)
MCHC RBC AUTO-ENTMCNC: 32.5 G/DL (ref 32–34.5)
MCV RBC AUTO: 95.6 FL (ref 80–99.9)
MONOCYTES ABSOLUTE: 0.4 K/UL (ref 0.1–0.95)
MONOCYTES RELATIVE PERCENT: 6 % (ref 2–12)
NEUTROPHILS ABSOLUTE: 4.35 K/UL (ref 1.8–7.3)
NEUTROPHILS RELATIVE PERCENT: 60 % (ref 43–80)
PDW BLD-RTO: 13.1 % (ref 11.5–15)
PLATELET # BLD: 239 K/UL (ref 130–450)
PMV BLD AUTO: 10.5 FL (ref 7–12)
POTASSIUM SERPL-SCNC: 4.2 MMOL/L (ref 3.5–5)
RBC # BLD: 4.54 M/UL (ref 3.5–5.5)
SODIUM BLD-SCNC: 143 MMOL/L (ref 132–146)
WBC # BLD: 7.2 K/UL (ref 4.5–11.5)

## 2023-07-31 PROCEDURE — 99213 OFFICE O/P EST LOW 20 MIN: CPT | Performed by: FAMILY MEDICINE

## 2023-07-31 RX ORDER — PANTOPRAZOLE SODIUM 40 MG/1
40 TABLET, DELAYED RELEASE ORAL DAILY
Qty: 90 TABLET | Refills: 1 | Status: SHIPPED | OUTPATIENT
Start: 2023-07-31

## 2023-07-31 ASSESSMENT — ENCOUNTER SYMPTOMS
COUGH: 0
VOMITING: 0
ABDOMINAL PAIN: 1
SORE THROAT: 0
ABDOMINAL DISTENTION: 1
NAUSEA: 0
BACK PAIN: 0
ALLERGIC/IMMUNOLOGIC NEGATIVE: 1
SINUS PAIN: 0
EYE REDNESS: 0
CHEST TIGHTNESS: 0
TROUBLE SWALLOWING: 0
CONSTIPATION: 1
SHORTNESS OF BREATH: 0
BLOOD IN STOOL: 0
PHOTOPHOBIA: 0
DIARRHEA: 0
EYE DISCHARGE: 0
EYE PAIN: 0

## 2023-07-31 NOTE — PROGRESS NOTES
23  Ron Anguiano : 1970 Sex: female  Age: 48 y.o. Assessment and Plan:  Gustavo Conley was seen today for blood work, anorexia and fatigue. Diagnoses and all orders for this visit:    Gustavo Conley was seen today for blood work, anorexia and fatigue. Diagnoses and all orders for this visit:    Gastroesophageal reflux disease without esophagitis  -     pantoprazole (PROTONIX) 40 MG tablet; Take 1 tablet by mouth daily    Constipation, unspecified constipation type    Chest pain, unspecified type    Fatigue, unspecified type  -     CBC with Auto Differential; Future  -     Basic Metabolic Panel; Future  -     Magnesium; Future  -     Estradiol; Future    She is doing much better with twice daily pantoprazole. Seeing GI and 10 days who can revisit this as well as her constipation. Cardiology seeing her this week for her chest pain which is most likely GI and related. For her fatigue we will check blood count electrolytes magnesium and estrogen. Return in about 4 weeks (around 2023). Chief Complaint   Patient presents with    Blood Work    Anorexia     Not eating, feels bloated when she does    Fatigue       Patient here for recheck of her abdominal discomfort. It is much improved since Protonix was increased to twice daily. He has a cardiology referral for later this week, GI referral for next week. Continues to have bloating and difficulty with her bowels. Her stimulator appears to be working well. Review of Systems   Constitutional:  Negative for appetite change, fatigue and unexpected weight change. HENT:  Negative for congestion, ear pain, hearing loss, sinus pain, sore throat and trouble swallowing. Eyes:  Negative for photophobia, pain, discharge and redness. Respiratory:  Negative for cough, chest tightness and shortness of breath. Cardiovascular:  Negative for chest pain, palpitations and leg swelling.    Gastrointestinal:  Positive for abdominal distention,

## 2023-07-31 NOTE — TELEPHONE ENCOUNTER
Patient currently goes to [facility]. She is very unhappy with them and their care. She would like a referral to a new gynecologist. She would like someone within [facility]. Please advise.

## 2023-08-01 LAB — ESTRADIOL LEVEL: <5 PG/ML

## 2023-08-02 PROBLEM — G89.29 CHRONIC LOW BACK PAIN WITHOUT SCIATICA: Status: RESOLVED | Noted: 2021-08-02 | Resolved: 2023-08-02

## 2023-08-02 PROBLEM — K13.0 MUCOCELE OF LIP: Status: RESOLVED | Noted: 2021-07-19 | Resolved: 2023-08-02

## 2023-08-02 PROBLEM — R11.2 INTRACTABLE NAUSEA AND VOMITING: Status: RESOLVED | Noted: 2022-04-08 | Resolved: 2023-08-02

## 2023-08-02 PROBLEM — G44.52 NEW DAILY PERSISTENT HEADACHE: Status: RESOLVED | Noted: 2017-08-02 | Resolved: 2023-08-02

## 2023-08-02 PROBLEM — R10.9 INTRACTABLE ABDOMINAL PAIN: Status: RESOLVED | Noted: 2022-03-09 | Resolved: 2023-08-02

## 2023-08-02 PROBLEM — M54.50 CHRONIC LOW BACK PAIN WITHOUT SCIATICA: Status: RESOLVED | Noted: 2021-08-02 | Resolved: 2023-08-02

## 2023-08-02 PROBLEM — M25.451 RIGHT HIP JOINT EFFUSION: Status: RESOLVED | Noted: 2021-03-23 | Resolved: 2023-08-02

## 2023-08-02 PROBLEM — G44.321 INTRACTABLE CHRONIC POST-TRAUMATIC HEADACHE: Chronic | Status: RESOLVED | Noted: 2022-04-21 | Resolved: 2023-08-02

## 2023-08-02 PROBLEM — R35.0 FREQUENCY OF MICTURITION: Status: RESOLVED | Noted: 2020-12-16 | Resolved: 2023-08-02

## 2023-08-03 ENCOUNTER — OFFICE VISIT (OUTPATIENT)
Dept: CARDIOLOGY CLINIC | Age: 53
End: 2023-08-03
Payer: MEDICARE

## 2023-08-03 VITALS
BODY MASS INDEX: 22.01 KG/M2 | WEIGHT: 119.6 LBS | DIASTOLIC BLOOD PRESSURE: 86 MMHG | HEART RATE: 76 BPM | HEIGHT: 62 IN | SYSTOLIC BLOOD PRESSURE: 127 MMHG | RESPIRATION RATE: 16 BRPM

## 2023-08-03 DIAGNOSIS — R07.89 CHEST DISCOMFORT: Primary | ICD-10-CM

## 2023-08-03 DIAGNOSIS — R07.9 CHEST PAIN, UNSPECIFIED TYPE: Primary | ICD-10-CM

## 2023-08-03 PROCEDURE — 93000 ELECTROCARDIOGRAM COMPLETE: CPT | Performed by: INTERNAL MEDICINE

## 2023-08-03 PROCEDURE — 99204 OFFICE O/P NEW MOD 45 MIN: CPT | Performed by: INTERNAL MEDICINE

## 2023-08-03 RX ORDER — MULTIVITAMIN WITH IRON
30 TABLET ORAL 2 TIMES DAILY
COMMUNITY

## 2023-08-03 NOTE — PROGRESS NOTES
radiculopathy    Pancreatic cyst    Irritable bowel syndrome with constipation    Chest pain    Gastroesophageal reflux disease without esophagitis    Chronic obstructive pulmonary disease (HCC)     Atypical complaints  Anxiety and depression - tearful  in office  Normal CV exam and ekg  Recommended exercise walking program   Will get CT chest for completeness        Barron Seaman M.D  [address] [place] [address] Cardiology

## 2023-08-30 DIAGNOSIS — K21.9 GASTROESOPHAGEAL REFLUX DISEASE WITHOUT ESOPHAGITIS: ICD-10-CM

## 2023-08-30 DIAGNOSIS — M47.812 SPONDYLOSIS OF CERVICAL REGION WITHOUT MYELOPATHY OR RADICULOPATHY: ICD-10-CM

## 2023-08-30 DIAGNOSIS — E78.2 MIXED HYPERLIPIDEMIA: ICD-10-CM

## 2023-08-30 RX ORDER — PANTOPRAZOLE SODIUM 40 MG/1
40 TABLET, DELAYED RELEASE ORAL DAILY
Qty: 90 TABLET | Refills: 1 | Status: SHIPPED | OUTPATIENT
Start: 2023-08-30

## 2023-08-30 RX ORDER — BACLOFEN 10 MG/1
10 TABLET ORAL 3 TIMES DAILY
Qty: 360 TABLET | Refills: 1 | Status: SHIPPED | OUTPATIENT
Start: 2023-08-30

## 2023-08-30 RX ORDER — ROSUVASTATIN CALCIUM 20 MG/1
20 TABLET, COATED ORAL NIGHTLY
Qty: 90 TABLET | Refills: 1 | Status: SHIPPED | OUTPATIENT
Start: 2023-08-30

## 2023-08-30 NOTE — TELEPHONE ENCOUNTER
Last Appointment:  7/31/2023  Future Appointments   Date Time Provider [address]   9/5/2023  3:00 PM SEB CT 1-BD [facility] Radiolog   9/8/2023  1:15 PM [place] DO WILL Casas [facility]   10/9/2023  1:00 PM Kelsey Whitaker APRN - CNM [facility]   2/8/2024  8:00 AM DO JAVAN DelacruzM RHEUM [facility]      Pt asking for the below meds to be sent; she states not on file at pharmacy and will be at appt next week

## 2023-09-05 ENCOUNTER — HOSPITAL ENCOUNTER (OUTPATIENT)
Dept: CT IMAGING | Age: 53
Discharge: HOME | End: 2023-09-07
Attending: INTERNAL MEDICINE
Payer: MEDICARE

## 2023-09-05 DIAGNOSIS — R07.89 CHEST DISCOMFORT: ICD-10-CM

## 2023-09-05 PROCEDURE — 71250 CT THORAX DX C-: CPT

## 2023-09-06 ENCOUNTER — TELEPHONE (OUTPATIENT)
Dept: CARDIOLOGY CLINIC | Age: 53
End: 2023-09-06

## 2023-09-06 NOTE — TELEPHONE ENCOUNTER
----- Message from César Herring MD sent at 9/6/2023  6:53 AM EDT -----  CT only shows lung disease - COPD  No heart issue, nothing to give ania pain  Ov prn   ----- Message -----  From: Jaswant Valle Incoming Radiant Results From [facility]  Sent: 9/5/2023   9:21 PM EDT  To: César Herring MD

## 2023-09-08 ENCOUNTER — OFFICE VISIT (OUTPATIENT)
Dept: FAMILY MEDICINE CLINIC | Age: 53
End: 2023-09-08

## 2023-09-08 VITALS
DIASTOLIC BLOOD PRESSURE: 70 MMHG | RESPIRATION RATE: 18 BRPM | HEART RATE: 79 BPM | HEIGHT: 62 IN | OXYGEN SATURATION: 98 % | BODY MASS INDEX: 21.6 KG/M2 | SYSTOLIC BLOOD PRESSURE: 118 MMHG | TEMPERATURE: 97.8 F | WEIGHT: 117.4 LBS

## 2023-09-08 DIAGNOSIS — K58.1 IRRITABLE BOWEL SYNDROME WITH CONSTIPATION: Primary | ICD-10-CM

## 2023-09-08 DIAGNOSIS — E78.2 MIXED HYPERLIPIDEMIA: Chronic | ICD-10-CM

## 2023-09-08 DIAGNOSIS — F17.200 TOBACCO USE DISORDER: Chronic | ICD-10-CM

## 2023-09-08 RX ORDER — ACETAMINOPHEN, DIPHENHYDRAMINE HCL, PHENYLEPHRINE HCL 325; 25; 5 MG/1; MG/1; MG/1
TABLET ORAL
COMMUNITY
Start: 2023-08-17

## 2023-09-08 RX ORDER — PRUCALOPRIDE 2 MG/1
TABLET, FILM COATED ORAL
COMMUNITY
Start: 2023-08-11

## 2023-09-08 ASSESSMENT — ENCOUNTER SYMPTOMS
ALLERGIC/IMMUNOLOGIC NEGATIVE: 1
SHORTNESS OF BREATH: 0
BLOOD IN STOOL: 0
DIARRHEA: 0
EYE DISCHARGE: 0
SORE THROAT: 0
COUGH: 0
EYE PAIN: 0
CHEST TIGHTNESS: 0
ABDOMINAL PAIN: 0
NAUSEA: 0
SINUS PAIN: 0
TROUBLE SWALLOWING: 0
PHOTOPHOBIA: 0
VOMITING: 0
EYE REDNESS: 0
BACK PAIN: 0

## 2023-09-11 DIAGNOSIS — K58.1 IRRITABLE BOWEL SYNDROME WITH CONSTIPATION: ICD-10-CM

## 2023-09-11 DIAGNOSIS — F17.200 TOBACCO USE DISORDER: Chronic | ICD-10-CM

## 2023-09-11 DIAGNOSIS — E78.2 MIXED HYPERLIPIDEMIA: Chronic | ICD-10-CM

## 2023-09-12 ENCOUNTER — TELEPHONE (OUTPATIENT)
Dept: FAMILY MEDICINE CLINIC | Age: 53
End: 2023-09-12

## 2023-09-12 DIAGNOSIS — M47.812 SPONDYLOSIS OF CERVICAL REGION WITHOUT MYELOPATHY OR RADICULOPATHY: Primary | ICD-10-CM

## 2023-09-12 LAB
ABSOLUTE IMMATURE GRANULOCYTE: <0.03 K/UL (ref 0–0.58)
ALBUMIN SERPL-MCNC: 4 G/DL (ref 3.5–5.2)
ALP BLD-CCNC: 69 U/L (ref 35–104)
ALT SERPL-CCNC: 9 U/L (ref 0–32)
ANION GAP SERPL CALCULATED.3IONS-SCNC: 10 MMOL/L (ref 7–16)
AST SERPL-CCNC: 24 U/L (ref 0–31)
BACTERIA: ABNORMAL
BASOPHILS ABSOLUTE: 0.04 K/UL (ref 0–0.2)
BASOPHILS RELATIVE PERCENT: 1 % (ref 0–2)
BILIRUB SERPL-MCNC: 0.3 MG/DL (ref 0–1.2)
BILIRUBIN URINE: NEGATIVE
BUN BLDV-MCNC: 11 MG/DL (ref 6–20)
CALCIUM SERPL-MCNC: 9.3 MG/DL (ref 8.6–10.2)
CHLORIDE BLD-SCNC: 105 MMOL/L (ref 98–107)
CHOLESTEROL: 178 MG/DL
CO2: 27 MMOL/L (ref 22–29)
COLOR: YELLOW
CREAT SERPL-MCNC: 0.7 MG/DL (ref 0.5–1)
EOSINOPHILS ABSOLUTE: 0.14 K/UL (ref 0.05–0.5)
EOSINOPHILS RELATIVE PERCENT: 2 % (ref 0–6)
GFR SERPL CREATININE-BSD FRML MDRD: >60 ML/MIN/1.73M2
GLUCOSE FASTING: 84 MG/DL (ref 74–99)
GLUCOSE URINE: NEGATIVE MG/DL
HCT VFR BLD CALC: 42.5 % (ref 34–48)
HDLC SERPL-MCNC: 49 MG/DL
HEMOGLOBIN: 13.8 G/DL (ref 11.5–15.5)
IMMATURE GRANULOCYTES: 0 % (ref 0–5)
KETONES, URINE: NEGATIVE MG/DL
LDL CHOLESTEROL: 97 MG/DL
LEUKOCYTE ESTERASE, URINE: ABNORMAL
LYMPHOCYTES ABSOLUTE: 2.77 K/UL (ref 1.5–4)
LYMPHOCYTES RELATIVE PERCENT: 45 % (ref 20–42)
MCH RBC QN AUTO: 30.8 PG (ref 26–35)
MCHC RBC AUTO-ENTMCNC: 32.5 G/DL (ref 32–34.5)
MCV RBC AUTO: 94.9 FL (ref 80–99.9)
MONOCYTES ABSOLUTE: 0.37 K/UL (ref 0.1–0.95)
MONOCYTES RELATIVE PERCENT: 6 % (ref 2–12)
NEUTROPHILS ABSOLUTE: 2.86 K/UL (ref 1.8–7.3)
NEUTROPHILS RELATIVE PERCENT: 46 % (ref 43–80)
NITRITE, URINE: NEGATIVE
PDW BLD-RTO: 12.9 % (ref 11.5–15)
PH UA: 6 (ref 5–9)
PLATELET # BLD: 213 K/UL (ref 130–450)
PMV BLD AUTO: 10 FL (ref 7–12)
POTASSIUM SERPL-SCNC: 3.8 MMOL/L (ref 3.5–5)
PROTEIN UA: NEGATIVE MG/DL
RBC # BLD: 4.48 M/UL (ref 3.5–5.5)
RBC UA: ABNORMAL /HPF
SODIUM BLD-SCNC: 142 MMOL/L (ref 132–146)
SPECIFIC GRAVITY UA: 1.02 (ref 1–1.03)
TOTAL PROTEIN: 6.5 G/DL (ref 6.4–8.3)
TRIGL SERPL-MCNC: 159 MG/DL
TSH SERPL DL<=0.05 MIU/L-ACNC: 1.26 UIU/ML (ref 0.27–4.2)
TURBIDITY: CLEAR
URINE HGB: NEGATIVE
UROBILINOGEN, URINE: 0.2 EU/DL (ref 0–1)
VLDLC SERPL CALC-MCNC: 32 MG/DL
WBC # BLD: 6.2 K/UL (ref 4.5–11.5)
WBC UA: ABNORMAL /HPF

## 2023-09-12 NOTE — TELEPHONE ENCOUNTER
Mela Fernandez calling to get another referral to [facility] physical therapy. She called to schedule but they said she needs a new one.

## 2023-09-12 NOTE — TELEPHONE ENCOUNTER
Jac Coleman calling to get another referral to [facility] physical therapy. She called to schedule but they said she needs a new one.

## 2023-09-13 ENCOUNTER — TELEPHONE (OUTPATIENT)
Dept: HEMATOLOGY | Age: 53
End: 2023-09-13

## 2023-09-13 DIAGNOSIS — D49.0 IPMN (INTRADUCTAL PAPILLARY MUCINOUS NEOPLASM): Primary | ICD-10-CM

## 2023-09-13 NOTE — TELEPHONE ENCOUNTER
The patient called and stated that she just recently had a stimulator put in from urology and that it is not compatible with the MRI machine. I spoke with Dr Carlos Lares and he stated that it was ok to schedule the patient for a CT abdomen with IV contrast. The patient was scheduled for [facility] on 10/10/2023 (first availible)  Arrive 1:30 pm  Scan 2:00 pm  NPO 3 hours prior  [facility] for patient to take meds with a small amount of water    I called the patient and left her a voicemail along with the office number for her to call and get the above info. The patient will also need a follow up appt scheduled.  There is no prior auth needed for CT scan  Electronically signed by Eitan Romero MA on 9/13/2023 at 8:52 AM

## 2023-09-28 ENCOUNTER — TELEPHONE (OUTPATIENT)
Dept: FAMILY MEDICINE CLINIC | Age: 53
End: 2023-09-28

## 2023-09-28 DIAGNOSIS — M54.50 ACUTE BILATERAL LOW BACK PAIN WITHOUT SCIATICA: Primary | ICD-10-CM

## 2023-10-09 ENCOUNTER — OFFICE VISIT (OUTPATIENT)
Dept: OBGYN | Age: 53
End: 2023-10-09
Payer: MEDICARE

## 2023-10-09 VITALS
BODY MASS INDEX: 21.55 KG/M2 | SYSTOLIC BLOOD PRESSURE: 114 MMHG | WEIGHT: 117.8 LBS | HEART RATE: 82 BPM | DIASTOLIC BLOOD PRESSURE: 79 MMHG

## 2023-10-09 DIAGNOSIS — Z12.4 SCREENING FOR CERVICAL CANCER: Primary | ICD-10-CM

## 2023-10-09 DIAGNOSIS — R10.2 PELVIC PAIN: ICD-10-CM

## 2023-10-09 PROCEDURE — 99203 OFFICE O/P NEW LOW 30 MIN: CPT | Performed by: MIDWIFE

## 2023-10-09 RX ORDER — FAMOTIDINE 40 MG/1
40 TABLET, FILM COATED ORAL
COMMUNITY
Start: 2023-09-07

## 2023-10-09 RX ORDER — DIPHENHYDRAMINE HCL 25 MG
CAPSULE ORAL
COMMUNITY
Start: 2023-09-21

## 2023-10-09 RX ORDER — DICYCLOMINE HYDROCHLORIDE 10 MG/1
CAPSULE ORAL
COMMUNITY
Start: 2023-09-21

## 2023-10-09 NOTE — PROGRESS NOTES
Here to establish care  Would like a pap swear and her levels checked   Last she knew she was in postmenopause  Pap smear performed by provider. Specimen labeled and sent to the lab. Discharge instructions have been discussed with the patient and patient verbalized understanding. Agrees to call the office if any questions and or concerns.
Conjunctiva/sclera: Conjunctivae normal.      Pupils: Pupils are equal, round, and reactive to light. Cardiovascular:      Rate and Rhythm: Normal rate and regular rhythm. Pulses: Normal pulses. Heart sounds: Normal heart sounds. Pulmonary:      Effort: Pulmonary effort is normal.      Breath sounds: Normal breath sounds. Abdominal:      General: Bowel sounds are normal.      Palpations: Abdomen is soft. Musculoskeletal:         General: Normal range of motion. Cervical back: Normal range of motion and neck supple. Neurological:      General: No focal deficit present. Mental Status: She is alert. Mental status is at baseline. She is disoriented. Skin:     General: Skin is warm and dry. Psychiatric:         Mood and Affect: Mood normal.         Behavior: Behavior normal.         Thought Content: Thought content normal.         Judgment: Judgment normal.   Vitals and nursing note reviewed. New pt  Pap obtained  Right adnexal fullness, with complaints of pelvic pain and bloating  Postmenopausal-no bleeding  Hot flashes-  Routine blood work with pcp in September  Recent colonoscopy this year all negative  No diagnosis found.       Pelvic ultrasound       Followup in 4 weeks    FRENCH Nunn CNM

## 2023-10-10 ENCOUNTER — HOSPITAL ENCOUNTER (OUTPATIENT)
Dept: CT IMAGING | Age: 53
Discharge: HOME | End: 2023-10-12
Attending: TRANSPLANT SURGERY
Payer: MEDICARE

## 2023-10-10 DIAGNOSIS — D49.0 IPMN (INTRADUCTAL PAPILLARY MUCINOUS NEOPLASM): ICD-10-CM

## 2023-10-10 PROCEDURE — 6360000004 HC RX CONTRAST MEDICATION: Performed by: RADIOLOGY

## 2023-10-10 PROCEDURE — 74160 CT ABDOMEN W/CONTRAST: CPT

## 2023-10-10 RX ADMIN — IOPAMIDOL 75 ML: 755 INJECTION, SOLUTION INTRAVENOUS at 14:07

## 2023-10-11 ASSESSMENT — ENCOUNTER SYMPTOMS: ABDOMINAL PAIN: 1

## 2023-10-13 LAB
GYNECOLOGY CYTOLOGY REPORT: NORMAL
HPV SAMPLE: NORMAL
HPV SOURCE: NORMAL
HPV, GENOTYPE 16: NOT DETECTED
HPV, GENOTYPE 18: NOT DETECTED
HPV, HIGH RISK OTHER: NOT DETECTED
HPV, INTERPRETATION: NORMAL

## 2023-10-16 ENCOUNTER — TELEPHONE (OUTPATIENT)
Dept: FAMILY MEDICINE CLINIC | Age: 53
End: 2023-10-16

## 2023-10-16 NOTE — TELEPHONE ENCOUNTER
Jac Coleman calling to give you a heads up on a device that she may be getting thru the place she is going for physical therapy. It is a cervical traction device that is supplied by [employer].    She does not need anything from you at this time, but they may call for office notes or something like that. This is something that her insurance should cover and just wanted you t be aware.

## 2023-10-18 ENCOUNTER — TELEPHONE (OUTPATIENT)
Dept: FAMILY MEDICINE CLINIC | Age: 53
End: 2023-10-18

## 2023-10-18 ENCOUNTER — OFFICE VISIT (OUTPATIENT)
Dept: FAMILY MEDICINE CLINIC | Age: 53
End: 2023-10-18
Payer: MEDICARE

## 2023-10-18 VITALS
BODY MASS INDEX: 21.16 KG/M2 | SYSTOLIC BLOOD PRESSURE: 116 MMHG | WEIGHT: 115 LBS | RESPIRATION RATE: 16 BRPM | HEART RATE: 76 BPM | HEIGHT: 62 IN | DIASTOLIC BLOOD PRESSURE: 66 MMHG | OXYGEN SATURATION: 96 % | TEMPERATURE: 98.2 F

## 2023-10-18 DIAGNOSIS — J02.9 SORE THROAT: ICD-10-CM

## 2023-10-18 DIAGNOSIS — R59.0 CERVICAL LYMPHADENOPATHY: Primary | ICD-10-CM

## 2023-10-18 LAB — S PYO AG THROAT QL: NORMAL

## 2023-10-18 PROCEDURE — 87880 STREP A ASSAY W/OPTIC: CPT | Performed by: NURSE PRACTITIONER

## 2023-10-18 PROCEDURE — 99213 OFFICE O/P EST LOW 20 MIN: CPT | Performed by: NURSE PRACTITIONER

## 2023-10-18 RX ORDER — AMOXICILLIN AND CLAVULANATE POTASSIUM 875; 125 MG/1; MG/1
1 TABLET, FILM COATED ORAL 2 TIMES DAILY
Qty: 20 TABLET | Refills: 0 | Status: SHIPPED | OUTPATIENT
Start: 2023-10-18 | End: 2023-10-28

## 2023-10-18 ASSESSMENT — ENCOUNTER SYMPTOMS
CONSTIPATION: 0
SHORTNESS OF BREATH: 0
DIARRHEA: 0
COUGH: 0
ABDOMINAL PAIN: 0
WHEEZING: 0
NAUSEA: 0

## 2023-10-18 NOTE — PROGRESS NOTES
2023     Yuly García 48 y.o. female   : 1970  Chief Complaint:   Mass (Lump on R side of neck, noticed it last night. Not painful. Concerned she has a family history of throat cancer. Denies sore throat.)       History of Present Illness:   Yuly García is a 48 y.o. female who presents to the office with complaints of right anterior cervical adenopathy evident last night. She denies any upper respiratory symptoms. She denies any fever, chills or night sweats. She does have loss of appetite but has been ongoing for several months. She is tearful in the office today and is concerned about possible throat cancer given her mother passed away from this. She tried nothing for symptomatic relief. It is not painful.     Past Medical History:     Past Medical History:   Diagnosis Date    Anxiety     Arthritis     right hip    Bipolar 1 disorder ([address])     Cervical stenosis of spine     Chronic back pain     Constipation     Depression     Hypertension     Intractable chronic post-traumatic headache 2022    Irritable bowel syndrome     Migraines     Mucocele of lip     for surgery 21    Other hyperlipidemia 2019    PTSD (post-traumatic stress disorder)        Past Surgical History:   Procedure Laterality Date    ARTHROGRAPHY Right 2019    RIGHT HIP ARTHROGRAM AND STEROID INJECTION performed by Farzad Archuleta MD at [facility] Right 09/10/2020    RIGHT HIP INJECTION UNDER FLUOROSCOPY performed by Farzad Archuleta MD at [facility] Right 2021    RIGHT HIP INJECTION UNDER FLUOROSCOPY performed by Farzad Archuleta MD at [address] [place] [address] Left     Benign mass removed    COLONOSCOPY      COLONOSCOPY N/A 2023    COLONOSCOPY WITH BIOPSY performed by Terrie Shaw DO at [facility] ENDOSCOPY    CT NEEDLE BIOPSY LIVER PERCUTANEOUS  2023    CT NEEDLE BIOPSY LIVER PERCUTANEOUS 2023 Shakila Bueno II,

## 2023-10-18 NOTE — TELEPHONE ENCOUNTER
Came [facility] and saw [employer]. Did ultrasound and found lump in neck. Needs to follow up with you asap. When can you see her? Please advise.

## 2023-10-19 ENCOUNTER — TELEPHONE (OUTPATIENT)
Dept: FAMILY MEDICINE CLINIC | Age: 53
End: 2023-10-19

## 2023-10-19 NOTE — TELEPHONE ENCOUNTER
[facility] for Jose A Tajanice to call back so we can schedule her on Dr. Keke Wheeler walk in day next week, Tuesday the 24th, per Dr. Allie Duong. She needs to follow up regarding the lump found on her neck when she came in to express.

## 2023-10-20 ENCOUNTER — OFFICE VISIT (OUTPATIENT)
Dept: FAMILY MEDICINE CLINIC | Age: 53
End: 2023-10-20

## 2023-10-20 VITALS
HEART RATE: 63 BPM | OXYGEN SATURATION: 97 % | DIASTOLIC BLOOD PRESSURE: 78 MMHG | BODY MASS INDEX: 21.16 KG/M2 | TEMPERATURE: 98.3 F | SYSTOLIC BLOOD PRESSURE: 124 MMHG | WEIGHT: 115 LBS | RESPIRATION RATE: 16 BRPM | HEIGHT: 62 IN

## 2023-10-20 DIAGNOSIS — R59.0 CERVICAL ADENOPATHY: Primary | ICD-10-CM

## 2023-10-20 DIAGNOSIS — M47.12 OSTEOARTHRITIS OF CERVICAL SPINE WITH MYELOPATHY: ICD-10-CM

## 2023-10-20 RX ORDER — NAPROXEN 375 MG/1
375 TABLET ORAL 2 TIMES DAILY WITH MEALS
Qty: 60 TABLET | Refills: 0 | Status: SHIPPED | OUTPATIENT
Start: 2023-10-20

## 2023-10-20 ASSESSMENT — ENCOUNTER SYMPTOMS
SORE THROAT: 0
EYE PAIN: 0
ABDOMINAL PAIN: 0
NAUSEA: 0
ALLERGIC/IMMUNOLOGIC NEGATIVE: 1
BACK PAIN: 0
COUGH: 0
TROUBLE SWALLOWING: 0
CHEST TIGHTNESS: 0
VOMITING: 0
PHOTOPHOBIA: 0
BLOOD IN STOOL: 0
EYE DISCHARGE: 0
DIARRHEA: 0
EYE REDNESS: 0
SINUS PAIN: 0
SHORTNESS OF BREATH: 0

## 2023-10-20 NOTE — PROGRESS NOTES
1    ondansetron (ZOFRAN ODT) 4 MG disintegrating tablet, Take 1 tablet by mouth every 8 hours as needed for Nausea or Vomiting May substitute for covered product, Disp: 10 tablet, Rfl: 0    Eptinezumab-jjmr (VYEPTI IV), Infuse intravenously every 3 months, Disp: , Rfl:     DULoxetine (CYMBALTA) 60 MG extended release capsule, 1 capsule 2 times daily, Disp: , Rfl:     diazepam (VALIUM) 10 MG tablet, Take 1 tablet by mouth in the morning and 1 tablet at noon and 1 tablet in the evening. Instructed to take am of procedure if needed. , Disp: , Rfl:     folic acid (FOLVITE) 1 MG tablet, Take 1 tablet by mouth daily, Disp: , Rfl:     Prucalopride Succinate (MOTEGRITY) 2 MG TABS, , Disp: , Rfl:   Allergies   Allergen Reactions    Latex Rash    Sulfa Antibiotics Rash and Hives       Past Medical History:   Diagnosis Date    Anxiety     Arthritis     right hip    Bipolar 1 disorder ([address])     Cervical stenosis of spine     Chronic back pain     Constipation     Depression     Hypertension     Intractable chronic post-traumatic headache 04/21/2022    Irritable bowel syndrome     Migraines     Mucocele of lip     for surgery 7/21/21    Other hyperlipidemia 07/29/2019    PTSD (post-traumatic stress disorder)      Past Surgical History:   Procedure Laterality Date    ARTHROGRAPHY Right 01/03/2019    RIGHT HIP ARTHROGRAM AND STEROID INJECTION performed by Terence Huang MD at [facility] Right 09/10/2020    RIGHT HIP INJECTION UNDER FLUOROSCOPY performed by Terence Huang MD at [facility] Right 04/13/2021    RIGHT HIP INJECTION UNDER FLUOROSCOPY performed by Terence Huang MD at [address] [place] [address] Left     Benign mass removed    COLONOSCOPY      COLONOSCOPY N/A 01/04/2023    COLONOSCOPY WITH BIOPSY performed by Eros Reynolds DO at [facility] ENDOSCOPY    CT NEEDLE BIOPSY LIVER PERCUTANEOUS  01/24/2023    CT NEEDLE BIOPSY LIVER PERCUTANEOUS 1/24/2023 Derek Mcwilliams II,

## 2023-10-24 ENCOUNTER — OFFICE VISIT (OUTPATIENT)
Dept: SURGERY | Age: 53
End: 2023-10-24
Payer: MEDICARE

## 2023-10-24 VITALS
HEART RATE: 63 BPM | WEIGHT: 118 LBS | RESPIRATION RATE: 16 BRPM | DIASTOLIC BLOOD PRESSURE: 73 MMHG | HEIGHT: 62 IN | TEMPERATURE: 98.3 F | SYSTOLIC BLOOD PRESSURE: 105 MMHG | OXYGEN SATURATION: 97 % | BODY MASS INDEX: 21.71 KG/M2

## 2023-10-24 DIAGNOSIS — D49.0 IPMN (INTRADUCTAL PAPILLARY MUCINOUS NEOPLASM): Primary | ICD-10-CM

## 2023-10-24 PROCEDURE — 99213 OFFICE O/P EST LOW 20 MIN: CPT | Performed by: TRANSPLANT SURGERY

## 2023-10-26 ENCOUNTER — TELEPHONE (OUTPATIENT)
Dept: FAMILY MEDICINE CLINIC | Age: 53
End: 2023-10-26

## 2023-10-26 DIAGNOSIS — R59.0 CERVICAL LYMPHADENOPATHY: Primary | ICD-10-CM

## 2023-10-26 DIAGNOSIS — R59.0 CERVICAL ADENOPATHY: ICD-10-CM

## 2023-10-26 NOTE — TELEPHONE ENCOUNTER
Joyce Torres's office                [phone]         Dr Angélica Robledo would like you to order a fine needle aspiration on this patient prior to scheduling her appointment with him.

## 2023-10-30 NOTE — TELEPHONE ENCOUNTER
Left a message for Corwin Vigil and requested a call back to discuss referral.     Dr Lashell Tovar would like specialist to do diagnostic workup. Need to check w/ patient to update and see if she is okay with being referred to a different ENT.

## 2023-11-16 ENCOUNTER — HOSPITAL ENCOUNTER (OUTPATIENT)
Age: 53
Discharge: HOME | End: 2023-11-16
Attending: OTOLARYNGOLOGY
Payer: MEDICARE

## 2023-11-16 ENCOUNTER — HOSPITAL ENCOUNTER (OUTPATIENT)
Dept: CT IMAGING | Age: 53
Discharge: HOME | End: 2023-11-18
Attending: OTOLARYNGOLOGY
Payer: MEDICARE

## 2023-11-16 DIAGNOSIS — R22.1 MASS IN NECK: ICD-10-CM

## 2023-11-16 LAB
BUN SERPL-MCNC: 16 MG/DL (ref 6–20)
CREAT SERPL-MCNC: 0.6 MG/DL (ref 0.5–1)
GFR SERPL CREATININE-BSD FRML MDRD: >60 ML/MIN/1.73M2

## 2023-11-16 PROCEDURE — 84520 ASSAY OF UREA NITROGEN: CPT

## 2023-11-16 PROCEDURE — 82565 ASSAY OF CREATININE: CPT

## 2023-11-16 PROCEDURE — 6360000004 HC RX CONTRAST MEDICATION: Performed by: GENERAL PRACTICE

## 2023-11-16 PROCEDURE — 70491 CT SOFT TISSUE NECK W/DYE: CPT

## 2023-11-16 PROCEDURE — 36415 COLL VENOUS BLD VENIPUNCTURE: CPT

## 2023-11-16 RX ADMIN — IOPAMIDOL 75 ML: 755 INJECTION, SOLUTION INTRAVENOUS at 11:52

## 2023-11-20 DIAGNOSIS — Z86.69 HISTORY OF MIGRAINE: Chronic | ICD-10-CM

## 2023-11-20 RX ORDER — NADOLOL 20 MG/1
TABLET ORAL
Qty: 90 TABLET | Refills: 0 | Status: SHIPPED | OUTPATIENT
Start: 2023-11-20

## 2023-12-08 SDOH — HEALTH STABILITY: PHYSICAL HEALTH: ON AVERAGE, HOW MANY DAYS PER WEEK DO YOU ENGAGE IN MODERATE TO STRENUOUS EXERCISE (LIKE A BRISK WALK)?: 0 DAYS

## 2023-12-11 ENCOUNTER — OFFICE VISIT (OUTPATIENT)
Dept: FAMILY MEDICINE CLINIC | Age: 53
End: 2023-12-11
Payer: MEDICARE

## 2023-12-11 VITALS
OXYGEN SATURATION: 98 % | DIASTOLIC BLOOD PRESSURE: 70 MMHG | HEART RATE: 76 BPM | HEIGHT: 62 IN | TEMPERATURE: 98 F | BODY MASS INDEX: 22.08 KG/M2 | SYSTOLIC BLOOD PRESSURE: 110 MMHG | WEIGHT: 120 LBS

## 2023-12-11 DIAGNOSIS — Z09 FOLLOW-UP EXAM, 3-6 MONTHS SINCE PREVIOUS EXAM: ICD-10-CM

## 2023-12-11 DIAGNOSIS — G89.29 CHRONIC BILATERAL LOW BACK PAIN WITHOUT SCIATICA: ICD-10-CM

## 2023-12-11 DIAGNOSIS — K21.9 GASTROESOPHAGEAL REFLUX DISEASE WITHOUT ESOPHAGITIS: Primary | ICD-10-CM

## 2023-12-11 DIAGNOSIS — K21.9 GASTROESOPHAGEAL REFLUX DISEASE WITHOUT ESOPHAGITIS: ICD-10-CM

## 2023-12-11 DIAGNOSIS — E78.2 MIXED HYPERLIPIDEMIA: Chronic | ICD-10-CM

## 2023-12-11 DIAGNOSIS — Z86.69 HISTORY OF MIGRAINE: Primary | ICD-10-CM

## 2023-12-11 DIAGNOSIS — M54.50 CHRONIC BILATERAL LOW BACK PAIN WITHOUT SCIATICA: ICD-10-CM

## 2023-12-11 DIAGNOSIS — I10 PRIMARY HYPERTENSION: ICD-10-CM

## 2023-12-11 PROCEDURE — 3074F SYST BP LT 130 MM HG: CPT | Performed by: FAMILY MEDICINE

## 2023-12-11 PROCEDURE — 99214 OFFICE O/P EST MOD 30 MIN: CPT | Performed by: FAMILY MEDICINE

## 2023-12-11 PROCEDURE — 3078F DIAST BP <80 MM HG: CPT | Performed by: FAMILY MEDICINE

## 2023-12-11 RX ORDER — METHOCARBAMOL 750 MG/1
750 TABLET, FILM COATED ORAL 4 TIMES DAILY
Qty: 40 TABLET | Refills: 0 | Status: SHIPPED | OUTPATIENT
Start: 2023-12-11 | End: 2023-12-21

## 2023-12-11 ASSESSMENT — ENCOUNTER SYMPTOMS
PHOTOPHOBIA: 0
ABDOMINAL DISTENTION: 0
COUGH: 0
DIARRHEA: 0
COLOR CHANGE: 0
SINUS PAIN: 0
SINUS PRESSURE: 0
APNEA: 0
VOMITING: 0
BLOOD IN STOOL: 0
SHORTNESS OF BREATH: 0
CHEST TIGHTNESS: 0
FACIAL SWELLING: 0
RHINORRHEA: 0
CONSTIPATION: 0

## 2023-12-11 NOTE — PROGRESS NOTES
Establish care:  Leon Casanova is a 48 y.o. female, who presents to the office today for establishment of care. Past Medical History:   Diagnosis Date    Anxiety     Arthritis     right hip    Bipolar 1 disorder (HCC)     Cervical stenosis of spine     Chronic back pain     Constipation     Depression     Hypertension     Intractable chronic post-traumatic headache 04/21/2022    Irritable bowel syndrome     Migraines     Mucocele of lip     for surgery 7/21/21    Other hyperlipidemia 07/29/2019    PTSD (post-traumatic stress disorder)         Allergies   Allergen Reactions    Latex Rash    Sulfa Antibiotics Rash and Hives       Current Outpatient Medications on File Prior to Visit   Medication Sig Dispense Refill    nadolol (CORGARD) 20 MG tablet take 1 tablet by mouth once daily 90 tablet 0    famotidine (PEPCID) 40 MG tablet Take 1 tablet by mouth      RA MELATONIN 10 MG TABS take 1 tablet by mouth every evening at bedtime if needed for sleep      pantoprazole (PROTONIX) 40 MG tablet Take 1 tablet by mouth daily 90 tablet 1    magnesium (MAGNESIUM-OXIDE) 250 MG TABS tablet Take 1 tablet by mouth 2 times daily      docusate sodium (COLACE) 100 MG capsule Take 1 capsule by mouth at bedtime      albuterol sulfate HFA (VENTOLIN HFA) 108 (90 Base) MCG/ACT inhaler Inhale 2 puffs into the lungs 4 times daily as needed for Wheezing 18 g 0    Calcium Carb-Cholecalciferol (CALCIUM 600 + D PO) Take by mouth      triamcinolone (KENALOG) 0.1 % cream Apply topically 2 times daily Apply topically 2 times daily. Eptinezumab-jjmr (VYEPTI IV) Infuse intravenously every 3 months      DULoxetine (CYMBALTA) 60 MG extended release capsule 1 capsule 2 times daily      diazepam (VALIUM) 10 MG tablet Take 1 tablet by mouth in the morning and 1 tablet at noon and 1 tablet in the evening. Instructed to take am of procedure if needed.       folic acid (FOLVITE) 1 MG tablet Take 1 tablet by mouth daily       No current

## 2023-12-11 NOTE — TELEPHONE ENCOUNTER
Patient stated she was in to see you today. Patient stated you are attempting to wean her off some of her medications. Patient reports she is supposed to take half of her pantoprazole but the medication is not scored. Please send 20mg tablets. Order pending. Uses [facility] in [facility].

## 2023-12-12 ENCOUNTER — TELEPHONE (OUTPATIENT)
Dept: FAMILY MEDICINE CLINIC | Age: 53
End: 2023-12-12

## 2023-12-12 ENCOUNTER — HOSPITAL ENCOUNTER (OUTPATIENT)
Dept: ULTRASOUND IMAGING | Age: 53
Discharge: HOME | End: 2023-12-14
Payer: MEDICARE

## 2023-12-12 DIAGNOSIS — R22.1 NECK MASS: ICD-10-CM

## 2023-12-12 PROCEDURE — 88172 CYTP DX EVAL FNA 1ST EA SITE: CPT

## 2023-12-12 PROCEDURE — 10005 FNA BX W/US GDN 1ST LES: CPT

## 2023-12-12 PROCEDURE — 88305 TISSUE EXAM BY PATHOLOGIST: CPT

## 2023-12-12 PROCEDURE — 88173 CYTOPATH EVAL FNA REPORT: CPT

## 2023-12-12 RX ORDER — PANTOPRAZOLE SODIUM 20 MG/1
20 TABLET, DELAYED RELEASE ORAL DAILY
Qty: 90 TABLET | Refills: 1 | Status: SHIPPED | OUTPATIENT
Start: 2023-12-12

## 2023-12-12 RX ORDER — UBIDECARENONE 50 MG
2 CAPSULE ORAL DAILY
COMMUNITY
Start: 2023-12-12

## 2023-12-12 NOTE — DISCHARGE INSTRUCTIONS
Discharge Instructions for Needle Biopsy: Thyroid     A needle biopsy is done to remove a small sample of tissue from your thyroid gland. The sample is then tested to see if there are malignant (cancerous) or benign (noncancerous) cells. There are two types of biopsies:   Fine-needle aspiration (FNA)the most common type    Coarse-needle biopsy (CNB)   This procedure is usually done in the doctor's office, but may also be done as an outpatient procedure at the hospital.   Steps to [address] [place] [address]    In most cases, you can return home or to work without any negative effects. After a FNA, you should not have any pain or tenderness. For a CNB, you may feel soreness at the site of the biopsy for 1-2 days after the test.   There are no complications associated with a FNA. A CNB may cause bleeding into the thyroid gland. For a FNA, remove the bandage after a few hours. For a CNB, remove the bandage after a few days. Ask your doctor if you can shower and bathe. Diet    Resume your normal diet. Physical Activity    Avoid vigorous physical activity for 24 hours. Ask your doctor when it is safe for you to drive. Medications    If you had to stop medicines before the procedure, ask your doctor when you can start again. Medicines often stopped include:   Anti-inflammatory drugs (eg, aspirin )   Blood thinners like clopidogrel (Plavix) or warfarin (Coumadin)   Your doctor may tell you to take an over-the-counter pain medicine, such as acetaminophen. If you are taking medications, follow these general guidelines:   Take your medication as directed. Do not change the amount or the schedule. Do not stop taking them without talking to your doctor. Do not share them. Know what the results and side effects. Report them to your doctor. Some drugs can be dangerous when mixed. Talk to a doctor or pharmacist if you are taking more than one drug.  This includes over-the-counter medication and herb or dietary

## 2023-12-12 NOTE — TELEPHONE ENCOUNTER
Michelle Berry calling in she is needing a refill on the protonix 20 mg since she is weaning.  To be sent to [facility] in [facility]

## 2023-12-13 LAB — NON-GYN CYTOLOGY REPORT: NORMAL

## 2023-12-20 DIAGNOSIS — M54.50 CHRONIC BILATERAL LOW BACK PAIN WITHOUT SCIATICA: ICD-10-CM

## 2023-12-20 DIAGNOSIS — G89.29 CHRONIC BILATERAL LOW BACK PAIN WITHOUT SCIATICA: ICD-10-CM

## 2023-12-20 NOTE — TELEPHONE ENCOUNTER
Pt called in and said she was to let you know how she was doing on the Methocarbamol instead of the Baclofen. She said she is fine staying on it. It is helping her, and she is not having constipation like she was before.

## 2023-12-20 NOTE — TELEPHONE ENCOUNTER
Pt called back in and said she is still having some pain, and headaches, but overall is noticing a difference. She was asking if you could possibly increase the dosage and send in a script to  her pharmacy.

## 2023-12-22 DIAGNOSIS — G89.29 CHRONIC BILATERAL LOW BACK PAIN WITHOUT SCIATICA: ICD-10-CM

## 2023-12-22 DIAGNOSIS — M54.50 CHRONIC BILATERAL LOW BACK PAIN WITHOUT SCIATICA: ICD-10-CM

## 2023-12-22 NOTE — TELEPHONE ENCOUNTER
I called and spoke with patient and she stated that she is taking Methocarbamol 750 mg ,4 times a day.

## 2023-12-22 NOTE — TELEPHONE ENCOUNTER
Patient informed. Patient agreeable to same dosage but needs refill on medication. Patient states she only had a 10 day supply. Order pending for you review for 30 day supply. Uses [facility] in [facility].

## 2023-12-22 NOTE — TELEPHONE ENCOUNTER
No I don't want to increase that. I think if it is helping she should please stay on that dose.      Thank you,      Latrice Proctor MD  3:55 PM  12/22/23

## 2023-12-26 RX ORDER — METHOCARBAMOL 750 MG/1
750 TABLET, FILM COATED ORAL 4 TIMES DAILY
Qty: 120 TABLET | Refills: 0 | Status: SHIPPED | OUTPATIENT
Start: 2023-12-26 | End: 2024-01-25

## 2023-12-26 NOTE — TELEPHONE ENCOUNTER
Patient called in to check status of refill. Patient is out of medication and is currently experiencing pain. Can you please refill? Order pending for 30 day supply.

## 2023-12-28 ENCOUNTER — TELEPHONE (OUTPATIENT)
Dept: HEMATOLOGY | Age: 53
End: 2023-12-28

## 2023-12-28 NOTE — TELEPHONE ENCOUNTER
Patient called in asking if it is ok to stop her protonix and change over to pepcid per her gastroenterologists. I did tell her that would be find to follow instructions of the gastroenterologist and his recommendation to stop the protonix and take pepcid. I did tell her to call back if any further questions.     Electronically signed by Marylee Rounds, RN on 12/28/2023 at 3:57 PM

## 2024-01-22 DIAGNOSIS — M54.50 CHRONIC BILATERAL LOW BACK PAIN WITHOUT SCIATICA: ICD-10-CM

## 2024-01-22 DIAGNOSIS — G89.29 CHRONIC BILATERAL LOW BACK PAIN WITHOUT SCIATICA: ICD-10-CM

## 2024-01-22 RX ORDER — METHOCARBAMOL 750 MG/1
750 TABLET, FILM COATED ORAL 4 TIMES DAILY
Qty: 360 TABLET | Refills: 0 | Status: SHIPPED | OUTPATIENT
Start: 2024-01-22 | End: 2024-04-21

## 2024-01-22 NOTE — TELEPHONE ENCOUNTER
Patient called in and is asking for a 3 month supply on her muscle relaxer.  Pended and ready to send.    Last Appointment:  12/11/2023    Future appts:  Future Appointments   Date Time Provider Department Center   2/8/2024  8:00 AM Dion Esquivel DO BD [facility]   3/11/2024  1:45 PM Rocael Hugo MD [facility]

## 2024-02-16 DIAGNOSIS — Z86.69 HISTORY OF MIGRAINE: Chronic | ICD-10-CM

## 2024-02-16 RX ORDER — NADOLOL 20 MG/1
TABLET ORAL
Qty: 90 TABLET | Refills: 0 | Status: SHIPPED | OUTPATIENT
Start: 2024-02-16

## 2024-03-11 ENCOUNTER — OFFICE VISIT (OUTPATIENT)
Dept: FAMILY MEDICINE CLINIC | Age: 54
End: 2024-03-11
Payer: MEDICARE

## 2024-03-11 VITALS
HEART RATE: 100 BPM | SYSTOLIC BLOOD PRESSURE: 128 MMHG | DIASTOLIC BLOOD PRESSURE: 78 MMHG | WEIGHT: 120.38 LBS | HEIGHT: 62 IN | OXYGEN SATURATION: 97 % | BODY MASS INDEX: 22.15 KG/M2 | TEMPERATURE: 98.8 F

## 2024-03-11 DIAGNOSIS — R53.83 FATIGUE, UNSPECIFIED TYPE: ICD-10-CM

## 2024-03-11 DIAGNOSIS — E78.2 MIXED HYPERLIPIDEMIA: Chronic | ICD-10-CM

## 2024-03-11 DIAGNOSIS — I10 PRIMARY HYPERTENSION: ICD-10-CM

## 2024-03-11 DIAGNOSIS — G89.29 CHRONIC BILATERAL LOW BACK PAIN WITHOUT SCIATICA: ICD-10-CM

## 2024-03-11 DIAGNOSIS — M54.50 CHRONIC BILATERAL LOW BACK PAIN WITHOUT SCIATICA: ICD-10-CM

## 2024-03-11 DIAGNOSIS — E78.2 MIXED HYPERLIPIDEMIA: Primary | Chronic | ICD-10-CM

## 2024-03-11 PROCEDURE — 99214 OFFICE O/P EST MOD 30 MIN: CPT | Performed by: FAMILY MEDICINE

## 2024-03-11 PROCEDURE — 3074F SYST BP LT 130 MM HG: CPT | Performed by: FAMILY MEDICINE

## 2024-03-11 PROCEDURE — 3078F DIAST BP <80 MM HG: CPT | Performed by: FAMILY MEDICINE

## 2024-03-11 RX ORDER — NAPROXEN SODIUM 220 MG
1 TABLET ORAL 2 TIMES DAILY
Qty: 60 TABLET | Refills: 11 | OUTPATIENT
Start: 2024-03-11

## 2024-03-11 RX ORDER — PRUCALOPRIDE 2 MG/1
2 TABLET, FILM COATED ORAL NIGHTLY
COMMUNITY

## 2024-03-11 RX ORDER — METHOCARBAMOL 750 MG/1
750 TABLET, FILM COATED ORAL 4 TIMES DAILY
Qty: 360 TABLET | Refills: 0 | Status: SHIPPED | OUTPATIENT
Start: 2024-03-11 | End: 2024-06-09

## 2024-03-11 RX ORDER — BUPROPION HYDROCHLORIDE 150 MG/1
150 TABLET, EXTENDED RELEASE ORAL 2 TIMES DAILY
Qty: 180 TABLET | Refills: 1 | Status: SHIPPED | OUTPATIENT
Start: 2024-03-11

## 2024-03-11 ASSESSMENT — PATIENT HEALTH QUESTIONNAIRE - PHQ9
SUM OF ALL RESPONSES TO PHQ QUESTIONS 1-9: 6
SUM OF ALL RESPONSES TO PHQ9 QUESTIONS 1 & 2: 0
8. MOVING OR SPEAKING SO SLOWLY THAT OTHER PEOPLE COULD HAVE NOTICED. OR THE OPPOSITE, BEING SO FIGETY OR RESTLESS THAT YOU HAVE BEEN MOVING AROUND A LOT MORE THAN USUAL: NOT AT ALL
6. FEELING BAD ABOUT YOURSELF - OR THAT YOU ARE A FAILURE OR HAVE LET YOURSELF OR YOUR FAMILY DOWN: NOT AT ALL
1. LITTLE INTEREST OR PLEASURE IN DOING THINGS: NOT AT ALL
4. FEELING TIRED OR HAVING LITTLE ENERGY: NEARLY EVERY DAY
SUM OF ALL RESPONSES TO PHQ QUESTIONS 1-9: 6
5. POOR APPETITE OR OVEREATING: NOT AT ALL
3. TROUBLE FALLING OR STAYING ASLEEP: NEARLY EVERY DAY
10. IF YOU CHECKED OFF ANY PROBLEMS, HOW DIFFICULT HAVE THESE PROBLEMS MADE IT FOR YOU TO DO YOUR WORK, TAKE CARE OF THINGS AT HOME, OR GET ALONG WITH OTHER PEOPLE: SOMEWHAT DIFFICULT
7. TROUBLE CONCENTRATING ON THINGS, SUCH AS READING THE NEWSPAPER OR WATCHING TELEVISION: NOT AT ALL
9. THOUGHTS THAT YOU WOULD BE BETTER OFF DEAD, OR OF HURTING YOURSELF: NOT AT ALL
SUM OF ALL RESPONSES TO PHQ QUESTIONS 1-9: 6
SUM OF ALL RESPONSES TO PHQ QUESTIONS 1-9: 6
2. FEELING DOWN, DEPRESSED OR HOPELESS: NOT AT ALL

## 2024-03-11 NOTE — PROGRESS NOTES
Ana Luisa Pate is a 54 y.o. female accompanied by herself   CC:   Chief Complaint   Patient presents with    Constipation     States that chronic constipation improved when she stopped baclofen. Robaxen is working but wears off after a few hrs. Has pain and tendersness in neck but thinks its from damage that was done. She will start PT next month.     Hyperlipidemia     Stopped Crestor 3 months ago to see if constipation would improve. Used Red yeast Rice now.     Migraine     Doing well of Nadolol. Has migraines but only when her neck is bothering her.        Constipation:   Improved   - association made with Baclofen.   - now that she stopped baclofen her back pain is worse again.     Hyperlipidemia:   -d/t constipation   - stopped crestor   - is now using red rice yeast     Migraine HA:   F/u   Well controlled   Currently on ?Nadalol   Has seen neurology in the past     Patient's past medical, surgical, social and/or family history reviewed, updated in chart, and are non-contributory (unless otherwise stated).  Medications and allergies also reviewed and updated in chart.      /78   Pulse 100   Temp 98.8 °F (37.1 °C)   Ht 1.575 m (5' 2\")   Wt 54.6 kg (120 lb 6 oz)   LMP 09/08/2018   SpO2 97%   BMI 22.02 kg/m²     Review of Systems   Constitutional:  Negative for chills, fatigue and fever.   HENT:  Negative for congestion, ear pain, facial swelling, rhinorrhea, sinus pressure and sinus pain.    Eyes:  Negative for photophobia and visual disturbance.   Respiratory:  Negative for apnea, cough, chest tightness and shortness of breath.    Cardiovascular:  Negative for chest pain and palpitations.   Gastrointestinal:  Negative for abdominal distention, blood in stool, constipation, diarrhea and vomiting.   Endocrine: Negative for cold intolerance, polydipsia, polyphagia and polyuria.   Genitourinary:  Negative for decreased urine volume, frequency and urgency.   Musculoskeletal:  Negative for

## 2024-03-12 ENCOUNTER — TELEPHONE (OUTPATIENT)
Dept: FAMILY MEDICINE CLINIC | Age: 54
End: 2024-03-12

## 2024-03-12 DIAGNOSIS — E78.2 MIXED HYPERLIPIDEMIA: Primary | ICD-10-CM

## 2024-03-12 LAB
ABSOLUTE IMMATURE GRANULOCYTE: <0.03 K/UL (ref 0–0.58)
ALBUMIN SERPL-MCNC: 4.5 G/DL (ref 3.5–5.2)
ALP BLD-CCNC: 68 U/L (ref 35–104)
ALT SERPL-CCNC: 6 U/L (ref 0–32)
ANION GAP SERPL CALCULATED.3IONS-SCNC: 12 MMOL/L (ref 7–16)
AST SERPL-CCNC: 17 U/L (ref 0–31)
BASOPHILS ABSOLUTE: 0.04 K/UL (ref 0–0.2)
BASOPHILS RELATIVE PERCENT: 1 % (ref 0–2)
BILIRUB SERPL-MCNC: 0.2 MG/DL (ref 0–1.2)
BUN BLDV-MCNC: 14 MG/DL (ref 6–20)
CALCIUM SERPL-MCNC: 9.6 MG/DL (ref 8.6–10.2)
CHLORIDE BLD-SCNC: 102 MMOL/L (ref 98–107)
CHOLESTEROL, FASTING: 257 MG/DL
CO2: 25 MMOL/L (ref 22–29)
CREAT SERPL-MCNC: 0.6 MG/DL (ref 0.5–1)
EOSINOPHILS ABSOLUTE: 0.09 K/UL (ref 0.05–0.5)
EOSINOPHILS RELATIVE PERCENT: 1 % (ref 0–6)
GFR SERPL CREATININE-BSD FRML MDRD: >60 ML/MIN/1.73M2
GLUCOSE BLD-MCNC: 81 MG/DL (ref 74–99)
HCT VFR BLD CALC: 43.5 % (ref 34–48)
HDLC SERPL-MCNC: 56 MG/DL
HEMOGLOBIN: 14.1 G/DL (ref 11.5–15.5)
IMMATURE GRANULOCYTES: 0 % (ref 0–5)
LDL CHOLESTEROL: 158 MG/DL
LYMPHOCYTES ABSOLUTE: 2.54 K/UL (ref 1.5–4)
LYMPHOCYTES RELATIVE PERCENT: 35 % (ref 20–42)
MAGNESIUM: 2.2 MG/DL (ref 1.6–2.6)
MCH RBC QN AUTO: 31.1 PG (ref 26–35)
MCHC RBC AUTO-ENTMCNC: 32.4 G/DL (ref 32–34.5)
MCV RBC AUTO: 96 FL (ref 80–99.9)
MONOCYTES ABSOLUTE: 0.37 K/UL (ref 0.1–0.95)
MONOCYTES RELATIVE PERCENT: 5 % (ref 2–12)
NEUTROPHILS ABSOLUTE: 4.21 K/UL (ref 1.8–7.3)
NEUTROPHILS RELATIVE PERCENT: 58 % (ref 43–80)
PDW BLD-RTO: 13.5 % (ref 11.5–15)
PLATELET # BLD: 258 K/UL (ref 130–450)
PMV BLD AUTO: 9.8 FL (ref 7–12)
POTASSIUM SERPL-SCNC: 4.4 MMOL/L (ref 3.5–5)
RBC # BLD: 4.53 M/UL (ref 3.5–5.5)
SODIUM BLD-SCNC: 139 MMOL/L (ref 132–146)
TOTAL PROTEIN: 6.9 G/DL (ref 6.4–8.3)
TRIGLYCERIDE, FASTING: 215 MG/DL
TSH SERPL DL<=0.05 MIU/L-ACNC: 0.68 UIU/ML (ref 0.27–4.2)
VLDLC SERPL CALC-MCNC: 43 MG/DL
WBC # BLD: 7.3 K/UL (ref 4.5–11.5)

## 2024-03-12 NOTE — TELEPHONE ENCOUNTER
Patient calling in stating she was informed of her lab results.  Patient asking if she should have lab work re-drawn because she was not fasting at the time of the lab draw?  Patient stated she had coy, eggs, and 2 mochas with cream.

## 2024-03-19 DIAGNOSIS — E78.2 MIXED HYPERLIPIDEMIA: ICD-10-CM

## 2024-03-20 ENCOUNTER — TELEPHONE (OUTPATIENT)
Dept: FAMILY MEDICINE CLINIC | Age: 54
End: 2024-03-20

## 2024-03-20 DIAGNOSIS — E78.2 MIXED HYPERLIPIDEMIA: Primary | Chronic | ICD-10-CM

## 2024-03-20 DIAGNOSIS — J44.9 CHRONIC OBSTRUCTIVE PULMONARY DISEASE, UNSPECIFIED COPD TYPE (HCC): ICD-10-CM

## 2024-03-20 LAB
CHOLESTEROL, FASTING: 250 MG/DL
HDLC SERPL-MCNC: 55 MG/DL
LDL CHOLESTEROL: 165 MG/DL
TRIGLYCERIDE, FASTING: 148 MG/DL
VLDLC SERPL CALC-MCNC: 30 MG/DL

## 2024-03-20 RX ORDER — ROSUVASTATIN CALCIUM 20 MG/1
20 TABLET, COATED ORAL DAILY
Qty: 90 TABLET | Refills: 1 | Status: CANCELLED | OUTPATIENT
Start: 2024-03-20

## 2024-03-20 ASSESSMENT — ENCOUNTER SYMPTOMS
BLOOD IN STOOL: 0
CHEST TIGHTNESS: 0
COUGH: 0
SHORTNESS OF BREATH: 0
CONSTIPATION: 0
COLOR CHANGE: 0
RHINORRHEA: 0
FACIAL SWELLING: 0
ABDOMINAL DISTENTION: 0
SINUS PAIN: 0
SINUS PRESSURE: 0
DIARRHEA: 0
APNEA: 0
PHOTOPHOBIA: 0

## 2024-03-20 NOTE — TELEPHONE ENCOUNTER
Ana Luisa called in and stated she did some research on statin medication.  Patient read that lipitor is less likely to cause constipation.  Patient would prefer not to go back on crestor d/t her IBS and her problems with constipation.  Please advise.

## 2024-03-20 NOTE — TELEPHONE ENCOUNTER
----- Message from Rocael Hugo MD sent at 3/20/2024  8:14 AM EDT -----  Abnormal labs increased cholesterol similar to the initial blood draw.  Instructions: Lifestyle modifications.  A statin would be a good idea.  Please inform patient   Any questions call me

## 2024-03-20 NOTE — TELEPHONE ENCOUNTER
Reviewed results w/ Ana Luisa. She will restart Crestor 20mg, still has some on hand but will need a refill. She had stopped it along w/ other meds b/c it may have been contributing to chronic constipation. I will also mail out a tip sheet on how to lower lipid levels through life style changes.     Ana Luisa requested a refill on her rescue inhaler. Said that she has been wheezing and has SOB. Also stated that she has allergies and they are flared up right now. Asked patient to  OTC allergy medication and start. Will ask Dr Hugo to send refill on Albuterol inhaler. Patient was not aware of her Dx of COPD. She is agreeable to using a maintenance inhaler, has not used one in the past. Can you prescribe maintenance inhaler?    [facility] [place]

## 2024-03-22 RX ORDER — ATORVASTATIN CALCIUM 40 MG/1
40 TABLET, FILM COATED ORAL DAILY
Qty: 30 TABLET | Refills: 3 | Status: SHIPPED | OUTPATIENT
Start: 2024-03-22

## 2024-03-22 RX ORDER — ALBUTEROL SULFATE 90 UG/1
2 AEROSOL, METERED RESPIRATORY (INHALATION) 4 TIMES DAILY PRN
Qty: 18 G | Refills: 5 | Status: SHIPPED | OUTPATIENT
Start: 2024-03-22

## 2024-03-22 RX ORDER — FLUTICASONE FUROATE AND VILANTEROL 100; 25 UG/1; UG/1
1 POWDER RESPIRATORY (INHALATION) DAILY
Qty: 1 EACH | Refills: 1 | Status: SHIPPED | OUTPATIENT
Start: 2024-03-22

## 2024-04-06 NOTE — PROGRESS NOTES
[facility]     21  Reese Pate : 1970 Sex: female  Age: 46 y.o. Chief Complaint   Patient presents with    Back Pain     chronic pain for her        HPI    Patient presents to express care today complaining of chronic low back pain. This been going on for an extended period of time. She has seen pain management. She has had epidurals/back injections as well as ablation therapy. She does follow with [facility] for history of head injury and chronic headaches for which they told her she cannot take narcotics or NSAIDs secondary to rebound headache. States she has tried about everything. Was recently seen by [facility] pain management and was told they had nothing else to offer her. She states she has been told she is not a surgical candidate. Review of Systems   Constitutional: Negative for chills and fever. Gastrointestinal:        No loss of continence of bowel or bladder   Genitourinary: Negative for difficulty urinating. Musculoskeletal: Positive for back pain and gait problem. Neurological: Negative for weakness and numbness. REST OF PERTINENT ROS GONE OVER AND WAS NEGATIVE. Current Outpatient Medications:     predniSONE (DELTASONE) 10 MG tablet, Take 3 tablets by mouth daily for 2 days, THEN 2 tablets daily for 2 days, THEN 1 tablet daily for 2 days. , Disp: 12 tablet, Rfl: 0    gabapentin (NEURONTIN) 800 MG tablet, take 1 tablet by mouth three times a day, Disp: 90 tablet, Rfl: 0    desvenlafaxine succinate (PRISTIQ) 25 MG TB24 extended release tablet, take 1 tablet by mouth once daily, Disp: , Rfl:     rosuvastatin (CRESTOR) 20 MG tablet, take 1 tablet by mouth nightly ---90DAYS REQUEST, Disp: 90 tablet, Rfl: 1    baclofen (LIORESAL) 10 MG tablet, Take 1 tablet by mouth 3 times daily, Disp: 90 tablet, Rfl: 2    eletriptan (RELPAX) 40 MG tablet, Take 1 tablet by mouth once as needed (Headache) may repeat in 2 hours if 73 yo M PMH of HTN, HLD, hypothyroid, DMT2, BPH , CAD with stents x6 (on aspirin), presents with c/o bilateral knee pain since 2018, worse in the right knee. Patient has known Hx of moderate to advanced medial compartment osteoarthritis. PSH of meniscus repair in b/l knees. Patient has failed to respond to previous cortisone as well as hyaluronic acid injections. He tried physical therapy without relief. He is taking NSAIDs and Tramadol from pain management as needed. He reports difficulty with using stairs and with routine ADLs.  Modifying factors: worsened by bending, walking, by direct pressure, with knee flexion or extension. Relieving factors include NSAIDs and rest.     1. B/L knee chronic pain , R>L, S/P R TKA , POD#01   PT/OT/pain mgmt  DVT prophylaxis- as per ortho  Abx as per SCIP  Incentive spirometry  Prophylaxis of opioid  induced constipation.  Wound care/ WB status as per ortho     2. CAD/ stents/ HTN / HLD  -continue statin ,  continue Metoprolol / Nifedipine with parameters , restart Furosemide/ Losartan if BP allows ,   restart ASA once OK with ortho     3. DM type 2 - hold oral hypoglycemics for now , restart on d/c home ,   ISSC , accu checks , ADA , pre op A1C - 7.9     4. BPH- continue Flomax , observe for postop urinary retention     5. Hypothyroidism - continue Synthroid     6. DVT prophylaxis  - as per ortho protocol  Opioid induced constipation  prophylaxis - bowel regimen     7. Episode of Trigeminy: no symptoms, electrolytes done came ok, he was told to follow with his cardiologist as out patient.     8. Acute blood loss anemia due to procedure: Iron supplement.     Patient is stable from the medicine point of view for discharge pending PT and Ortho eval.       REMOVAL      in pancrease      No family history on file. Social History     Socioeconomic History    Marital status:      Spouse name: Not on file    Number of children: Not on file    Years of education: Not on file    Highest education level: Not on file   Occupational History    Not on file   Tobacco Use    Smoking status: Current Every Day Smoker     Packs/day: 1.00     Years: 30.00     Pack years: 30.00     Types: Cigarettes    Smokeless tobacco: Never Used   Vaping Use    Vaping Use: Some days    Substances: Flavoring   Substance and Sexual Activity    Alcohol use: Not Currently    Drug use: No    Sexual activity: Not Currently   Other Topics Concern    Not on file   Social History Narrative    Not on file     Social Determinants of Health     Financial Resource Strain: High Risk    Difficulty of Paying Living Expenses: Hard   Food Insecurity: Food Insecurity Present    Worried About Running Out of Food in the Last Year: Never true    Kaleb of Food in the Last Year: Sometimes true   Transportation Needs:     Lack of Transportation (Medical):  Lack of Transportation (Non-Medical):    Physical Activity:     Days of Exercise per Week:     Minutes of Exercise per Session:    Stress:     Feeling of Stress :    Social Connections:     Frequency of Communication with Friends and Family:     Frequency of Social Gatherings with Friends and Family:     Attends Jewish Services:     Active Member of Clubs or Organizations:     Attends Club or Organization Meetings:     Marital Status:    Intimate Partner Violence:     Fear of Current or Ex-Partner:     Emotionally Abused:     Physically Abused:     Sexually Abused:        Vitals:    07/30/21 1315   BP: 120/80   Pulse: 80   Temp: 98.8 °F (37.1 °C)   SpO2: 95%   Weight: 106 lb (48.1 kg)       Physical Exam  Vitals and nursing note reviewed. Constitutional:       General: She is in acute distress. Comments:  In mild distress secondary to her pain   Musculoskeletal:         General: Tenderness present. Comments: She does have reproducible back pain from the mid thoracic area on down bilaterally. Does not appear to have straight leg raise pain or pain to hip maneuvers of abduction abduction. Neurological:      Mental Status: She is alert and oriented to person, place, and time. Sensory: No sensory deficit. Motor: No weakness. Gait: Gait abnormal.      Comments: Reflexes diminished lower extremities bilaterally. Does walk with a slow gait because of pain   Psychiatric:      Comments: Looks to be somewhat depressed. Assessment and Plan:  Rosa Lee was seen today for back pain. Diagnoses and all orders for this visit:    Chronic low back pain without sciatica, unspecified back pain laterality    Other orders  -     ketorolac (TORADOL) injection 30 mg  -     predniSONE (DELTASONE) 10 MG tablet; Take 3 tablets by mouth daily for 2 days, THEN 2 tablets daily for 2 days, THEN 1 tablet daily for 2 days. Plan: I told her there is not much I am going to be able to do for her here in [place]. I did give her a shot of Toradol and taper dose of prednisone And ask her to follow-up with her PCP next week. I am not sure where they would go from here given what she has told me. Return in about 1 week (around 8/6/2021). Seen By:  Michael Lawson MD      *Document was created using voice recognition software. Note was reviewed however may contain grammatical errors.

## 2024-04-19 ENCOUNTER — HOSPITAL ENCOUNTER (OUTPATIENT)
Age: 54
Discharge: HOME | End: 2024-04-19
Payer: MEDICARE

## 2024-04-19 PROCEDURE — 36415 COLL VENOUS BLD VENIPUNCTURE: CPT

## 2024-04-19 PROCEDURE — 86301 IMMUNOASSAY TUMOR CA 19-9: CPT

## 2024-04-20 LAB — CANCER AG19-9 SERPL IA-ACNC: 41 U/ML (ref 0–35)

## 2024-04-21 ENCOUNTER — HOSPITAL ENCOUNTER (OUTPATIENT)
Dept: MRI IMAGING | Age: 54
Discharge: HOME | End: 2024-04-23
Payer: MEDICARE

## 2024-04-21 DIAGNOSIS — K86.89: ICD-10-CM

## 2024-04-21 PROCEDURE — 74183 MRI ABD W/O CNTR FLWD CNTR: CPT

## 2024-04-21 PROCEDURE — 6360000004 HC RX CONTRAST MEDICATION: Performed by: RADIOLOGY

## 2024-04-21 PROCEDURE — A9579 GAD-BASE MR CONTRAST NOS,1ML: HCPCS | Performed by: RADIOLOGY

## 2024-04-21 RX ADMIN — GADOTERIDOL 11 ML: 279.3 INJECTION, SOLUTION INTRAVENOUS at 09:02

## 2024-04-25 ENCOUNTER — TELEPHONE (OUTPATIENT)
Dept: HEMATOLOGY | Age: 54
End: 2024-04-25

## 2024-04-25 NOTE — TELEPHONE ENCOUNTER
Incoming call from Ana Luisa stating she had a recent MRI ordered by her gastroenterologist Dr Heck that was abnormal. She also stated that her  was elevated and she was experience a lot of abdominal discomfort. Called and spoke to Dr. Heck's office who stated they would fax over office note. Scheduled patient an appt in [place] after first offering next available appt in [place].

## 2024-04-26 ENCOUNTER — OFFICE VISIT (OUTPATIENT)
Dept: HEMATOLOGY | Age: 54
End: 2024-04-26
Payer: MEDICARE

## 2024-04-26 VITALS
HEIGHT: 62 IN | WEIGHT: 119.9 LBS | SYSTOLIC BLOOD PRESSURE: 103 MMHG | DIASTOLIC BLOOD PRESSURE: 73 MMHG | HEART RATE: 102 BPM | OXYGEN SATURATION: 96 % | TEMPERATURE: 97.5 F | BODY MASS INDEX: 22.07 KG/M2 | RESPIRATION RATE: 15 BRPM

## 2024-04-26 DIAGNOSIS — R19.8 ALTERNATING CONSTIPATION AND DIARRHEA: ICD-10-CM

## 2024-04-26 DIAGNOSIS — D49.0 IPMN (INTRADUCTAL PAPILLARY MUCINOUS NEOPLASM): Primary | ICD-10-CM

## 2024-04-26 DIAGNOSIS — R10.10 PAIN OF UPPER ABDOMEN: ICD-10-CM

## 2024-04-26 PROCEDURE — 99213 OFFICE O/P EST LOW 20 MIN: CPT | Performed by: TRANSPLANT SURGERY

## 2024-04-26 PROCEDURE — 99212 OFFICE O/P EST SF 10 MIN: CPT | Performed by: TRANSPLANT SURGERY

## 2024-04-26 NOTE — PROGRESS NOTES
Hepatobiliary and Pancreatic Surgery Attending History and Physical    Patient's Name/Date of Birth: Ana Luisa Pate /1970 (54 y.o.)    Date: April 26, 2024     CC: abdominal pain    HPI:  Patient is a very pleasant 52 year old female who had an US of her pancreas to evaluate the pancreatic cyst and also to see if she had features of chronic pancreatitis.  She also had a colonoscopy with Dr. Rowe.  She does smoke 1PPD.    She had a HIDA which showed an EF of 76%.  She also had a liver biopsy which was normal.  She underwent an EGD and EUS 4/23 which showed duodenitis and a pancreatic body cyst.  There was only cystic debris.      She recently saw Gastroenterology. Was  referred [facility].  She also recently started [employer].  Patient states that she feels bloated.  She does not move her bowels daily.  However when she does finally move her bowels she will have multiple loose bowel movements.  She underwent a repeat MRI for branch duct IPMN and was referred back to me.  She states that she has tried multiple medications which have not helped her constipation.  She is concerned that she has pancreatic insufficiency.    Physical Exam:  /73   Pulse (!) 102   Temp 97.5 °F (36.4 °C) (Temporal)   Resp 15   Ht 1.575 m (5' 2\")   Wt 54.4 kg (119 lb 14.4 oz)   LMP 09/08/2018   SpO2 96%   BMI 21.93 kg/m²         PSYCH: mood and affect normal, alert and oriented x 3: No apparent distress, comfortable  EYES: Sclera white, pupils equal round and reactive to light  ENMT:  Hearing normal, trachea midline, ears externally intact  LYMPH: no obvious lympadenopathy in neck.   RESP: Respiratory effort was normal with no retractions or use of accessory muscles.  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

## 2024-07-16 DIAGNOSIS — E78.2 MIXED HYPERLIPIDEMIA: Chronic | ICD-10-CM

## 2024-07-16 RX ORDER — ATORVASTATIN CALCIUM 40 MG/1
40 TABLET, FILM COATED ORAL DAILY
Qty: 30 TABLET | Refills: 3 | Status: SHIPPED | OUTPATIENT
Start: 2024-07-16

## 2024-07-16 NOTE — TELEPHONE ENCOUNTER
Patient called in and stated that she needs refills of atorvastatin 40 mg to [facility] pharmacy in [place].

## 2024-09-11 ENCOUNTER — TELEPHONE (OUTPATIENT)
Dept: HEMATOLOGY | Age: 54
End: 2024-09-11

## 2024-09-12 DIAGNOSIS — J44.9 CHRONIC OBSTRUCTIVE PULMONARY DISEASE, UNSPECIFIED COPD TYPE (HCC): ICD-10-CM

## 2024-09-12 DIAGNOSIS — E78.2 MIXED HYPERLIPIDEMIA: Chronic | ICD-10-CM

## 2024-09-13 RX ORDER — ALBUTEROL SULFATE 90 UG/1
2 AEROSOL, METERED RESPIRATORY (INHALATION) 4 TIMES DAILY PRN
Qty: 18 G | Refills: 5 | Status: SHIPPED | OUTPATIENT
Start: 2024-09-13

## 2024-09-13 RX ORDER — FLUTICASONE FUROATE AND VILANTEROL 100; 25 UG/1; UG/1
1 POWDER RESPIRATORY (INHALATION) DAILY
Qty: 1 EACH | Refills: 1 | Status: SHIPPED | OUTPATIENT
Start: 2024-09-13

## 2024-09-16 NOTE — TELEPHONE ENCOUNTER
Referral updated to reflect [facility]CLAUDE in [facility]. I called the patient and left a detailed voicemail in regards to this.
The patient called in and said that Dr. Núñez Speaks does not accept her insurance. She wants you to refer her to someone else.  I did also advise her that if she calls her insurance they will be able to give her a list of providers that accept it
[facility]
<-- Click to add NO significant Past Surgical History

## 2024-09-17 LAB
BASOPHILS # BLD: 0.04 K/UL (ref 0–0.2)
BASOPHILS NFR BLD: 1 % (ref 0–2)
EOSINOPHIL # BLD: 0.08 K/UL (ref 0.05–0.5)
EOSINOPHILS RELATIVE PERCENT: 1 % (ref 0–6)
ERYTHROCYTE [DISTWIDTH] IN BLOOD BY AUTOMATED COUNT: 13.5 % (ref 11.5–15)
HCT VFR BLD AUTO: 44.5 % (ref 34–48)
HGB BLD-MCNC: 14.1 G/DL (ref 11.5–15.5)
IMM GRANULOCYTES # BLD AUTO: <0.03 K/UL (ref 0–0.58)
IMM GRANULOCYTES NFR BLD: 0 % (ref 0–5)
LYMPHOCYTES NFR BLD: 2.12 K/UL (ref 1.5–4)
LYMPHOCYTES RELATIVE PERCENT: 37 % (ref 20–42)
MCH RBC QN AUTO: 30 PG (ref 26–35)
MCHC RBC AUTO-ENTMCNC: 31.7 G/DL (ref 32–34.5)
MCV RBC AUTO: 94.7 FL (ref 80–99.9)
MONOCYTES NFR BLD: 0.33 K/UL (ref 0.1–0.95)
MONOCYTES NFR BLD: 6 % (ref 2–12)
NEUTROPHILS NFR BLD: 55 % (ref 43–80)
NEUTS SEG NFR BLD: 3.22 K/UL (ref 1.8–7.3)
PLATELET # BLD AUTO: 258 K/UL (ref 130–450)
PMV BLD AUTO: 10.3 FL (ref 7–12)
RBC # BLD AUTO: 4.7 M/UL (ref 3.5–5.5)
VIT B12 SERPL-MCNC: 482 PG/ML (ref 211–946)
WBC OTHER # BLD: 5.8 K/UL (ref 4.5–11.5)

## 2024-09-18 LAB
25(OH)D3 SERPL-MCNC: 21.5 NG/ML (ref 30–100)
ALBUMIN SERPL-MCNC: 4.4 G/DL (ref 3.5–5.2)
ALP SERPL-CCNC: 81 U/L (ref 35–104)
ALT SERPL-CCNC: 6 U/L (ref 0–32)
ANION GAP SERPL CALCULATED.3IONS-SCNC: 12 MMOL/L (ref 7–16)
AST SERPL-CCNC: 18 U/L (ref 0–31)
BILIRUB SERPL-MCNC: 0.4 MG/DL (ref 0–1.2)
BUN SERPL-MCNC: 10 MG/DL (ref 6–20)
CALCIUM SERPL-MCNC: 9.6 MG/DL (ref 8.6–10.2)
CHLORIDE SERPL-SCNC: 103 MMOL/L (ref 98–107)
CHOLEST SERPL-MCNC: 150 MG/DL
CO2 SERPL-SCNC: 27 MMOL/L (ref 22–29)
CREAT SERPL-MCNC: 0.6 MG/DL (ref 0.5–1)
GFR, ESTIMATED: >90 ML/MIN/1.73M2
GLUCOSE SERPL-MCNC: 90 MG/DL (ref 74–99)
HBA1C MFR BLD: 5.7 % (ref 4–5.6)
HDLC SERPL-MCNC: 47 MG/DL
LDLC SERPL CALC-MCNC: 82 MG/DL
POTASSIUM SERPL-SCNC: 4.1 MMOL/L (ref 3.5–5)
PROT SERPL-MCNC: 7 G/DL (ref 6.4–8.3)
SODIUM SERPL-SCNC: 142 MMOL/L (ref 132–146)
T3 SERPL-MCNC: 128 NG/DL (ref 80–200)
T4 SERPL-MCNC: 7.6 UG/DL (ref 4.5–11.7)
TRIGL SERPL-MCNC: 103 MG/DL
TSH SERPL DL<=0.05 MIU/L-ACNC: 0.43 UIU/ML (ref 0.27–4.2)
VLDLC SERPL CALC-MCNC: 21 MG/DL

## 2024-09-23 ENCOUNTER — TELEPHONE (OUTPATIENT)
Dept: FAMILY MEDICINE CLINIC | Age: 54
End: 2024-09-23

## 2024-09-23 DIAGNOSIS — E55.9 VITAMIN D DEFICIENCY: Primary | ICD-10-CM

## 2024-09-25 ENCOUNTER — OFFICE VISIT (OUTPATIENT)
Dept: FAMILY MEDICINE CLINIC | Age: 54
End: 2024-09-25

## 2024-09-25 VITALS
SYSTOLIC BLOOD PRESSURE: 100 MMHG | BODY MASS INDEX: 20.61 KG/M2 | DIASTOLIC BLOOD PRESSURE: 70 MMHG | HEIGHT: 62 IN | OXYGEN SATURATION: 97 % | HEART RATE: 99 BPM | TEMPERATURE: 97.6 F | WEIGHT: 112 LBS | RESPIRATION RATE: 16 BRPM

## 2024-09-25 VITALS
HEIGHT: 62 IN | SYSTOLIC BLOOD PRESSURE: 100 MMHG | WEIGHT: 112 LBS | BODY MASS INDEX: 20.61 KG/M2 | TEMPERATURE: 97.6 F | RESPIRATION RATE: 16 BRPM | HEART RATE: 99 BPM | OXYGEN SATURATION: 97 % | DIASTOLIC BLOOD PRESSURE: 70 MMHG

## 2024-09-25 DIAGNOSIS — K58.1 IRRITABLE BOWEL SYNDROME WITH CONSTIPATION: ICD-10-CM

## 2024-09-25 DIAGNOSIS — M54.50 CHRONIC BILATERAL LOW BACK PAIN WITHOUT SCIATICA: Primary | ICD-10-CM

## 2024-09-25 DIAGNOSIS — Z87.891 PERSONAL HISTORY OF TOBACCO USE: ICD-10-CM

## 2024-09-25 DIAGNOSIS — E55.9 VITAMIN D DEFICIENCY: ICD-10-CM

## 2024-09-25 DIAGNOSIS — Z00.00 MEDICARE ANNUAL WELLNESS VISIT, SUBSEQUENT: Primary | ICD-10-CM

## 2024-09-25 DIAGNOSIS — Z53.20 LUNG CANCER SCREENING DECLINED BY PATIENT: ICD-10-CM

## 2024-09-25 DIAGNOSIS — G89.29 CHRONIC BILATERAL LOW BACK PAIN WITHOUT SCIATICA: Primary | ICD-10-CM

## 2024-09-25 PROBLEM — F33.41 RECURRENT MAJOR DEPRESSION IN PARTIAL REMISSION (HCC): Status: RESOLVED | Noted: 2017-08-02 | Resolved: 2024-09-25

## 2024-09-25 PROBLEM — J44.9 CHRONIC OBSTRUCTIVE PULMONARY DISEASE (HCC): Status: RESOLVED | Noted: 2023-07-17 | Resolved: 2024-09-25

## 2024-09-25 RX ORDER — ERGOCALCIFEROL 1.25 MG/1
50000 CAPSULE, LIQUID FILLED ORAL WEEKLY
Qty: 8 CAPSULE | Refills: 0 | Status: SHIPPED | OUTPATIENT
Start: 2024-09-25 | End: 2024-11-14

## 2024-09-25 RX ORDER — ACETAMINOPHEN 160 MG
1 TABLET,DISINTEGRATING ORAL DAILY
Qty: 90 CAPSULE | Refills: 3 | Status: SHIPPED
Start: 2024-09-25 | End: 2024-09-25

## 2024-09-25 SDOH — ECONOMIC STABILITY: INCOME INSECURITY: HOW HARD IS IT FOR YOU TO PAY FOR THE VERY BASICS LIKE FOOD, HOUSING, MEDICAL CARE, AND HEATING?: NOT HARD AT ALL

## 2024-09-25 SDOH — ECONOMIC STABILITY: FOOD INSECURITY: WITHIN THE PAST 12 MONTHS, THE FOOD YOU BOUGHT JUST DIDN'T LAST AND YOU DIDN'T HAVE MONEY TO GET MORE.: NEVER TRUE

## 2024-09-25 SDOH — ECONOMIC STABILITY: FOOD INSECURITY: WITHIN THE PAST 12 MONTHS, YOU WORRIED THAT YOUR FOOD WOULD RUN OUT BEFORE YOU GOT MONEY TO BUY MORE.: NEVER TRUE

## 2024-09-25 ASSESSMENT — PATIENT HEALTH QUESTIONNAIRE - PHQ9
1. LITTLE INTEREST OR PLEASURE IN DOING THINGS: NEARLY EVERY DAY
3. TROUBLE FALLING OR STAYING ASLEEP: NEARLY EVERY DAY
2. FEELING DOWN, DEPRESSED OR HOPELESS: NEARLY EVERY DAY
SUM OF ALL RESPONSES TO PHQ QUESTIONS 1-9: 15
SUM OF ALL RESPONSES TO PHQ QUESTIONS 1-9: 15
9. THOUGHTS THAT YOU WOULD BE BETTER OFF DEAD, OR OF HURTING YOURSELF: NOT AT ALL
4. FEELING TIRED OR HAVING LITTLE ENERGY: NEARLY EVERY DAY
8. MOVING OR SPEAKING SO SLOWLY THAT OTHER PEOPLE COULD HAVE NOTICED. OR THE OPPOSITE, BEING SO FIGETY OR RESTLESS THAT YOU HAVE BEEN MOVING AROUND A LOT MORE THAN USUAL: NOT AT ALL
SUM OF ALL RESPONSES TO PHQ QUESTIONS 1-9: 15
10. IF YOU CHECKED OFF ANY PROBLEMS, HOW DIFFICULT HAVE THESE PROBLEMS MADE IT FOR YOU TO DO YOUR WORK, TAKE CARE OF THINGS AT HOME, OR GET ALONG WITH OTHER PEOPLE: NOT DIFFICULT AT ALL
5. POOR APPETITE OR OVEREATING: NEARLY EVERY DAY
7. TROUBLE CONCENTRATING ON THINGS, SUCH AS READING THE NEWSPAPER OR WATCHING TELEVISION: NOT AT ALL
SUM OF ALL RESPONSES TO PHQ QUESTIONS 1-9: 15
6. FEELING BAD ABOUT YOURSELF - OR THAT YOU ARE A FAILURE OR HAVE LET YOURSELF OR YOUR FAMILY DOWN: NOT AT ALL
SUM OF ALL RESPONSES TO PHQ9 QUESTIONS 1 & 2: 6

## 2024-09-25 ASSESSMENT — ENCOUNTER SYMPTOMS
SINUS PAIN: 0
COUGH: 0
VOMITING: 0
SHORTNESS OF BREATH: 0
SINUS PRESSURE: 0
COLOR CHANGE: 0
ABDOMINAL DISTENTION: 0
CHEST TIGHTNESS: 0
CONSTIPATION: 0
FACIAL SWELLING: 0
DIARRHEA: 0
BLOOD IN STOOL: 0
RHINORRHEA: 0
APNEA: 0
PHOTOPHOBIA: 0

## 2024-09-25 ASSESSMENT — LIFESTYLE VARIABLES
HOW MANY STANDARD DRINKS CONTAINING ALCOHOL DO YOU HAVE ON A TYPICAL DAY: PATIENT DOES NOT DRINK
HOW OFTEN DO YOU HAVE A DRINK CONTAINING ALCOHOL: NEVER

## 2024-09-25 ASSESSMENT — ANXIETY QUESTIONNAIRES
5. BEING SO RESTLESS THAT IT IS HARD TO SIT STILL: NOT AT ALL
2. NOT BEING ABLE TO STOP OR CONTROL WORRYING: NOT AT ALL
1. FEELING NERVOUS, ANXIOUS, OR ON EDGE: MORE THAN HALF THE DAYS
GAD7 TOTAL SCORE: 13
IF YOU CHECKED OFF ANY PROBLEMS ON THIS QUESTIONNAIRE, HOW DIFFICULT HAVE THESE PROBLEMS MADE IT FOR YOU TO DO YOUR WORK, TAKE CARE OF THINGS AT HOME, OR GET ALONG WITH OTHER PEOPLE: SOMEWHAT DIFFICULT
4. TROUBLE RELAXING: NEARLY EVERY DAY
3. WORRYING TOO MUCH ABOUT DIFFERENT THINGS: MORE THAN HALF THE DAYS
7. FEELING AFRAID AS IF SOMETHING AWFUL MIGHT HAPPEN: NEARLY EVERY DAY
6. BECOMING EASILY ANNOYED OR IRRITABLE: NEARLY EVERY DAY

## 2024-09-30 ENCOUNTER — TELEPHONE (OUTPATIENT)
Dept: FAMILY MEDICINE CLINIC | Age: 54
End: 2024-09-30

## 2024-09-30 NOTE — TELEPHONE ENCOUNTER
Ana Luisa calling to tell you that she cannot tolerate the side effects of the Metformin.    She has had stomach pains and nausea since she started taking it.

## 2024-10-01 NOTE — TELEPHONE ENCOUNTER
Advised Ana Luisa to stop the Metformin.   She is asking if there is something that you will prescribe to take the place of Metformin?

## 2024-10-02 NOTE — TELEPHONE ENCOUNTER
With her A1c being 5.7 really the only thing that we want to prescribe was metformin.  Lifestyle modifications and dietary modification is a extremely important in managing this.

## 2024-10-02 NOTE — TELEPHONE ENCOUNTER
I spoke to Ana Luisa who stated that she will work on her diet, avoiding carbohydrates and obvious sugars.

## 2024-10-28 ENCOUNTER — HOSPITAL ENCOUNTER (OUTPATIENT)
Dept: MRI IMAGING | Age: 54
Discharge: HOME | End: 2024-10-30
Payer: MEDICARE

## 2024-10-28 DIAGNOSIS — M48.061 SPINAL STENOSIS, LUMBAR REGION, WITHOUT NEUROGENIC CLAUDICATION: ICD-10-CM

## 2024-10-28 DIAGNOSIS — M48.02 SPINAL STENOSIS IN CERVICAL REGION: ICD-10-CM

## 2024-10-28 PROCEDURE — 72141 MRI NECK SPINE W/O DYE: CPT

## 2024-10-28 PROCEDURE — 72148 MRI LUMBAR SPINE W/O DYE: CPT

## 2024-10-29 ENCOUNTER — HOSPITAL ENCOUNTER (OUTPATIENT)
Dept: CT IMAGING | Age: 54
Discharge: HOME | End: 2024-10-31
Attending: FAMILY MEDICINE
Payer: MEDICARE

## 2024-10-29 DIAGNOSIS — Z53.20 LUNG CANCER SCREENING DECLINED BY PATIENT: ICD-10-CM

## 2024-10-29 DIAGNOSIS — Z87.891 PERSONAL HISTORY OF TOBACCO USE: ICD-10-CM

## 2024-10-29 PROCEDURE — 71271 CT THORAX LUNG CANCER SCR C-: CPT

## 2024-11-08 ENCOUNTER — TELEPHONE (OUTPATIENT)
Dept: FAMILY MEDICINE CLINIC | Age: 54
End: 2024-11-08

## 2024-11-08 NOTE — TELEPHONE ENCOUNTER
Detail Level: Generalized No she doesn't need more blood work before that visit.     Thank you,      Rocael Hugo MD  1:00 PM  11/08/24     Detail Level: Zone Doxycycline Pregnancy And Lactation Text: This medication is Pregnancy Category D and not consider safe during pregnancy. It is also excreted in breast milk but is considered safe for shorter treatment courses. Tetracycline Counseling: Patient counseled regarding possible photosensitivity and increased risk for sunburn.  Patient instructed to avoid sunlight, if possible.  When exposed to sunlight, patients should wear protective clothing, sunglasses, and sunscreen.  The patient was instructed to call the office immediately if the following severe adverse effects occur:  hearing changes, easy bruising/bleeding, severe headache, or vision changes.  The patient verbalized understanding of the proper use and possible adverse effects of tetracycline.  All of the patient's questions and concerns were addressed. Patient understands to avoid pregnancy while on therapy due to potential birth defects. Use Enhanced Medication Counseling?: No Topical Clindamycin Pregnancy And Lactation Text: This medication is Pregnancy Category B and is considered safe during pregnancy. It is unknown if it is excreted in breast milk. Sarecycline Counseling: Patient advised regarding possible photosensitivity and discoloration of the teeth, skin, lips, tongue and gums.  Patient instructed to avoid sunlight, if possible.  When exposed to sunlight, patients should wear protective clothing, sunglasses, and sunscreen.  The patient was instructed to call the office immediately if the following severe adverse effects occur:  hearing changes, easy bruising/bleeding, severe headache, or vision changes.  The patient verbalized understanding of the proper use and possible adverse effects of sarecycline.  All of the patient's questions and concerns were addressed. Birth Control Pills Counseling: Birth Control Pill Counseling: I discussed with the patient the potential side effects of OCPs including but not limited to increased risk of stroke, heart attack, thrombophlebitis, deep venous thrombosis, hepatic adenomas, breast changes, GI upset, headaches, and depression.  The patient verbalized understanding of the proper use and possible adverse effects of OCPs. All of the patient's questions and concerns were addressed. Topical Retinoid counseling:  Patient advised to apply a pea-sized amount only at bedtime and wait 30 minutes after washing their face before applying.  If too drying, patient may add a non-comedogenic moisturizer. The patient verbalized understanding of the proper use and possible adverse effects of retinoids.  All of the patient's questions and concerns were addressed. Minocycline Pregnancy And Lactation Text: This medication is Pregnancy Category D and not consider safe during pregnancy. It is also excreted in breast milk. Azithromycin Counseling:  I discussed with the patient the risks of azithromycin including but not limited to GI upset, allergic reaction, drug rash, diarrhea, and yeast infections. Isotretinoin Pregnancy And Lactation Text: This medication is Pregnancy Category X and is considered extremely dangerous during pregnancy. It is unknown if it is excreted in breast milk. Azithromycin Pregnancy And Lactation Text: This medication is considered safe during pregnancy and is also secreted in breast milk. Topical Sulfur Applications Counseling: Topical Sulfur Counseling: Patient counseled that this medication may cause skin irritation or allergic reactions.  In the event of skin irritation, the patient was advised to reduce the amount of the drug applied or use it less frequently.   The patient verbalized understanding of the proper use and possible adverse effects of topical sulfur application.  All of the patient's questions and concerns were addressed. Tazorac Counseling:  Patient advised that medication is irritating and drying.  Patient may need to apply sparingly and wash off after an hour before eventually leaving it on overnight.  The patient verbalized understanding of the proper use and possible adverse effects of tazorac.  All of the patient's questions and concerns were addressed. Erythromycin Counseling:  I discussed with the patient the risks of erythromycin including but not limited to GI upset, allergic reaction, drug rash, diarrhea, increase in liver enzymes, and yeast infections. High Dose Vitamin A Counseling: Side effects reviewed, pt to contact office should one occur. Topical Retinoid Pregnancy And Lactation Text: This medication is Pregnancy Category C. It is unknown if this medication is excreted in breast milk. Birth Control Pills Pregnancy And Lactation Text: This medication should be avoided if pregnant and for the first 30 days post-partum. Tazorac Pregnancy And Lactation Text: This medication is not safe during pregnancy. It is unknown if this medication is excreted in breast milk. Erythromycin Pregnancy And Lactation Text: This medication is Pregnancy Category B and is considered safe during pregnancy. It is also excreted in breast milk. Spironolactone Counseling: Patient advised regarding risks of diarrhea, abdominal pain, hyperkalemia, birth defects (for female patients), liver toxicity and renal toxicity. The patient may need blood work to monitor liver and kidney function and potassium levels while on therapy. The patient verbalized understanding of the proper use and possible adverse effects of spironolactone.  All of the patient's questions and concerns were addressed. Dapsone Pregnancy And Lactation Text: This medication is Pregnancy Category C and is not considered safe during pregnancy or breast feeding. Dapsone Counseling: I discussed with the patient the risks of dapsone including but not limited to hemolytic anemia, agranulocytosis, rashes, methemoglobinemia, kidney failure, peripheral neuropathy, headaches, GI upset, and liver toxicity.  Patients who start dapsone require monitoring including baseline LFTs and weekly CBCs for the first month, then every month thereafter.  The patient verbalized understanding of the proper use and possible adverse effects of dapsone.  All of the patient's questions and concerns were addressed. Benzoyl Peroxide Counseling: Patient counseled that medicine may cause skin irritation and bleach clothing.  In the event of skin irritation, the patient was advised to reduce the amount of the drug applied or use it less frequently.   The patient verbalized understanding of the proper use and possible adverse effects of benzoyl peroxide.  All of the patient's questions and concerns were addressed. Topical Sulfur Applications Pregnancy And Lactation Text: This medication is Pregnancy Category C and has an unknown safety profile during pregnancy. It is unknown if this topical medication is excreted in breast milk. Bactrim Counseling:  I discussed with the patient the risks of sulfa antibiotics including but not limited to GI upset, allergic reaction, drug rash, diarrhea, dizziness, photosensitivity, and yeast infections.  Rarely, more serious reactions can occur including but not limited to aplastic anemia, agranulocytosis, methemoglobinemia, blood dyscrasias, liver or kidney failure, lung infiltrates or desquamative/blistering drug rashes. High Dose Vitamin A Pregnancy And Lactation Text: High dose vitamin A therapy is contraindicated during pregnancy and breast feeding. Spironolactone Pregnancy And Lactation Text: This medication can cause feminization of the male fetus and should be avoided during pregnancy. The active metabolite is also found in breast milk. Topical Clindamycin Counseling: Patient counseled that this medication may cause skin irritation or allergic reactions.  In the event of skin irritation, the patient was advised to reduce the amount of the drug applied or use it less frequently.   The patient verbalized understanding of the proper use and possible adverse effects of clindamycin.  All of the patient's questions and concerns were addressed. Doxycycline Counseling:  Patient counseled regarding possible photosensitivity and increased risk for sunburn.  Patient instructed to avoid sunlight, if possible.  When exposed to sunlight, patients should wear protective clothing, sunglasses, and sunscreen.  The patient was instructed to call the office immediately if the following severe adverse effects occur:  hearing changes, easy bruising/bleeding, severe headache, or vision changes.  The patient verbalized understanding of the proper use and possible adverse effects of doxycycline.  All of the patient's questions and concerns were addressed. Minocycline Counseling: Patient advised regarding possible photosensitivity and discoloration of the teeth, skin, lips, tongue and gums.  Patient instructed to avoid sunlight, if possible.  When exposed to sunlight, patients should wear protective clothing, sunglasses, and sunscreen.  The patient was instructed to call the office immediately if the following severe adverse effects occur:  hearing changes, easy bruising/bleeding, severe headache, or vision changes.  The patient verbalized understanding of the proper use and possible adverse effects of minocycline.  All of the patient's questions and concerns were addressed. Bactrim Pregnancy And Lactation Text: This medication is Pregnancy Category D and is known to cause fetal risk.  It is also excreted in breast milk. Benzoyl Peroxide Pregnancy And Lactation Text: This medication is Pregnancy Category C. It is unknown if benzoyl peroxide is excreted in breast milk. Isotretinoin Counseling: Patient should get monthly blood tests, not donate blood, not drive at night if vision affected, not share medication, and not undergo elective surgery for 6 months after tx completed. Side effects reviewed, pt to contact office should one occur.

## 2024-11-08 NOTE — TELEPHONE ENCOUNTER
11/13/24 patient having follow up visit with you for A1c elevated.does she need labs before this visit?

## 2024-11-13 ENCOUNTER — OFFICE VISIT (OUTPATIENT)
Dept: FAMILY MEDICINE CLINIC | Age: 54
End: 2024-11-13

## 2024-11-13 VITALS
WEIGHT: 105.2 LBS | HEART RATE: 88 BPM | TEMPERATURE: 98 F | HEIGHT: 62 IN | RESPIRATION RATE: 16 BRPM | DIASTOLIC BLOOD PRESSURE: 70 MMHG | OXYGEN SATURATION: 97 % | BODY MASS INDEX: 19.36 KG/M2 | SYSTOLIC BLOOD PRESSURE: 102 MMHG

## 2024-11-13 DIAGNOSIS — R35.0 URINARY FREQUENCY: Primary | ICD-10-CM

## 2024-11-13 DIAGNOSIS — M47.812 SPONDYLOSIS OF CERVICAL REGION WITHOUT MYELOPATHY OR RADICULOPATHY: ICD-10-CM

## 2024-11-13 DIAGNOSIS — E78.2 MIXED HYPERLIPIDEMIA: Chronic | ICD-10-CM

## 2024-11-13 LAB
BILIRUBIN, POC: NORMAL
BLOOD URINE, POC: NORMAL
CLARITY, POC: CLEAR
COLOR, POC: YELLOW
GLUCOSE URINE, POC: NORMAL MG/DL
KETONES, POC: NORMAL MG/DL
LEUKOCYTE EST, POC: NORMAL
NITRITE, POC: NORMAL
PH, POC: 6
PROTEIN, POC: NORMAL MG/DL
SPECIFIC GRAVITY, POC: >=1.03
UROBILINOGEN, POC: 0.2 MG/DL

## 2024-11-13 RX ORDER — ATORVASTATIN CALCIUM 40 MG/1
40 TABLET, FILM COATED ORAL DAILY
Qty: 90 TABLET | Refills: 1 | Status: CANCELLED | OUTPATIENT
Start: 2024-11-13

## 2024-11-13 RX ORDER — PANTOPRAZOLE SODIUM 40 MG/1
40 TABLET, DELAYED RELEASE ORAL DAILY
COMMUNITY
Start: 2024-09-26

## 2024-11-14 LAB
CULTURE: NO GROWTH
SPECIMEN DESCRIPTION: NORMAL

## 2024-11-22 DIAGNOSIS — E78.2 MIXED HYPERLIPIDEMIA: Chronic | ICD-10-CM

## 2024-11-22 RX ORDER — ATORVASTATIN CALCIUM 40 MG/1
40 TABLET, FILM COATED ORAL DAILY
Qty: 30 TABLET | Refills: 3 | Status: SHIPPED | OUTPATIENT
Start: 2024-11-22

## 2024-11-25 RX ORDER — BACLOFEN 10 MG/1
10 TABLET ORAL 3 TIMES DAILY
Qty: 360 TABLET | Refills: 0 | Status: SHIPPED | OUTPATIENT
Start: 2024-11-25

## 2024-11-25 RX ORDER — FLUTICASONE FUROATE AND VILANTEROL 100; 25 UG/1; UG/1
1 POWDER RESPIRATORY (INHALATION) DAILY
Qty: 1 EACH | Refills: 1 | Status: SHIPPED | OUTPATIENT
Start: 2024-11-25

## 2024-11-25 ASSESSMENT — ENCOUNTER SYMPTOMS
RHINORRHEA: 0
DIARRHEA: 0
CHEST TIGHTNESS: 0
PHOTOPHOBIA: 0
ABDOMINAL DISTENTION: 0
VOMITING: 0
SINUS PAIN: 0
SINUS PRESSURE: 0
CONSTIPATION: 0
SHORTNESS OF BREATH: 0
BLOOD IN STOOL: 0
COUGH: 0
FACIAL SWELLING: 0
COLOR CHANGE: 0
APNEA: 0

## 2024-11-25 NOTE — PROGRESS NOTES
follow-ups on file.      As above.  Call or go to ED immediately if symptoms worsen or persist.  No follow-ups on file., or sooner if necessary.      Counseled regarding above diagnosis, including possible risks and complications,  especially if left uncontrolled.    Counseledregarding the possible side effects, risks, benefits and alternatives to treatment; patient and/or guardian verbalizes understanding, agrees, feels comfortable with and wishes to proceed with above treatment plan.  patient to call with any new medication issues, and read all Rx info from pharmacy to assure aware of all possible risks and side effects of medication before taking.      Patient and/or guardian verbalizes understanding and agrees with above counseling,assessment and plan.    All questions answered.

## 2024-12-16 ENCOUNTER — OFFICE VISIT (OUTPATIENT)
Dept: FAMILY MEDICINE CLINIC | Age: 54
End: 2024-12-16

## 2024-12-16 VITALS
SYSTOLIC BLOOD PRESSURE: 114 MMHG | HEIGHT: 62 IN | WEIGHT: 102 LBS | BODY MASS INDEX: 18.77 KG/M2 | OXYGEN SATURATION: 99 % | TEMPERATURE: 97.7 F | DIASTOLIC BLOOD PRESSURE: 84 MMHG | RESPIRATION RATE: 15 BRPM | HEART RATE: 91 BPM

## 2024-12-16 DIAGNOSIS — U07.1 COVID-19: ICD-10-CM

## 2024-12-16 DIAGNOSIS — J44.0 CHRONIC OBSTRUCTIVE PULMONARY DISEASE WITH ACUTE LOWER RESPIRATORY INFECTION (HCC): ICD-10-CM

## 2024-12-16 DIAGNOSIS — R05.9 COUGH, UNSPECIFIED TYPE: Primary | ICD-10-CM

## 2024-12-16 LAB
INFLUENZA A ANTIGEN, POC: NORMAL
INFLUENZA B ANTIGEN, POC: NORMAL
Lab: ABNORMAL
PERFORMING INSTRUMENT: ABNORMAL
QC PASS/FAIL: ABNORMAL
SARS-COV-2, POC: DETECTED

## 2024-12-16 RX ORDER — METHYLPREDNISOLONE 4 MG/1
TABLET ORAL
Qty: 1 KIT | Refills: 0 | Status: SHIPPED | OUTPATIENT
Start: 2024-12-16 | End: 2024-12-22

## 2024-12-20 RX ORDER — ERGOCALCIFEROL 1.25 MG/1
50000 CAPSULE, LIQUID FILLED ORAL WEEKLY
Qty: 8 CAPSULE | Refills: 0 | Status: SHIPPED | OUTPATIENT
Start: 2024-12-20 | End: 2025-02-08

## 2024-12-20 NOTE — TELEPHONE ENCOUNTER
Name of Medication(s) Requested:  Requested Prescriptions     Pending Prescriptions Disp Refills    vitamin D (ERGOCALCIFEROL) 1.25 MG (28723 UT) CAPS capsule 8 capsule 0     Sig: Take 1 capsule by mouth once a week for 8 doses       Medication is on current medication list Yes    Dosage and directions were verified? Yes    Quantity verified: 90 day supply     Pharmacy Verified?  Yes    Last Appointment:  11/13/2024    Future appts:  No future appointments.     (If no appt send self scheduling link. .REFILLAPPT)  Scheduling request sent?     [] Yes  [x] No    Does patient need updated?  [] Yes  [x] No

## 2025-01-10 ENCOUNTER — TELEPHONE (OUTPATIENT)
Dept: FAMILY MEDICINE CLINIC | Age: 55
End: 2025-01-10

## 2025-01-10 DIAGNOSIS — R73.03 PREDIABETES: Primary | ICD-10-CM

## 2025-01-10 NOTE — TELEPHONE ENCOUNTER
Patient requesting follow up A1C, and appointment to review with you. I did schedule her for 2/13 and will pend A1C.   Last A1C on 9/17/24

## 2025-01-15 ENCOUNTER — OFFICE VISIT (OUTPATIENT)
Dept: FAMILY MEDICINE CLINIC | Age: 55
End: 2025-01-15

## 2025-01-15 ENCOUNTER — TELEPHONE (OUTPATIENT)
Dept: FAMILY MEDICINE CLINIC | Age: 55
End: 2025-01-15

## 2025-01-15 VITALS
BODY MASS INDEX: 18.22 KG/M2 | SYSTOLIC BLOOD PRESSURE: 100 MMHG | WEIGHT: 99 LBS | RESPIRATION RATE: 18 BRPM | HEART RATE: 85 BPM | OXYGEN SATURATION: 96 % | HEIGHT: 62 IN | TEMPERATURE: 98.6 F | DIASTOLIC BLOOD PRESSURE: 80 MMHG

## 2025-01-15 DIAGNOSIS — Z12.31 ENCOUNTER FOR SCREENING MAMMOGRAM FOR MALIGNANT NEOPLASM OF BREAST: Primary | ICD-10-CM

## 2025-01-15 DIAGNOSIS — J44.1 COPD EXACERBATION (HCC): Primary | ICD-10-CM

## 2025-01-15 DIAGNOSIS — R52 BODY ACHES: ICD-10-CM

## 2025-01-15 LAB
INFLUENZA A ANTIGEN, POC: NEGATIVE
INFLUENZA B ANTIGEN, POC: NEGATIVE

## 2025-01-15 RX ORDER — PREDNISONE 20 MG/1
40 TABLET ORAL DAILY
Qty: 10 TABLET | Refills: 0 | Status: SHIPPED | OUTPATIENT
Start: 2025-01-15 | End: 2025-01-20

## 2025-01-15 RX ORDER — BENZONATATE 100 MG/1
100 CAPSULE ORAL 3 TIMES DAILY PRN
Qty: 30 CAPSULE | Refills: 0 | Status: SHIPPED | OUTPATIENT
Start: 2025-01-15 | End: 2025-01-25

## 2025-01-15 RX ORDER — DOXYCYCLINE HYCLATE 100 MG
100 TABLET ORAL 2 TIMES DAILY
Qty: 14 TABLET | Refills: 0 | Status: SHIPPED | OUTPATIENT
Start: 2025-01-15 | End: 2025-01-22

## 2025-01-15 NOTE — TELEPHONE ENCOUNTER
Good Morning Dr Oanh Orosco scheduled her mammogram and needs an order put in Epic    Thank You

## 2025-01-15 NOTE — PROGRESS NOTES
apply route daily as needed (with inhaler), Disp: 1 each, Rfl: 0    vitamin D (ERGOCALCIFEROL) 1.25 MG (72399 UT) CAPS capsule, Take 1 capsule by mouth once a week for 8 doses, Disp: 8 capsule, Rfl: 0    fluticasone furoate-vilanterol (BREO ELLIPTA) 100-25 MCG/ACT inhaler, Inhale 1 puff into the lungs daily, Disp: 1 each, Rfl: 1    baclofen (LIORESAL) 10 MG tablet, Take 1 tablet by mouth 3 times daily, Disp: 360 tablet, Rfl: 0    atorvastatin (LIPITOR) 40 MG tablet, Take 1 tablet by mouth daily, Disp: 30 tablet, Rfl: 3    sublingual tablet cyanocobalamin 2500 MCG SUBL, Place under the tongue, Disp: , Rfl:     pantoprazole (PROTONIX) 40 MG tablet, Take 1 tablet by mouth daily, Disp: , Rfl:     albuterol sulfate HFA (VENTOLIN HFA) 108 (90 Base) MCG/ACT inhaler, Inhale 2 puffs into the lungs 4 times daily as needed for Wheezing, Disp: 18 g, Rfl: 5    famotidine (PEPCID) 40 MG tablet, Take 1 tablet by mouth, Disp: , Rfl:     RA MELATONIN 10 MG TABS, , Disp: , Rfl:     Eptinezumab-jjmr (VYEPTI IV), Infuse intravenously every 3 months, Disp: , Rfl:     DULoxetine (CYMBALTA) 60 MG extended release capsule, 1 capsule 2 times daily, Disp: , Rfl:     diazepam (VALIUM) 10 MG tablet, Take 1 tablet by mouth in the morning and 1 tablet at noon and 1 tablet in the evening. Instructed to take am of procedure if needed., Disp: , Rfl:     folic acid (FOLVITE) 1 MG tablet, Take 1 tablet by mouth daily, Disp: , Rfl:   Results:     Orders Placed This Encounter   Procedures    POCT Influenza A/B Antigen      Results  Laboratory Studies  Influenza test is negative.  Assessment & Plan:     1. COPD exacerbation (HCC)    2. Body aches      Orders Placed This Encounter    POCT Influenza A/B Antigen    doxycycline hyclate (VIBRA-TABS) 100 MG tablet     Sig: Take 1 tablet by mouth 2 times daily for 7 days     Dispense:  14 tablet     Refill:  0    predniSONE (DELTASONE) 20 MG tablet     Sig: Take 2 tablets by mouth daily for 5 days

## 2025-01-25 DIAGNOSIS — M47.812 SPONDYLOSIS OF CERVICAL REGION WITHOUT MYELOPATHY OR RADICULOPATHY: ICD-10-CM

## 2025-01-27 RX ORDER — BACLOFEN 10 MG/1
10 TABLET ORAL 3 TIMES DAILY
Qty: 270 TABLET | Refills: 1 | Status: SHIPPED | OUTPATIENT
Start: 2025-01-27

## 2025-01-27 NOTE — TELEPHONE ENCOUNTER
Name of Medication(s) Requested:  Requested Prescriptions     Pending Prescriptions Disp Refills    baclofen (LIORESAL) 10 MG tablet [Pharmacy Med Name: BACLOFEN 10MG TABLET] 270 tablet 1     Sig: TAKE ONE TABLET BY MOUTH THREE TIMES A DAY       Medication is on current medication list Yes    Dosage and directions were verified? Yes    Quantity verified: 90 day supply     Pharmacy Verified?  Yes    Last Appointment:  11/13/2024    Future appts:  Future Appointments   Date Time Provider Department Center   1/29/2025  1:00 PM [facility]   2/13/2025  2:45 PM Rocael Hugo MD [facility] DE[facility]        (If no appt send self scheduling link. .REFILLAPPT)  Scheduling request sent?     [] Yes  [x] No    Does patient need updated?  [] Yes  [x] No

## 2025-02-12 SDOH — ECONOMIC STABILITY: TRANSPORTATION INSECURITY
IN THE PAST 12 MONTHS, HAS LACK OF TRANSPORTATION KEPT YOU FROM MEETINGS, WORK, OR FROM GETTING THINGS NEEDED FOR DAILY LIVING?: PATIENT DECLINED

## 2025-02-12 SDOH — ECONOMIC STABILITY: FOOD INSECURITY: WITHIN THE PAST 12 MONTHS, THE FOOD YOU BOUGHT JUST DIDN'T LAST AND YOU DIDN'T HAVE MONEY TO GET MORE.: PATIENT DECLINED

## 2025-02-12 SDOH — ECONOMIC STABILITY: TRANSPORTATION INSECURITY
IN THE PAST 12 MONTHS, HAS THE LACK OF TRANSPORTATION KEPT YOU FROM MEDICAL APPOINTMENTS OR FROM GETTING MEDICATIONS?: PATIENT DECLINED

## 2025-02-12 SDOH — ECONOMIC STABILITY: FOOD INSECURITY: WITHIN THE PAST 12 MONTHS, YOU WORRIED THAT YOUR FOOD WOULD RUN OUT BEFORE YOU GOT MONEY TO BUY MORE.: PATIENT DECLINED

## 2025-02-12 SDOH — ECONOMIC STABILITY: INCOME INSECURITY: IN THE LAST 12 MONTHS, WAS THERE A TIME WHEN YOU WERE NOT ABLE TO PAY THE MORTGAGE OR RENT ON TIME?: PATIENT DECLINED

## 2025-02-12 ASSESSMENT — PATIENT HEALTH QUESTIONNAIRE - PHQ9
3. TROUBLE FALLING OR STAYING ASLEEP: NEARLY EVERY DAY
5. POOR APPETITE OR OVEREATING: NEARLY EVERY DAY
10. IF YOU CHECKED OFF ANY PROBLEMS, HOW DIFFICULT HAVE THESE PROBLEMS MADE IT FOR YOU TO DO YOUR WORK, TAKE CARE OF THINGS AT HOME, OR GET ALONG WITH OTHER PEOPLE: VERY DIFFICULT
7. TROUBLE CONCENTRATING ON THINGS, SUCH AS READING THE NEWSPAPER OR WATCHING TELEVISION: NOT AT ALL
2. FEELING DOWN, DEPRESSED OR HOPELESS: NOT AT ALL
8. MOVING OR SPEAKING SO SLOWLY THAT OTHER PEOPLE COULD HAVE NOTICED. OR THE OPPOSITE, BEING SO FIGETY OR RESTLESS THAT YOU HAVE BEEN MOVING AROUND A LOT MORE THAN USUAL: NOT AT ALL
4. FEELING TIRED OR HAVING LITTLE ENERGY: NEARLY EVERY DAY
1. LITTLE INTEREST OR PLEASURE IN DOING THINGS: NOT AT ALL
9. THOUGHTS THAT YOU WOULD BE BETTER OFF DEAD, OR OF HURTING YOURSELF: NOT AT ALL
9. THOUGHTS THAT YOU WOULD BE BETTER OFF DEAD, OR OF HURTING YOURSELF: NOT AT ALL
8. MOVING OR SPEAKING SO SLOWLY THAT OTHER PEOPLE COULD HAVE NOTICED. OR THE OPPOSITE - BEING SO FIDGETY OR RESTLESS THAT YOU HAVE BEEN MOVING AROUND A LOT MORE THAN USUAL: NOT AT ALL
10. IF YOU CHECKED OFF ANY PROBLEMS, HOW DIFFICULT HAVE THESE PROBLEMS MADE IT FOR YOU TO DO YOUR WORK, TAKE CARE OF THINGS AT HOME, OR GET ALONG WITH OTHER PEOPLE: VERY DIFFICULT
6. FEELING BAD ABOUT YOURSELF - OR THAT YOU ARE A FAILURE OR HAVE LET YOURSELF OR YOUR FAMILY DOWN: NOT AT ALL
3. TROUBLE FALLING OR STAYING ASLEEP: NEARLY EVERY DAY
SUM OF ALL RESPONSES TO PHQ9 QUESTIONS 1 & 2: 0
SUM OF ALL RESPONSES TO PHQ QUESTIONS 1-9: 9
7. TROUBLE CONCENTRATING ON THINGS, SUCH AS READING THE NEWSPAPER OR WATCHING TELEVISION: NOT AT ALL
6. FEELING BAD ABOUT YOURSELF - OR THAT YOU ARE A FAILURE OR HAVE LET YOURSELF OR YOUR FAMILY DOWN: NOT AT ALL
2. FEELING DOWN, DEPRESSED OR HOPELESS: NOT AT ALL
5. POOR APPETITE OR OVEREATING: NEARLY EVERY DAY
1. LITTLE INTEREST OR PLEASURE IN DOING THINGS: NOT AT ALL
4. FEELING TIRED OR HAVING LITTLE ENERGY: NEARLY EVERY DAY

## 2025-02-13 ENCOUNTER — OFFICE VISIT (OUTPATIENT)
Dept: FAMILY MEDICINE CLINIC | Age: 55
End: 2025-02-13

## 2025-02-13 VITALS
BODY MASS INDEX: 18.95 KG/M2 | HEART RATE: 81 BPM | WEIGHT: 103 LBS | TEMPERATURE: 97.1 F | HEIGHT: 62 IN | RESPIRATION RATE: 16 BRPM | SYSTOLIC BLOOD PRESSURE: 110 MMHG | DIASTOLIC BLOOD PRESSURE: 72 MMHG | OXYGEN SATURATION: 98 %

## 2025-02-13 DIAGNOSIS — E55.9 VITAMIN D DEFICIENCY: ICD-10-CM

## 2025-02-13 DIAGNOSIS — R73.03 PREDIABETES: Primary | ICD-10-CM

## 2025-02-13 DIAGNOSIS — Z59.41 FOOD INSECURITY: ICD-10-CM

## 2025-02-13 DIAGNOSIS — E78.2 MIXED HYPERLIPIDEMIA: ICD-10-CM

## 2025-02-13 LAB — HBA1C MFR BLD: 6 %

## 2025-02-13 RX ORDER — GLIPIZIDE 2.5 MG/1
2.5 TABLET, EXTENDED RELEASE ORAL DAILY
Qty: 30 TABLET | Refills: 2 | Status: SHIPPED | OUTPATIENT
Start: 2025-02-13

## 2025-02-13 RX ORDER — BUTYROSPERMUM PARKII(SHEA BUTTER), SIMMONDSIA CHINENSIS (JOJOBA) SEED OIL, ALOE BARBADENSIS LEAF EXTRACT .01; 1; 3.5 G/100G; G/100G; G/100G
1 LIQUID TOPICAL
COMMUNITY

## 2025-02-13 SDOH — ECONOMIC STABILITY: FOOD INSECURITY: WITHIN THE PAST 12 MONTHS, YOU WORRIED THAT YOUR FOOD WOULD RUN OUT BEFORE YOU GOT MONEY TO BUY MORE.: SOMETIMES TRUE

## 2025-02-13 SDOH — ECONOMIC STABILITY - FOOD INSECURITY: FOOD INSECURITY: Z59.41

## 2025-02-13 SDOH — ECONOMIC STABILITY: FOOD INSECURITY: WITHIN THE PAST 12 MONTHS, THE FOOD YOU BOUGHT JUST DIDN'T LAST AND YOU DIDN'T HAVE MONEY TO GET MORE.: SOMETIMES TRUE

## 2025-02-13 NOTE — PROGRESS NOTES
Ana Luisa Pate is a 54 y.o. female   CC:   Chief Complaint   Patient presents with    3 Month Follow-Up     Would like an A1c checked     Weight Loss     Having trouble affording food and is losing weight        History of Present Illness  The patient presents for evaluation of prediabetes, vitamin D deficiency, and chronic constipation.    Prediabetes  She has been adhering to the prescribed regimen for her prediabetes but is uncertain about the cause of her elevated A1c levels. She has a family history of diabetes and is concerned about the potential side effects of Ozempic, particularly its association with cancer, given her familial predisposition to the disease. She has previously consulted with a nutritionist from the [facility], who provided dietary recommendations. However, due to financial constraints, she has been unable to fully implement these suggestions. She is currently on disability and has limited funds after paying her bills. She has been referred to social work for assistance with food and transportation. She has a friend who drives her around as she does not have her own car. She recalls a previous adverse reaction to metformin, which induced nausea and physical discomfort.  - Onset: Uncertain about the cause of elevated A1c levels.  - Character: Concerned about potential side effects of Ozempic, particularly its association with cancer.  - Alleviating/Aggravating Factors: Adverse reaction to metformin (nausea and physical discomfort).  - Severity: Elevated A1c levels.    Vitamin D Deficiency  She has been prescribed vitamin D by her psychiatrist, who is aware of her poor physical health and inadequate diet. The psychiatrist's prescription is for a lower dose than what she has been taking. She has been taking the higher dose once daily in the morning with breakfast for the past 2 months. She is questioning whether this could be contributing to her constipation, as she knows her body

## 2025-02-14 ENCOUNTER — CARE COORDINATION (OUTPATIENT)
Dept: CARE COORDINATION | Age: 55
End: 2025-02-14

## 2025-02-14 NOTE — CARE COORDINATION
SWCC made call to pt, she reports she just woke up and requested to call this SWCC back later.    Kayley HAN, LSW   Care Coordinator  [phone]

## 2025-02-17 NOTE — PATIENT INSTRUCTIONS
[facility] Food Resources*  (Call [facility] for more resources)     HELP NETWORK OF [facility]:  What they do: Provides 24-hr, 7 days a week access to information on community resources for food & clothing help for [facility] AND [facility]  Phone: 211 or [phone]  Text “HELP NETWORK” to 583608 for local food resources  DEPARTMENT OF JOB AND FAMILY SERVICES:  What they do: SNAP, provide a card to use like cash to purchase healthy foods at approved retailers  [place] Benefits Phone Number: 1-152.802.7150   [facility] DJFS: [address] [place] [address]  Phone: [phone]   [facility] DJFS: [address] [place] [address]  Phone: [phone]  [facility] DJFS: [address] [facility] [place] [address]  Phone: [phone]  Website: [place].[facility]:  [address] .  [place] [address]  What they offer: Food and clothing distribution on Tuesdays from 9 am to 12pm & Thursdays from 4pm to 7pm.   Phone Number: [phone] ext. 503  Website: [email or web address]  [facility]: [address] [place] [address]  What they offer: food and clothing  Phone Number: [phone]  [facility]: [address] [place] [address]  Phone Number: [phone]  [facility]: [address] [place] [address]  Phone Number: [phone]  [facility] [employer] [facility]  What they offer: food items that stop at various housing and community services organizations, follow a month schedule.  Call to learn more.  Phone Number: [phone]  [employer] $15.00 voucher; 1 per household per month; can request on site or call.   [facility] FAMILY SERVICE: [address] [place] [address]  What they offer: Emergency food assistance  Phone number: [phone]  Website: [employer]  [employer]:  [address] [place] [address]  What they offer: Serves breakfast and

## 2025-02-18 ENCOUNTER — CARE COORDINATION (OUTPATIENT)
Dept: CARE COORDINATION | Age: 55
End: 2025-02-18

## 2025-02-18 NOTE — CARE COORDINATION
SWCC made call to pt to initiate SW referral, unable to reach, left message requesting call back to this CC.    Kayley Mcmullen MSW, LSW   Care Coordinator  [phone]

## 2025-02-21 ENCOUNTER — CARE COORDINATION (OUTPATIENT)
Dept: CARE COORDINATION | Age: 55
End: 2025-02-21

## 2025-02-21 NOTE — CARE COORDINATION
CC made call to pt to initiate SW referral, unable to reach, left message requesting call back.    Kayley Mcmullen MSW, LSW   Care Coordinator  [phone]

## 2025-02-28 ENCOUNTER — CARE COORDINATION (OUTPATIENT)
Dept: CARE COORDINATION | Age: 55
End: 2025-02-28

## 2025-02-28 NOTE — CARE COORDINATION
SWCC made 3rd outreach attempt to pt to initiate SW referral unable to reach, left message requesting call back to this SWCC. If no return call, CC to close out referral.  Kayley HAN, W   Care Coordinator  [phone]

## 2025-03-03 DIAGNOSIS — J44.9 CHRONIC OBSTRUCTIVE PULMONARY DISEASE, UNSPECIFIED COPD TYPE (HCC): ICD-10-CM

## 2025-03-03 RX ORDER — ALBUTEROL SULFATE 90 UG/1
2 INHALANT RESPIRATORY (INHALATION) 4 TIMES DAILY PRN
Qty: 18 G | Refills: 5 | Status: SHIPPED | OUTPATIENT
Start: 2025-03-03

## 2025-03-06 NOTE — TELEPHONE ENCOUNTER
Left msg to return call [Disease: _____________________] : Disease: [unfilled] [T: ___] : T[unfilled] [N: ___] : N[unfilled] [M: ___] : M[unfilled] [AJCC Stage: ____] : AJCC Stage: [unfilled] [de-identified] : 62F former smoker (27py, quit ~17y ago) referred by Dr. Nieves after surveillance CT scans showing interval growth of CHAPARRITA lesion with biopsy showing lung adenocarcinoma status post resection by Dr. Garland, final pathology with positive margins and mediastinal invasion, here for follow up.  PMH: Extensive medical history including COPD, atopic asthma (on fasenra) glaucoma, cataracts, T2DM, RA(on chronic steroids, currently being tapered, etanercept) anxiety/depression FH: reports that maternal aunt and uncles all  of cancer (unknown types) SH: retired, worked for former board [employer], has a son in [place], daughter in [place], 27py smoking history, quit ~17y ago  Onc Hx: She has been seeing Dr. Nieves for her COPD/atopic asthma. Given her smoking history, she also has been under surveillance for a lung nodule that has increased in size.    CT Chest 24 Left upper lobe 0.8 cm subsolid nodule contiguous with and adjacent to an emphysematous cystic air space medially, which demonstrates irregular nodular wall thickening as described above. The exact etiology is unclear, but one differential diagnostic consideration is for primary lung neoplasm. Recommend correlation with PET/CT. No evidence of pulmonary fibrosis as clinically questioned.  CT Chest 24 When compared to examination dated 2024 there has been interval development of a part solid nodule measuring 29 x 28 mm in transverse by AP dimensions (9:67) within the apical posterior segment of the left upper lobe. The solid component has increased in size measuring 26 x 12 mm as above. Further imaging to include whole-body PET/CT is suggested to exclude developing adenocarcinoma. Substantial paraseptal and moderate centrilobular emphysema again noted. Multiple stable subcentimeter as well as solid and calcified micronodules as described above.  PET-CT 24 Abnormal skull-to-thigh FDG-PET/CT scan. 1. An intensely FDG avid irregular solid nodule in the paramediastinal left lung apex, compatible with lung malignancy. No hypermetabolic hilar or mediastinal nodes. 2. New superior endplate deformities in T12 and L2 with increased activity may reflect recent trauma; please correlate clinically. 3. No suspicious FDG avid malignant tissue in the remaining field of view.  Lung, CHAPARRITA FNA Pathology 10/24/24 POSITIVE FOR MALIGNANT CELLS. Non- small cell carcinoma, favor adenocarcinoma - see Note. The cell block shows scant similar findings.  EBUS 24 ALL ln NEGATIVE  MRI Brain 24 - No evidence of intracranial metastasis.  12/10/2024: Pathology:  1.  Lung, left upper lobe with mediastinal pleura, resection: -   Poorly differentiated adenocarcinoma, solid (90%) and acinar (10%) patterns -   1.2 cm greatest dimension -   Resection edge negative for adenocarcinoma -   Fibrous adhesions with infiltrating adenocarcinoma present, see note -   Negative for lymphovascular invasion -   Negative for REGLA -   Emphysematous changes of surrounding lung -   See synoptic report below  Note: There is extensive fibrosis and inflammation surrounding the tumor. IHC workup reveals the malignant cells are positive for TTF-1.  They are negative for Napsin and p40.  Per Dr. MAURA Garland, the tumor was adhered to the mediastinal pleura.  Fragments of fibrous adhesions containing infiltrating adenocarcinoma are present in the specimen container (grossly described as free-floating tissue).  Because adenocarcinoma is present in the adhesions, this lesion will be staged as invading the mediastinal region of the parietal  pleura (pT3).  This case is discussed with Dr. Garland on 2024.  A portion the specimen will be sent for PD-L1 IHC and molecular testing.  2.  Lung, left upper lobe, additional margin excision: -   Negative for carcinoma -   Emphysematous change and subpleural bleb 3.  Lymph node, left level 9, excision: 1 lymph node negative for metastatic carcinoma, 0/1 4.  Lymph node, level 7, excision: 1 lymph node negative for metastatic carcinoma, 0/1 5.  Lymph node, left level 10, excision: 3 lymph nodes negative for metastatic carcinoma, 0/3 6.  Lymph node, left level 5, excision: Multiple fragments of lymph node(s) negative for metastatic carcinoma  Verified by: Rickey Cordero M.D. Reported on: 24 16:08 [facility], [facility], [address] [place] [address], _________________________________________________________________  Comment mol 1A  Synoptic Summary 1: Lung - Resection Procedure:  Wedge resection Specimen Laterality:   Left Tumor Focality:   Single focus Tumor Site:  Upper lobe of lung Total Tumor Size:   1.2 Centimeters (cm) Size of Invasive Component:   1.2 Centimeters (cm) Histologic Type:   Invasive solid adenocarcinoma Histologic Patterns Present:   Acinar - 10%;  Solid - 90% Histologic Grade:   G3, poorly differentiated Spread Through Air Spaces (REGLA):    Not identified Visceral Pleura Invasion:   Present Direct Invasion of Adjacent Structures:    Present Involved Adjacent Structures:   Mediastinal region of parietal pleura Treatment Effect:   No known presurgical therapy Lymphovascular Invasion:   Not identified Margin Status for Invasive Carcinoma:    All margins negative for invasive carcinoma Closest Margin(s) to Invasive Carcinoma:    Parenchymal Distance from Invasive Carcinoma to Closest Margin:    Greater than 0.8 cm Margin Status for Non-Invasive Tumor:    Not applicable Lymph Node(s) from Prior Procedures:    No known prior lymph node sampling performed Regional Lymph Node Status:   All regional lymph nodes negative for tumor Number of Lymph Nodes Examined:   At least 6 Amira Site(s) Examined:   7: Subcarinal;  5: Subaortic / aortopulmonary (AP) / AP window;  9L: Pulmonary ligament; 10L: Hilar Pathologic Stage Classification (pTNM, AJCC 8th Edition) pT Category:   pT4 pN Category:   pN0 Best Tumor Blocks for Future Studies Tumor Block(s):   1A  25: TTB - - Surgical pathology was reviewed, pleural margin is positive.   25: CT Chest:  1. Enlarging left superior mediastinal lesion with probable central necrosis measuring 25 x 25 mm (it measured 16 x 15 mm on whole-body PET/CT dated 2024. This may represent mediastinal tumor progression with central necrosis versus postsurgical changes/abscess. Clinical and laboratory correlation and follow-up whole-body PET/CT may be of value. 2. Interval postsurgical changes with chain sutures in the apical posterior segment of the left upper lobe in the paramediastinal region with increasing masslike consolidation in this region measuring 4 x 19 mm. Abbreviated differential includes tumor progression, postsurgical/radiation changes. Whole-body PET/CT correlation and short interval surveillance is suggested. 3. Additional stable findings as described above. [de-identified] : Adenocarcinoma - PDL1 40-50%, KRAS G12C [de-identified] : Positive margins, invasion of parietal pleura [de-identified] : Pulm: Dr. Nieves CTSx: Dr. Garland [FreeTextEntry1] : . 2/4/25 = C1D1 carbo/taxol 2/11/25 = C1D8 carbo/taxol + RT [de-identified] : Here for C5 carbo/taxol. Cont to experience sciatica as well as pain for which she takes oxycodone 10mg PRN. Having issues with constpation related to oxycodone usage. Intermittent n/v persists, she notes that this symptom preceded her cancer dx and treatment

## 2025-03-24 ENCOUNTER — TELEPHONE (OUTPATIENT)
Dept: FAMILY MEDICINE CLINIC | Age: 55
End: 2025-03-24

## 2025-03-24 NOTE — TELEPHONE ENCOUNTER
----- Message from Trell ANDERSON sent at 3/24/2025 12:42 PM EDT -----  Regarding: ECC Appointment Request  ECC Appointment Request    Patient needs appointment for ECC Appointment Type: New to Provider.    Patient Requested Dates(s):as soon as possible for the month of April 2025.  Patient Requested Time: afternoon   Provider Name:Benja Chau MD    Reason for Appointment Request: Established Patient - Available appointments did not meet patient need /Patient want to permanently switch PCP to Benja Chau MD to establish care .  --------------------------------------------------------------------------------------------------------------------------    Relationship to Patient: Self     Call Back Information: OK to leave message on voicemail  Preferred Call Back Number: Phone [phone]

## 2025-03-25 ENCOUNTER — TELEPHONE (OUTPATIENT)
Dept: HEMATOLOGY | Age: 55
End: 2025-03-25

## 2025-03-25 NOTE — TELEPHONE ENCOUNTER
Patient called in and rescheduled her MRI and called our office back to make her follow up appt.  Appt made for 4/22/25 at 11:45am at the [place] office.  She confirmed this appt.    Electronically signed by Maria Luisa Brady RN on 3/25/2025 at 1:51 PM

## 2025-03-31 DIAGNOSIS — D49.0 IPMN (INTRADUCTAL PAPILLARY MUCINOUS NEOPLASM): Primary | ICD-10-CM

## 2025-03-31 DIAGNOSIS — E78.2 MIXED HYPERLIPIDEMIA: Chronic | ICD-10-CM

## 2025-03-31 RX ORDER — ATORVASTATIN CALCIUM 40 MG/1
40 TABLET, FILM COATED ORAL DAILY
Qty: 30 TABLET | Refills: 3 | Status: SHIPPED | OUTPATIENT
Start: 2025-03-31

## 2025-03-31 NOTE — TELEPHONE ENCOUNTER
Last Appointment:  2/13/2025  Future Appointments   Date Time Provider Department Center   4/16/2025 11:00 AM [facility] MRI-B [facility] MRI [facility] Radiolog   4/22/2025 11:45 AM Thuan Hua III, MD COL SURG [facility]

## 2025-04-01 ENCOUNTER — TELEPHONE (OUTPATIENT)
Dept: PHARMACY | Facility: CLINIC | Age: 55
End: 2025-04-01

## 2025-04-01 NOTE — TELEPHONE ENCOUNTER
[facility] CLINICAL PHARMACY: ADHERENCE REVIEW  Identified care gap per [employer]: [facility] at [facility] : Diabetes and Statin adherence    Patient also appears to be prescribed: Diabetes and Statin    ASSESSMENT  DIABETES ADHERENCE    Insurance Records claims through 2025 (Prior Year PDC = not reported; YTD PDC = FIRST FILL; Potential Fail Date: 24):   GLIPIZIDE ER TAB 2.5MG last filled on 25 for 30 day supply. Next refill due: 03/15/24    Prescribed si tablet/capsule daily    Per Reconcile Dispense History: last filled on 25 for 30 day supply.     Per [facility]: will get  30 day supply ready to  since past due.    Lab Results   Component Value Date    LABA1C 6.0 2025    LABA1C 5.7 (H) 2024    LABA1C 5.6 2022     NOTE: A1c <9%    STATIN ADHERENCE    Insurance Records claims through 2025 (Prior Year PDC = not reported; YTD PDC = 100%; Potential Fail Date: 25):   ATORVASTATIN TAB 40MG last filled on 25 for 30 day supply. Next refill due: 25    Prescribed si tablet/capsule daily    Per Insurer Portal: last filled on 25 for 30 day supply.     Per [facility]: will get  30 day supply ready to  since past due.    Lab Results   Component Value Date    CHOL 178 2023    TRIG 159 (H) 2023    HDL 47 2024     Lab Results   Component Value Date    LDL 82 2024      ALT   Date Value Ref Range Status   2024 6 0 - 32 U/L Final     AST   Date Value Ref Range Status   2024 18 0 - 31 U/L Final     The 10-year ASCVD risk score (Shilpi RASHAD, et al., 2019) is: 3.1%    Values used to calculate the score:      Age: 55 years      Sex: Female      Is Non- : No      Diabetic: No      Tobacco smoker: Yes      Systolic Blood Pressure: 110 mmHg      Is BP treated: No      HDL Cholesterol: 47 mg/dL      Total Cholesterol: 150 mg/dL     PLAN  The following are interventions

## 2025-04-16 ENCOUNTER — HOSPITAL ENCOUNTER (OUTPATIENT)
Age: 55
Discharge: HOME | End: 2025-04-16
Attending: TRANSPLANT SURGERY
Payer: MEDICARE

## 2025-04-16 ENCOUNTER — HOSPITAL ENCOUNTER (OUTPATIENT)
Dept: MRI IMAGING | Age: 55
Discharge: HOME | End: 2025-04-18
Attending: TRANSPLANT SURGERY
Payer: MEDICARE

## 2025-04-16 DIAGNOSIS — D49.0 IPMN (INTRADUCTAL PAPILLARY MUCINOUS NEOPLASM): ICD-10-CM

## 2025-04-16 LAB
BUN SERPL-MCNC: 11 MG/DL (ref 6–20)
CREAT SERPL-MCNC: 0.8 MG/DL (ref 0.5–1)
GFR, ESTIMATED: 86 ML/MIN/1.73M2

## 2025-04-16 PROCEDURE — 36415 COLL VENOUS BLD VENIPUNCTURE: CPT

## 2025-04-16 PROCEDURE — A9579 GAD-BASE MR CONTRAST NOS,1ML: HCPCS | Performed by: RADIOLOGY

## 2025-04-16 PROCEDURE — 84520 ASSAY OF UREA NITROGEN: CPT

## 2025-04-16 PROCEDURE — 74183 MRI ABD W/O CNTR FLWD CNTR: CPT

## 2025-04-16 PROCEDURE — 6360000004 HC RX CONTRAST MEDICATION: Performed by: RADIOLOGY

## 2025-04-16 PROCEDURE — 82565 ASSAY OF CREATININE: CPT

## 2025-04-16 RX ADMIN — GADOTERIDOL 8 ML: 279.3 INJECTION, SOLUTION INTRAVENOUS at 11:25

## 2025-04-22 ENCOUNTER — OFFICE VISIT (OUTPATIENT)
Dept: SURGERY | Age: 55
End: 2025-04-22
Payer: MEDICARE

## 2025-04-22 VITALS
HEART RATE: 81 BPM | WEIGHT: 98.4 LBS | RESPIRATION RATE: 16 BRPM | BODY MASS INDEX: 18.11 KG/M2 | SYSTOLIC BLOOD PRESSURE: 120 MMHG | TEMPERATURE: 97.1 F | HEIGHT: 62 IN | OXYGEN SATURATION: 98 % | DIASTOLIC BLOOD PRESSURE: 82 MMHG

## 2025-04-22 DIAGNOSIS — D49.0 IPMN (INTRADUCTAL PAPILLARY MUCINOUS NEOPLASM): Primary | ICD-10-CM

## 2025-04-22 PROCEDURE — 99213 OFFICE O/P EST LOW 20 MIN: CPT | Performed by: TRANSPLANT SURGERY

## 2025-04-22 NOTE — PROGRESS NOTES
Zoey potter.     Please send a copy of my note to ZAIRE Keith CNP    Electronically signed by Thuan Hua MD on 4/22/2025 at 12:09 PM

## 2025-05-20 ENCOUNTER — TELEPHONE (OUTPATIENT)
Dept: PHARMACY | Facility: CLINIC | Age: 55
End: 2025-05-20

## 2025-05-20 NOTE — TELEPHONE ENCOUNTER
[facility] CLINICAL PHARMACY: ADHERENCE REVIEW  Identified care gap per [employer]: fills at [facility] : Diabetes adherence    Patient also appears to be prescribed: Statin    ASSESSMENT  DIABETES ADHERENCE    Insurance Records claims through 2025 (Prior Year PDC = not reported; YTD PDC = 68%; Potential Fail Date: 25):   Glipizide ER 2.5 mg tab last filled on 25 for 30 day supply. Next refill due: 25    Prescribed si tablet/capsule daily - of note pt had OV with gen surgery 25, glipizide was not marked as taking at visit     New Rx 25 OV with PCP Dr. Hugo; per OV note:  1. Prediabetes: A1c levels of 5.6, 5.7, and 6.0.  - Initiate glipizide 2.5 mg daily.  - Discontinue glipizide if adverse reactions occur.  - Educate on potential side effects of glipizide, including hypoglycemia.    Per reconcile dispense:  GLIPIZIDE ER  2.5 MG  30 30 tablet Rocael Hugo MD [facility]...   GLIPIZIDE ER  2.5 MG  30 30 tablet Rocael Hugo MD [facility]...   Last Rx 2/13/25 x 30 day supply 2 RR - believe has refill at pharmacy if still taking    Lab Results   Component Value Date    LABA1C 6.0 2025    LABA1C 5.7 (H) 2024    LABA1C 5.6 2022     NOTE: A1c <9%    STATIN ADHERENCE    Insurance Records claims through 2025 (Prior Year PDC = not reported; YTD PDC = 95%; Potential Fail Date: 25):   Atorvastatin 40 mg tab last filled on 25 for 30 day supply. Next refill due: 25    Prescribed si tablet/capsule daily    Per Reconcile Dispense History:   ATORVASTATIN CALCIUM  40 MG TABS 2025 30 30 tablet Rocael Hugo MD [facility]...   ATORVASTATIN CALCIUM  40 MG TABS 2025 30 30 tablet Rocael Hugo MD [facility]...   ATORVASTATIN CALCIUM  40 MG TABS 2025 30 30 tablet Rocael Hugo MD [facility]...   ATORVASTATIN CALCIUM  40 MG TABS 2025 30 30 tablet Oanh,

## 2025-05-23 NOTE — TELEPHONE ENCOUNTER
Noted pt read "[employer]" message.    Per payer portal glipizide ER filled 5/21/25 x 30 day supply at [facility].    For Pharmacy Admin Tracking Only    Program: Immediately  CPA in place:  No  Recommendation Provided To: Patient/Caregiver: 1 via "[employer]" Message  Intervention Detail: Adherence Monitorin  Intervention Accepted By: Patient/Caregiver: 1  Gap Closed?: Yes   Time Spent (min): 15

## 2025-06-05 ENCOUNTER — TELEPHONE (OUTPATIENT)
Dept: FAMILY MEDICINE CLINIC | Age: 55
End: 2025-06-05

## 2025-06-05 NOTE — TELEPHONE ENCOUNTER
Patient calling to verify if she should betaking her Glipizide 2.5 mg QD?   This was ordered on 2/13 at office visit, A1c 6.0.   When she left visit she went to the pharmacy to buy a BS testing kit. The pharmacist told patient not to take the Glipizide if blood sugars were <120.   Patient checks FBS they are 100-110. So she has not taken any of the Glipizide.   Please advise     She also states she is continuing to lose weight, current weight is 95#.  She reports still have discomfort around her belly button. This pain is all the time but is worse after eating, a really bad ache, this last for a least 1 hour or so. In general this pain has been going on for months. She has seen GI for this. She is taking Protonix and Pepcid but they dont seem to work anymore.  Last Appointment:  2/13/2025  Future Appointments   Date Time Provider Department Center   8/14/2025 10:45 AM Rocael Hugo MD [facility] DEP

## 2025-06-06 NOTE — TELEPHONE ENCOUNTER
Ana Luisa called back and was advised to follow up with her GI doctor for her Gastro issues, and is agreeable to that.     She will contact the Gastro for an appointment.

## 2025-06-06 NOTE — TELEPHONE ENCOUNTER
As far as the patient's glipizide goes.  I think that is appropriate.      With her belly pain and her seeing GI I would recommend a follow-up with GI.  Perhaps she needs further workup.

## 2025-06-16 RX ORDER — FLUTICASONE FUROATE AND VILANTEROL 100; 25 UG/1; UG/1
1 POWDER RESPIRATORY (INHALATION) DAILY
Qty: 1 EACH | Refills: 1 | Status: SHIPPED | OUTPATIENT
Start: 2025-06-16

## 2025-06-16 NOTE — TELEPHONE ENCOUNTER
Name of Medication(s) Requested:  Requested Prescriptions     Pending Prescriptions Disp Refills    fluticasone furoate-vilanterol (BREO ELLIPTA) 100-25 MCG/ACT inhaler 1 each 1     Sig: Inhale 1 puff into the lungs daily       Medication is on current medication list Yes    Dosage and directions were verified? Yes    Quantity verified: 30 day supply     Pharmacy Verified?  Yes    Last Appointment:  2/13/2025    Future appts:  Future Appointments   Date Time Provider Department Center   8/14/2025 10:45 AM Rocael Hugo MD [facility] DEP        (If no appt send self scheduling link. .REFILLAPPT)  Scheduling request sent?     [] Yes  [x] No    Does patient need updated?  [] Yes  [x] No

## 2025-07-03 ENCOUNTER — HOSPITAL ENCOUNTER (OUTPATIENT)
Dept: CT IMAGING | Age: 55
Discharge: HOME | End: 2025-07-05

## 2025-07-03 DIAGNOSIS — R63.4 WEIGHT LOSS: ICD-10-CM

## 2025-07-10 ENCOUNTER — HOSPITAL ENCOUNTER (OUTPATIENT)
Dept: CT IMAGING | Age: 55
Discharge: HOME | End: 2025-07-12
Payer: MEDICARE

## 2025-07-10 PROCEDURE — 6360000004 HC RX CONTRAST MEDICATION: Performed by: RADIOLOGY

## 2025-07-10 PROCEDURE — 71270 CT THORAX DX C-/C+: CPT

## 2025-07-10 RX ORDER — IOPAMIDOL 755 MG/ML
75 INJECTION, SOLUTION INTRAVASCULAR
Status: COMPLETED | OUTPATIENT
Start: 2025-07-10 | End: 2025-07-10

## 2025-07-10 RX ADMIN — IOPAMIDOL 75 ML: 755 INJECTION, SOLUTION INTRAVENOUS at 10:10

## 2025-07-18 ENCOUNTER — HOSPITAL ENCOUNTER (EMERGENCY)
Age: 55
Discharge: HOME | End: 2025-07-18
Attending: EMERGENCY MEDICINE
Payer: MEDICARE

## 2025-07-18 ENCOUNTER — OFFICE VISIT (OUTPATIENT)
Dept: FAMILY MEDICINE CLINIC | Age: 55
End: 2025-07-18

## 2025-07-18 ENCOUNTER — APPOINTMENT (OUTPATIENT)
Dept: CT IMAGING | Age: 55
End: 2025-07-18
Payer: MEDICARE

## 2025-07-18 VITALS
RESPIRATION RATE: 20 BRPM | TEMPERATURE: 97.9 F | OXYGEN SATURATION: 99 % | WEIGHT: 91 LBS | HEIGHT: 62 IN | BODY MASS INDEX: 16.75 KG/M2 | HEART RATE: 130 BPM | SYSTOLIC BLOOD PRESSURE: 132 MMHG | DIASTOLIC BLOOD PRESSURE: 60 MMHG

## 2025-07-18 VITALS
HEART RATE: 94 BPM | RESPIRATION RATE: 23 BRPM | WEIGHT: 96.8 LBS | SYSTOLIC BLOOD PRESSURE: 104 MMHG | OXYGEN SATURATION: 98 % | TEMPERATURE: 99.4 F | BODY MASS INDEX: 17.81 KG/M2 | DIASTOLIC BLOOD PRESSURE: 69 MMHG | HEIGHT: 62 IN

## 2025-07-18 DIAGNOSIS — R35.0 URINARY FREQUENCY: ICD-10-CM

## 2025-07-18 DIAGNOSIS — R42 DIZZINESS: ICD-10-CM

## 2025-07-18 DIAGNOSIS — R00.0 TACHYCARDIA: ICD-10-CM

## 2025-07-18 DIAGNOSIS — R63.4 WEIGHT LOSS: ICD-10-CM

## 2025-07-18 DIAGNOSIS — R06.02 SHORTNESS OF BREATH: ICD-10-CM

## 2025-07-18 DIAGNOSIS — R68.83 CHILLS: Primary | ICD-10-CM

## 2025-07-18 DIAGNOSIS — R05.9 COUGH, UNSPECIFIED TYPE: ICD-10-CM

## 2025-07-18 LAB
ALBUMIN SERPL-MCNC: 4.8 G/DL (ref 3.5–5.2)
ALP SERPL-CCNC: 84 U/L (ref 35–104)
ALT SERPL-CCNC: 12 U/L (ref 0–35)
ANION GAP SERPL CALCULATED.3IONS-SCNC: 13 MMOL/L (ref 7–16)
AST SERPL-CCNC: 28 U/L (ref 0–35)
BASOPHILS # BLD: 0.04 K/UL (ref 0–0.2)
BASOPHILS NFR BLD: 1 % (ref 0–2)
BILIRUB SERPL-MCNC: 0.4 MG/DL (ref 0–1.2)
BILIRUB UR QL STRIP: NEGATIVE
BILIRUBIN, POC: NORMAL
BLOOD URINE, POC: NORMAL
BUN SERPL-MCNC: 8 MG/DL (ref 6–20)
CALCIUM SERPL-MCNC: 9.7 MG/DL (ref 8.6–10)
CHLORIDE SERPL-SCNC: 105 MMOL/L (ref 98–107)
CHP ED QC CHECK: NORMAL
CLARITY UR: CLEAR
CLARITY, POC: CLEAR
CO2 SERPL-SCNC: 25 MMOL/L (ref 22–29)
COLOR UR: YELLOW
COLOR, POC: YELLOW
CREAT SERPL-MCNC: 0.6 MG/DL (ref 0.5–1)
EKG ATRIAL RATE: 83 BPM
EKG P AXIS: 76 DEGREES
EKG P-R INTERVAL: 142 MS
EKG Q-T INTERVAL: 402 MS
EKG QRS DURATION: 74 MS
EKG QTC CALCULATION (BAZETT): 472 MS
EKG R AXIS: 93 DEGREES
EKG T AXIS: 75 DEGREES
EKG VENTRICULAR RATE: 83 BPM
EOSINOPHIL # BLD: 0.06 K/UL (ref 0.05–0.5)
EOSINOPHILS RELATIVE PERCENT: 1 % (ref 0–6)
ERYTHROCYTE [DISTWIDTH] IN BLOOD BY AUTOMATED COUNT: 13.6 % (ref 11.5–15)
GFR, ESTIMATED: >90 ML/MIN/1.73M2
GLUCOSE BLD-MCNC: 185 MG/DL
GLUCOSE SERPL-MCNC: 79 MG/DL (ref 74–99)
GLUCOSE UR STRIP-MCNC: NEGATIVE MG/DL
GLUCOSE URINE, POC: NORMAL MG/DL
HCT VFR BLD AUTO: 47.9 % (ref 34–48)
HGB BLD-MCNC: 15.8 G/DL (ref 11.5–15.5)
HGB UR QL STRIP.AUTO: NEGATIVE
IMM GRANULOCYTES # BLD AUTO: <0.03 K/UL (ref 0–0.58)
IMM GRANULOCYTES NFR BLD: 0 % (ref 0–5)
KETONES UR STRIP-MCNC: NEGATIVE MG/DL
KETONES, POC: NORMAL MG/DL
LACTATE BLDV-SCNC: 0.9 MMOL/L (ref 0.5–1.9)
LEUKOCYTE EST, POC: NORMAL
LEUKOCYTE ESTERASE UR QL STRIP: NEGATIVE
LYMPHOCYTES NFR BLD: 2.71 K/UL (ref 1.5–4)
LYMPHOCYTES RELATIVE PERCENT: 40 % (ref 20–42)
MCH RBC QN AUTO: 30.6 PG (ref 26–35)
MCHC RBC AUTO-ENTMCNC: 33 G/DL (ref 32–34.5)
MCV RBC AUTO: 92.8 FL (ref 80–99.9)
MONOCYTES NFR BLD: 0.35 K/UL (ref 0.1–0.95)
MONOCYTES NFR BLD: 5 % (ref 2–12)
NEUTROPHILS NFR BLD: 53 % (ref 43–80)
NEUTS SEG NFR BLD: 3.61 K/UL (ref 1.8–7.3)
NITRITE UR QL STRIP: NEGATIVE
NITRITE, POC: NORMAL
PH UR STRIP: 7 [PH] (ref 5–8)
PH, POC: 7
PLATELET # BLD AUTO: 212 K/UL (ref 130–450)
PMV BLD AUTO: 10.5 FL (ref 7–12)
POTASSIUM SERPL-SCNC: 3.4 MMOL/L (ref 3.5–5.1)
PROT SERPL-MCNC: 7.3 G/DL (ref 6.4–8.3)
PROT UR STRIP-MCNC: NEGATIVE MG/DL
PROTEIN, POC: NORMAL MG/DL
RBC # BLD AUTO: 5.16 M/UL (ref 3.5–5.5)
RBC #/AREA URNS HPF: ABNORMAL /HPF
SODIUM SERPL-SCNC: 143 MMOL/L (ref 136–145)
SP GR UR STRIP: <1.005 (ref 1–1.03)
SPECIFIC GRAVITY, POC: 1.02
TROPONIN I SERPL HS-MCNC: <6 NG/L (ref 0–14)
UROBILINOGEN UR STRIP-ACNC: 0.2 EU/DL (ref 0–1)
UROBILINOGEN, POC: 0.2 MG/DL
WBC #/AREA URNS HPF: ABNORMAL /HPF
WBC OTHER # BLD: 6.8 K/UL (ref 4.5–11.5)

## 2025-07-18 PROCEDURE — 6370000000 HC RX 637 (ALT 250 FOR IP): Performed by: EMERGENCY MEDICINE

## 2025-07-18 PROCEDURE — 96374 THER/PROPH/DIAG INJ IV PUSH: CPT

## 2025-07-18 PROCEDURE — 87040 BLOOD CULTURE FOR BACTERIA: CPT

## 2025-07-18 PROCEDURE — 71275 CT ANGIOGRAPHY CHEST: CPT

## 2025-07-18 PROCEDURE — 74177 CT ABD & PELVIS W/CONTRAST: CPT

## 2025-07-18 PROCEDURE — 6370000000 HC RX 637 (ALT 250 FOR IP)

## 2025-07-18 PROCEDURE — 85025 COMPLETE CBC W/AUTO DIFF WBC: CPT

## 2025-07-18 PROCEDURE — 80053 COMPREHEN METABOLIC PANEL: CPT

## 2025-07-18 PROCEDURE — 6360000004 HC RX CONTRAST MEDICATION: Performed by: RADIOLOGY

## 2025-07-18 PROCEDURE — 84484 ASSAY OF TROPONIN QUANT: CPT

## 2025-07-18 PROCEDURE — 81001 URINALYSIS AUTO W/SCOPE: CPT

## 2025-07-18 PROCEDURE — 93005 ELECTROCARDIOGRAM TRACING: CPT

## 2025-07-18 PROCEDURE — 83605 ASSAY OF LACTIC ACID: CPT

## 2025-07-18 PROCEDURE — 6360000002 HC RX W HCPCS

## 2025-07-18 PROCEDURE — 93000 ELECTROCARDIOGRAM COMPLETE: CPT | Performed by: INTERNAL MEDICINE

## 2025-07-18 PROCEDURE — 2580000003 HC RX 258

## 2025-07-18 PROCEDURE — 99285 EMERGENCY DEPT VISIT HI MDM: CPT

## 2025-07-18 RX ORDER — DIAZEPAM 5 MG/1
10 TABLET ORAL ONCE
Status: COMPLETED | OUTPATIENT
Start: 2025-07-18 | End: 2025-07-18

## 2025-07-18 RX ORDER — IPRATROPIUM BROMIDE AND ALBUTEROL SULFATE 2.5; .5 MG/3ML; MG/3ML
2 SOLUTION RESPIRATORY (INHALATION) ONCE
Status: COMPLETED | OUTPATIENT
Start: 2025-07-18 | End: 2025-07-18

## 2025-07-18 RX ORDER — METHYLPREDNISOLONE SODIUM SUCCINATE 125 MG/2ML
125 INJECTION INTRAMUSCULAR; INTRAVENOUS ONCE
Status: COMPLETED | OUTPATIENT
Start: 2025-07-18 | End: 2025-07-18

## 2025-07-18 RX ORDER — 0.9 % SODIUM CHLORIDE 0.9 %
1000 INTRAVENOUS SOLUTION INTRAVENOUS ONCE
Status: COMPLETED | OUTPATIENT
Start: 2025-07-18 | End: 2025-07-18

## 2025-07-18 RX ORDER — IOPAMIDOL 755 MG/ML
75 INJECTION, SOLUTION INTRAVASCULAR
Status: COMPLETED | OUTPATIENT
Start: 2025-07-18 | End: 2025-07-18

## 2025-07-18 RX ORDER — ACETAMINOPHEN 500 MG
1000 TABLET ORAL ONCE
Status: COMPLETED | OUTPATIENT
Start: 2025-07-18 | End: 2025-07-18

## 2025-07-18 RX ADMIN — IPRATROPIUM BROMIDE AND ALBUTEROL SULFATE 2 DOSE: .5; 2.5 SOLUTION RESPIRATORY (INHALATION) at 18:18

## 2025-07-18 RX ADMIN — ACETAMINOPHEN 1000 MG: 500 TABLET ORAL at 18:17

## 2025-07-18 RX ADMIN — DIAZEPAM 10 MG: 5 TABLET ORAL at 18:58

## 2025-07-18 RX ADMIN — SODIUM CHLORIDE 1000 ML: 0.9 INJECTION, SOLUTION INTRAVENOUS at 17:44

## 2025-07-18 RX ADMIN — METHYLPREDNISOLONE SODIUM SUCCINATE 125 MG: 125 INJECTION INTRAMUSCULAR; INTRAVENOUS at 18:18

## 2025-07-18 RX ADMIN — IOPAMIDOL 75 ML: 755 INJECTION, SOLUTION INTRAVENOUS at 19:30

## 2025-07-18 ASSESSMENT — PAIN DESCRIPTION - LOCATION
LOCATION: HEAD
LOCATION: HEAD

## 2025-07-18 ASSESSMENT — PAIN SCALES - GENERAL
PAINLEVEL_OUTOF10: 0
PAINLEVEL_OUTOF10: 5
PAINLEVEL_OUTOF10: 2

## 2025-07-18 ASSESSMENT — ENCOUNTER SYMPTOMS
SINUS PRESSURE: 0
COUGH: 1
NAUSEA: 0
CHEST TIGHTNESS: 0
VOMITING: 0
ABDOMINAL PAIN: 1
SORE THROAT: 0
DIARRHEA: 0
WHEEZING: 0
SHORTNESS OF BREATH: 1

## 2025-07-18 ASSESSMENT — LIFESTYLE VARIABLES
HOW OFTEN DO YOU HAVE A DRINK CONTAINING ALCOHOL: NEVER
HOW MANY STANDARD DRINKS CONTAINING ALCOHOL DO YOU HAVE ON A TYPICAL DAY: PATIENT DOES NOT DRINK

## 2025-07-18 ASSESSMENT — PAIN - FUNCTIONAL ASSESSMENT: PAIN_FUNCTIONAL_ASSESSMENT: 0-10

## 2025-07-18 NOTE — PROGRESS NOTES
[facility]     25  Ana Luisa Pate : 1970 Sex: female  Age: 55 y.o.    Chief Complaint   Patient presents with    Shortness of Breath     Started 2-3 weeks ago with SOB, Dizziness, blurred visiom  \"urinating every 5 minutes\".        HPI    Patient presents to express care today in wheelchair complaining of urinary frequency sweats, chills, shortness of breath, lightheadedness, fatigue, near syncope, coughing, 30 pound weight loss in the last 3 months, blurry vision and unable to focus all of this starting about 2 weeks ago with substantial worsening in the last several days.        Review of Systems   Constitutional:  Positive for chills, fatigue and unexpected weight change. Negative for fever.   HENT:  Negative for congestion, ear pain, postnasal drip, sinus pressure and sore throat.    Eyes:  Positive for visual disturbance.   Respiratory:  Positive for cough and shortness of breath. Negative for chest tightness and wheezing.    Cardiovascular:  Negative for chest pain, palpitations and leg swelling.   Gastrointestinal:  Positive for abdominal pain. Negative for diarrhea, nausea and vomiting.   Genitourinary:  Positive for frequency. Negative for dysuria, flank pain and hematuria.   Musculoskeletal:  Negative for myalgias.   Neurological:  Positive for dizziness, tremors, weakness and light-headedness.   Psychiatric/Behavioral:  Positive for decreased concentration.                REST OF PERTINENT ROS GONE OVER AND WAS NEGATIVE.               Current Outpatient Medications:     fluticasone furoate-vilanterol (BREO ELLIPTA) 100-25 MCG/ACT inhaler, Inhale 1 puff into the lungs daily, Disp: 1 each, Rfl: 1    atorvastatin (LIPITOR) 40 MG tablet, Take 1 tablet by mouth daily, Disp: 30 tablet, Rfl: 3    albuterol sulfate HFA (VENTOLIN HFA) 108 (90 Base) MCG/ACT inhaler, Inhale 2 puffs into the lungs 4 times daily as needed for Wheezing, Disp: 18 g, Rfl: 5    Magnesium Citrate 100 MG CAPS,

## 2025-07-18 NOTE — ED PROVIDER NOTES
hyperlipidemia (07/29/2019), and PTSD (post-traumatic stress disorder).     EMERGENCY DEPARTMENT COURSE and DIFFERENTIAL DIAGNOSIS/MDM:   Vitals:    Vitals:    07/18/25 1800 07/18/25 1845 07/18/25 1859 07/18/25 2058   BP: 121/79  126/82 103/63   Pulse: 74 97 91 80   Resp: 16 14 13 19   Temp:       TempSrc:       SpO2: 98% 100% 100% 98%   Weight:       Height:             ED Course as of 07/18/25 2300   Fri Jul 18, 2025   1840 EKG shows sinus rhythm with occasional ventricular paced complexes 83 bpm QTc is 472 ms no ischemic changes noted this was reviewed interpreted by me Dr. Mcmullen. [JN]      ED Course User Index  [JN] Jony Mcmullen, DO        Is this patient to be included in the SEP-1 core measure? No   Exclusion criteria - the patient is NOT to be included for SEP-1 Core Measure due to:  2+ SIRS criteria are not met     DDX: Pneumonia, UTI, KATARZYNA, electrolyte abnormality, malnutrition, starvation ketosis, neoplasm, bronchitis, viral illness, bacteremia    ED course  55-year-old female arriving with several weeks of chills, weight loss, generalized weakness and a cough.  Symptoms worsening this week.  On arrival appears in no distress.  Vitals are stable with temperature 99.4.  On exam she does have a cough and has some mild wheezing.  She was given DuoNebs and steroids and a liter of fluids.  Her labs were collected which indicated no significant abnormalities with a lactic acid of 0.9.  CT imaging of the lungs and abdomen were done to evaluate for neoplasm given the fact that she has lost upwards of 30 pounds unintentionally and imaging indicated no significant abnormality and no signs of neoplasm or infection.  On reevaluation the patient is wanting to leave her vitals remained stable.  I did have shared decision-making with the patient at bedside and recommended admission pending blood cultures however patient had strong preference for discharge home.  Went over the risks of returning home and strict return  precautions and noted that the patient is reliable and has transportation to return quickly if she does develop a fever.  Her partner also appears concern for her and is reliable.  Ultimately the patient was discharged with strict return precautions at this time and told to have her phone on tomorrow in case her cultures are positive.  Medications   sodium chloride 0.9 % bolus 1,000 mL (0 mLs IntraVENous Stopped 7/18/25 1856)   ipratropium 0.5 mg-albuterol 2.5 mg (DUONEB) nebulizer solution 2 Dose (2 Doses Inhalation Given 7/18/25 1818)   methylPREDNISolone sodium succ (SOLU-MEDROL) injection 125 mg (125 mg IntraVENous Given 7/18/25 1818)   acetaminophen (TYLENOL) tablet 1,000 mg (1,000 mg Oral Given 7/18/25 1817)   diazePAM (VALIUM) tablet 10 mg (10 mg Oral Given 7/18/25 1858)   iopamidol (ISOVUE-370) 76 % injection 75 mL (75 mLs IntraVENous Given 7/18/25 1930)    (medications given in the ED and medications ordered by admitting physician at time of submitting note)      CONSULTS: (Who and What was discussed)  None      Social Determinants : None          I am the Primary Clinician of Record.    FINAL IMPRESSION      1. Chills    2. Weight loss          DISPOSITION/PLAN     DISPOSITION Discharge - Pending Orders Complete 07/18/2025 10:52:47 PM   DISPOSITION CONDITION Stable           PATIENT REFERRED TO:  Rocael Hugo MD  [address] [place] [address] [address] [place] [address] [phone]    Schedule an appointment as soon as possible for a visit   As needed      DISCHARGE MEDICATIONS:  New Prescriptions    No medications on file       DISCONTINUED MEDICATIONS:  Discontinued Medications    No medications on file              (Please note that portions of this note were completed with a voice recognition program.  Efforts were made to edit the dictations but occasionally words are mis-transcribed.)    Eder Gu,  PGY-3

## 2025-07-19 LAB
CULTURE: NORMAL
SPECIMEN DESCRIPTION: NORMAL

## 2025-07-19 NOTE — DISCHARGE INSTRUCTIONS
Return to emergency department if you measure a fever above 100.4 degrees  She was eat as much as you can and consider protein supplement  Keep your phone on full volume tomorrow awaiting a call regarding your blood cultures, if they are negative you will not receive a call.

## 2025-07-19 NOTE — ED NOTES
Ambulated patient down hallway and back to bed. Patient very unstable during ambulation. Hr 117 and SpO2 98% on RA. Patient stated she felt weak and dizzy while ambulating.

## 2025-07-20 LAB
MICROORGANISM SPEC CULT: NORMAL
MICROORGANISM SPEC CULT: NORMAL
SERVICE CMNT-IMP: NORMAL
SERVICE CMNT-IMP: NORMAL
SPECIMEN DESCRIPTION: NORMAL
SPECIMEN DESCRIPTION: NORMAL

## 2025-07-21 LAB
EKG ATRIAL RATE: 83 BPM
EKG P AXIS: 76 DEGREES
EKG P-R INTERVAL: 142 MS
EKG Q-T INTERVAL: 402 MS
EKG QRS DURATION: 74 MS
EKG QTC CALCULATION (BAZETT): 472 MS
EKG R AXIS: 93 DEGREES
EKG T AXIS: 75 DEGREES
EKG VENTRICULAR RATE: 83 BPM

## 2025-07-21 PROCEDURE — 93010 ELECTROCARDIOGRAM REPORT: CPT | Performed by: INTERNAL MEDICINE

## 2025-07-30 DIAGNOSIS — E78.2 MIXED HYPERLIPIDEMIA: Chronic | ICD-10-CM

## 2025-08-01 RX ORDER — ATORVASTATIN CALCIUM 40 MG/1
40 TABLET, FILM COATED ORAL DAILY
Qty: 30 TABLET | Refills: 3 | OUTPATIENT
Start: 2025-08-01

## 2025-08-01 SDOH — HEALTH STABILITY: PHYSICAL HEALTH
ON AVERAGE, HOW MANY DAYS PER WEEK DO YOU ENGAGE IN MODERATE TO STRENUOUS EXERCISE (LIKE A BRISK WALK)?: PATIENT DECLINED

## 2025-08-04 ENCOUNTER — OFFICE VISIT (OUTPATIENT)
Dept: PRIMARY CARE CLINIC | Age: 55
End: 2025-08-04

## 2025-08-04 VITALS
HEIGHT: 62 IN | RESPIRATION RATE: 18 BRPM | WEIGHT: 92.2 LBS | DIASTOLIC BLOOD PRESSURE: 80 MMHG | OXYGEN SATURATION: 98 % | TEMPERATURE: 98 F | HEART RATE: 103 BPM | BODY MASS INDEX: 16.97 KG/M2 | SYSTOLIC BLOOD PRESSURE: 102 MMHG

## 2025-08-04 DIAGNOSIS — R73.01 IMPAIRED FASTING GLUCOSE: ICD-10-CM

## 2025-08-04 DIAGNOSIS — R63.4 UNINTENTIONAL WEIGHT LOSS: Primary | ICD-10-CM

## 2025-08-04 DIAGNOSIS — R63.4 UNINTENTIONAL WEIGHT LOSS: ICD-10-CM

## 2025-08-04 DIAGNOSIS — K58.1 IRRITABLE BOWEL SYNDROME WITH CONSTIPATION: ICD-10-CM

## 2025-08-04 DIAGNOSIS — R59.0 CERVICAL LYMPHADENOPATHY: ICD-10-CM

## 2025-08-04 DIAGNOSIS — R63.6 UNDERWEIGHT: ICD-10-CM

## 2025-08-04 DIAGNOSIS — R53.83 OTHER FATIGUE: ICD-10-CM

## 2025-08-04 DIAGNOSIS — H53.8 BLURRED VISION, BILATERAL: ICD-10-CM

## 2025-08-04 DIAGNOSIS — R10.84 GENERALIZED ABDOMINAL PAIN: ICD-10-CM

## 2025-08-04 DIAGNOSIS — R79.9 ABNORMAL FINDING OF BLOOD CHEMISTRY, UNSPECIFIED: ICD-10-CM

## 2025-08-04 DIAGNOSIS — R93.3 ABNORMAL FINDINGS ON DIAGNOSTIC IMAGING OF OTHER PARTS OF DIGESTIVE TRACT: ICD-10-CM

## 2025-08-04 DIAGNOSIS — E55.9 VITAMIN D DEFICIENCY: ICD-10-CM

## 2025-08-04 DIAGNOSIS — E78.2 MIXED HYPERLIPIDEMIA: Chronic | ICD-10-CM

## 2025-08-04 DIAGNOSIS — K86.2 PANCREATIC CYST: ICD-10-CM

## 2025-08-04 PROBLEM — Z72.0 NICOTINE USE: Status: ACTIVE | Noted: 2024-01-19

## 2025-08-04 PROBLEM — G47.9 DIFFICULTY SLEEPING: Status: ACTIVE | Noted: 2025-08-04

## 2025-08-04 PROBLEM — R11.0 NAUSEA: Status: ACTIVE | Noted: 2025-08-04

## 2025-08-04 PROBLEM — F32.A DEPRESSIVE DISORDER: Status: ACTIVE | Noted: 2025-08-04

## 2025-08-04 LAB
ALBUMIN: 4.4 G/DL (ref 3.5–5.2)
ALP BLD-CCNC: 75 U/L (ref 35–104)
ALT SERPL-CCNC: 12 U/L (ref 0–35)
ANION GAP SERPL CALCULATED.3IONS-SCNC: 14 MMOL/L (ref 7–16)
AST SERPL-CCNC: 30 U/L (ref 0–35)
BASOPHILS ABSOLUTE: 0.03 K/UL (ref 0–0.2)
BASOPHILS RELATIVE PERCENT: 0 % (ref 0–2)
BILIRUB SERPL-MCNC: 0.3 MG/DL (ref 0–1.2)
BILIRUBIN, POC: NORMAL
BLOOD URINE, POC: NORMAL
BUN BLDV-MCNC: 11 MG/DL (ref 6–20)
CALCIUM SERPL-MCNC: 9.8 MG/DL (ref 8.6–10)
CHLORIDE BLD-SCNC: 104 MMOL/L (ref 98–107)
CHOLESTEROL, TOTAL: 148 MG/DL
CLARITY, POC: CLEAR
CO2: 25 MMOL/L (ref 22–29)
COLOR, POC: YELLOW
CREAT SERPL-MCNC: 0.7 MG/DL (ref 0.5–1)
EOSINOPHILS ABSOLUTE: 0.08 K/UL (ref 0.05–0.5)
EOSINOPHILS RELATIVE PERCENT: 1 % (ref 0–6)
FERRITIN: 79 NG/ML
FOLATE: 33.5 NG/ML (ref 4.6–34.8)
GFR, ESTIMATED: >90 ML/MIN/1.73M2
GLUCOSE BLD-MCNC: 87 MG/DL (ref 74–99)
GLUCOSE URINE, POC: NORMAL MG/DL
HBA1C MFR BLD: 5.6 % (ref 4–5.6)
HCT VFR BLD CALC: 43.7 % (ref 34–48)
HDLC SERPL-MCNC: 56 MG/DL
HEMOGLOBIN: 14.5 G/DL (ref 11.5–15.5)
IMMATURE GRANULOCYTES %: 0 % (ref 0–5)
IMMATURE GRANULOCYTES ABSOLUTE: 0.03 K/UL (ref 0–0.58)
IRON % SATURATION: 26 % (ref 15–50)
IRON: 75 UG/DL (ref 37–145)
KETONES, POC: NORMAL MG/DL
LDL CHOLESTEROL: 71 MG/DL
LEUKOCYTE EST, POC: NORMAL
LYMPHOCYTES ABSOLUTE: 2.99 K/UL (ref 1.5–4)
LYMPHOCYTES RELATIVE PERCENT: 36 % (ref 20–42)
MAGNESIUM: 2.1 MG/DL (ref 1.6–2.6)
MCH RBC QN AUTO: 31.3 PG (ref 26–35)
MCHC RBC AUTO-ENTMCNC: 33.2 G/DL (ref 32–34.5)
MCV RBC AUTO: 94.4 FL (ref 80–99.9)
MONOCYTES ABSOLUTE: 0.42 K/UL (ref 0.1–0.95)
MONOCYTES RELATIVE PERCENT: 5 % (ref 2–12)
NEUTROPHILS ABSOLUTE: 4.7 K/UL (ref 1.8–7.3)
NEUTROPHILS RELATIVE PERCENT: 57 % (ref 43–80)
NITRITE, POC: NORMAL
PDW BLD-RTO: 14 % (ref 11.5–15)
PH, POC: 6.5
PLATELET # BLD: 231 K/UL (ref 130–450)
PMV BLD AUTO: 10.9 FL (ref 7–12)
POTASSIUM SERPL-SCNC: 3.9 MMOL/L (ref 3.5–5.1)
PROTEIN, POC: NORMAL MG/DL
RBC # BLD: 4.63 M/UL (ref 3.5–5.5)
SODIUM BLD-SCNC: 142 MMOL/L (ref 136–145)
SPECIFIC GRAVITY, POC: 1.01
T4 FREE: 1.3 NG/DL (ref 0.9–1.7)
TOTAL IRON BINDING CAPACITY: 291 UG/DL (ref 250–450)
TOTAL PROTEIN: 7 G/DL (ref 6.4–8.3)
TRIGL SERPL-MCNC: 103 MG/DL
TSH SERPL DL<=0.05 MIU/L-ACNC: 0.55 UIU/ML (ref 0.27–4.2)
UROBILINOGEN, POC: 0.2 MG/DL
VITAMIN B-12: >2000 PG/ML (ref 232–1245)
VITAMIN D 25-HYDROXY: 44.5 NG/ML (ref 30–100)
VLDLC SERPL CALC-MCNC: 21 MG/DL
WBC # BLD: 8.3 K/UL (ref 4.5–11.5)

## 2025-08-04 RX ORDER — ATORVASTATIN CALCIUM 40 MG/1
40 TABLET, FILM COATED ORAL DAILY
Qty: 90 TABLET | Refills: 1 | Status: SHIPPED | OUTPATIENT
Start: 2025-08-04

## 2025-08-04 RX ORDER — METOCLOPRAMIDE 10 MG/1
10 TABLET ORAL
Qty: 120 TABLET | Refills: 3 | Status: SHIPPED | OUTPATIENT
Start: 2025-08-04

## 2025-08-04 ASSESSMENT — ENCOUNTER SYMPTOMS
WHEEZING: 0
DIARRHEA: 0
NAUSEA: 1
COUGH: 0
VOMITING: 0
SHORTNESS OF BREATH: 0

## 2025-08-05 ASSESSMENT — ENCOUNTER SYMPTOMS
CONSTIPATION: 1
ABDOMINAL PAIN: 1

## 2025-08-07 ENCOUNTER — OFFICE VISIT (OUTPATIENT)
Dept: SURGERY | Age: 55
End: 2025-08-07

## 2025-08-07 VITALS
WEIGHT: 92 LBS | TEMPERATURE: 98.2 F | SYSTOLIC BLOOD PRESSURE: 120 MMHG | BODY MASS INDEX: 16.93 KG/M2 | DIASTOLIC BLOOD PRESSURE: 72 MMHG | HEART RATE: 85 BPM | OXYGEN SATURATION: 97 % | RESPIRATION RATE: 18 BRPM | HEIGHT: 62 IN

## 2025-08-07 DIAGNOSIS — K82.8 DILATED GALLBLADDER: ICD-10-CM

## 2025-08-07 DIAGNOSIS — Z09 ENCOUNTER FOR FOLLOW-UP EXAMINATION AFTER COMPLETED TREATMENT FOR CONDITIONS OTHER THAN MALIGNANT NEOPLASM: ICD-10-CM

## 2025-08-07 DIAGNOSIS — R63.4 WEIGHT LOSS, UNINTENTIONAL: ICD-10-CM

## 2025-08-07 DIAGNOSIS — K59.00 CONSTIPATION, UNSPECIFIED CONSTIPATION TYPE: ICD-10-CM

## 2025-08-07 DIAGNOSIS — R10.9 ABDOMINAL PAIN, UNSPECIFIED ABDOMINAL LOCATION: ICD-10-CM

## 2025-08-07 DIAGNOSIS — D37.9 NEOPLASM OF UNCERTAIN BEHAVIOR OF DIGESTIVE ORGAN, UNSPECIFIED: ICD-10-CM

## 2025-08-07 DIAGNOSIS — D49.0 IPMN (INTRADUCTAL PAPILLARY MUCINOUS NEOPLASM): ICD-10-CM

## 2025-08-07 DIAGNOSIS — K86.89 DILATION OF PANCREATIC DUCT: ICD-10-CM

## 2025-08-07 DIAGNOSIS — K86.89 DILATION OF PANCREATIC DUCT: Primary | ICD-10-CM

## 2025-08-07 DIAGNOSIS — K83.8 DILATION OF BILIARY TRACT: ICD-10-CM

## 2025-08-07 LAB — CARCINOEMBRYONIC ANTIGEN: 8.7 NG/ML (ref 0–5.2)

## 2025-08-09 LAB — CA 19-9: 27 U/ML (ref 0–35)

## 2025-08-10 DIAGNOSIS — R63.4 WEIGHT LOSS, UNINTENTIONAL: ICD-10-CM

## 2025-08-10 DIAGNOSIS — D49.0 IPMN (INTRADUCTAL PAPILLARY MUCINOUS NEOPLASM): Primary | ICD-10-CM

## 2025-08-10 DIAGNOSIS — K83.8 DILATION OF BILIARY TRACT: ICD-10-CM

## 2025-08-12 ENCOUNTER — TELEPHONE (OUTPATIENT)
Dept: SURGERY | Age: 55
End: 2025-08-12

## 2025-08-12 PROBLEM — R63.4 WEIGHT LOSS, UNINTENTIONAL: Status: ACTIVE | Noted: 2025-08-12

## 2025-08-12 PROBLEM — R97.0 ELEVATED CEA: Status: ACTIVE | Noted: 2025-08-12

## 2025-08-12 PROBLEM — R10.9 ABDOMINAL PAIN: Status: ACTIVE | Noted: 2025-08-12

## 2025-08-19 ENCOUNTER — TELEPHONE (OUTPATIENT)
Dept: SURGERY | Age: 55
End: 2025-08-19

## 2025-08-21 ENCOUNTER — ANESTHESIA EVENT (OUTPATIENT)
Dept: ENDOSCOPY | Age: 55
End: 2025-08-21
Payer: MEDICARE

## 2025-08-21 ENCOUNTER — ANESTHESIA (OUTPATIENT)
Dept: ENDOSCOPY | Age: 55
End: 2025-08-21
Payer: MEDICARE

## 2025-08-21 ENCOUNTER — HOSPITAL ENCOUNTER (OUTPATIENT)
Age: 55
Setting detail: OUTPATIENT SURGERY
Discharge: HOME | End: 2025-08-21
Attending: SURGERY | Admitting: SURGERY
Payer: MEDICARE

## 2025-08-21 VITALS
HEIGHT: 62 IN | HEART RATE: 88 BPM | OXYGEN SATURATION: 96 % | WEIGHT: 88 LBS | BODY MASS INDEX: 16.2 KG/M2 | RESPIRATION RATE: 12 BRPM | DIASTOLIC BLOOD PRESSURE: 45 MMHG | TEMPERATURE: 98.6 F | SYSTOLIC BLOOD PRESSURE: 95 MMHG

## 2025-08-21 DIAGNOSIS — R97.0 ELEVATED CEA: ICD-10-CM

## 2025-08-21 DIAGNOSIS — R63.4 WEIGHT LOSS, UNINTENTIONAL: ICD-10-CM

## 2025-08-21 DIAGNOSIS — R10.9 ABDOMINAL PAIN: ICD-10-CM

## 2025-08-21 PROCEDURE — 2580000003 HC RX 258: Performed by: NURSE ANESTHETIST, CERTIFIED REGISTERED

## 2025-08-21 PROCEDURE — 7100000010 HC PHASE II RECOVERY - FIRST 15 MIN: Performed by: SURGERY

## 2025-08-21 PROCEDURE — 7100000011 HC PHASE II RECOVERY - ADDTL 15 MIN: Performed by: SURGERY

## 2025-08-21 PROCEDURE — 3700000000 HC ANESTHESIA ATTENDED CARE: Performed by: SURGERY

## 2025-08-21 PROCEDURE — 3609010300 HC COLONOSCOPY W/BIOPSY SINGLE/MULTIPLE: Performed by: SURGERY

## 2025-08-21 PROCEDURE — 6360000002 HC RX W HCPCS: Performed by: NURSE ANESTHETIST, CERTIFIED REGISTERED

## 2025-08-21 PROCEDURE — 2709999900 HC NON-CHARGEABLE SUPPLY: Performed by: SURGERY

## 2025-08-21 PROCEDURE — 3609012400 HC EGD TRANSORAL BIOPSY SINGLE/MULTIPLE: Performed by: SURGERY

## 2025-08-21 PROCEDURE — 3700000001 HC ADD 15 MINUTES (ANESTHESIA): Performed by: SURGERY

## 2025-08-21 PROCEDURE — 88305 TISSUE EXAM BY PATHOLOGIST: CPT

## 2025-08-21 PROCEDURE — 43239 EGD BIOPSY SINGLE/MULTIPLE: CPT | Performed by: SURGERY

## 2025-08-21 PROCEDURE — 45380 COLONOSCOPY AND BIOPSY: CPT | Performed by: SURGERY

## 2025-08-21 RX ORDER — SODIUM CHLORIDE 0.9 % (FLUSH) 0.9 %
5-40 SYRINGE (ML) INJECTION EVERY 12 HOURS SCHEDULED
Status: DISCONTINUED | OUTPATIENT
Start: 2025-08-21 | End: 2025-08-21 | Stop reason: HOSPADM

## 2025-08-21 RX ORDER — PROPOFOL 10 MG/ML
INJECTION, EMULSION INTRAVENOUS
Status: DISCONTINUED | OUTPATIENT
Start: 2025-08-21 | End: 2025-08-21 | Stop reason: SDUPTHER

## 2025-08-21 RX ORDER — HYDRALAZINE HYDROCHLORIDE 20 MG/ML
10 INJECTION INTRAMUSCULAR; INTRAVENOUS
Status: DISCONTINUED | OUTPATIENT
Start: 2025-08-21 | End: 2025-08-21 | Stop reason: HOSPADM

## 2025-08-21 RX ORDER — SODIUM CHLORIDE 9 MG/ML
INJECTION, SOLUTION INTRAVENOUS PRN
Status: DISCONTINUED | OUTPATIENT
Start: 2025-08-21 | End: 2025-08-21 | Stop reason: HOSPADM

## 2025-08-21 RX ORDER — LIDOCAINE HYDROCHLORIDE 20 MG/ML
INJECTION, SOLUTION EPIDURAL; INFILTRATION; INTRACAUDAL; PERINEURAL
Status: DISCONTINUED | OUTPATIENT
Start: 2025-08-21 | End: 2025-08-21 | Stop reason: SDUPTHER

## 2025-08-21 RX ORDER — DROPERIDOL 2.5 MG/ML
0.62 INJECTION, SOLUTION INTRAMUSCULAR; INTRAVENOUS
Status: DISCONTINUED | OUTPATIENT
Start: 2025-08-21 | End: 2025-08-21 | Stop reason: HOSPADM

## 2025-08-21 RX ORDER — LABETALOL HYDROCHLORIDE 5 MG/ML
10 INJECTION, SOLUTION INTRAVENOUS
Status: DISCONTINUED | OUTPATIENT
Start: 2025-08-21 | End: 2025-08-21 | Stop reason: HOSPADM

## 2025-08-21 RX ORDER — PROCHLORPERAZINE EDISYLATE 5 MG/ML
5 INJECTION INTRAMUSCULAR; INTRAVENOUS
Status: DISCONTINUED | OUTPATIENT
Start: 2025-08-21 | End: 2025-08-21 | Stop reason: HOSPADM

## 2025-08-21 RX ORDER — SODIUM CHLORIDE 9 MG/ML
INJECTION, SOLUTION INTRAVENOUS
Status: DISCONTINUED | OUTPATIENT
Start: 2025-08-21 | End: 2025-08-21 | Stop reason: SDUPTHER

## 2025-08-21 RX ORDER — SODIUM CHLORIDE 0.9 % (FLUSH) 0.9 %
5-40 SYRINGE (ML) INJECTION PRN
Status: DISCONTINUED | OUTPATIENT
Start: 2025-08-21 | End: 2025-08-21 | Stop reason: HOSPADM

## 2025-08-21 RX ADMIN — SODIUM CHLORIDE: 9 INJECTION, SOLUTION INTRAVENOUS at 07:00

## 2025-08-21 RX ADMIN — LIDOCAINE HYDROCHLORIDE 5 ML: 20 INJECTION, SOLUTION EPIDURAL; INFILTRATION; INTRACAUDAL; PERINEURAL at 07:30

## 2025-08-21 RX ADMIN — PROPOFOL 400 MG: 10 INJECTION, EMULSION INTRAVENOUS at 07:30

## 2025-08-21 ASSESSMENT — PAIN - FUNCTIONAL ASSESSMENT
PAIN_FUNCTIONAL_ASSESSMENT: 0-10
PAIN_FUNCTIONAL_ASSESSMENT: 0-10

## 2025-08-21 ASSESSMENT — PAIN SCALES - GENERAL
PAINLEVEL_OUTOF10: 0
PAINLEVEL_OUTOF10: 0

## 2025-08-21 ASSESSMENT — LIFESTYLE VARIABLES: SMOKING_STATUS: 1

## 2025-08-26 ENCOUNTER — OFFICE VISIT (OUTPATIENT)
Dept: SURGERY | Age: 55
End: 2025-08-26
Payer: MEDICARE

## 2025-08-26 VITALS
RESPIRATION RATE: 12 BRPM | HEART RATE: 87 BPM | OXYGEN SATURATION: 96 % | TEMPERATURE: 98 F | BODY MASS INDEX: 17.11 KG/M2 | SYSTOLIC BLOOD PRESSURE: 109 MMHG | HEIGHT: 62 IN | WEIGHT: 93 LBS | DIASTOLIC BLOOD PRESSURE: 72 MMHG

## 2025-08-26 DIAGNOSIS — R97.0 ELEVATED CEA: ICD-10-CM

## 2025-08-26 DIAGNOSIS — D49.0 IPMN (INTRADUCTAL PAPILLARY MUCINOUS NEOPLASM): Primary | ICD-10-CM

## 2025-08-26 LAB — SURGICAL PATHOLOGY REPORT: NORMAL

## 2025-08-26 PROCEDURE — 99212 OFFICE O/P EST SF 10 MIN: CPT | Performed by: TRANSPLANT SURGERY

## 2025-08-27 ASSESSMENT — LIFESTYLE VARIABLES
HOW OFTEN DO YOU HAVE A DRINK CONTAINING ALCOHOL: NEVER
HOW MANY STANDARD DRINKS CONTAINING ALCOHOL DO YOU HAVE ON A TYPICAL DAY: 0
HOW OFTEN DO YOU HAVE A DRINK CONTAINING ALCOHOL: 1
HOW MANY STANDARD DRINKS CONTAINING ALCOHOL DO YOU HAVE ON A TYPICAL DAY: PATIENT DOES NOT DRINK
HOW OFTEN DO YOU HAVE SIX OR MORE DRINKS ON ONE OCCASION: 1

## 2025-08-27 ASSESSMENT — PATIENT HEALTH QUESTIONNAIRE - PHQ9
SUM OF ALL RESPONSES TO PHQ QUESTIONS 1-9: 0
2. FEELING DOWN, DEPRESSED OR HOPELESS: NOT AT ALL
SUM OF ALL RESPONSES TO PHQ QUESTIONS 1-9: 0
1. LITTLE INTEREST OR PLEASURE IN DOING THINGS: NOT AT ALL

## 2025-09-03 ENCOUNTER — OFFICE VISIT (OUTPATIENT)
Dept: PRIMARY CARE CLINIC | Age: 55
End: 2025-09-03

## 2025-09-03 VITALS
HEIGHT: 62 IN | BODY MASS INDEX: 17.48 KG/M2 | TEMPERATURE: 97.5 F | WEIGHT: 95 LBS | DIASTOLIC BLOOD PRESSURE: 60 MMHG | SYSTOLIC BLOOD PRESSURE: 90 MMHG | HEART RATE: 97 BPM | OXYGEN SATURATION: 99 % | RESPIRATION RATE: 18 BRPM

## 2025-09-03 VITALS
DIASTOLIC BLOOD PRESSURE: 60 MMHG | OXYGEN SATURATION: 99 % | SYSTOLIC BLOOD PRESSURE: 90 MMHG | BODY MASS INDEX: 17.48 KG/M2 | TEMPERATURE: 97.5 F | HEART RATE: 97 BPM | WEIGHT: 95 LBS | RESPIRATION RATE: 18 BRPM | HEIGHT: 62 IN

## 2025-09-03 DIAGNOSIS — R63.4 UNINTENTIONAL WEIGHT LOSS: Primary | ICD-10-CM

## 2025-09-03 DIAGNOSIS — H61.23 BILATERAL IMPACTED CERUMEN: ICD-10-CM

## 2025-09-03 DIAGNOSIS — R07.9 CHEST PAIN, UNSPECIFIED TYPE: ICD-10-CM

## 2025-09-03 DIAGNOSIS — R63.6 UNDERWEIGHT: ICD-10-CM

## 2025-09-03 DIAGNOSIS — G47.00 INSOMNIA, UNSPECIFIED TYPE: ICD-10-CM

## 2025-09-03 DIAGNOSIS — K13.79 MOUTH SORE: ICD-10-CM

## 2025-09-03 DIAGNOSIS — Z00.00 MEDICARE ANNUAL WELLNESS VISIT, SUBSEQUENT: Primary | ICD-10-CM

## 2025-09-03 DIAGNOSIS — R59.0 CERVICAL LYMPHADENOPATHY: ICD-10-CM

## 2025-09-03 DIAGNOSIS — Z87.820 HX OF MULTIPLE CONCUSSIONS: ICD-10-CM

## 2025-09-03 RX ORDER — NICOTINE 21 MG/24HR
1 PATCH, TRANSDERMAL 24 HOURS TRANSDERMAL EVERY 24 HOURS
Qty: 30 PATCH | Refills: 2 | Status: SHIPPED | OUTPATIENT
Start: 2025-09-03 | End: 2025-12-02

## 2025-09-03 RX ORDER — HYDROXYZINE PAMOATE 25 MG/1
25 CAPSULE ORAL 3 TIMES DAILY PRN
Qty: 90 CAPSULE | Refills: 0 | Status: SHIPPED | OUTPATIENT
Start: 2025-09-03 | End: 2025-10-03

## 2025-09-03 ASSESSMENT — ENCOUNTER SYMPTOMS
VOMITING: 0
WHEEZING: 0
DIARRHEA: 0
COUGH: 0
NAUSEA: 0
SHORTNESS OF BREATH: 0
ABDOMINAL PAIN: 0

## 2025-09-03 ASSESSMENT — PATIENT HEALTH QUESTIONNAIRE - PHQ9
8. MOVING OR SPEAKING SO SLOWLY THAT OTHER PEOPLE COULD HAVE NOTICED. OR THE OPPOSITE, BEING SO FIGETY OR RESTLESS THAT YOU HAVE BEEN MOVING AROUND A LOT MORE THAN USUAL: NOT AT ALL
2. FEELING DOWN, DEPRESSED OR HOPELESS: SEVERAL DAYS
10. IF YOU CHECKED OFF ANY PROBLEMS, HOW DIFFICULT HAVE THESE PROBLEMS MADE IT FOR YOU TO DO YOUR WORK, TAKE CARE OF THINGS AT HOME, OR GET ALONG WITH OTHER PEOPLE: VERY DIFFICULT
9. THOUGHTS THAT YOU WOULD BE BETTER OFF DEAD, OR OF HURTING YOURSELF: NOT AT ALL
4. FEELING TIRED OR HAVING LITTLE ENERGY: NEARLY EVERY DAY
1. LITTLE INTEREST OR PLEASURE IN DOING THINGS: SEVERAL DAYS
7. TROUBLE CONCENTRATING ON THINGS, SUCH AS READING THE NEWSPAPER OR WATCHING TELEVISION: NOT AT ALL
6. FEELING BAD ABOUT YOURSELF - OR THAT YOU ARE A FAILURE OR HAVE LET YOURSELF OR YOUR FAMILY DOWN: NOT AT ALL
SUM OF ALL RESPONSES TO PHQ QUESTIONS 1-9: 8
5. POOR APPETITE OR OVEREATING: NOT AT ALL
SUM OF ALL RESPONSES TO PHQ QUESTIONS 1-9: 8
3. TROUBLE FALLING OR STAYING ASLEEP: NEARLY EVERY DAY
SUM OF ALL RESPONSES TO PHQ QUESTIONS 1-9: 8
SUM OF ALL RESPONSES TO PHQ QUESTIONS 1-9: 8

## 2025-09-05 ENCOUNTER — TELEPHONE (OUTPATIENT)
Dept: ENT CLINIC | Age: 55
End: 2025-09-05

## (undated) DEVICE — APPLICATOR PREP 26ML 0.7% IOD POVACRYLEX 74% ISO ALC ST

## (undated) DEVICE — ELECTRODE PT RET AD L9FT HI MOIST COND ADH HYDRGEL CORDED

## (undated) DEVICE — NEEDLE FLTR 18GA L1.5IN MEM THK5UM BLNT DISP

## (undated) DEVICE — VALVE SUCTION AIR H2O HYDR H2O JET CONN STRL ORCA POD + DISP

## (undated) DEVICE — ENDOSCOPIC ULTRASOUND ASPIRATION NEEDLE: Brand: EXPECT SLIMLINE SL

## (undated) DEVICE — LIGHT SOURCE WHT

## (undated) DEVICE — LIQUIBAND RAPID ADHESIVE 36/CS 0.8ML: Brand: MEDLINE

## (undated) DEVICE — TOWEL,OR,DSP,ST,BLUE,STD,6/PK,12PK/CS: Brand: MEDLINE

## (undated) DEVICE — GLOVE SURG L 291 MM CUF THK 0.17 MM FNGR THK 0.2 MM PALM THK

## (undated) DEVICE — MASK,FACE,MAXFLUIDPROTECT,SHIELD/ERLPS: Brand: MEDLINE

## (undated) DEVICE — TUBING SUCT 12FR MAL ALUM SHFT FN CAP VENT UNIV CONN W/ OBT

## (undated) DEVICE — FORCEPS BX OVL CUP FEN DISPOSABLE CAP L 160CM RAD JAW 4

## (undated) DEVICE — Device: Brand: BALLOON3

## (undated) DEVICE — GAUZE,SPONGE,4"X4",16PLY,XRAY,STRL,LF: Brand: MEDLINE

## (undated) DEVICE — CLAMP KELLY CURVED

## (undated) DEVICE — MARKER,SKIN,WI/RULER AND LABELS: Brand: MEDLINE

## (undated) DEVICE — 6 X 9  1.75MIL 4-WALL LABGUARD: Brand: MINIGRIP COMMERCIAL LLC

## (undated) DEVICE — STANDARD HYPODERMIC NEEDLE,ALUMINUM HUB: Brand: MONOJECT

## (undated) DEVICE — INTENDED FOR TISSUE SEPARATION, AND OTHER PROCEDURES THAT REQUIRE A SHARP SURGICAL BLADE TO PUNCTURE OR CUT.: Brand: BARD-PARKER ® STAINLESS STEEL BLADES

## (undated) DEVICE — CHLORAPREP 26ML ORANGE

## (undated) DEVICE — MEDIA CONTRAST ISOVUE  300 10X50ML

## (undated) DEVICE — GOWN,ECLIPSE,FABRNF,XL,30/CS: Brand: MEDLINE

## (undated) DEVICE — BANDAGE ADH W0.75XL3IN UNIV WVN FAB NAT GEN USE STRP N ADH

## (undated) DEVICE — YANKAUER,BULB TIP,W/O VENT,RIGID,STERILE: Brand: MEDLINE

## (undated) DEVICE — 4-PORT MANIFOLD: Brand: NEPTUNE 2

## (undated) DEVICE — CANNULA NSL ORAL AD FOR CAPNOFLEX CO2 O2 AIRLFE

## (undated) DEVICE — SOLUTION IV IRRIG POUR BRL 0.9% SODIUM CHL 2F7124

## (undated) DEVICE — GLOVE SURG SZ 8 CRM LTX FREE POLYISOPRENE POLYMER BEAD ANTI

## (undated) DEVICE — PENCIL ES CRD L10FT HND SWCHING ROCK SWCH W/ EDGE COAT BLDE

## (undated) DEVICE — LEGGINGS, PAIR, 31X48, STERILE: Brand: MEDLINE

## (undated) DEVICE — DRAPE,REIN 53X77,STERILE: Brand: MEDLINE

## (undated) DEVICE — NEEDLE EPI 18GA TUOHY WNG W/ CLR HUB PERIFIX

## (undated) DEVICE — COVER,LIGHT HANDLE,FLX,2/PK: Brand: MEDLINE INDUSTRIES, INC.

## (undated) DEVICE — SNARE ENDOSCP POLYP SM 2.4 MM 195 CM 13 MM 2.8 MM CAPTIVATOR

## (undated) DEVICE — SPONGE GZ W4XL4IN RAYON POLY FILL CVR W/ NONWOVEN FAB

## (undated) DEVICE — TRAY,VAG PREP,2PR VNYL GLV,4 C: Brand: MEDLINE INDUSTRIES, INC.

## (undated) DEVICE — JELLY,LUBE,STERILE,FLIP TOP,TUBE,2-OZ: Brand: MEDLINE

## (undated) DEVICE — KENDALL 450 SERIES MONITORING FOAM ELECTRODE - RECTANGULAR SHAPE ( 3/PK): Brand: KENDALL

## (undated) DEVICE — SHEET,DRAPE,53X77,STERILE: Brand: MEDLINE

## (undated) DEVICE — CUP MEDICINE ST

## (undated) DEVICE — TUBING, SUCTION, 1/4" X 10', STRAIGHT: Brand: MEDLINE

## (undated) DEVICE — PROGRAMMER SMART COMM W/HANDSET F/SACRAL NRVE STIMULATORS

## (undated) DEVICE — SWABSTCK, BENZOIN TINCTURE, 1/PK, STRL: Brand: APLICARE

## (undated) DEVICE — NEEDLE HYPO 25GA L1.5IN BLU POLYPR HUB S STL REG BVL STR

## (undated) DEVICE — DOUBLE BASIN SET: Brand: MEDLINE INDUSTRIES, INC.

## (undated) DEVICE — COUNTER NDL 30 COUNT DBL MAG

## (undated) DEVICE — CONTAINER SPEC COLL 960ML POLYPR TRIANG GRAD INTAKE/OUTPUT

## (undated) DEVICE — PACK PROCEDURE SURG GEN CUST

## (undated) DEVICE — Y-TYPE TUR/BLADDER IRRIGATION SET, REGULATING CLAMP

## (undated) DEVICE — TAPE,WATERPROOF,CURAD,2"X10YD,LF,72/CS: Brand: CURAD

## (undated) DEVICE — GAUZE,SPONGE,4"X4",8PLY,STRL,LF,10/TRAY: Brand: MEDLINE

## (undated) DEVICE — BITEBLOCK 54FR W/ DENT RIM BLOX

## (undated) DEVICE — NEEDLE,22GX1.5",REG,BEVEL: Brand: MEDLINE

## (undated) DEVICE — STANDARD HYPODERMIC NEEDLE,POLYPROPYLENE HUB: Brand: MONOJECT

## (undated) DEVICE — ELECTRODE ELECSURG NDL 2.8 INX7.2 CM COAT INSUL EDGE

## (undated) DEVICE — SURGICAL PROCEDURE PACK EENT CUST

## (undated) DEVICE — SYSTEM INJ BILI RAP REFIL CONT

## (undated) DEVICE — SOLUTION IRRIG 1000ML STRL H2O USP PLAS POUR BTL

## (undated) DEVICE — C-ARM: Brand: UNBRANDED

## (undated) DEVICE — DRESSING COMP W4XL4IN N ADH PD W2.5XL2.5IN GZ BORDERED ADH

## (undated) DEVICE — GLOVE SURG SZ 75 CRM LTX FREE POLYISOPRENE POLYMER BEAD ANTI

## (undated) DEVICE — PAD,NON-ADHERENT,2X3,STERILE,LF,1/PK: Brand: MEDLINE

## (undated) DEVICE — SPONGE GZ 4IN 4IN 4 PLY N WVN AVANT

## (undated) DEVICE — PAD,SANITARY,11 IN,MAXI,N-STRL,IND WRAP: Brand: MEDLINE

## (undated) DEVICE — FORCEPS BX L240CM JAW DIA2.4MM ORNG L CAP W/ NDL DISP RAD

## (undated) DEVICE — Device: Brand: DEFENDO VALVE AND CONNECTOR KIT

## (undated) DEVICE — BLOCK BITE 60FR CAREGUARD

## (undated) DEVICE — BLADE,STAINLESS-STEEL,11,STRL,DISPOSABLE: Brand: MEDLINE

## (undated) DEVICE — FORMALIN  10%NBF 60ML PREFLL CONT

## (undated) DEVICE — LUBRICANT SURG JELLY ST BACTER TUBE 4.25OZ

## (undated) DEVICE — SET FLD COLL CLR W/ BLT IN WARN SYS FOR HYSTSCP DOLPHIN

## (undated) DEVICE — SOLUTION IV 1000ML 0.9% SOD CHL PH 5 INJ USP VIAFLX PLAS

## (undated) DEVICE — SYSTEM BX CAP BILI RAP EXCHG CAP LOK DEV COMPATIBLE W/ OLY

## (undated) DEVICE — TRAP POLYP ETRAP

## (undated) DEVICE — KIT BEDSIDE REVITAL OX 500ML

## (undated) DEVICE — SYRINGE,CONTROL,LL,FINGER,GRIP: Brand: MEDLINE INDUSTRIES, INC.

## (undated) DEVICE — ABSORBENT, WATERPROOF, BACTERIA PROOF FILM DRESSING: Brand: OPSITE POST OP 15.5X8.5CM CTN 20

## (undated) DEVICE — ENDOSCOPIC ULTRASOUND FINE NEEDLE BIOPSY (FNB) DEVICE: Brand: ACQUIRE

## (undated) DEVICE — SYRINGE, LUER LOCK, 10ML: Brand: MEDLINE

## (undated) DEVICE — SPONGE GZ W4XL4IN RAYON POLY CVR W/NONWOVEN FAB STRL 2/PK

## (undated) DEVICE — GRADUATE TRIANG MEASURE 1000ML BLK PRNT

## (undated) DEVICE — GLOVE ORANGE PI 7 1/2   MSG9075

## (undated) DEVICE — NEEDLE SPNL 22GA L5IN BLK HUB S STL W/ QNCKE PNT W/OUT

## (undated) DEVICE — SYRINGE, LUER LOCK, 5ML: Brand: MEDLINE

## (undated) DEVICE — COVER HNDL LT DISP

## (undated) DEVICE — GOWN,SIRUS,FABRNF,L,20/CS: Brand: MEDLINE

## (undated) DEVICE — GOWN ISOLATN REG YEL M WT MULTIPLY SIDETIE LEV 2

## (undated) DEVICE — GOWN,SIRUS,FABRNF,XL,20/CS: Brand: MEDLINE

## (undated) DEVICE — BAG PRSS INFUS 1000ML 2 PRSS SFTY VLV RIG HNGR SLIP EASILY

## (undated) DEVICE — CONNECTOR IRRIGATION AUXILIARY H2O JET W/ PRT MTL THRD HYDR

## (undated) DEVICE — FORCEPS BX L240CM JAW DIA2.8MM L CAP W/ NDL MIC MESH TOOTH

## (undated) DEVICE — STRIP,CLOSURE,WOUND,MEDI-STRIP,1/2X4: Brand: MEDLINE

## (undated) DEVICE — SPHINCTEROTOME: Brand: HYDRATOME RX 44

## (undated) DEVICE — CONTROL SYRINGE LUER-LOCK TIP: Brand: MONOJECT

## (undated) DEVICE — BASIC SINGLE BASIN 1-LF: Brand: MEDLINE INDUSTRIES, INC.

## (undated) DEVICE — BLOCK BITE 60FR RUBBER ADLT DENTAL

## (undated) DEVICE — DRAPE,LAPAROTOMY,PCH,STERILE: Brand: MEDLINE

## (undated) DEVICE — BLADE,STAINLESS-STEEL,15,STRL,DISPOSABLE: Brand: MEDLINE